# Patient Record
Sex: FEMALE | Race: WHITE | HISPANIC OR LATINO | Employment: OTHER | URBAN - METROPOLITAN AREA
[De-identification: names, ages, dates, MRNs, and addresses within clinical notes are randomized per-mention and may not be internally consistent; named-entity substitution may affect disease eponyms.]

---

## 2017-01-25 RX ORDER — DIAZEPAM 5 MG/1
TABLET ORAL
Qty: 30 TAB | Refills: 0 | Status: ON HOLD | OUTPATIENT
Start: 2017-01-25 | End: 2017-02-17

## 2017-01-26 RX ORDER — ESCITALOPRAM OXALATE 10 MG/1
TABLET ORAL
Qty: 90 TAB | Refills: 0 | Status: ON HOLD | OUTPATIENT
Start: 2017-01-26 | End: 2017-02-17

## 2017-02-16 ENCOUNTER — TELEPHONE (OUTPATIENT)
Dept: CARDIOLOGY | Facility: MEDICAL CENTER | Age: 72
End: 2017-02-16

## 2017-02-17 ENCOUNTER — HOSPITAL ENCOUNTER (INPATIENT)
Facility: MEDICAL CENTER | Age: 72
LOS: 8 days | DRG: 234 | End: 2017-02-27
Attending: INTERNAL MEDICINE | Admitting: INTERNAL MEDICINE
Payer: MEDICARE

## 2017-02-17 ENCOUNTER — TELEPHONE (OUTPATIENT)
Dept: CARDIOLOGY | Facility: MEDICAL CENTER | Age: 72
End: 2017-02-17

## 2017-02-17 ENCOUNTER — RESOLUTE PROFESSIONAL BILLING HOSPITAL PROF FEE (OUTPATIENT)
Dept: PULMONOLOGY | Facility: HOSPICE | Age: 72
End: 2017-02-17
Payer: MEDICARE

## 2017-02-17 DIAGNOSIS — I25.10 CORONARY ARTERY DISEASE INVOLVING NATIVE CORONARY ARTERY OF NATIVE HEART WITHOUT ANGINA PECTORIS: ICD-10-CM

## 2017-02-17 LAB
EKG IMPRESSION: NORMAL
GLUCOSE BLD-MCNC: 96 MG/DL (ref 65–99)
GLUCOSE BLD-MCNC: 97 MG/DL (ref 65–99)
INR PPP: 0.94 (ref 0.87–1.13)
PROTHROMBIN TIME: 12.9 SEC (ref 12–14.6)
TROPONIN I SERPL-MCNC: <0.01 NG/ML (ref 0–0.04)
TROPONIN I SERPL-MCNC: <0.01 NG/ML (ref 0–0.04)

## 2017-02-17 PROCEDURE — C1894 INTRO/SHEATH, NON-LASER: HCPCS

## 2017-02-17 PROCEDURE — B2151ZZ FLUOROSCOPY OF LEFT HEART USING LOW OSMOLAR CONTRAST: ICD-10-PCS | Performed by: INTERNAL MEDICINE

## 2017-02-17 PROCEDURE — B2111ZZ FLUOROSCOPY OF MULTIPLE CORONARY ARTERIES USING LOW OSMOLAR CONTRAST: ICD-10-PCS | Performed by: INTERNAL MEDICINE

## 2017-02-17 PROCEDURE — C1887 CATHETER, GUIDING: HCPCS

## 2017-02-17 PROCEDURE — 700101 HCHG RX REV CODE 250

## 2017-02-17 PROCEDURE — C1769 GUIDE WIRE: HCPCS

## 2017-02-17 PROCEDURE — A9270 NON-COVERED ITEM OR SERVICE: HCPCS | Performed by: NURSE PRACTITIONER

## 2017-02-17 PROCEDURE — 304952 HCHG R 2 PADS

## 2017-02-17 PROCEDURE — 700105 HCHG RX REV CODE 258: Performed by: INTERNAL MEDICINE

## 2017-02-17 PROCEDURE — 85610 PROTHROMBIN TIME: CPT

## 2017-02-17 PROCEDURE — 307093 HCHG TR BAND RADIAL

## 2017-02-17 PROCEDURE — 700111 HCHG RX REV CODE 636 W/ 250 OVERRIDE (IP)

## 2017-02-17 PROCEDURE — 84484 ASSAY OF TROPONIN QUANT: CPT | Mod: 91

## 2017-02-17 PROCEDURE — 700117 HCHG RX CONTRAST REV CODE 255: Performed by: INTERNAL MEDICINE

## 2017-02-17 PROCEDURE — 93005 ELECTROCARDIOGRAM TRACING: CPT | Performed by: INTERNAL MEDICINE

## 2017-02-17 PROCEDURE — 93458 L HRT ARTERY/VENTRICLE ANGIO: CPT

## 2017-02-17 PROCEDURE — 700105 HCHG RX REV CODE 258: Performed by: NURSE PRACTITIONER

## 2017-02-17 PROCEDURE — 700102 HCHG RX REV CODE 250 W/ 637 OVERRIDE(OP): Performed by: NURSE PRACTITIONER

## 2017-02-17 PROCEDURE — 360979 HCHG DIAGNOSTIC CATH

## 2017-02-17 PROCEDURE — 93010 ELECTROCARDIOGRAM REPORT: CPT | Performed by: INTERNAL MEDICINE

## 2017-02-17 PROCEDURE — 99152 MOD SED SAME PHYS/QHP 5/>YRS: CPT

## 2017-02-17 PROCEDURE — 36415 COLL VENOUS BLD VENIPUNCTURE: CPT

## 2017-02-17 PROCEDURE — 82962 GLUCOSE BLOOD TEST: CPT

## 2017-02-17 PROCEDURE — 4A023N7 MEASUREMENT OF CARDIAC SAMPLING AND PRESSURE, LEFT HEART, PERCUTANEOUS APPROACH: ICD-10-PCS | Performed by: INTERNAL MEDICINE

## 2017-02-17 PROCEDURE — G0378 HOSPITAL OBSERVATION PER HR: HCPCS

## 2017-02-17 RX ORDER — METOPROLOL TARTRATE 50 MG/1
50 TABLET, FILM COATED ORAL TWICE DAILY
Status: DISCONTINUED | OUTPATIENT
Start: 2017-02-18 | End: 2017-02-19

## 2017-02-17 RX ORDER — ZOLPIDEM TARTRATE 5 MG/1
5 TABLET ORAL
Status: DISCONTINUED | OUTPATIENT
Start: 2017-02-17 | End: 2017-02-20

## 2017-02-17 RX ORDER — LIDOCAINE HYDROCHLORIDE 20 MG/ML
INJECTION, SOLUTION INFILTRATION; PERINEURAL
Status: COMPLETED
Start: 2017-02-17 | End: 2017-02-17

## 2017-02-17 RX ORDER — METFORMIN HYDROCHLORIDE 500 MG/1
500 TABLET, EXTENDED RELEASE ORAL 2 TIMES DAILY WITH MEALS
COMMUNITY
End: 2021-10-13

## 2017-02-17 RX ORDER — ATORVASTATIN CALCIUM 20 MG/1
20 TABLET, FILM COATED ORAL
Status: DISCONTINUED | OUTPATIENT
Start: 2017-02-17 | End: 2017-02-27 | Stop reason: HOSPADM

## 2017-02-17 RX ORDER — DIAZEPAM 5 MG/1
5 TABLET ORAL 4 TIMES DAILY PRN
COMMUNITY
End: 2017-06-13 | Stop reason: SDUPTHER

## 2017-02-17 RX ORDER — AMOXICILLIN 250 MG
1 CAPSULE ORAL
Status: DISCONTINUED | OUTPATIENT
Start: 2017-02-17 | End: 2017-02-20

## 2017-02-17 RX ORDER — METOPROLOL TARTRATE 50 MG/1
100 TABLET, FILM COATED ORAL DAILY
Status: DISCONTINUED | OUTPATIENT
Start: 2017-02-17 | End: 2017-02-17

## 2017-02-17 RX ORDER — SIMVASTATIN 20 MG
20 TABLET ORAL EVERY EVENING
Status: DISCONTINUED | OUTPATIENT
Start: 2017-02-17 | End: 2017-02-17

## 2017-02-17 RX ORDER — ACETAMINOPHEN 325 MG/1
650 TABLET ORAL EVERY 6 HOURS PRN
Status: DISCONTINUED | OUTPATIENT
Start: 2017-02-17 | End: 2017-02-20

## 2017-02-17 RX ORDER — GUAIFENESIN/DEXTROMETHORPHAN 100-10MG/5
10 SYRUP ORAL EVERY 6 HOURS PRN
Status: DISCONTINUED | OUTPATIENT
Start: 2017-02-17 | End: 2017-02-20

## 2017-02-17 RX ORDER — DEXTROSE MONOHYDRATE 25 G/50ML
25 INJECTION, SOLUTION INTRAVENOUS
Status: DISCONTINUED | OUTPATIENT
Start: 2017-02-17 | End: 2017-02-20

## 2017-02-17 RX ORDER — ONDANSETRON 2 MG/ML
4 INJECTION INTRAMUSCULAR; INTRAVENOUS EVERY 4 HOURS PRN
Status: DISCONTINUED | OUTPATIENT
Start: 2017-02-17 | End: 2017-02-20

## 2017-02-17 RX ORDER — SODIUM CHLORIDE 9 MG/ML
1000 INJECTION, SOLUTION INTRAVENOUS CONTINUOUS
Status: DISCONTINUED | OUTPATIENT
Start: 2017-02-17 | End: 2017-02-17

## 2017-02-17 RX ORDER — ESCITALOPRAM OXALATE 10 MG/1
10 TABLET ORAL
Status: DISCONTINUED | OUTPATIENT
Start: 2017-02-17 | End: 2017-02-17

## 2017-02-17 RX ORDER — DOCUSATE SODIUM 100 MG/1
100 CAPSULE, LIQUID FILLED ORAL 2 TIMES DAILY
Status: DISCONTINUED | OUTPATIENT
Start: 2017-02-17 | End: 2017-02-20

## 2017-02-17 RX ORDER — ESCITALOPRAM OXALATE 10 MG/1
10 TABLET ORAL DAILY
Status: DISCONTINUED | OUTPATIENT
Start: 2017-02-17 | End: 2017-02-21

## 2017-02-17 RX ORDER — AMOXICILLIN 250 MG
1 CAPSULE ORAL NIGHTLY
Status: DISCONTINUED | OUTPATIENT
Start: 2017-02-17 | End: 2017-02-20

## 2017-02-17 RX ORDER — LOSARTAN POTASSIUM 25 MG/1
50 TABLET ORAL DAILY
Status: DISCONTINUED | OUTPATIENT
Start: 2017-02-17 | End: 2017-02-20

## 2017-02-17 RX ORDER — AMLODIPINE BESYLATE 10 MG/1
10 TABLET ORAL DAILY
Status: DISCONTINUED | OUTPATIENT
Start: 2017-02-17 | End: 2017-02-20

## 2017-02-17 RX ORDER — METOPROLOL TARTRATE 50 MG/1
50 TABLET, FILM COATED ORAL
Status: DISCONTINUED | OUTPATIENT
Start: 2017-02-17 | End: 2017-02-17

## 2017-02-17 RX ORDER — ONDANSETRON 4 MG/1
4 TABLET, ORALLY DISINTEGRATING ORAL EVERY 4 HOURS PRN
Status: DISCONTINUED | OUTPATIENT
Start: 2017-02-17 | End: 2017-02-20

## 2017-02-17 RX ORDER — VERAPAMIL HYDROCHLORIDE 2.5 MG/ML
INJECTION, SOLUTION INTRAVENOUS
Status: COMPLETED
Start: 2017-02-17 | End: 2017-02-17

## 2017-02-17 RX ORDER — ENEMA 19; 7 G/133ML; G/133ML
1 ENEMA RECTAL
Status: DISCONTINUED | OUTPATIENT
Start: 2017-02-17 | End: 2017-02-20

## 2017-02-17 RX ORDER — BISACODYL 10 MG
10 SUPPOSITORY, RECTAL RECTAL
Status: DISCONTINUED | OUTPATIENT
Start: 2017-02-17 | End: 2017-02-20

## 2017-02-17 RX ORDER — MAGNESIUM HYDROXIDE 400 MG/5ML
1 SUSPENSION, ORAL (FINAL DOSE FORM) ORAL 2 TIMES DAILY
Status: ON HOLD | COMMUNITY
End: 2017-02-25

## 2017-02-17 RX ORDER — MIDAZOLAM HYDROCHLORIDE 1 MG/ML
INJECTION INTRAMUSCULAR; INTRAVENOUS
Status: COMPLETED
Start: 2017-02-17 | End: 2017-02-17

## 2017-02-17 RX ORDER — LACTULOSE 20 G/30ML
30 SOLUTION ORAL
Status: DISCONTINUED | OUTPATIENT
Start: 2017-02-17 | End: 2017-02-20

## 2017-02-17 RX ORDER — LEVOTHYROXINE SODIUM 0.07 MG/1
75 TABLET ORAL
Status: DISCONTINUED | OUTPATIENT
Start: 2017-02-17 | End: 2017-02-27 | Stop reason: HOSPADM

## 2017-02-17 RX ORDER — SODIUM CHLORIDE 9 MG/ML
INJECTION, SOLUTION INTRAVENOUS CONTINUOUS
Status: DISPENSED | OUTPATIENT
Start: 2017-02-17 | End: 2017-02-17

## 2017-02-17 RX ORDER — HEPARIN SODIUM,PORCINE 1000/ML
VIAL (ML) INJECTION
Status: COMPLETED
Start: 2017-02-17 | End: 2017-02-17

## 2017-02-17 RX ORDER — DIAZEPAM 5 MG/1
5 TABLET ORAL EVERY 6 HOURS PRN
Status: DISCONTINUED | OUTPATIENT
Start: 2017-02-17 | End: 2017-02-20

## 2017-02-17 RX ADMIN — LIDOCAINE HYDROCHLORIDE: 20 INJECTION, SOLUTION INFILTRATION; PERINEURAL at 15:01

## 2017-02-17 RX ADMIN — ATORVASTATIN CALCIUM 20 MG: 20 TABLET, FILM COATED ORAL at 21:14

## 2017-02-17 RX ADMIN — STANDARDIZED SENNA CONCENTRATE AND DOCUSATE SODIUM 1 TABLET: 8.6; 5 TABLET, FILM COATED ORAL at 21:14

## 2017-02-17 RX ADMIN — AMLODIPINE BESYLATE 10 MG: 10 TABLET ORAL at 16:06

## 2017-02-17 RX ADMIN — LEVOTHYROXINE SODIUM 75 MCG: 75 TABLET ORAL at 16:05

## 2017-02-17 RX ADMIN — SODIUM CHLORIDE 1000 ML: 9 INJECTION, SOLUTION INTRAVENOUS at 13:15

## 2017-02-17 RX ADMIN — METOPROLOL TARTRATE 100 MG: 50 TABLET, FILM COATED ORAL at 16:05

## 2017-02-17 RX ADMIN — NITROGLYCERIN 10 ML: 20 INJECTION INTRAVENOUS at 15:01

## 2017-02-17 RX ADMIN — SODIUM CHLORIDE: 9 INJECTION, SOLUTION INTRAVENOUS at 16:10

## 2017-02-17 RX ADMIN — VERAPAMIL HYDROCHLORIDE 5 MG: 2.5 INJECTION, SOLUTION INTRAVENOUS at 15:02

## 2017-02-17 RX ADMIN — ASPIRIN 81 MG: 81 TABLET ORAL at 16:06

## 2017-02-17 RX ADMIN — FENTANYL CITRATE 50 MCG: 50 INJECTION, SOLUTION INTRAMUSCULAR; INTRAVENOUS at 15:04

## 2017-02-17 RX ADMIN — HEPARIN SODIUM: 1000 INJECTION, SOLUTION INTRAVENOUS; SUBCUTANEOUS at 15:01

## 2017-02-17 RX ADMIN — IOHEXOL 71 ML: 350 INJECTION, SOLUTION INTRAVENOUS at 15:11

## 2017-02-17 RX ADMIN — ESCITALOPRAM OXALATE 10 MG: 10 TABLET ORAL at 16:06

## 2017-02-17 RX ADMIN — LOSARTAN POTASSIUM 50 MG: 50 TABLET, FILM COATED ORAL at 16:06

## 2017-02-17 RX ADMIN — MIDAZOLAM 1 MG: 1 INJECTION INTRAMUSCULAR; INTRAVENOUS at 15:04

## 2017-02-17 RX ADMIN — HEPARIN SODIUM 2000 UNITS: 200 INJECTION, SOLUTION INTRAVENOUS at 15:01

## 2017-02-17 ASSESSMENT — LIFESTYLE VARIABLES
HAVE YOU EVER FELT YOU SHOULD CUT DOWN ON YOUR DRINKING: NO
TOTAL SCORE: 1
EVER_SMOKED: YES
TOTAL SCORE: 1
ALCOHOL_USE: YES
ALCOHOL_AMOUNT: 3
EVER_SMOKED: YES
EVER FELT BAD OR GUILTY ABOUT YOUR DRINKING: NO
AVERAGE NUMBER OF DAYS PER WEEK YOU HAVE A DRINK CONTAINING ALCOHOL: 3
ON A TYPICAL DAY WHEN YOU DRINK ALCOHOL HOW MANY DRINKS DO YOU HAVE: 4
CONSUMPTION TOTAL: POSITIVE
TOTAL SCORE: 1
DO YOU DRINK ALCOHOL: YES
HOW MANY TIMES IN THE PAST YEAR HAVE YOU HAD 5 OR MORE DRINKS IN A DAY: 0
EVER HAD A DRINK FIRST THING IN THE MORNING TO STEADY YOUR NERVES TO GET RID OF A HANGOVER: NO
HAVE PEOPLE ANNOYED YOU BY CRITICIZING YOUR DRINKING: YES

## 2017-02-17 ASSESSMENT — PAIN SCALES - GENERAL
PAINLEVEL_OUTOF10: 0
PAINLEVEL_OUTOF10: 0

## 2017-02-17 ASSESSMENT — COPD QUESTIONNAIRES
DURING THE PAST 4 WEEKS HOW MUCH DID YOU FEEL SHORT OF BREATH: NONE/LITTLE OF THE TIME
DO YOU EVER COUGH UP ANY MUCUS OR PHLEGM?: NO/ONLY WITH OCCASIONAL COLDS OR INFECTIONS
DURING THE PAST 4 WEEKS HOW MUCH DID YOU FEEL SHORT OF BREATH: SOME OF THE TIME
HAVE YOU SMOKED AT LEAST 100 CIGARETTES IN YOUR ENTIRE LIFE: YES
COPD SCREENING SCORE: 6

## 2017-02-17 ASSESSMENT — PATIENT HEALTH QUESTIONNAIRE - PHQ9
SUM OF ALL RESPONSES TO PHQ QUESTIONS 1-9: 0
SUM OF ALL RESPONSES TO PHQ9 QUESTIONS 1 AND 2: 0
2. FEELING DOWN, DEPRESSED, IRRITABLE, OR HOPELESS: NOT AT ALL
1. LITTLE INTEREST OR PLEASURE IN DOING THINGS: NOT AT ALL

## 2017-02-17 NOTE — IP AVS SNAPSHOT
" Home Care Instructions                                                                                                                  Name:Carole Kingsley  Medical Record Number:[id number]  CSN: [id number]    YOB: 1945   Age: 71 y.o.  Sex: female  HT:1.57 m (5' 1.81\") WT: 61 kg (134 lb 7.7 oz)          Admit Date: 2/17/2017     Discharge Date:   Today's Date: 2/27/2017  Attending Doctor:  Hernán Dowell M.D.                  Allergies:  Tape            Discharge Instructions       Discharge Instructions    Discharged to group home by medical transport with hospital escort. Discharged via wheelchair, hospital escort: Yes.  Special equipment needed: Not Applicable    Be sure to schedule a follow-up appointment with your primary care doctor or any specialists as instructed.     Discharge Plan:   Influenza Vaccine Indication: Patient Refuses    I understand that a diet low in cholesterol, fat, and sodium is recommended for good health. Unless I have been given specific instructions below for another diet, I accept this instruction as my diet prescription.   Other diet: cardiac    Special Instructions: heart surgery and acute coronary syndrome    Cardiac Surgery Discharge Instructions/[facility]    Activity:  1. NO driving for 4 weeks after surgery. You may ride as a passenger.  2. NO lifting of any item over 10 lbs (e.g. gallon of milk) for 6 weeks after surgery.  Do not raise both arms above head, only one at a time.  Do not push or pull with your arms.  3. Walk as much as possible! Walk a minimum of 4 times per day. Depending on your fatigue and comfort level, you may walk as much as you wish. There is no maximum.  4. Other physical activities (sex, housework, gardening, etc.) are OK after 4 weeks or after 8 weeks if you want to golf (start by putting, then advance to chipping, then to driving).  5. Continue using incentive spirometer for 2 weeks.  If you are going home on oxygen and " you were not on oxygen prior to surgery, keep using until you are oxygen free.  6. Weigh daily and write it down starting  the next morning after you arrive home. Call your doctor for a weight gain of 2-4 lbs in 1-2 days.    Incision Care:  1.  Home health nurse or patient may remove chest dressing 7 days post-surgery (on February 27, 2017) or sooner if the battery runs out or the device alarms. When the battery runs out, the vacuum will lose suction and the dressing will puff up.  Slowly roll down the dressing edges, until the entire dressing is removed. Then the entire dressing/incision vacuum may be thrown away.   2. Once the chest dressing is removed, keep the incision open to air. SHOWER EVERYDAY-no baths. Make sure to clean your incision(s) twice daily with plain, perfume and dye-free soap (if you shower in the morning, stand at the sink at bedtime and clean incision with soap and water). Then pat incision(s) dry with clean towel. Avoid creams or lotions on the incision(s).             3. If there is any increase in redness or swelling, or separation of the incision line, or thick drainage* from any of the incisions, call [facility] ([phone]). * Clear, thin drainage is not abnormal especially from the leg incision and/or chest tube sites.       4. Continue to wear your CELINE Stockings for 4 weeks on the leg(s) with the incision(s) or swelling. You may take off the stocking(s) when in bed or when the leg(s) are elevated.    General Instructions:  1. If you are on the blood thinner Coumadin (warfarin), you will need your coumadin levels checked periodically.  2. You have been referred to Cardiac Rehab which is highly recommended for you after heart surgery. You can start Cardiac Rehab 30 days after surgery.  If you do not have an appointment at the time of discharge call [phone] to schedule an appointment.  3. Your Primary Care Doctor typically handles Home Oxygen. The Home Oxygen service that  drops off your tanks should be checking your oxygen saturation levels. Oxygen may be stopped when you are > 90 % saturated on room air. Check with your Primary Care Doctor if you are unsure.    Prescription Refills/Questions:   Call your usual Pharmacy for ALL refills  1. Pain medication questions only: Call [facility] at [phone].  (Remember that refills may require additional physician approval and will need at least 24 hours’ notice. Please call for refills on Mondays through Fridays from 9 am to 4 pm).  2. For all other medications (except pain medication): Call your Cardiology Group or Primary Care Doctor (not [facility]) for refills or questions.    [facility] IS ALWAYS AVAILABLE TO ANSWER YOUR QUESTIONS. DO NOT HESITATE TO CALL!    Acute Coronary Syndrome (ACS) is a diagnosis that encompasses cardiac-related chest pain and heart attack. ACS occurs when the blood flow to the heart muscle is severely reduced or cut off completely due to a slow process called atherosclerosis.  Atherosclerosis is a disease in which the coronary arteries become narrow from a buildup of fat, cholesterol, and other substances that combine to form plaque. If the plaque breaks, a blood clot will form and block the blood flow to the heart muscle. This lack of blood flow can cause damage or death to the heart muscle which is called a heart attack or Myocardial Infarction (MI). There are two different types of MIs:  ST Elevation Myocardial Infarction or STEMI (the most severe type of heart attack) and Non-ST Elevation Myocardial Infarction or NSTEMI.    Treatment Plan:  · Cardiac Diet  - Low fat, low salt, low cholesterol   · Cardiac Rehab  - Your doctor has ordered you a referral to [facility].  Call [phone] to schedule an appointment.  · Attend my follow-up appointment with my Cardiologist.  · Take my medications as prescribed by my doctor  · Exercise daily  · Lower my bad cholesterol and raise  my good cholesterol and lower my blood pressure    Medications:  Certain medications are used to treat ACS.  Remember to always take medications as prescribed and never stop talking medications unless told by your doctor.    You have been prescribed the following medicatons:    Aspirin - Aspirin is used as a blood thinning medication and you will require this medication indefinitely.  Anti-platelet/blood thinner - Your Anti-platelet/Blood thinning medication is called clopidogrel, and is used in combination with aspirin to prevent clots from forming in your heart and/or around your stent.  Your doctor will determine how long you need to be on this medicine, but generally if you have a Drug Eluding stent, you will need to take this medication for at least one month, and you have a Bare Metal stent, you will need the medication for at least 3 months.  Some patients require this medication indefinitely.  Beta-Blocker - Beta-Blocker metoprolol is used to lower blood pressure and heart rate, and/or helps your heart heal after a heart attack.  Statin - Statin atorvastatin is used to lower cholesterol.    · Is patient discharged on Warfarin / Coumadin?   No     · Is patient Post Blood Transfusion?  No    Depression / Suicide Risk    As you are discharged from this [facility] facility, it is important to learn how to keep safe from harming yourself.    Recognize the warning signs:  · Abrupt changes in personality, positive or negative- including increase in energy   · Giving away possessions  · Change in eating patterns- significant weight changes-  positive or negative  · Change in sleeping patterns- unable to sleep or sleeping all the time   · Unwillingness or inability to communicate  · Depression  · Unusual sadness, discouragement and loneliness  · Talk of wanting to die  · Neglect of personal appearance   · Rebelliousness- reckless behavior  · Withdrawal from people/activities they love  · Confusion- inability to  concentrate     If you or a loved one observes any of these behaviors or has concerns about self-harm, here's what you can do:  · Talk about it- your feelings and reasons for harming yourself  · Remove any means that you might use to hurt yourself (examples: pills, rope, extension cords, firearm)  · Get professional help from the community (Mental Health, Substance Abuse, psychological counseling)  · Do not be alone:Call your Safe Contact- someone whom you trust who will be there for you.  · Call your local CRISIS HOTLINE 285-7115 or 257-555-2462  · Call your local Children's Mobile Crisis Response Team [place] (522) 381-8747 or www.BizSlate  · Call the toll free National Suicide Prevention Hotlines   · National Suicide Prevention Lifeline 852-947-RDEP (0493)  · National Hope Line Network 800-SUICIDE (124-3038)        Your appointments     Mar 07, 2017  2:00 PM   HOSPITAL FOLLOW UP with WARREN Silva   [facility]    [address]  [facility] [id number] [phone]            Apr 17, 2017 11:00 AM   Established Patient with Angel Ambrose M.D.   [facility] ([facility])    [address], [address]  [place] [id number] [phone]           You will be receiving a confirmation call a few days before your appointment from our automated call confirmation system.              Follow-up Information     1. Follow up with Hernán Dowell M.D. On 3/13/2017.    Specialty:  Cardiac Surgery    Why:  2:00 pm    Contact information    [id number]  [place] [address]  [phone]          2. Follow up with [facility] ([facility]) .    Specialties:  Skilled Nursing Facility, Hospice, Rehabilitation    Contact information    [address]  [facility] [address]  [phone]         Discharge Medication Instructions:    Below are the medications your physician expects you to take upon  discharge:    Review all your home medications and newly ordered medications with your doctor and/or pharmacist. Follow medication instructions as directed by your doctor and/or pharmacist.    Please keep your medication list with you and share with your physician.               Medication List      START taking these medications        Instructions    atorvastatin 20 MG Tabs   Last time this was given:  20 mg on 2/26/2017  8:00 PM   Commonly known as:  LIPITOR    Take 1 Tab by mouth every bedtime.   Dose:  20 mg       clopidogrel 75 MG Tabs   Last time this was given:  75 mg on 2/27/2017  8:30 AM   Commonly known as:  PLAVIX    Take 1 Tab by mouth every day.   Dose:  75 mg        MG Caps   Last time this was given:  100 mg on 2/27/2017  8:37 AM    Take 100 mg by mouth every morning.   Dose:  100 mg       furosemide 40 MG Tabs   Last time this was given:  40 mg on 2/27/2017  8:37 AM   Commonly known as:  LASIX    Take 1 Tab by mouth every day.   Dose:  40 mg       hydrocodone-acetaminophen 5-325 MG Tabs per tablet   Last time this was given:  2 Tabs on 2/27/2017  2:45 AM   Commonly known as:  NORCO    Take 1-2 Tabs by mouth every 6 hours as needed.   Dose:  1-2 Tab       losartan 25 MG Tabs   Last time this was given:  25 mg on 2/27/2017  8:30 AM   Commonly known as:  COZAAR    Take 1 Tab by mouth every day.   Dose:  25 mg       metoprolol 25 MG Tabs   Last time this was given:  25 mg on 2/27/2017  8:30 AM   Commonly known as:  LOPRESSOR    Take 1 Tab by mouth 2 Times a Day.   Dose:  25 mg       omeprazole 20 MG delayed-release capsule   Last time this was given:  20 mg on 2/27/2017  8:30 AM   Commonly known as:  PRILOSEC    Take 1 Cap by mouth every day.   Dose:  20 mg       potassium chloride SA 20 MEQ Tbcr   Last time this was given:  20 mEq on 2/27/2017  8:30 AM   Commonly known as:  Kdur    Take 1 Tab by mouth every day.   Dose:  20 mEq         CHANGE how you take these medications        Instructions     escitalopram 20 MG tablet   What changed:    - medication strength  - how much to take  - when to take this   Last time this was given:  20 mg on 2/27/2017  8:30 AM   Commonly known as:  LEXAPRO    Take 1 Tab by mouth every day.   Dose:  20 mg         CONTINUE taking these medications        Instructions    aspirin 81 MG EC tablet   Last time this was given:  81 mg on 2/27/2017  8:30 AM    Take 81 mg by mouth every day.   Dose:  81 mg       CALCIUM PO    Take 600 mg by mouth every day.   Dose:  600 mg       cholecalciferol 5000 UNIT Caps   Commonly known as:  VITAMIN D3    Take 5,000 Units by mouth every day.   Dose:  5000 Units       Cyanocobalamin 5000 MCG Caps    Take 5,000 mcg by mouth every day.   Dose:  5000 mcg       diazepam 5 MG Tabs   Last time this was given:  5 mg on 2/20/2017  4:39 AM   Commonly known as:  VALIUM    Take 5 mg by mouth 4 times a day as needed for Anxiety.   Dose:  5 mg       folic acid 400 MCG tablet   Commonly known as:  FOLVITE    Take 400 mcg by mouth every day.   Dose:  400 mcg       gemfibrozil 600 MG Tabs   Commonly known as:  LOPID    TAKE ONE TABLET BY MOUTH TWICE DAILY       levothyroxine 75 MCG Tabs   Last time this was given:  75 mcg on 2/27/2017  6:21 AM   Commonly known as:  SYNTHROID    TAKE ONE TABLET BY MOUTH ONCE DAILY       metformin  MG Tb24   Commonly known as:  GLUCOPHAGE XR    Take 500 mg by mouth 2 times a day.   Dose:  500 mg               Instructions           Diet / Nutrition:    Follow any diet instructions given to you by your doctor or the dietician, including how much salt (sodium) you are allowed each day.    If you are overweight, talk to your doctor about a weight reduction plan.    Activity:    Remain physically active following your doctor's instructions about exercise and activity.    Rest often.     Any time you become even a little tired or short of breath, SIT DOWN and rest.    Worsening Symptoms:    Report any of the following signs and  symptoms to the doctor's office immediately:    *Pain of jaw, arm, or neck  *Chest pain not relieved by medication                               *Dizziness or loss of consciousness  *Difficulty breathing even when at rest   *More tired than usual                                       *Bleeding drainage or swelling of surgical site  *Swelling of feet, ankles, legs or stomach                 *Fever (>100ºF)  *Pink or blood tinged sputum  *Weight gain (3lbs/day or 5lbs /week)           *Shock from internal defibrillator (if applicable)  *Palpitations or irregular heartbeats                *Cool and/or numb extremities    Stroke Awareness    Common Risk Factors for Stroke include:    Age  Atrial Fibrillation  Carotid Artery Stenosis  Diabetes Mellitus  Excessive alcohol consumption  High blood pressure  Overweight   Physical inactivity  Smoking    Warning signs and symptoms of a stroke include:    *Sudden numbness or weakness of the face, arm or leg (especially on one side of the body).  *Sudden confusion, trouble speaking or understanding.  *Sudden trouble seeing in one or both eyes.  *Sudden trouble walking, dizziness, loss of balance or coordination.Sudden severe headache with no known cause.    It is very important to get treatment quickly when a stroke occurs. If you experience any of the above warning signs, call 911 immediately.                   Disclaimer         Quit Smoking / Tobacco Use:    I understand the use of any tobacco products increases my chance of suffering from future heart disease or stroke and could cause other illnesses which may shorten my life. Quitting the use of tobacco products is the single most important thing I can do to improve my health. For further information on smoking / tobacco cessation call a Toll Free Quit Line at 1-640.260.8812 (*National [employer]) or 1-871.926.8829 (American Lung Association) or you can access the web based program at www.lungusa.org.    [place]  Tobacco Users Help Line:  (891) 792-6532       Toll Free: 3-074-717-3469  Quit Tobacco Program [facility] (996)113-3328    Crisis Hotline:    [place] Crisis Hotline:  0-093-ILOAAYB or 1-697.368.6115    [place] Crisis Hotline:    1-988.284.7130 or 388-580-2937    Discharge Survey:   Thank you for choosing [facility]. We hope we did everything we could to make your hospital stay a pleasant one. You may be receiving a phone survey and we would appreciate your time and participation in answering the questions. Your input is very valuable to us in our efforts to improve our service to our patients and their families.        My signature on this form indicates that:    1. I have reviewed and understand the above information.  2. My questions regarding this information have been answered to my satisfaction.  3. I have formulated a plan with my discharge nurse to obtain my prescribed medications for home.                  Disclaimer         __________________________________                     __________       ________                       Patient Signature                                                 Date                    Time

## 2017-02-17 NOTE — CONSULTS
"DATE OF CONSULTATION: 2/17/2017     REFERRING PHYSICIAN: Trav Corrales MD.     CONSULTING PHYSICIAN: Zane Carpio M.D.     REASON FOR CONSULTATION: coronary artery disease.     I have been asked by  to see the patient in surgical consultation   for evaluation of coronary artery bypass surgery.    HISTORY OF PRESENT ILLNESS: The patient is a 71 year old female who presented to an outside hospital a couple of days ago with chest.  She was released due to negative troponins.  She went to her cardiologist Dr. Johnson and described her pain.  Dr. Eugene then directly admitted her to [facility] for a heart cath.  She has been referred to me for surgical consultation.      PAST MEDICAL HISTORY:  has a past medical history of Granular cell tumor (2007); Hypertension; Hypothyroidism; Vitamin D deficiency; status post hysterectomy oophorectomy; Gout; DIABETES MELLITUS; Heart burn; Indigestion; Unspecified cataract; Psychiatric problem; Renal disorder (2013); Hiatus hernia syndrome; Pituitary adenoma ([facility]) (2008); Peripheral vascular disease with claudication (2014); Coronary artery disease (2014); and Hyperlipidemia.     PAST SURGICAL HISTORY:  has past surgical history that includes recovery (6/12/2014); other (2013); recovery (7/14/2014); hysterectomy, total abdominal; angioplasty (7/2014); and recovery (10/3/2014).     ALLERGIES:   Allergies   Allergen Reactions   • Tape Contact Dermatitis     \"paper is OK\"        CURRENT MEDICATIONS:   Home Medications     Reviewed by Haley Ennis (Pharmacy Tech) on 02/17/17 at 1357  Med List Status: Complete    Medication Last Dose Status    amlodipine (NORVASC) 10 MG Tab 2/16/2017 Active    aspirin 81 MG EC tablet 2/16/2017 Active    CALCIUM PO 2/16/2017 Active    cholecalciferol (VITAMIN D3) 5000 UNIT Cap 2/16/2017 Active    Cyanocobalamin 5000 MCG Cap 2/16/2017 Active    diazepam (VALIUM) 5 MG Tab 2/15/2017 Active    escitalopram (LEXAPRO) 10 MG Tab " 2/16/2017 Active    folic acid (FOLVITE) 400 MCG tablet 2/16/2017 Active    gemfibrozil (LOPID) 600 MG Tab 2/16/2017 Active    levothyroxine (SYNTHROID) 75 MCG Tab 2/16/2017 Active    losartan (COZAAR) 50 MG Tab 2/16/2017 Active    metformin ER (GLUCOPHAGE XR) 500 MG TABLET SR 24 HR 2/16/2017 Active    metoprolol (LOPRESSOR) 50 MG Tab 2/16/2017 Active    NITROSTAT 0.4 MG SL Tab 2/16/2017 Active    Omega 3-Lutein-Zeaxanthin (ADVANCED EYE HEALTH) 250-2.5-0.5 MG Cap 2/16/2017 Active    Potassium 99 MG TABS 2/16/2017 Active                FAMILY HISTORY:   Family History   Problem Relation Age of Onset   • Other Mother      Colon cancer   • Heart Attack Paternal Uncle      MI in 60        SOCIAL HISTORY:   Social History     Social History Main Topics   • Smoking status: Former Smoker -- 25 years     Types: Cigarettes     Quit date: 01/08/2014   • Smokeless tobacco: Never Used   • Alcohol Use: 1.0 oz/week     2 Standard drinks or equivalent per week      Comment: 2 a night but not for the past month/ Vodka   • Drug Use: No   • Sexual Activity: Not on file       REVIEW OF SYSTEMS: Comprehensive review of systems is negative with the   exception of the aforementioned HPI, PMH, and PSH elements in accordance with CMS guidelines.     PHYSICAL EXAMINATION:     General Appearance:  Alert and oriented x 3, well nourished, well developed, in no apparent distress.   HEENT:  Atraumatic, normocephalic, pupils equal round reactive to light, extra ocular movements are intact, oropharynx is clear without exudates.   Neck:  Supple without lymphadenopathy. No carotid bruits bilaterally.   Cardiovascular:  Regular rate and rhythm.  No murmurs, rubs, or gallops.   Pulmonary:  No adventitious lung sounds are heard.  Air entry is adequate bilaterally.  Abdomen:  Soft non-tender, non-distended, bowel sounds are present.   Extremities:   No clubbing, cyanosis, or generalized edema is noted.    Neurologic: Non focal exam.   Patient answers  questions appropriately    LABORATORY VALUES:           Recent Labs      02/17/17   1344   INR  0.94     Recent Labs      02/17/17   1344   INR  0.94        IMAGING:   Echocardiogram Comp W/O Cont    (Results Pending)       IMPRESSION AND PLAN:    A pleasant 71 year old female whom was on maximal medical therapy and continued to have chest pain.  I recommend CABG with grafts to the distal LAD, the diagonal 1 and 2, right posterior descending, possible right and posterior lateral.  My partner Dr. Dowell to do case on Monday.  Dr. Dowell to discuss risk on Sunday with patient.        ____________________________________   Zane Carpio M.D.

## 2017-02-17 NOTE — IP AVS SNAPSHOT
Voxeo Access Code: [email or web address]  Expires: 3/29/2017 11:07 AM    Voxeo  A secure, online tool to manage your health information     [employer]’s Voxeo® is a secure, online tool that connects you to your personalized health information from the privacy of your home -- day or night - making it very easy for you to manage your healthcare. Once the activation process is completed, you can even access your medical information using the Voxeo meredith, which is available for free in the Apple Meredith store or Google Play store.     Voxeo provides the following levels of access (as shown below):   My Chart Features   [facility] Primary Care Doctor [facility]  Specialists [facility]  Urgent  Care Non-[facility]  Primary Care  Doctor   Email your healthcare team securely and privately 24/7 X X X X   Manage appointments: schedule your next appointment; view details of past/upcoming appointments X      Request prescription refills. X      View recent personal medical records, including lab and immunizations X X X X   View health record, including health history, allergies, medications X X X X   Read reports about your outpatient visits, procedures, consult and ER notes X X X X   See your discharge summary, which is a recap of your hospital and/or ER visit that includes your diagnosis, lab results, and care plan. X X       How to register for Voxeo:  1. Go to  [email or web address].  2. Click on the Sign Up Now box, which takes you to the New Member Sign Up page. You will need to provide the following information:  a. Enter your Voxeo Access Code exactly as it appears at the top of this page. (You will not need to use this code after you’ve completed the sign-up process. If you do not sign up before the expiration date, you must request a new code.)   b. Enter your date of birth.   c. Enter your home email address.   d. Click Submit, and follow the next screen’s instructions.  3. Create a Voxeo ID. This will be your Voxeo  login ID and cannot be changed, so think of one that is secure and easy to remember.  4. Create a [facility] password. You can change your password at any time.  5. Enter your Password Reset Question and Answer. This can be used at a later time if you forget your password.   6. Enter your e-mail address. This allows you to receive e-mail notifications when new information is available in [facility].  7. Click Sign Up. You can now view your health information.    For assistance activating your [facility] account, call [phone]

## 2017-02-17 NOTE — TELEPHONE ENCOUNTER
Patient of Chavo having chest pain and seen at [facility]. She needs to be sen by someone tomororrow hopefully Dr Eugene.

## 2017-02-17 NOTE — IP AVS SNAPSHOT
" <p align=\"LEFT\"><IMG SRC=\"//[email or web address]\" alt=\"Image\" WIDTH=\"50%\" HEIGHT=\"200\" BORDER=\"\"></p>                   Name:Carole Kingsley  Medical Record Number:[id number]  CSN: [id number]    YOB: 1945   Age: 71 y.o.  Sex: female  HT:1.57 m (5' 1.81\") WT: 61 kg (134 lb 7.7 oz)          Admit Date: 2/17/2017     Discharge Date:   Today's Date: 2/27/2017  Attending Doctor:  Hernán Dowell M.D.                  Allergies:  Tape          Your appointments     Mar 07, 2017  2:00 PM   HOSPITAL FOLLOW UP with WARREN Silva   [facility]    [address]  [place] [id number] [phone]            Apr 17, 2017 11:00 AM   Established Patient with Angel Ambrose M.D.   [facility] ([facility])    [address]  [place] [id number] [phone]           You will be receiving a confirmation call a few days before your appointment from our automated call confirmation system.              Follow-up Information     1. Follow up with Hernán Dowell M.D. On 3/13/2017.    Specialty:  Cardiac Surgery    Why:  2:00 pm    Contact information    [id number]  [address]  [place] [address]  [phone]          2. Follow up with [facility] ([facility]) .    Specialties:  Skilled Nursing Facility, Hospice, Rehabilitation    Contact information    [address]  [place] [address]  [phone]         Medication List      Take these Medications        Instructions    aspirin 81 MG EC tablet    Take 81 mg by mouth every day.   Dose:  81 mg       atorvastatin 20 MG Tabs   Commonly known as:  LIPITOR    Take 1 Tab by mouth every bedtime.   Dose:  20 mg       CALCIUM PO    Take 600 mg by mouth every day.   Dose:  600 mg       cholecalciferol 5000 UNIT Caps   Commonly known as:  VITAMIN D3    Take 5,000 Units by mouth every day.   Dose:  5000 Units       clopidogrel 75 MG Tabs "   Commonly known as:  PLAVIX    Take 1 Tab by mouth every day.   Dose:  75 mg       Cyanocobalamin 5000 MCG Caps    Take 5,000 mcg by mouth every day.   Dose:  5000 mcg       diazepam 5 MG Tabs   Commonly known as:  VALIUM    Take 5 mg by mouth 4 times a day as needed for Anxiety.   Dose:  5 mg        MG Caps    Take 100 mg by mouth every morning.   Dose:  100 mg       escitalopram 20 MG tablet   What changed:    - medication strength  - how much to take  - when to take this   Commonly known as:  LEXAPRO    Take 1 Tab by mouth every day.   Dose:  20 mg       folic acid 400 MCG tablet   Commonly known as:  FOLVITE    Take 400 mcg by mouth every day.   Dose:  400 mcg       furosemide 40 MG Tabs   Commonly known as:  LASIX    Take 1 Tab by mouth every day.   Dose:  40 mg       gemfibrozil 600 MG Tabs   Commonly known as:  LOPID    TAKE ONE TABLET BY MOUTH TWICE DAILY       hydrocodone-acetaminophen 5-325 MG Tabs per tablet   Commonly known as:  NORCO    Take 1-2 Tabs by mouth every 6 hours as needed.   Dose:  1-2 Tab       levothyroxine 75 MCG Tabs   Commonly known as:  SYNTHROID    TAKE ONE TABLET BY MOUTH ONCE DAILY       losartan 25 MG Tabs   Commonly known as:  COZAAR    Take 1 Tab by mouth every day.   Dose:  25 mg       metformin  MG Tb24   Commonly known as:  GLUCOPHAGE XR    Take 500 mg by mouth 2 times a day.   Dose:  500 mg       metoprolol 25 MG Tabs   Commonly known as:  LOPRESSOR    Take 1 Tab by mouth 2 Times a Day.   Dose:  25 mg       omeprazole 20 MG delayed-release capsule   Commonly known as:  PRILOSEC    Take 1 Cap by mouth every day.   Dose:  20 mg       potassium chloride SA 20 MEQ Tbcr   Commonly known as:  Kdur    Take 1 Tab by mouth every day.   Dose:  20 mEq

## 2017-02-17 NOTE — H&P
Reason of Consult: CP    HPI:  71F with unrevascularized MVCAD presents as a direct admission after talking with her cardiologist Dr. Eugene. She notes yesterday the onset of stuttering substernal chest pressure radiating to left arm and axilla. Longest episode 15 minutes, spontaneously resolves but occurring at rest. Compliant with her medical therapy. Seen at an outside hospital last night and was discharged after normal labs and ECG. Continues to have stuttering intermittent pain, but none now.    Denies any other cardiovascular symptoms including shortness of breath, dyspnea on exertion, lightheadedness, syncope or presyncope, lower extremity edema, PND, orthopnea or palpitations.      Past Medical History   Diagnosis Date   • Granular cell tumor 2007     pituitary / status post transsphenoidal resection   • Hypertension    • Hypothyroidism      primary   • Vitamin D deficiency    • status post hysterectomy oophorectomy    • Gout    • DIABETES MELLITUS      oral meds   • Heart burn    • Indigestion    • Unspecified cataract      early stage   • Psychiatric problem      anxiety   • Renal disorder 2013     hospitalized for kidney infection   • Hiatus hernia syndrome    • Pituitary adenoma (CMS-HCC) 2008   • Peripheral vascular disease with claudication 2014     status post corrective surgery   • Coronary artery disease 2014   • Hyperlipidemia      especially hypertriglyceridemia       Past Surgical History   Procedure Laterality Date   • Recovery  6/12/2014     Performed by Ir-Recovery Surgery at SURGERY [facility] ORS   • Other  2013     eye (laser)   • Recovery  7/14/2014     Performed by Ir-Recovery Surgery at SURGERY SAME DAY [facility] ORS   • Hysterectomy, total abdominal     • Angioplasty  7/2014     legs   • Recovery  10/3/2014     Performed by Cath-Recovery Surgery at SURGERY SAME DAY [facility] ORS       No current facility-administered medications on file prior to encounter.     Current Outpatient  Prescriptions on File Prior to Encounter   Medication Sig Dispense Refill   • escitalopram (LEXAPRO) 10 MG Tab TAKE ONE TABLET BY MOUTH ONCE DAILY 90 Tab 0   • diazepam (VALIUM) 5 MG Tab TAKE ONE TABLET BY MOUTH 4 TIMES DAILY AFTER MEALS AND AT BEDTIME 30 Tab 0   • losartan (COZAAR) 50 MG Tab TAKE ONE TABLET BY MOUTH ONCE DAILY 90 Tab 2   • metformin (GLUCOPHAGE) 500 MG Tab TAKE ONE TABLET BY MOUTH TWICE DAILY WITH FOOD 60 Tab 0   • Multiple Vitamins-Minerals (PRESERVISION AREDS 2) Cap Take  by mouth.     • simvastatin (ZOCOR) 20 MG Tab Take 1 Tab by mouth every evening. 30 Tab 11   • levothyroxine (SYNTHROID) 75 MCG Tab TAKE ONE TABLET BY MOUTH ONCE DAILY 30 Tab 6   • NITROSTAT 0.4 MG SL Tab DISSOLVE ONE TABLET UNDER THE TONGUE EVERY 5 MINUTES AS NEEDED FOR CHEST PAIN.  DO NOT EXCEED A TOTAL OF 3 DOSES IN 15 MINUTES NOW 25 Tab 3   • metoprolol (LOPRESSOR) 50 MG Tab 50 mg 2 times a day. TAKE 2 TABLETS EVERY MORNING  Indications: High Blood Pressure     • amlodipine (NORVASC) 10 MG Tab Take 1 Tab by mouth every day. 90 Tab 4   • Coenzyme Q10 (CO Q 10 PO) Take  by mouth every day.     • metformin (GLUCOPHAGE) 500 MG Tab Take 1 Tab by mouth 2 times a day, with meals. 60 Tab 0   • losartan (COZAAR) 50 MG Tab Take 1 Tab by mouth every day. 30 Tab 0   • gemfibrozil (LOPID) 600 MG Tab TAKE ONE TABLET BY MOUTH TWICE DAILY 60 Tab 0   • escitalopram (LEXAPRO) 10 MG Tab Take 1 Tab by mouth every day. 30 Tab 0   • Cholecalciferol (VITAMIN D-3) 5000 UNITS TABS Take  by mouth every day.     • aspirin 81 MG EC tablet Take  by mouth every day.     • folic acid (FOLVITE) 400 MCG tablet Take 400 mcg by mouth every day.     • Potassium 99 MG TABS TAKE 2 TABLETS EVERY MORNING & 2 TABLETS EVERY EVENING     • Cyanocobalamin (VITAMIN B 12 PO) Take 1,000 mcg by mouth every day.     • CALCIUM PO Take 600 mg by mouth every day.         Current Facility-Administered Medications   Medication Dose Frequency Provider Last Rate Last Dose   •  amlodipine (NORVASC) tablet 10 mg  10 mg DAILY Sarah Servin A.P.N.       • aspirin EC (ECOTRIN) tablet 81 mg  81 mg DAILY Sarah Servin A.P.N.       • diazepam (VALIUM) tablet 5 mg  5 mg Q6HRS PRN Sarah Servin A.P.N.       • escitalopram (LEXAPRO) tablet 10 mg  10 mg QDAY Sarah Servin A.P.N.       • [START ON 2/18/2017] levothyroxine (SYNTHROID) tablet 75 mcg  75 mcg AM ES Sarah Servin A.P.N.       • [START ON 2/18/2017] losartan (COZAAR) tablet 50 mg  50 mg QDAY Sarah Servin A.P.N.       • metoprolol (LOPRESSOR) tablet 50 mg  50 mg TWICE DAILY Sarah Servin A.P.N.       • simvastatin (ZOCOR) tablet 20 mg  20 mg Q EVENING Sarah Servin A.P.N.       • docusate sodium (COLACE) capsule 100 mg  100 mg BID Sarah Servin, A.P.N.       • senna-docusate (PERICOLACE or SENOKOT S) 8.6-50 MG per tablet 1 Tab  1 Tab Nightly Sarah Servin A.P.N.       • senna-docusate (PERICOLACE or SENOKOT S) 8.6-50 MG per tablet 1 Tab  1 Tab Q24HRS PRN Sarah Servin A.P.N.       • lactulose 20 GM/30ML solution 30 mL  30 mL Q24HRS PRN Sarah Servin, A.P.N.       • bisacodyl (DULCOLAX) suppository 10 mg  10 mg Q24HRS PRN Sarah Servin A.P.N.       • fleet enema 133 mL  1 Each Once PRN Sarah Servin A.P.N.       • Respiratory Care per Protocol   Continuous RT Sarah Servin A.P.N.       • NS infusion 1,000 mL  1,000 mL Continuous Sarah Servin A.P.N.       • ondansetron (ZOFRAN) syringe/vial injection 4 mg  4 mg Q4HRS PRN Sarah Servin, A.P.N.       • ondansetron (ZOFRAN ODT) dispertab 4 mg  4 mg Q4HRS PRN Sarah Servin, A.P.N.       • zolpidem (AMBIEN) tablet 5 mg  5 mg HS PRN - MR X 1 Sarah Servin, A.P.N.       • guaifenesin DM (ROBITUSSIN DM) 100-10 MG/5ML syrup 10 mL  10 mL Q6HRS PRN Sarah Servin, A.P.N.       • insulin lispro (HUMALOG) injection 1-6 Units  1-6 Units 4X/DAY [facility] Sarah Servin, A.P.N.       • glucose 4 g chewable tablet 16 g  16 g Q15 MIN PRN Sarah Servin, A.P.N.        And   •  dextrose 50% (D50W) injection 25 mL  25 mL Q15 MIN PRN Sarah Servin AZulmaPZulmaN.       • acetaminophen (TYLENOL) tablet 650 mg  650 mg Q6HRS PRN Sarah Servin A.P.N.         Last reviewed on 2/17/2017 12:27 PM by Kellie Norris R.N.    Tape    Family History   Problem Relation Age of Onset   • Other Mother      Colon cancer   • Heart Attack Paternal Uncle      MI in 60       ROS: As HPI other reviewed and negative     Physical Exam   Blood pressure 175/92, pulse 52, temperature 36.6 °C (97.8 °F), resp. rate 18, weight 57.607 kg (127 lb), SpO2 95 %.    Constitutional: Appears well-developed.   HENT: Normocephalic and atraumatic. No scleral icterus.   Neck: No JVD present.   Cardiovascular: Normal rate. Exam reveals no gallop and no friction rub. No murmur heard.   Pulmonary/Chest: CTAB    Abdominal: S/NT/ND BS+   Musculoskeletal: Exhibits no edema. Pulses present.  Skin: Skin is warm and dry.   Neuro: Non-focal, CN 2-12 intact grossly    No intake or output data in the 24 hours ending 02/17/17 1323                              EKG (pnd):    Outside labs from last night: CBC, CMP unremarkable. Troponin negative.    Imaging reviewed    Impressions:  1. Unstable angina  2. MVCAD  3. HTN  4. PAD  5. HLP  6. DM2  7. Bi-cytopenia with history of reported iron deficiency anemia    Recommendations:  Cardiac catheterization and likely CTS consultation for CABG  Continue home medications except metformin  Statin, beta blocker, ACE-I and up-titrate as tolerated.  ASA  NS  PRN NTG    I have discussed the risks and benefits of cardiac catheterization as well as the procedure itself, rationale and appropriateness in detail with the patient today. Complications including but not limited to death, stroke, MI, urgent bypass surgery, contrast nephropathy, vascular complications, bleeding and infection were explained to the patient. The potential outcomes associated with the procedure (possible PCI, possible CABG, possible medical Rx  only) were also discussed at length. The patient agrees to proceed.

## 2017-02-17 NOTE — IP AVS SNAPSHOT
2/27/2017          Carole Kingsley  [address]  [place] [address]    Dear Carole:    [facility] wants to ensure your discharge home is safe and you or your loved ones have had all your questions answered regarding your care after you leave the hospital.    You may receive a telephone call within two days of your discharge.  This call is to make certain you understand your discharge instructions as well as ensure we provided you with the best care possible during your stay with us.     The call will only last approximately 3-5 minutes and will be done by a nurse.    Once again, we want to ensure your discharge home is safe and that you have a clear understanding of any next steps in your care.  If you have any questions or concerns, please do not hesitate to contact us, we are here for you.  Thank you for choosing [facility] for your healthcare needs.    Sincerely,    Jorge Luis Casanova    [facility]

## 2017-02-17 NOTE — CATH LAB
Immediate Post-Operative Note      PreOp Diagnosis:   1. Unstable angina  2. CAD  3. HTN    PostOp Diagnosis:   1. 3v CAD  2. Normal LVEF    Procedure(s) :  Coronary Angiography, Left Heart Catheterization, Left Ventriculography    Surgeon(s):  Trav Corrales M.D.    Type of Anesthesia: Moderate Sedation    Specimen: None  Complications: none apparent    Recs:  CABG        Trav Corrales M.D.  2/17/2017 3:09 PM

## 2017-02-17 NOTE — PROGRESS NOTES
"Med rec complete per patient and patient's family at bedside with list and Rx bottles ([facility] [place]).  Patient states she is not taking \"any statins\".  Patient states she took nitroglycerin three times yesterday.  Patient denies taking antibiotics within last month.  Allergies reviewed.  "

## 2017-02-17 NOTE — PROGRESS NOTES
MARGARITA CM received medical records from [facility], records include EKG tracings, labs, H&P, CXR.  Documents scanned to EPIC media for reviewing and archiving

## 2017-02-17 NOTE — TELEPHONE ENCOUNTER
Dr. Eugene has called patient.  Choosing to direct admit at [facility] with heart cath today.  Bed Control notified. Tamica will be on route from [place].

## 2017-02-18 LAB
ANION GAP SERPL CALC-SCNC: 10 MMOL/L (ref 0–11.9)
BUN SERPL-MCNC: 25 MG/DL (ref 8–22)
CALCIUM SERPL-MCNC: 9.1 MG/DL (ref 8.5–10.5)
CHLORIDE SERPL-SCNC: 107 MMOL/L (ref 96–112)
CO2 SERPL-SCNC: 21 MMOL/L (ref 20–33)
CREAT SERPL-MCNC: 1.05 MG/DL (ref 0.5–1.4)
EKG IMPRESSION: NORMAL
ERYTHROCYTE [DISTWIDTH] IN BLOOD BY AUTOMATED COUNT: 45.8 FL (ref 35.9–50)
GFR SERPL CREATININE-BSD FRML MDRD: 52 ML/MIN/1.73 M 2
GLUCOSE BLD-MCNC: 103 MG/DL (ref 65–99)
GLUCOSE BLD-MCNC: 112 MG/DL (ref 65–99)
GLUCOSE BLD-MCNC: 139 MG/DL (ref 65–99)
GLUCOSE BLD-MCNC: 95 MG/DL (ref 65–99)
GLUCOSE SERPL-MCNC: 92 MG/DL (ref 65–99)
HCT VFR BLD AUTO: 35.1 % (ref 37–47)
HGB BLD-MCNC: 11.3 G/DL (ref 12–16)
LV EJECT FRACT  99904: 70
LV EJECT FRACT MOD 2C 99903: 74.91
LV EJECT FRACT MOD 4C 99902: 64.52
LV EJECT FRACT MOD BP 99901: 69.95
MCH RBC QN AUTO: 30.1 PG (ref 27–33)
MCHC RBC AUTO-ENTMCNC: 32.2 G/DL (ref 33.6–35)
MCV RBC AUTO: 93.4 FL (ref 81.4–97.8)
PLATELET # BLD AUTO: 226 K/UL (ref 164–446)
PMV BLD AUTO: 11.2 FL (ref 9–12.9)
POTASSIUM SERPL-SCNC: 3.7 MMOL/L (ref 3.6–5.5)
RBC # BLD AUTO: 3.76 M/UL (ref 4.2–5.4)
SODIUM SERPL-SCNC: 138 MMOL/L (ref 135–145)
TROPONIN I SERPL-MCNC: <0.01 NG/ML (ref 0–0.04)
WBC # BLD AUTO: 7.1 K/UL (ref 4.8–10.8)

## 2017-02-18 PROCEDURE — 93970 EXTREMITY STUDY: CPT

## 2017-02-18 PROCEDURE — 36415 COLL VENOUS BLD VENIPUNCTURE: CPT

## 2017-02-18 PROCEDURE — A9270 NON-COVERED ITEM OR SERVICE: HCPCS | Performed by: NURSE PRACTITIONER

## 2017-02-18 PROCEDURE — 93970 EXTREMITY STUDY: CPT | Mod: 26 | Performed by: SURGERY

## 2017-02-18 PROCEDURE — 700102 HCHG RX REV CODE 250 W/ 637 OVERRIDE(OP): Performed by: NURSE PRACTITIONER

## 2017-02-18 PROCEDURE — 84484 ASSAY OF TROPONIN QUANT: CPT

## 2017-02-18 PROCEDURE — G0378 HOSPITAL OBSERVATION PER HR: HCPCS

## 2017-02-18 PROCEDURE — A9270 NON-COVERED ITEM OR SERVICE: HCPCS | Performed by: INTERNAL MEDICINE

## 2017-02-18 PROCEDURE — 93306 TTE W/DOPPLER COMPLETE: CPT | Mod: 26 | Performed by: INTERNAL MEDICINE

## 2017-02-18 PROCEDURE — 82962 GLUCOSE BLOOD TEST: CPT | Mod: 91

## 2017-02-18 PROCEDURE — 700102 HCHG RX REV CODE 250 W/ 637 OVERRIDE(OP): Performed by: INTERNAL MEDICINE

## 2017-02-18 PROCEDURE — 85027 COMPLETE CBC AUTOMATED: CPT

## 2017-02-18 PROCEDURE — 93880 EXTRACRANIAL BILAT STUDY: CPT | Mod: 26 | Performed by: SURGERY

## 2017-02-18 PROCEDURE — 700112 HCHG RX REV CODE 229: Performed by: NURSE PRACTITIONER

## 2017-02-18 PROCEDURE — 80048 BASIC METABOLIC PNL TOTAL CA: CPT

## 2017-02-18 PROCEDURE — 93306 TTE W/DOPPLER COMPLETE: CPT

## 2017-02-18 PROCEDURE — 93010 ELECTROCARDIOGRAM REPORT: CPT | Performed by: INTERNAL MEDICINE

## 2017-02-18 PROCEDURE — 93880 EXTRACRANIAL BILAT STUDY: CPT

## 2017-02-18 PROCEDURE — 93005 ELECTROCARDIOGRAM TRACING: CPT | Performed by: NURSE PRACTITIONER

## 2017-02-18 RX ORDER — ALPRAZOLAM 0.5 MG/1
0.5 TABLET ORAL NIGHTLY PRN
Status: DISCONTINUED | OUTPATIENT
Start: 2017-02-18 | End: 2017-02-20

## 2017-02-18 RX ADMIN — AMLODIPINE BESYLATE 10 MG: 10 TABLET ORAL at 08:27

## 2017-02-18 RX ADMIN — STANDARDIZED SENNA CONCENTRATE AND DOCUSATE SODIUM 1 TABLET: 8.6; 5 TABLET, FILM COATED ORAL at 16:41

## 2017-02-18 RX ADMIN — LEVOTHYROXINE SODIUM 75 MCG: 75 TABLET ORAL at 05:41

## 2017-02-18 RX ADMIN — DOCUSATE SODIUM 100 MG: 100 CAPSULE ORAL at 21:24

## 2017-02-18 RX ADMIN — LOSARTAN POTASSIUM 50 MG: 50 TABLET, FILM COATED ORAL at 08:26

## 2017-02-18 RX ADMIN — METOPROLOL TARTRATE 50 MG: 50 TABLET, FILM COATED ORAL at 09:38

## 2017-02-18 RX ADMIN — ASPIRIN 81 MG: 81 TABLET ORAL at 08:27

## 2017-02-18 RX ADMIN — DOCUSATE SODIUM 100 MG: 100 CAPSULE ORAL at 08:27

## 2017-02-18 RX ADMIN — STANDARDIZED SENNA CONCENTRATE AND DOCUSATE SODIUM 1 TABLET: 8.6; 5 TABLET, FILM COATED ORAL at 21:24

## 2017-02-18 RX ADMIN — ALPRAZOLAM 0.5 MG: 0.5 TABLET ORAL at 21:23

## 2017-02-18 RX ADMIN — ATORVASTATIN CALCIUM 20 MG: 20 TABLET, FILM COATED ORAL at 21:24

## 2017-02-18 RX ADMIN — ESCITALOPRAM OXALATE 10 MG: 10 TABLET ORAL at 08:28

## 2017-02-18 ASSESSMENT — PAIN SCALES - GENERAL
PAINLEVEL_OUTOF10: 0
PAINLEVEL_OUTOF10: 0

## 2017-02-18 NOTE — PROGRESS NOTES
Hemoband release completed at this time. Pt alert and oriented x4. O2 removed at 1700 and maintains POX >95%.  in to explain need for CABG procedure to pt and granddaughter and then spoke with the son on the phone. Pt aware that procedure will not be done until Monday.

## 2017-02-18 NOTE — PROGRESS NOTES
Patient's HR in low 50's high 40's. Order received from Cardiologist Dr. Valverde to discontinue metoprolol 100 mg and change other order of metoprolol 50 mg to BID with paramaters to hold if SBP <100 or HR <60. Orders to hold metoprolol for tonight since HR in 50's.

## 2017-02-18 NOTE — CARE PLAN
Problem: Safety  Goal: Will remain free from injury  Non slip socks on, bed in low position, 2 side rails up, call light within reach, educated on fall risk, verbalizes understanding, will continue to monitor.    Problem: Knowledge Deficit  Goal: Knowledge of disease process/condition, treatment plan, diagnostic tests, and medications will improve  Patient updated on plan of care for the day, patient verbalized understanding. Understands will have full discussion with cardiologist on Sunday for CABG on Monday. All questions and concerns addressed at this time.

## 2017-02-18 NOTE — PROGRESS NOTES
Interval History:  71F with MVCAD  2/18: Cath with MVCAD. Doing well.    Physical Exam   Blood pressure 140/67, pulse 48, temperature 36.1 °C (97 °F), resp. rate 18, weight 64.5 kg (142 lb 3.2 oz), SpO2 94 %.    Constitutional: Appears well-developed.   HENT: Normocephalic and atraumatic. No scleral icterus.   Neck: No JVD present.   Cardiovascular: Normal rate. Exam reveals no gallop and no friction rub. No murmur heard.   Pulmonary/Chest: CTAB   Abdominal: S/NT/ND BS+   Musculoskeletal: Exhibits no edema. Pulses present.  Skin: Skin is warm and dry.     ROS: As HPI other reviewed and negative       Intake/Output Summary (Last 24 hours) at 02/18/17 0947  Last data filed at 02/17/17 2000   Gross per 24 hour   Intake    150 ml   Output    200 ml   Net    -50 ml       Recent Labs      02/18/17   0204   WBC  7.1   RBC  3.76*   HEMOGLOBIN  11.3*   HEMATOCRIT  35.1*   MCV  93.4   MCH  30.1   MCHC  32.2*   RDW  45.8   PLATELETCT  226   MPV  11.2     Recent Labs      02/18/17   0204   SODIUM  138   POTASSIUM  3.7   CHLORIDE  107   CO2  21   GLUCOSE  92   BUN  25*   CREATININE  1.05   CALCIUM  9.1     Recent Labs      02/17/17   1344   INR  0.94         Recent Labs      02/17/17   1344  02/17/17   1929  02/18/17   0204   TROPONINI  <0.01  <0.01  <0.01           No current facility-administered medications on file prior to encounter.     Current Outpatient Prescriptions on File Prior to Encounter   Medication Sig Dispense Refill   • levothyroxine (SYNTHROID) 75 MCG Tab TAKE ONE TABLET BY MOUTH ONCE DAILY 30 Tab 6   • NITROSTAT 0.4 MG SL Tab DISSOLVE ONE TABLET UNDER THE TONGUE EVERY 5 MINUTES AS NEEDED FOR CHEST PAIN.  DO NOT EXCEED A TOTAL OF 3 DOSES IN 15 MINUTES NOW 25 Tab 3   • metoprolol (LOPRESSOR) 50 MG Tab Take  mg by mouth 2 times a day. 100 mg in am  50 mg in pm  Indications: High Blood Pressure     • amlodipine (NORVASC) 10 MG Tab Take 1 Tab by mouth every day. 90 Tab 4   • losartan (COZAAR) 50 MG Tab Take 1  Tab by mouth every day. 30 Tab 0   • gemfibrozil (LOPID) 600 MG Tab TAKE ONE TABLET BY MOUTH TWICE DAILY 60 Tab 0   • escitalopram (LEXAPRO) 10 MG Tab Take 1 Tab by mouth every day. 30 Tab 0   • aspirin 81 MG EC tablet Take 81 mg by mouth every day.     • folic acid (FOLVITE) 400 MCG tablet Take 400 mcg by mouth every day.     • Potassium 99 MG TABS Take 2 Tabs by mouth 2 Times a Day.     • CALCIUM PO Take 600 mg by mouth every day.         Current Facility-Administered Medications   Medication Dose Frequency Provider Last Rate Last Dose   • amlodipine (NORVASC) tablet 10 mg  10 mg DAILY Sarah Servin, A.P.N.   10 mg at 02/18/17 0827   • aspirin EC (ECOTRIN) tablet 81 mg  81 mg DAILY Sarah Servin, A.P.N.   81 mg at 02/18/17 0827   • diazepam (VALIUM) tablet 5 mg  5 mg Q6HRS PRN Sarah Servin A.P.N.       • levothyroxine (SYNTHROID) tablet 75 mcg  75 mcg AM ES Sarah Servin, A.P.N.   75 mcg at 02/18/17 0541   • losartan (COZAAR) tablet 50 mg  50 mg DAILY Sarah Servin, A.P.N.   50 mg at 02/18/17 0826   • docusate sodium (COLACE) capsule 100 mg  100 mg BID Sarah Servin, A.P.N.   100 mg at 02/18/17 0827   • senna-docusate (PERICOLACE or SENOKOT S) 8.6-50 MG per tablet 1 Tab  1 Tab Nightly Sarah Servin, A.P.N.   1 Tab at 02/17/17 2114   • senna-docusate (PERICOLACE or SENOKOT S) 8.6-50 MG per tablet 1 Tab  1 Tab Q24HRS PRN Sarah Servin A.P.N.       • lactulose 20 GM/30ML solution 30 mL  30 mL Q24HRS PRN Sarah Servin A.P.N.       • bisacodyl (DULCOLAX) suppository 10 mg  10 mg Q24HRS PRN Sarah Servin A.P.N.       • fleet enema 133 mL  1 Each Once PRN KENZIE Bradley.P.ALYSIA       • Respiratory Care per Protocol   Continuous RT KENZIE Bradley.P.NZulma       • ondansetron (ZOFRAN) syringe/vial injection 4 mg  4 mg Q4HRS PRN KENZIE Bradley.P.N.       • ondansetron (ZOFRAN ODT) dispertab 4 mg  4 mg Q4HRS PRN KENZIE Bradley.P.NZulma       • zolpidem (AMBIEN) tablet 5 mg  5 mg HS PRN - MR X 1   Malathi, A.P.N.       • guaifenesin DM (ROBITUSSIN DM) 100-10 MG/5ML syrup 10 mL  10 mL Q6HRS PRN linda Servin, A.P.N.       • insulin lispro (HUMALOG) injection 1-6 Units  1-6 Units 4X/DAY [facility] Malathi, A.P.N.   Stopped at 02/17/17 1700   • glucose 4 g chewable tablet 16 g  16 g Q15 MIN PRN linda Servin, A.P.N.        And   • dextrose 50% (D50W) injection 25 mL  25 mL Q15 MIN PRN  Malathi, A.P.N.       • acetaminophen (TYLENOL) tablet 650 mg  650 mg Q6HRS PRN  Malathi, A.P.N.       • escitalopram (LEXAPRO) tablet 10 mg  10 mg DAILY Vesta Malathi, A.P.N.   10 mg at 02/18/17 0828   • atorvastatin (LIPITOR) tablet 20 mg  20 mg QHS Vesta Malathi, A.P.N.   20 mg at 02/17/17 2114   • pneumococcal 13-Loan Conj Vacc (PREVNAR 13) syringe 0.5 mL  0.5 mL Once PRN Rody Eugene M.D.       • metoprolol (LOPRESSOR) tablet 50 mg  50 mg TWICE DAILY Boom Min MD,FACC   50 mg at 02/18/17 0938     Last reviewed on 2/17/2017  1:57 PM by Haley Ennis  Medications reviewed    Imaging reviewed    Impressions:  1. Unstable angina  2. MVCAD  3. HTN  4. PAD  5. HLP  6. DM2  7. Iron deficiency anemia    Recommendations:  CABG Monday  BB as tolerated  ASA  Statin  ARB

## 2017-02-18 NOTE — PROCEDURES
DATE OF SERVICE:  02/17/2017    REFERRING PHYSICIAN:  Rody Eugene MD    PROCEDURES PERFORMED:  Selective coronary angiography of the native vessels,   left heart catheterization, left ventriculogram, right radial approach.    PREOPERATIVE DIAGNOSES:  1.  Unstable angina.  2.  History of multivessel coronary artery disease.  3.  Hypertension.    POSTOPERATIVE DIAGNOSES:  1.  Severe multivessel coronary artery disease.  2.  Preserved left ventricular ejection fraction.    DESCRIPTION OF PROCEDURE:  The risks, benefits, and alternatives of cardiac   catheterization including, but not limited to death, stroke, MI, urgent bypass   surgery, renovascular complication, bleeding, and infection were discussed   with the patient who agreed to proceed both verbally and via written consent.    The patient was transferred to catheterization laboratory in a fasting state   from the inpatient setting.  The right wrist and right groin were prepped and   draped in usual sterile fashion with ChloraPrep solution.  The right radial   area was anesthetized with 1 mL of 1% Xylocaine and entered with single   through and through puncture and a 6-Danish slender glide sheath was placed.    Intra-arterial cocktail of heparin, verapamil, and nitroglycerin was   administered and a 5-Danish Velasquez catheter was advanced in the ascending aorta   and used to cross the aortic valve.  Contrast was administered with 8 mL per   second for 24 mL for left ventriculogram and a pullback gradient was recorded.    Same catheter was then used to engage the right coronary artery.  Contrast   administered and multiple images obtained in standard planes.  This is   insufficient to engage the ostium of the left main and therefore it was   exchanged over a guidewire for a 6-Danish JL 3.5 diagnostic catheter seated in   the left main.  Contrast was administered and multiple images obtained in   standard planes.  After review of the angiographic and hemodynamic  films, all   catheters and guidewires removed and TR band was applied to achieve patent   hemostasis.  The patient tolerated the procedure well, and left the   catheterization laboratory in stable condition.    FINDINGS:  I.  HEMODYNAMICS:  1.  Aortic pressure 114/63, mean 75, heart rate 53.  2.  Left ventricular pressure 113/8, LVEDP 15.  3.  No significant gradient on aortic valve pullback.    II.  LEFT VENTRICULOGRAM:  LVEF in the GOFF projection is calculated to be 68%   without wall motion abnormalities and no significant mitral regurgitation.    III.  SELECTIVE CORONARY ANGIOGRAPHY OF NATIVE VESSELS:  1.  Right coronary artery:  The right coronary artery is a large, dominant   vessel giving rise to a posterolateral and posterior descending system.  The   mid portion of the vessel is notable for mild-to-moderate nonobstructive   disease.  At the crux, the posterior descending and posterolateral branches,   which are large both have high grade 80% stenoses at their ostia.  These   appear to represent good targets for bypass.  2.  Left main:  This is a large long vessel, which is free from   angiographically apparent disease and gives rise to LAD and circumflex.  3.  Left circumflex:  This is a moderate sized vessel, which is notable for a   subtotal occlusion of a large marginal branch as well as a high grade stenosis   in the circumflex proper.  The vessel is highly tortuous throughout.  4.  Left anterior descending:  This is a very large vessel with a long segment   of moderate disease in its proximal portion, which is eccentric.  It gives   rise to several diagonal branches, which are moderate to large in size.  The   first diagonal has 50% ostial stenosis, second diagonal has a 70% ostial   stenosis and the LAD has a diffuse area of high grade stenosis in its mid   portion, terminating in a very large vessel at the mid portion of the LV,   which wraps around the apex.    CONCLUSIONS:  1.  Severe multivessel  coronary artery disease.  2.  Preserved left ventricular ejection fraction.    PLAN:  Coronary artery bypass grafting surgery.       ____________________________________     MD JOSE Cruz / BUNNY    DD:  02/17/2017 15:26:04  DT:  02/17/2017 17:15:17    D#:  [id number]  Job#:  [id number]

## 2017-02-18 NOTE — PROGRESS NOTES
Received report and assumed care of patient. Patient is alert and oriented x4. Patient is in no signs of distress and denies pain at this time. Patient is still on post-op vitals with 4 hours left. Patient has hemoband on right wrist with all air out but will keep in place for now, old drainage but no new bleeding, soft with 3+ pulses. Patient has understanding for CABG on Monday. Patient was updated on the plan of care for the day. Call light within reach, bed in low position, 2 side rails up. Educated on fall risk, verbalizes understanding. Will continue to monitor.

## 2017-02-18 NOTE — PROGRESS NOTES
Pt received from cardiac [facility] lab at this time. Responds easily to verbal stimuli but still groggy. Grandaughter at bedside. O2 on at 2 liters nasal cannula. Hemoband intact to rt wrist with 18ml air pulse +3

## 2017-02-19 ENCOUNTER — APPOINTMENT (OUTPATIENT)
Dept: RADIOLOGY | Facility: MEDICAL CENTER | Age: 72
DRG: 234 | End: 2017-02-19
Attending: NURSE PRACTITIONER
Payer: MEDICARE

## 2017-02-19 PROBLEM — R07.9 CHEST PAIN: Status: ACTIVE | Noted: 2017-02-19

## 2017-02-19 LAB
ABO GROUP BLD: NORMAL
ABO GROUP BLD: NORMAL
BARCODED ABORH UBTYP: 6200
BARCODED ABORH UBTYP: 6200
BARCODED PRD CODE UBPRD: NORMAL
BARCODED PRD CODE UBPRD: NORMAL
BARCODED UNIT NUM UBUNT: NORMAL
BARCODED UNIT NUM UBUNT: NORMAL
BLD GP AB SCN SERPL QL: NORMAL
COMPONENT R 8504R: NORMAL
COMPONENT R 8504R: NORMAL
GLUCOSE BLD-MCNC: 114 MG/DL (ref 65–99)
GLUCOSE BLD-MCNC: 143 MG/DL (ref 65–99)
GLUCOSE BLD-MCNC: 99 MG/DL (ref 65–99)
PRODUCT TYPE UPROD: NORMAL
PRODUCT TYPE UPROD: NORMAL
RH BLD: NORMAL
TSH SERPL DL<=0.005 MIU/L-ACNC: 2.68 UIU/ML (ref 0.3–3.7)
UNIT STATUS USTAT: NORMAL
UNIT STATUS USTAT: NORMAL

## 2017-02-19 PROCEDURE — 700102 HCHG RX REV CODE 250 W/ 637 OVERRIDE(OP): Performed by: INTERNAL MEDICINE

## 2017-02-19 PROCEDURE — 84443 ASSAY THYROID STIM HORMONE: CPT

## 2017-02-19 PROCEDURE — 700101 HCHG RX REV CODE 250: Performed by: NURSE PRACTITIONER

## 2017-02-19 PROCEDURE — 770022 HCHG ROOM/CARE - ICU (200)

## 2017-02-19 PROCEDURE — 86901 BLOOD TYPING SEROLOGIC RH(D): CPT

## 2017-02-19 PROCEDURE — 700102 HCHG RX REV CODE 250 W/ 637 OVERRIDE(OP): Performed by: NURSE PRACTITIONER

## 2017-02-19 PROCEDURE — 700112 HCHG RX REV CODE 229: Performed by: NURSE PRACTITIONER

## 2017-02-19 PROCEDURE — 82962 GLUCOSE BLOOD TEST: CPT

## 2017-02-19 PROCEDURE — 86850 RBC ANTIBODY SCREEN: CPT

## 2017-02-19 PROCEDURE — A9270 NON-COVERED ITEM OR SERVICE: HCPCS | Performed by: INTERNAL MEDICINE

## 2017-02-19 PROCEDURE — 71020 DX-CHEST-2 VIEWS: CPT

## 2017-02-19 PROCEDURE — A9270 NON-COVERED ITEM OR SERVICE: HCPCS | Performed by: NURSE PRACTITIONER

## 2017-02-19 PROCEDURE — 86900 BLOOD TYPING SEROLOGIC ABO: CPT

## 2017-02-19 RX ORDER — METOPROLOL TARTRATE 50 MG/1
50 TABLET, FILM COATED ORAL TWICE DAILY
Status: DISPENSED | OUTPATIENT
Start: 2017-02-19 | End: 2017-02-20

## 2017-02-19 RX ADMIN — LOSARTAN POTASSIUM 50 MG: 50 TABLET, FILM COATED ORAL at 08:07

## 2017-02-19 RX ADMIN — DOCUSATE SODIUM 100 MG: 100 CAPSULE ORAL at 08:07

## 2017-02-19 RX ADMIN — ATORVASTATIN CALCIUM 20 MG: 20 TABLET, FILM COATED ORAL at 21:06

## 2017-02-19 RX ADMIN — ESCITALOPRAM OXALATE 10 MG: 10 TABLET ORAL at 08:07

## 2017-02-19 RX ADMIN — METOPROLOL TARTRATE 50 MG: 50 TABLET, FILM COATED ORAL at 08:07

## 2017-02-19 RX ADMIN — STANDARDIZED SENNA CONCENTRATE AND DOCUSATE SODIUM 1 TABLET: 8.6; 5 TABLET, FILM COATED ORAL at 10:59

## 2017-02-19 RX ADMIN — LEVOTHYROXINE SODIUM 75 MCG: 75 TABLET ORAL at 06:14

## 2017-02-19 RX ADMIN — DIAZEPAM 5 MG: 5 TABLET ORAL at 14:17

## 2017-02-19 RX ADMIN — MUPIROCIN 1 APPLICATION: 20 OINTMENT TOPICAL at 21:07

## 2017-02-19 RX ADMIN — AMLODIPINE BESYLATE 10 MG: 10 TABLET ORAL at 08:07

## 2017-02-19 RX ADMIN — STANDARDIZED SENNA CONCENTRATE AND DOCUSATE SODIUM 1 TABLET: 8.6; 5 TABLET, FILM COATED ORAL at 21:06

## 2017-02-19 RX ADMIN — DIAZEPAM 5 MG: 5 TABLET ORAL at 21:06

## 2017-02-19 RX ADMIN — ASPIRIN 81 MG: 81 TABLET ORAL at 08:07

## 2017-02-19 RX ADMIN — DOCUSATE SODIUM 100 MG: 100 CAPSULE ORAL at 21:06

## 2017-02-19 ASSESSMENT — ENCOUNTER SYMPTOMS
FEVER: 0
SHORTNESS OF BREATH: 0
NAUSEA: 0
CHILLS: 0
VOMITING: 0
DIARRHEA: 0
DIZZINESS: 0
CONSTIPATION: 1
PALPITATIONS: 0
COUGH: 0
MYALGIAS: 0

## 2017-02-19 ASSESSMENT — PAIN SCALES - GENERAL
PAINLEVEL_OUTOF10: 0

## 2017-02-19 NOTE — PROGRESS NOTES
"Assumed pt care at 1900.  Pt in no distress.  Pt stated, \"I feel anxious.\"  Pt requesting xanax to be give with evening medications.  Pt denies any pain or discomfort.  Upper side rails are up.  Call light is within reach.  Will continue to monitor and follow plan of care.  "

## 2017-02-19 NOTE — CARE PLAN
Problem: Safety  Goal: Will remain free from falls  Intervention: Implement fall precautions  Pt has steady gait, ambulates with no assistance.       Problem: Bowel/Gastric:  Goal: Normal bowel function is maintained or improved  Intervention: Educate patient and significant other/support system about diet, fluid intake, medications and activity to promote bowel function  Pt states she has poor appetite, but is passing gas and has had a bowel movement.

## 2017-02-19 NOTE — PROGRESS NOTES
Patient and family seen at bedside by CTS.  Plan for CABG in AM 0800.  Transfer to [facility] for prep and education.  Orders placed, check TSH as well.  See full dictated note from Dr. Dowell for details.

## 2017-02-19 NOTE — PROGRESS NOTES
Cardiology Progress Note               Author: Sarah Valentinen Date & Time created: 2017  10:58 AM     Interval History:  71 year old with HX of hypertension, hypothyroid, DM who presented from [facility] with chest pain.  Angiogram on  showed multivessel CAD    :  142/85  HR 60    States little nervous about surgery tomorrow       CONCLUSIONS  Normal left ventricular size, wall thickness, and systolic function.  Left ventricular ejection fraction is visually estimated to be 70%.  Grade I diastolic dysfunction.  Aortic sclerosis without stenosis.  Right ventricular systolic pressure is estimated to be 30 mmHg.    CONCLUSIONS:  1.  Severe multivessel coronary artery disease.  2.  Preserved left ventricular ejection fraction.     PLAN:  Coronary artery bypass grafting surgery.        ____________________________________      Review of Systems   Constitutional: Negative for fever, chills and malaise/fatigue.   Respiratory: Negative for cough and shortness of breath.    Cardiovascular: Negative for chest pain and palpitations.   Gastrointestinal: Positive for constipation. Negative for nausea, vomiting and diarrhea.   Musculoskeletal: Negative for myalgias.   Skin: Negative for rash.   Neurological: Negative for dizziness.       Physical Exam   Constitutional: She is oriented to person, place, and time. She appears well-developed and well-nourished. No distress.   Eyes: Conjunctivae are normal.   Cardiovascular: Normal rate, regular rhythm and normal heart sounds.    Pulmonary/Chest: Effort normal and breath sounds normal.   Musculoskeletal: Normal range of motion. She exhibits no edema.   Neurological: She is alert and oriented to person, place, and time.   Skin: Skin is warm and dry.   Psychiatric: She has a normal mood and affect. Her behavior is normal. Judgment and thought content normal.   Nursing note and vitals reviewed.      Hemodynamics:  Temp (24hrs), Av.4 °C (97.5 °F), Min:36.2 °C (97.2 °F),  Max:36.6 °C (97.8 °F)  Temperature: 36.2 °C (97.2 °F)  Pulse  Av.6  Min: 48  Max: 90   Blood Pressure : (!) 179/82 mmHg     Respiratory:    Respiration: 19, Pulse Oximetry: 96 %        RUL Breath Sounds: Clear, RML Breath Sounds: Clear, RLL Breath Sounds: Clear, KATERYNA Breath Sounds: Clear, LLL Breath Sounds: Clear  Fluids:     Weight: 63.2 kg (139 lb 5.3 oz)  GI/Nutrition:  Orders Placed This Encounter   Procedures   • Diet Order     Standing Status: Standing      Number of Occurrences: 1      Standing Expiration Date:      Order Specific Question:  Diet:     Answer:  Cardiac [6]     Lab Results:  Recent Labs      17   0204   WBC  7.1   RBC  3.76*   HEMOGLOBIN  11.3*   HEMATOCRIT  35.1*   MCV  93.4   MCH  30.1   MCHC  32.2*   RDW  45.8   PLATELETCT  226   MPV  11.2     Recent Labs      17   0204   SODIUM  138   POTASSIUM  3.7   CHLORIDE  107   CO2  21   GLUCOSE  92   BUN  25*   CREATININE  1.05   CALCIUM  9.1     Recent Labs      17   1344   INR  0.94         Recent Labs      17   1344  17   1929  17   0204   TROPONINI  <0.01  <0.01  <0.01             Medical Decision Making, by Problem:  There are no hospital problems to display for this patient.      Plan:  CAD:  Plan for CABG on Monday  Having some increased SBP this am but last nights dose of lopressor was held    Ok to transfer to [facility] for pre-op CABG showers and education   Continue current cardiac medications      Pt c/o constipation, does have bowel protocol ordered    Case and plan with Dr. Corrales         Core Measures

## 2017-02-19 NOTE — PROGRESS NOTES
Pt seen by cardiac surgeon at this time and currently requesting medication for anxiety. Valium 5 mg given po.

## 2017-02-19 NOTE — PROGRESS NOTES
Pt resting comfortably in bed, appears to be sleeping.  In no distress.  Call light within reach.  Will continue to monitor and follow plan of care.

## 2017-02-20 ENCOUNTER — APPOINTMENT (OUTPATIENT)
Dept: RADIOLOGY | Facility: MEDICAL CENTER | Age: 72
DRG: 234 | End: 2017-02-20
Attending: THORACIC SURGERY (CARDIOTHORACIC VASCULAR SURGERY)
Payer: MEDICARE

## 2017-02-20 LAB
ACT BLD: 142 SEC (ref 74–137)
ACT BLD: 152 SEC (ref 74–137)
ACT BLD: 580 SEC (ref 74–137)
ACT BLD: 714 SEC (ref 74–137)
ACT BLD: 745 SEC (ref 74–137)
ACT BLD: 822 SEC (ref 74–137)
ACT BLD: 842 SEC (ref 74–137)
ALBUMIN SERPL BCP-MCNC: 4 G/DL (ref 3.2–4.9)
ALBUMIN/GLOB SERPL: 1.3 G/DL
ALP SERPL-CCNC: 68 U/L (ref 30–99)
ALT SERPL-CCNC: 15 U/L (ref 2–50)
ANION GAP SERPL CALC-SCNC: 11 MMOL/L (ref 0–11.9)
APPEARANCE UR: CLEAR
APTT PPP: 30.5 SEC (ref 24.7–36)
APTT PPP: 31.4 SEC (ref 24.7–36)
AST SERPL-CCNC: 16 U/L (ref 12–45)
BACTERIA #/AREA URNS HPF: ABNORMAL /HPF
BARCODED ABORH UBTYP: 5100
BARCODED PRD CODE UBPRD: NORMAL
BARCODED UNIT NUM UBUNT: NORMAL
BASE EXCESS BLDA CALC-SCNC: -1 MMOL/L (ref -4–3)
BASE EXCESS BLDA CALC-SCNC: -1 MMOL/L (ref -4–3)
BASE EXCESS BLDA CALC-SCNC: -10 MMOL/L (ref -4–3)
BASE EXCESS BLDA CALC-SCNC: -11 MMOL/L (ref -4–3)
BASE EXCESS BLDA CALC-SCNC: -2 MMOL/L (ref -4–3)
BASE EXCESS BLDA CALC-SCNC: -5 MMOL/L (ref -4–3)
BASE EXCESS BLDA CALC-SCNC: -6 MMOL/L (ref -4–3)
BASE EXCESS BLDA CALC-SCNC: -7 MMOL/L (ref -4–3)
BASE EXCESS BLDA CALC-SCNC: 0 MMOL/L (ref -4–3)
BASE EXCESS BLDA CALC-SCNC: 0 MMOL/L (ref -4–3)
BASE EXCESS BLDA CALC-SCNC: 3 MMOL/L (ref -4–3)
BASE EXCESS BLDA CALC-SCNC: 4 MMOL/L (ref -4–3)
BASE EXCESS BLDV CALC-SCNC: -1 MMOL/L (ref -4–3)
BASOPHILS # BLD AUTO: 0.6 % (ref 0–1.8)
BASOPHILS # BLD: 0.04 K/UL (ref 0–0.12)
BILIRUB SERPL-MCNC: 0.4 MG/DL (ref 0.1–1.5)
BILIRUB UR QL STRIP.AUTO: NEGATIVE
BODY TEMPERATURE: ABNORMAL DEGREES
BUN SERPL-MCNC: 19 MG/DL (ref 8–22)
CA-I BLD ISE-SCNC: 0.8 MMOL/L (ref 1.1–1.3)
CA-I BLD ISE-SCNC: 0.81 MMOL/L (ref 1.1–1.3)
CA-I BLD ISE-SCNC: 0.84 MMOL/L (ref 1.1–1.3)
CA-I BLD ISE-SCNC: 0.86 MMOL/L (ref 1.1–1.3)
CA-I BLD ISE-SCNC: 0.89 MMOL/L (ref 1.1–1.3)
CA-I BLD ISE-SCNC: 0.97 MMOL/L (ref 1.1–1.3)
CA-I BLD ISE-SCNC: 1.16 MMOL/L (ref 1.1–1.3)
CA-I BLD ISE-SCNC: 1.2 MMOL/L (ref 1.1–1.3)
CALCIUM SERPL-MCNC: 9.7 MG/DL (ref 8.5–10.5)
CHLORIDE SERPL-SCNC: 106 MMOL/L (ref 96–112)
CO2 BLDA-SCNC: 17 MMOL/L (ref 20–33)
CO2 BLDA-SCNC: 18 MMOL/L (ref 20–33)
CO2 BLDA-SCNC: 20 MMOL/L (ref 20–33)
CO2 BLDA-SCNC: 21 MMOL/L (ref 20–33)
CO2 BLDA-SCNC: 23 MMOL/L (ref 20–33)
CO2 BLDA-SCNC: 24 MMOL/L (ref 20–33)
CO2 BLDA-SCNC: 24 MMOL/L (ref 20–33)
CO2 BLDA-SCNC: 25 MMOL/L (ref 20–33)
CO2 BLDA-SCNC: 25 MMOL/L (ref 20–33)
CO2 BLDA-SCNC: 26 MMOL/L (ref 20–33)
CO2 BLDA-SCNC: 28 MMOL/L (ref 20–33)
CO2 BLDA-SCNC: 29 MMOL/L (ref 20–33)
CO2 BLDV-SCNC: 24 MMOL/L (ref 20–33)
CO2 SERPL-SCNC: 24 MMOL/L (ref 20–33)
COLOR UR: COLORLESS
COMPONENT P 8504P: NORMAL
CREAT SERPL-MCNC: 0.84 MG/DL (ref 0.5–1.4)
EKG IMPRESSION: NORMAL
EOSINOPHIL # BLD AUTO: 0.34 K/UL (ref 0–0.51)
EOSINOPHIL NFR BLD: 5.3 % (ref 0–6.9)
EPI CELLS #/AREA URNS HPF: ABNORMAL /HPF
ERYTHROCYTE [DISTWIDTH] IN BLOOD BY AUTOMATED COUNT: 44.1 FL (ref 35.9–50)
EST. AVERAGE GLUCOSE BLD GHB EST-MCNC: 148 MG/DL
GFR SERPL CREATININE-BSD FRML MDRD: >60 ML/MIN/1.73 M 2
GLOBULIN SER CALC-MCNC: 3 G/DL (ref 1.9–3.5)
GLUCOSE BLD-MCNC: 110 MG/DL (ref 65–99)
GLUCOSE BLD-MCNC: 126 MG/DL (ref 65–99)
GLUCOSE BLD-MCNC: 128 MG/DL (ref 65–99)
GLUCOSE BLD-MCNC: 129 MG/DL (ref 65–99)
GLUCOSE BLD-MCNC: 129 MG/DL (ref 65–99)
GLUCOSE BLD-MCNC: 131 MG/DL (ref 65–99)
GLUCOSE BLD-MCNC: 137 MG/DL (ref 65–99)
GLUCOSE BLD-MCNC: 137 MG/DL (ref 65–99)
GLUCOSE BLD-MCNC: 147 MG/DL (ref 65–99)
GLUCOSE BLD-MCNC: 151 MG/DL (ref 65–99)
GLUCOSE BLD-MCNC: 154 MG/DL (ref 65–99)
GLUCOSE BLD-MCNC: 156 MG/DL (ref 65–99)
GLUCOSE BLD-MCNC: 164 MG/DL (ref 65–99)
GLUCOSE BLD-MCNC: 172 MG/DL (ref 65–99)
GLUCOSE BLD-MCNC: 200 MG/DL (ref 65–99)
GLUCOSE BLD-MCNC: 93 MG/DL (ref 65–99)
GLUCOSE BLD-MCNC: 99 MG/DL (ref 65–99)
GLUCOSE SERPL-MCNC: 96 MG/DL (ref 65–99)
GLUCOSE UR STRIP.AUTO-MCNC: NEGATIVE MG/DL
HBA1C MFR BLD: 6.8 % (ref 0–5.6)
HCO3 BLDA-SCNC: 15.9 MMOL/L (ref 17–25)
HCO3 BLDA-SCNC: 16.7 MMOL/L (ref 17–25)
HCO3 BLDA-SCNC: 18.8 MMOL/L (ref 17–25)
HCO3 BLDA-SCNC: 20 MMOL/L (ref 17–25)
HCO3 BLDA-SCNC: 21.7 MMOL/L (ref 17–25)
HCO3 BLDA-SCNC: 23.1 MMOL/L (ref 17–25)
HCO3 BLDA-SCNC: 23.2 MMOL/L (ref 17–25)
HCO3 BLDA-SCNC: 23.6 MMOL/L (ref 17–25)
HCO3 BLDA-SCNC: 23.9 MMOL/L (ref 17–25)
HCO3 BLDA-SCNC: 24.3 MMOL/L (ref 17–25)
HCO3 BLDA-SCNC: 26.8 MMOL/L (ref 17–25)
HCO3 BLDA-SCNC: 27.7 MMOL/L (ref 17–25)
HCO3 BLDV-SCNC: 23.2 MMOL/L (ref 24–28)
HCT VFR BLD AUTO: 35.9 % (ref 37–47)
HCT VFR BLD CALC: 19 % (ref 37–47)
HCT VFR BLD CALC: 20 % (ref 37–47)
HCT VFR BLD CALC: 23 % (ref 37–47)
HCT VFR BLD CALC: 24 % (ref 37–47)
HCT VFR BLD CALC: 24 % (ref 37–47)
HCT VFR BLD CALC: 25 % (ref 37–47)
HCT VFR BLD CALC: 36 % (ref 37–47)
HCT VFR BLD CALC: 36 % (ref 37–47)
HGB BLD-MCNC: 12 G/DL (ref 12–16)
HGB BLD-MCNC: 12.2 G/DL (ref 12–16)
HGB BLD-MCNC: 12.2 G/DL (ref 12–16)
HGB BLD-MCNC: 6.5 G/DL (ref 12–16)
HGB BLD-MCNC: 6.8 G/DL (ref 12–16)
HGB BLD-MCNC: 7.8 G/DL (ref 12–16)
HGB BLD-MCNC: 8.2 G/DL (ref 12–16)
HGB BLD-MCNC: 8.2 G/DL (ref 12–16)
HGB BLD-MCNC: 8.5 G/DL (ref 12–16)
IMM GRANULOCYTES # BLD AUTO: 0.01 K/UL (ref 0–0.11)
IMM GRANULOCYTES NFR BLD AUTO: 0.2 % (ref 0–0.9)
INR PPP: 0.91 (ref 0.87–1.13)
INR PPP: 1.36 (ref 0.87–1.13)
KETONES UR STRIP.AUTO-MCNC: NEGATIVE MG/DL
LEUKOCYTE ESTERASE UR QL STRIP.AUTO: ABNORMAL
LYMPHOCYTES # BLD AUTO: 2.79 K/UL (ref 1–4.8)
LYMPHOCYTES NFR BLD: 43.2 % (ref 22–41)
MAGNESIUM SERPL-MCNC: 2.8 MG/DL (ref 1.5–2.5)
MCH RBC QN AUTO: 30.5 PG (ref 27–33)
MCHC RBC AUTO-ENTMCNC: 33.4 G/DL (ref 33.6–35)
MCV RBC AUTO: 91.1 FL (ref 81.4–97.8)
MICRO URNS: ABNORMAL
MONOCYTES # BLD AUTO: 0.6 K/UL (ref 0–0.85)
MONOCYTES NFR BLD AUTO: 9.3 % (ref 0–13.4)
NEUTROPHILS # BLD AUTO: 2.68 K/UL (ref 2–7.15)
NEUTROPHILS NFR BLD: 41.4 % (ref 44–72)
NITRITE UR QL STRIP.AUTO: NEGATIVE
NRBC # BLD AUTO: 0 K/UL
NRBC BLD AUTO-RTO: 0 /100 WBC
O2/TOTAL GAS SETTING VFR VENT: 100 %
O2/TOTAL GAS SETTING VFR VENT: 30 %
O2/TOTAL GAS SETTING VFR VENT: 40 %
O2/TOTAL GAS SETTING VFR VENT: 50 %
O2/TOTAL GAS SETTING VFR VENT: 60 %
O2/TOTAL GAS SETTING VFR VENT: 80 %
PCO2 BLDA: 31 MMHG (ref 26–37)
PCO2 BLDA: 34.1 MMHG (ref 26–37)
PCO2 BLDA: 35.7 MMHG (ref 26–37)
PCO2 BLDA: 36.2 MMHG (ref 26–37)
PCO2 BLDA: 36.4 MMHG (ref 26–37)
PCO2 BLDA: 38.1 MMHG (ref 26–37)
PCO2 BLDA: 38.3 MMHG (ref 26–37)
PCO2 BLDA: 38.6 MMHG (ref 26–37)
PCO2 BLDA: 38.7 MMHG (ref 26–37)
PCO2 BLDA: 39.3 MMHG (ref 26–37)
PCO2 BLDA: 46.1 MMHG (ref 26–37)
PCO2 BLDA: 49.2 MMHG (ref 26–37)
PCO2 BLDV: 35.9 MMHG (ref 41–51)
PCO2 TEMP ADJ BLDA: 35.6 MMHG (ref 26–37)
PCO2 TEMP ADJ BLDA: 36.7 MMHG (ref 26–37)
PCO2 TEMP ADJ BLDA: 37 MMHG (ref 26–37)
PCO2 TEMP ADJ BLDA: 39 MMHG (ref 26–37)
PCO2 TEMP ADJ BLDA: 42.6 MMHG (ref 26–37)
PCO2 TEMP ADJ BLDA: 45.3 MMHG (ref 26–37)
PH BLDA: 7.24 [PH] (ref 7.4–7.5)
PH BLDA: 7.25 [PH] (ref 7.4–7.5)
PH BLDA: 7.28 [PH] (ref 7.4–7.5)
PH BLDA: 7.3 [PH] (ref 7.4–7.5)
PH BLDA: 7.3 [PH] (ref 7.4–7.5)
PH BLDA: 7.31 [PH] (ref 7.4–7.5)
PH BLDA: 7.42 [PH] (ref 7.4–7.5)
PH BLDA: 7.43 [PH] (ref 7.4–7.5)
PH BLDA: 7.44 [PH] (ref 7.4–7.5)
PH BLDA: 7.46 [PH] (ref 7.4–7.5)
PH BLDA: 7.46 [PH] (ref 7.4–7.5)
PH BLDA: 7.48 [PH] (ref 7.4–7.5)
PH BLDV: 7.42 [PH] (ref 7.31–7.45)
PH TEMP ADJ BLDA: 7.25 [PH] (ref 7.4–7.5)
PH TEMP ADJ BLDA: 7.26 [PH] (ref 7.4–7.5)
PH TEMP ADJ BLDA: 7.31 [PH] (ref 7.4–7.5)
PH TEMP ADJ BLDA: 7.31 [PH] (ref 7.4–7.5)
PH TEMP ADJ BLDA: 7.32 [PH] (ref 7.4–7.5)
PH TEMP ADJ BLDA: 7.33 [PH] (ref 7.4–7.5)
PH UR STRIP.AUTO: 6.5 [PH]
PLATELET # BLD AUTO: 132 K/UL (ref 164–446)
PLATELET # BLD AUTO: 204 K/UL (ref 164–446)
PLATELET # BLD AUTO: 222 K/UL (ref 164–446)
PMV BLD AUTO: 11 FL (ref 9–12.9)
PO2 BLDA: 109 MMHG (ref 64–87)
PO2 BLDA: 349 MMHG (ref 64–87)
PO2 BLDA: 357 MMHG (ref 64–87)
PO2 BLDA: 377 MMHG (ref 64–87)
PO2 BLDA: 381 MMHG (ref 64–87)
PO2 BLDA: 68 MMHG (ref 64–87)
PO2 BLDA: 81 MMHG (ref 64–87)
PO2 BLDA: 84 MMHG (ref 64–87)
PO2 BLDA: 90 MMHG (ref 64–87)
PO2 BLDA: 96 MMHG (ref 64–87)
PO2 BLDA: 99 MMHG (ref 64–87)
PO2 BLDA: >500 MMHG (ref 64–87)
PO2 BLDV: 48 MMHG (ref 25–40)
PO2 TEMP ADJ BLDA: 67 MMHG (ref 64–87)
PO2 TEMP ADJ BLDA: 77 MMHG (ref 64–87)
PO2 TEMP ADJ BLDA: 79 MMHG (ref 64–87)
PO2 TEMP ADJ BLDA: 86 MMHG (ref 64–87)
PO2 TEMP ADJ BLDA: 88 MMHG (ref 64–87)
PO2 TEMP ADJ BLDA: 89 MMHG (ref 64–87)
POTASSIUM BLD-SCNC: 3.4 MMOL/L (ref 3.6–5.5)
POTASSIUM BLD-SCNC: 3.5 MMOL/L (ref 3.6–5.5)
POTASSIUM BLD-SCNC: 4.2 MMOL/L (ref 3.6–5.5)
POTASSIUM BLD-SCNC: 5.2 MMOL/L (ref 3.6–5.5)
POTASSIUM BLD-SCNC: 5.8 MMOL/L (ref 3.6–5.5)
POTASSIUM BLD-SCNC: 6.4 MMOL/L (ref 3.6–5.5)
POTASSIUM BLD-SCNC: 6.6 MMOL/L (ref 3.6–5.5)
POTASSIUM BLD-SCNC: 7.5 MMOL/L (ref 3.6–5.5)
POTASSIUM SERPL-SCNC: 3 MMOL/L (ref 3.6–5.5)
POTASSIUM SERPL-SCNC: 4 MMOL/L (ref 3.6–5.5)
PRODUCT TYPE UPROD: NORMAL
PROT SERPL-MCNC: 7 G/DL (ref 6–8.2)
PROT UR QL STRIP: NEGATIVE MG/DL
PROTHROMBIN TIME: 12.5 SEC (ref 12–14.6)
PROTHROMBIN TIME: 17.2 SEC (ref 12–14.6)
RBC # BLD AUTO: 3.94 M/UL (ref 4.2–5.4)
RBC # URNS HPF: ABNORMAL /HPF
RBC UR QL AUTO: NEGATIVE
SAO2 % BLDA: 100 % (ref 93–99)
SAO2 % BLDA: 92 % (ref 93–99)
SAO2 % BLDA: 94 % (ref 93–99)
SAO2 % BLDA: 95 % (ref 93–99)
SAO2 % BLDA: 95 % (ref 93–99)
SAO2 % BLDA: 97 % (ref 93–99)
SAO2 % BLDA: 97 % (ref 93–99)
SAO2 % BLDA: 99 % (ref 93–99)
SAO2 % BLDV: 85 %
SODIUM BLD-SCNC: 136 MMOL/L (ref 135–145)
SODIUM BLD-SCNC: 137 MMOL/L (ref 135–145)
SODIUM BLD-SCNC: 138 MMOL/L (ref 135–145)
SODIUM BLD-SCNC: 139 MMOL/L (ref 135–145)
SODIUM BLD-SCNC: 143 MMOL/L (ref 135–145)
SODIUM BLD-SCNC: 146 MMOL/L (ref 135–145)
SODIUM SERPL-SCNC: 141 MMOL/L (ref 135–145)
SP GR UR STRIP.AUTO: 1.01
SPECIMEN DRAWN FROM PATIENT: ABNORMAL
UNIT STATUS USTAT: NORMAL
WBC # BLD AUTO: 6.5 K/UL (ref 4.8–10.8)
WBC #/AREA URNS HPF: ABNORMAL /HPF

## 2017-02-20 PROCEDURE — 700111 HCHG RX REV CODE 636 W/ 250 OVERRIDE (IP): Performed by: THORACIC SURGERY (CARDIOTHORACIC VASCULAR SURGERY)

## 2017-02-20 PROCEDURE — 502240 HCHG MISC OR SUPPLY RC 0272: Performed by: THORACIC SURGERY (CARDIOTHORACIC VASCULAR SURGERY)

## 2017-02-20 PROCEDURE — 770022 HCHG ROOM/CARE - ICU (200)

## 2017-02-20 PROCEDURE — 500074 HCHG BLADE, BEAVER (OHS): Performed by: THORACIC SURGERY (CARDIOTHORACIC VASCULAR SURGERY)

## 2017-02-20 PROCEDURE — 502654 HCHG INSERT, OFF-PUMP: Performed by: THORACIC SURGERY (CARDIOTHORACIC VASCULAR SURGERY)

## 2017-02-20 PROCEDURE — 81001 URINALYSIS AUTO W/SCOPE: CPT

## 2017-02-20 PROCEDURE — A6402 STERILE GAUZE <= 16 SQ IN: HCPCS | Performed by: THORACIC SURGERY (CARDIOTHORACIC VASCULAR SURGERY)

## 2017-02-20 PROCEDURE — 700101 HCHG RX REV CODE 250: Performed by: NURSE PRACTITIONER

## 2017-02-20 PROCEDURE — 85610 PROTHROMBIN TIME: CPT | Mod: 91

## 2017-02-20 PROCEDURE — P9034 PLATELETS, PHERESIS: HCPCS

## 2017-02-20 PROCEDURE — 700105 HCHG RX REV CODE 258: Performed by: THORACIC SURGERY (CARDIOTHORACIC VASCULAR SURGERY)

## 2017-02-20 PROCEDURE — 501673 HCHG TUBING, PRESSURE 6' W/MALE LL: Performed by: THORACIC SURGERY (CARDIOTHORACIC VASCULAR SURGERY)

## 2017-02-20 PROCEDURE — 02100Z9 BYPASS CORONARY ARTERY, ONE ARTERY FROM LEFT INTERNAL MAMMARY, OPEN APPROACH: ICD-10-PCS | Performed by: THORACIC SURGERY (CARDIOTHORACIC VASCULAR SURGERY)

## 2017-02-20 PROCEDURE — 160024 HCHG STAT PERFUSION STAT: Performed by: THORACIC SURGERY (CARDIOTHORACIC VASCULAR SURGERY)

## 2017-02-20 PROCEDURE — 500896 HCHG PACK, VASCULAR (FOR ROOM 10): Performed by: THORACIC SURGERY (CARDIOTHORACIC VASCULAR SURGERY)

## 2017-02-20 PROCEDURE — 84132 ASSAY OF SERUM POTASSIUM: CPT | Mod: 91

## 2017-02-20 PROCEDURE — 500385 HCHG DRAIN, PLEUROVAC ADUL: Performed by: THORACIC SURGERY (CARDIOTHORACIC VASCULAR SURGERY)

## 2017-02-20 PROCEDURE — C1729 CATH, DRAINAGE: HCPCS | Performed by: THORACIC SURGERY (CARDIOTHORACIC VASCULAR SURGERY)

## 2017-02-20 PROCEDURE — 501445 HCHG STAPLER, SKIN DISP: Performed by: THORACIC SURGERY (CARDIOTHORACIC VASCULAR SURGERY)

## 2017-02-20 PROCEDURE — 82803 BLOOD GASES ANY COMBINATION: CPT | Mod: 91

## 2017-02-20 PROCEDURE — 500700 HCHG HEMOCLIP, SMALL (RED): Performed by: THORACIC SURGERY (CARDIOTHORACIC VASCULAR SURGERY)

## 2017-02-20 PROCEDURE — C1898 LEAD, PMKR, OTHER THAN TRANS: HCPCS | Performed by: THORACIC SURGERY (CARDIOTHORACIC VASCULAR SURGERY)

## 2017-02-20 PROCEDURE — 500053 HCHG BANDAGE, ELASTIC 4: Performed by: THORACIC SURGERY (CARDIOTHORACIC VASCULAR SURGERY)

## 2017-02-20 PROCEDURE — 500043 HCHG BAG-A-JET: Performed by: THORACIC SURGERY (CARDIOTHORACIC VASCULAR SURGERY)

## 2017-02-20 PROCEDURE — 500598 HCHG GLUE, BIOGLUE 5CC PRE-FILL: Performed by: THORACIC SURGERY (CARDIOTHORACIC VASCULAR SURGERY)

## 2017-02-20 PROCEDURE — 501519 HCHG SUTURE, E PACK: Performed by: THORACIC SURGERY (CARDIOTHORACIC VASCULAR SURGERY)

## 2017-02-20 PROCEDURE — 93005 ELECTROCARDIOGRAM TRACING: CPT | Performed by: NURSE PRACTITIONER

## 2017-02-20 PROCEDURE — 160009 HCHG ANES TIME/MIN: Performed by: THORACIC SURGERY (CARDIOTHORACIC VASCULAR SURGERY)

## 2017-02-20 PROCEDURE — 06BP0ZZ EXCISION OF RIGHT SAPHENOUS VEIN, OPEN APPROACH: ICD-10-PCS | Performed by: THORACIC SURGERY (CARDIOTHORACIC VASCULAR SURGERY)

## 2017-02-20 PROCEDURE — 85014 HEMATOCRIT: CPT | Mod: 91

## 2017-02-20 PROCEDURE — 500734 HCHG INSERT, STEALTH: Performed by: THORACIC SURGERY (CARDIOTHORACIC VASCULAR SURGERY)

## 2017-02-20 PROCEDURE — 500815 HCHG LOCATORS, AC VEIN GRAFT: Performed by: THORACIC SURGERY (CARDIOTHORACIC VASCULAR SURGERY)

## 2017-02-20 PROCEDURE — 700111 HCHG RX REV CODE 636 W/ 250 OVERRIDE (IP)

## 2017-02-20 PROCEDURE — 93325 DOPPLER ECHO COLOR FLOW MAPG: CPT

## 2017-02-20 PROCEDURE — 160042 HCHG SURGERY MINUTES - EA ADDL 1 MIN LEVEL 5: Performed by: THORACIC SURGERY (CARDIOTHORACIC VASCULAR SURGERY)

## 2017-02-20 PROCEDURE — 85025 COMPLETE CBC W/AUTO DIFF WBC: CPT

## 2017-02-20 PROCEDURE — 82330 ASSAY OF CALCIUM: CPT

## 2017-02-20 PROCEDURE — A9270 NON-COVERED ITEM OR SERVICE: HCPCS | Performed by: NURSE PRACTITIONER

## 2017-02-20 PROCEDURE — P9047 ALBUMIN (HUMAN), 25%, 50ML: HCPCS

## 2017-02-20 PROCEDURE — 503000 HCHG SUTURE, OHS: Performed by: THORACIC SURGERY (CARDIOTHORACIC VASCULAR SURGERY)

## 2017-02-20 PROCEDURE — P9016 RBC LEUKOCYTES REDUCED: HCPCS

## 2017-02-20 PROCEDURE — 84295 ASSAY OF SERUM SODIUM: CPT

## 2017-02-20 PROCEDURE — 30243R1 TRANSFUSION OF NONAUTOLOGOUS PLATELETS INTO CENTRAL VEIN, PERCUTANEOUS APPROACH: ICD-10-PCS | Performed by: THORACIC SURGERY (CARDIOTHORACIC VASCULAR SURGERY)

## 2017-02-20 PROCEDURE — 94760 N-INVAS EAR/PLS OXIMETRY 1: CPT

## 2017-02-20 PROCEDURE — 700102 HCHG RX REV CODE 250 W/ 637 OVERRIDE(OP): Performed by: NURSE PRACTITIONER

## 2017-02-20 PROCEDURE — 5A1221Z PERFORMANCE OF CARDIAC OUTPUT, CONTINUOUS: ICD-10-PCS | Performed by: THORACIC SURGERY (CARDIOTHORACIC VASCULAR SURGERY)

## 2017-02-20 PROCEDURE — 83036 HEMOGLOBIN GLYCOSYLATED A1C: CPT

## 2017-02-20 PROCEDURE — 700101 HCHG RX REV CODE 250

## 2017-02-20 PROCEDURE — 500890 HCHG PACK, OPEN HEART: Performed by: THORACIC SURGERY (CARDIOTHORACIC VASCULAR SURGERY)

## 2017-02-20 PROCEDURE — 30243N1 TRANSFUSION OF NONAUTOLOGOUS RED BLOOD CELLS INTO CENTRAL VEIN, PERCUTANEOUS APPROACH: ICD-10-PCS | Performed by: THORACIC SURGERY (CARDIOTHORACIC VASCULAR SURGERY)

## 2017-02-20 PROCEDURE — 700102 HCHG RX REV CODE 250 W/ 637 OVERRIDE(OP): Performed by: ANESTHESIOLOGY

## 2017-02-20 PROCEDURE — 110382 HCHG SHELL REV 271: Performed by: THORACIC SURGERY (CARDIOTHORACIC VASCULAR SURGERY)

## 2017-02-20 PROCEDURE — 82947 ASSAY GLUCOSE BLOOD QUANT: CPT | Mod: 91

## 2017-02-20 PROCEDURE — 160048 HCHG OR STATISTICAL LEVEL 1-5: Performed by: THORACIC SURGERY (CARDIOTHORACIC VASCULAR SURGERY)

## 2017-02-20 PROCEDURE — 700105 HCHG RX REV CODE 258

## 2017-02-20 PROCEDURE — 500359 HCHG DILATORS, PARSONNET (OHS): Performed by: THORACIC SURGERY (CARDIOTHORACIC VASCULAR SURGERY)

## 2017-02-20 PROCEDURE — 36430 TRANSFUSION BLD/BLD COMPNT: CPT

## 2017-02-20 PROCEDURE — 502627 HCHG HEMOSTAT, SURGICEL 4X4: Performed by: THORACIC SURGERY (CARDIOTHORACIC VASCULAR SURGERY)

## 2017-02-20 PROCEDURE — 700105 HCHG RX REV CODE 258: Performed by: NURSE PRACTITIONER

## 2017-02-20 PROCEDURE — 85730 THROMBOPLASTIN TIME PARTIAL: CPT | Mod: 91

## 2017-02-20 PROCEDURE — 700105 HCHG RX REV CODE 258: Performed by: ANESTHESIOLOGY

## 2017-02-20 PROCEDURE — 99291 CRITICAL CARE FIRST HOUR: CPT | Performed by: INTERNAL MEDICINE

## 2017-02-20 PROCEDURE — 5A1223Z PERFORMANCE OF CARDIAC PACING, CONTINUOUS: ICD-10-PCS | Performed by: THORACIC SURGERY (CARDIOTHORACIC VASCULAR SURGERY)

## 2017-02-20 PROCEDURE — 94002 VENT MGMT INPAT INIT DAY: CPT

## 2017-02-20 PROCEDURE — 700111 HCHG RX REV CODE 636 W/ 250 OVERRIDE (IP): Performed by: INTERNAL MEDICINE

## 2017-02-20 PROCEDURE — A4606 OXYGEN PROBE USED W OXIMETER: HCPCS | Performed by: THORACIC SURGERY (CARDIOTHORACIC VASCULAR SURGERY)

## 2017-02-20 PROCEDURE — 501745 HCHG WIRE, SURGICAL STEEL: Performed by: THORACIC SURGERY (CARDIOTHORACIC VASCULAR SURGERY)

## 2017-02-20 PROCEDURE — 700111 HCHG RX REV CODE 636 W/ 250 OVERRIDE (IP): Performed by: ANESTHESIOLOGY

## 2017-02-20 PROCEDURE — 82962 GLUCOSE BLOOD TEST: CPT | Mod: 91

## 2017-02-20 PROCEDURE — 85347 COAGULATION TIME ACTIVATED: CPT | Mod: 91

## 2017-02-20 PROCEDURE — 500767 HCHG KIT, ENDOSCOPIC VEIN HARVEST: Performed by: THORACIC SURGERY (CARDIOTHORACIC VASCULAR SURGERY)

## 2017-02-20 PROCEDURE — 71010 DX-CHEST-PORTABLE (1 VIEW): CPT

## 2017-02-20 PROCEDURE — B24BZZ4 ULTRASONOGRAPHY OF HEART WITH AORTA, TRANSESOPHAGEAL: ICD-10-PCS | Performed by: THORACIC SURGERY (CARDIOTHORACIC VASCULAR SURGERY)

## 2017-02-20 PROCEDURE — 700101 HCHG RX REV CODE 250: Performed by: ANESTHESIOLOGY

## 2017-02-20 PROCEDURE — 700111 HCHG RX REV CODE 636 W/ 250 OVERRIDE (IP): Performed by: NURSE PRACTITIONER

## 2017-02-20 PROCEDURE — 503001 HCHG PERFUSION: Performed by: THORACIC SURGERY (CARDIOTHORACIC VASCULAR SURGERY)

## 2017-02-20 PROCEDURE — 160031 HCHG SURGERY MINUTES - 1ST 30 MINS LEVEL 5: Performed by: THORACIC SURGERY (CARDIOTHORACIC VASCULAR SURGERY)

## 2017-02-20 PROCEDURE — 80053 COMPREHEN METABOLIC PANEL: CPT

## 2017-02-20 PROCEDURE — 500294 HCHG CLAMP, BULLDOG 1/2 FORCE BLUE: Performed by: THORACIC SURGERY (CARDIOTHORACIC VASCULAR SURGERY)

## 2017-02-20 PROCEDURE — 0212093 BYPASS CORONARY ARTERY, THREE ARTERIES FROM CORONARY ARTERY WITH AUTOLOGOUS VENOUS TISSUE, OPEN APPROACH: ICD-10-PCS | Performed by: THORACIC SURGERY (CARDIOTHORACIC VASCULAR SURGERY)

## 2017-02-20 PROCEDURE — 93312 ECHO TRANSESOPHAGEAL: CPT

## 2017-02-20 PROCEDURE — 501183 HCHG PUNCH, AORTIC #4: Performed by: THORACIC SURGERY (CARDIOTHORACIC VASCULAR SURGERY)

## 2017-02-20 PROCEDURE — 500824 HCHG MISTER BLOWER, CTS: Performed by: THORACIC SURGERY (CARDIOTHORACIC VASCULAR SURGERY)

## 2017-02-20 PROCEDURE — 93010 ELECTROCARDIOGRAM REPORT: CPT | Performed by: INTERNAL MEDICINE

## 2017-02-20 PROCEDURE — 85049 AUTOMATED PLATELET COUNT: CPT | Mod: 91

## 2017-02-20 PROCEDURE — P9045 ALBUMIN (HUMAN), 5%, 250 ML: HCPCS

## 2017-02-20 PROCEDURE — 86923 COMPATIBILITY TEST ELECTRIC: CPT | Mod: 91

## 2017-02-20 PROCEDURE — 110371 HCHG SHELL REV 272: Performed by: THORACIC SURGERY (CARDIOTHORACIC VASCULAR SURGERY)

## 2017-02-20 PROCEDURE — 500112 HCHG BONE WAX: Performed by: THORACIC SURGERY (CARDIOTHORACIC VASCULAR SURGERY)

## 2017-02-20 PROCEDURE — 83735 ASSAY OF MAGNESIUM: CPT

## 2017-02-20 PROCEDURE — 37799 UNLISTED PX VASCULAR SURGERY: CPT

## 2017-02-20 PROCEDURE — 502655: Performed by: THORACIC SURGERY (CARDIOTHORACIC VASCULAR SURGERY)

## 2017-02-20 DEVICE — GLUE BIOGLUE 5CC PRE-FILL (5EA/PK - 4 TIPS PER SYRINGE): Type: IMPLANTABLE DEVICE | Status: FUNCTIONAL

## 2017-02-20 DEVICE — LOCATORS AC VEIN GRAFT (OHS) - 10/BX  SCANLAN #250-1001-83: Type: IMPLANTABLE DEVICE | Status: FUNCTIONAL

## 2017-02-20 RX ORDER — POTASSIUM CHLORIDE 7.45 MG/ML
10 INJECTION INTRAVENOUS ONCE
Status: COMPLETED | OUTPATIENT
Start: 2017-02-20 | End: 2017-02-20

## 2017-02-20 RX ORDER — LACTULOSE 20 G/30ML
30 SOLUTION ORAL
Status: DISCONTINUED | OUTPATIENT
Start: 2017-02-20 | End: 2017-02-27 | Stop reason: HOSPADM

## 2017-02-20 RX ORDER — POTASSIUM CHLORIDE 14.9 MG/ML
20 INJECTION INTRAVENOUS ONCE
Status: COMPLETED | OUTPATIENT
Start: 2017-02-20 | End: 2017-02-20

## 2017-02-20 RX ORDER — ONDANSETRON 2 MG/ML
4 INJECTION INTRAMUSCULAR; INTRAVENOUS EVERY 6 HOURS PRN
Status: DISCONTINUED | OUTPATIENT
Start: 2017-02-20 | End: 2017-02-27 | Stop reason: HOSPADM

## 2017-02-20 RX ORDER — AMOXICILLIN 250 MG
1 CAPSULE ORAL NIGHTLY
Status: DISCONTINUED | OUTPATIENT
Start: 2017-02-20 | End: 2017-02-27 | Stop reason: HOSPADM

## 2017-02-20 RX ORDER — HYDROCODONE BITARTRATE AND ACETAMINOPHEN 5; 325 MG/1; MG/1
1-2 TABLET ORAL EVERY 6 HOURS PRN
Status: DISCONTINUED | OUTPATIENT
Start: 2017-02-20 | End: 2017-02-27 | Stop reason: HOSPADM

## 2017-02-20 RX ORDER — AMOXICILLIN 250 MG
1 CAPSULE ORAL
Status: DISCONTINUED | OUTPATIENT
Start: 2017-02-20 | End: 2017-02-27 | Stop reason: HOSPADM

## 2017-02-20 RX ORDER — DEXTROSE MONOHYDRATE 25 G/50ML
25 INJECTION, SOLUTION INTRAVENOUS PRN
Status: DISPENSED | OUTPATIENT
Start: 2017-02-20 | End: 2017-02-21

## 2017-02-20 RX ORDER — BISACODYL 10 MG
10 SUPPOSITORY, RECTAL RECTAL
Status: DISCONTINUED | OUTPATIENT
Start: 2017-02-20 | End: 2017-02-27 | Stop reason: HOSPADM

## 2017-02-20 RX ORDER — NITROGLYCERIN 20 MG/100ML
0-100 INJECTION INTRAVENOUS CONTINUOUS
Status: DISCONTINUED | OUTPATIENT
Start: 2017-02-20 | End: 2017-02-21 | Stop reason: ALTCHOICE

## 2017-02-20 RX ORDER — OXYCODONE HYDROCHLORIDE 5 MG/1
2.5 TABLET ORAL
Status: DISCONTINUED | OUTPATIENT
Start: 2017-02-20 | End: 2017-02-27 | Stop reason: HOSPADM

## 2017-02-20 RX ORDER — SODIUM CHLORIDE, SODIUM GLUCONATE, SODIUM ACETATE, POTASSIUM CHLORIDE AND MAGNESIUM CHLORIDE 526; 502; 368; 37; 30 MG/100ML; MG/100ML; MG/100ML; MG/100ML; MG/100ML
INJECTION, SOLUTION INTRAVENOUS PRN
Status: DISCONTINUED | OUTPATIENT
Start: 2017-02-20 | End: 2017-02-21 | Stop reason: ALTCHOICE

## 2017-02-20 RX ORDER — ACETAMINOPHEN 325 MG/1
650 TABLET ORAL EVERY 4 HOURS PRN
Status: DISCONTINUED | OUTPATIENT
Start: 2017-02-20 | End: 2017-02-27 | Stop reason: HOSPADM

## 2017-02-20 RX ORDER — HYDRALAZINE HYDROCHLORIDE 20 MG/ML
10-20 INJECTION INTRAMUSCULAR; INTRAVENOUS EVERY 4 HOURS PRN
Status: DISCONTINUED | OUTPATIENT
Start: 2017-02-20 | End: 2017-02-20

## 2017-02-20 RX ORDER — ALUMINA, MAGNESIA, AND SIMETHICONE 2400; 2400; 240 MG/30ML; MG/30ML; MG/30ML
30 SUSPENSION ORAL EVERY 4 HOURS PRN
Status: DISCONTINUED | OUTPATIENT
Start: 2017-02-20 | End: 2017-02-27 | Stop reason: HOSPADM

## 2017-02-20 RX ORDER — CLOPIDOGREL BISULFATE 75 MG/1
75 TABLET ORAL DAILY
Status: DISCONTINUED | OUTPATIENT
Start: 2017-02-21 | End: 2017-02-27 | Stop reason: HOSPADM

## 2017-02-20 RX ORDER — MIDAZOLAM HYDROCHLORIDE 1 MG/ML
.5-2 INJECTION INTRAMUSCULAR; INTRAVENOUS
Status: DISCONTINUED | OUTPATIENT
Start: 2017-02-20 | End: 2017-02-21 | Stop reason: ALTCHOICE

## 2017-02-20 RX ORDER — ACETAMINOPHEN 650 MG/1
650 SUPPOSITORY RECTAL EVERY 4 HOURS PRN
Status: DISCONTINUED | OUTPATIENT
Start: 2017-02-20 | End: 2017-02-27 | Stop reason: HOSPADM

## 2017-02-20 RX ORDER — SODIUM CHLORIDE 9 MG/ML
INJECTION, SOLUTION INTRAVENOUS CONTINUOUS
Status: DISCONTINUED | OUTPATIENT
Start: 2017-02-20 | End: 2017-02-27 | Stop reason: HOSPADM

## 2017-02-20 RX ORDER — TRAMADOL HYDROCHLORIDE 50 MG/1
50 TABLET ORAL EVERY 4 HOURS PRN
Status: DISCONTINUED | OUTPATIENT
Start: 2017-02-20 | End: 2017-02-27 | Stop reason: HOSPADM

## 2017-02-20 RX ORDER — NITROGLYCERIN 20 MG/100ML
5 INJECTION INTRAVENOUS
Status: DISCONTINUED | OUTPATIENT
Start: 2017-02-20 | End: 2017-02-20

## 2017-02-20 RX ORDER — PROMETHAZINE HYDROCHLORIDE 25 MG/1
25 SUPPOSITORY RECTAL EVERY 6 HOURS PRN
Status: DISCONTINUED | OUTPATIENT
Start: 2017-02-20 | End: 2017-02-27 | Stop reason: HOSPADM

## 2017-02-20 RX ORDER — DIPHENHYDRAMINE HCL 25 MG
25 TABLET ORAL
Status: DISCONTINUED | OUTPATIENT
Start: 2017-02-20 | End: 2017-02-27 | Stop reason: HOSPADM

## 2017-02-20 RX ORDER — PAPAVERINE HYDROCHLORIDE 30 MG/ML
INJECTION INTRAMUSCULAR; INTRAVENOUS
Status: DISCONTINUED | OUTPATIENT
Start: 2017-02-20 | End: 2017-02-20 | Stop reason: HOSPADM

## 2017-02-20 RX ORDER — ENEMA 19; 7 G/133ML; G/133ML
1 ENEMA RECTAL
Status: DISCONTINUED | OUTPATIENT
Start: 2017-02-20 | End: 2017-02-27 | Stop reason: HOSPADM

## 2017-02-20 RX ORDER — DOCUSATE SODIUM 100 MG/1
100 CAPSULE, LIQUID FILLED ORAL EVERY MORNING
Status: DISCONTINUED | OUTPATIENT
Start: 2017-02-20 | End: 2017-02-27 | Stop reason: HOSPADM

## 2017-02-20 RX ORDER — CEFAZOLIN SODIUM 2 G/100ML
2 INJECTION, SOLUTION INTRAVENOUS EVERY 8 HOURS
Status: COMPLETED | OUTPATIENT
Start: 2017-02-20 | End: 2017-02-21

## 2017-02-20 RX ORDER — NITROGLYCERIN 20 MG/100ML
INJECTION INTRAVENOUS
Status: ACTIVE
Start: 2017-02-20 | End: 2017-02-21

## 2017-02-20 RX ORDER — MORPHINE SULFATE 4 MG/ML
4 INJECTION, SOLUTION INTRAMUSCULAR; INTRAVENOUS
Status: DISCONTINUED | OUTPATIENT
Start: 2017-02-20 | End: 2017-02-21 | Stop reason: ALTCHOICE

## 2017-02-20 RX ORDER — OXYCODONE HYDROCHLORIDE 5 MG/1
5 TABLET ORAL
Status: DISCONTINUED | OUTPATIENT
Start: 2017-02-20 | End: 2017-02-27 | Stop reason: HOSPADM

## 2017-02-20 RX ORDER — SODIUM CHLORIDE 9 MG/ML
INJECTION, SOLUTION INTRAVENOUS
Status: DISCONTINUED
Start: 2017-02-20 | End: 2017-02-20

## 2017-02-20 RX ORDER — SODIUM CHLORIDE, SODIUM LACTATE, POTASSIUM CHLORIDE, CALCIUM CHLORIDE 600; 310; 30; 20 MG/100ML; MG/100ML; MG/100ML; MG/100ML
INJECTION, SOLUTION INTRAVENOUS
Status: DISCONTINUED
Start: 2017-02-20 | End: 2017-02-20

## 2017-02-20 RX ADMIN — SODIUM CHLORIDE, SODIUM GLUCONATE, SODIUM ACETATE, POTASSIUM CHLORIDE AND MAGNESIUM CHLORIDE 1000 ML: 526; 502; 368; 37; 30 INJECTION, SOLUTION INTRAVENOUS at 18:05

## 2017-02-20 RX ADMIN — MUPIROCIN 1 APPLICATION: 20 OINTMENT TOPICAL at 21:05

## 2017-02-20 RX ADMIN — DIAZEPAM 5 MG: 5 TABLET ORAL at 04:39

## 2017-02-20 RX ADMIN — METOPROLOL TARTRATE 12.5 MG: 25 TABLET, FILM COATED ORAL at 05:37

## 2017-02-20 RX ADMIN — SODIUM CHLORIDE: 9 INJECTION, SOLUTION INTRAVENOUS at 14:10

## 2017-02-20 RX ADMIN — HYDRALAZINE HYDROCHLORIDE 20 MG: 20 INJECTION INTRAMUSCULAR; INTRAVENOUS at 04:00

## 2017-02-20 RX ADMIN — OXYCODONE HYDROCHLORIDE 5 MG: 5 TABLET ORAL at 21:12

## 2017-02-20 RX ADMIN — SODIUM CHLORIDE, SODIUM GLUCONATE, SODIUM ACETATE, POTASSIUM CHLORIDE AND MAGNESIUM CHLORIDE 1000 ML: 526; 502; 368; 37; 30 INJECTION, SOLUTION INTRAVENOUS at 14:20

## 2017-02-20 RX ADMIN — HYDROCODONE BITARTRATE AND ACETAMINOPHEN 1 TABLET: 5; 325 TABLET ORAL at 19:41

## 2017-02-20 RX ADMIN — POTASSIUM CHLORIDE 10 MEQ: 7.46 INJECTION, SOLUTION INTRAVENOUS at 18:07

## 2017-02-20 RX ADMIN — SODIUM BICARBONATE 50 MEQ: 84 INJECTION, SOLUTION INTRAVENOUS at 15:55

## 2017-02-20 RX ADMIN — STANDARDIZED SENNA CONCENTRATE AND DOCUSATE SODIUM 1 TABLET: 8.6; 5 TABLET, FILM COATED ORAL at 21:03

## 2017-02-20 RX ADMIN — VANCOMYCIN HYDROCHLORIDE 900 MG: 10 INJECTION, POWDER, LYOPHILIZED, FOR SOLUTION INTRAVENOUS at 14:52

## 2017-02-20 RX ADMIN — ATORVASTATIN CALCIUM 20 MG: 20 TABLET, FILM COATED ORAL at 21:03

## 2017-02-20 RX ADMIN — POTASSIUM CHLORIDE 20 MEQ: 14.9 INJECTION, SOLUTION INTRAVENOUS at 14:30

## 2017-02-20 RX ADMIN — MUPIROCIN 1 APPLICATION: 20 OINTMENT TOPICAL at 14:41

## 2017-02-20 RX ADMIN — MORPHINE SULFATE 4 MG: 4 INJECTION INTRAVENOUS at 16:37

## 2017-02-20 RX ADMIN — SODIUM BICARBONATE 50 MEQ: 84 INJECTION, SOLUTION INTRAVENOUS at 17:17

## 2017-02-20 RX ADMIN — CEFAZOLIN SODIUM 2 G: 2 INJECTION, SOLUTION INTRAVENOUS at 20:59

## 2017-02-20 RX ADMIN — POTASSIUM CHLORIDE 20 MEQ: 200 INJECTION, SOLUTION INTRAVENOUS at 18:46

## 2017-02-20 ASSESSMENT — PAIN SCALES - GENERAL
PAINLEVEL_OUTOF10: 0
PAINLEVEL_OUTOF10: 7
PAINLEVEL_OUTOF10: 0
PAINLEVEL_OUTOF10: 7
PAINLEVEL_OUTOF10: 3
PAINLEVEL_OUTOF10: 6

## 2017-02-20 ASSESSMENT — ENCOUNTER SYMPTOMS
SHORTNESS OF BREATH: 0
FEVER: 0

## 2017-02-20 ASSESSMENT — LIFESTYLE VARIABLES: DOES PATIENT WANT TO STOP DRINKING: NO

## 2017-02-20 NOTE — PROGRESS NOTES
Report received from tele RN, transported patient to [facility], patient tolerated transport, patient family at bedside.

## 2017-02-20 NOTE — CARE PLAN
Problem: Pre Op  Goal: Optimal preparation for CABG/Heart Valve surgery  Intervention: Pre Op education to patient/significant other. Provide patient [facility] Patient Guideline for Cardiac Surgery (See Pt. Ed.)  Education provided to patient and family. Starr RN present to assist with answering questions about immediate post-op period. Packet provided, all questions answered at this time.  Intervention: Consents obtained for Surgery, Anesthesia and Transfusion/Bloodless Program Participant  Surgical and blood transfusion consents signed and placed on chart. Anesthesia consent to be signed in OR.  Intervention: Pre op checklist completed. Admit profile completed. Advanced directive verified.  Completed and verified.  Intervention: Pre op labs per MD order: CBC, CMP, INR, PTT, COD if not done in past 72 hours. HbA1C if diabetic.  Labs completed.  Intervention: Pre op diagnostics per MD order (EKG, CXR, Bilateral carotid doppler study and vein mapping)  All diagnostics done per MD order.  Intervention: Baseline assessment documented to include IS volume, weight, bilateral BP and peripheral pulses.  IS = 2000 ml, weight 58.2 kg via stand up scale, bilateral blood pressures charted and peripheral pulses marked and charted accordingly.  Intervention: NPO at midnight except cardiac medications. (No ASA, coumadin or Plavix)  Done.  Intervention: Shower with chlorhexidine x 2  Completed.  Intervention: Prep with clippers  Completed.  Intervention: Remove dentures, valuables and jewelry  Patient does not wear dentures, no valuables or jewelry noted.  Intervention: Determine if patient is taking Beta Blockers  Patient prescribed beta blockers. 2000 dose held due to bradycardia.  Intervention: Cardiac/BP meds and Mupirocin ointment given prior to surgery. Verify orders for hold or administer of anticoags.  Medications administered as ordered.  Intervention: If diabetic, administer insulin night before surgery  FSBG within  parameters.  Intervention: If diabetic, FSBS in am and follow sliding scale if indicated  Completed.  Intervention: Pre op antibiotics sent to surgery with patient per MD order (surgery to administer)  Will consult with anesthesiology in pre-op, no current order for antibiotics.  Intervention: Medical record sent to surgery with current H&P  Done.  Intervention: Transport to surgery with cardiac monitor and oxygen  To be completed when called to OR.

## 2017-02-20 NOTE — OR SURGEON
Immediate Post-Operative Note      PreOp Diagnosis: CAD    PostOp Diagnosis: CA    Procedure(s):  MULTIPLE CORONARY ARTERY BYPASS ENDO VEIN HARVEST X4, PREVENA DRESSING - Wound Class: Clean with Drain  PARUL  - Wound Class: None  LIMA to LAD, RSVG to OM, RSVG to Diag, RSVG to RCA, bilat thigh EVH    Surgeon(s):  Hernán Dowell M.D.    Anesthesiologist/Type of Anesthesia:  Anesthesiologist: Abdelrahman Madden M.D.  Anesthesia Technician: Sly Sarkar; Arnav Richardson/General    Surgical Staff:  Assistant: WARREN Quigley  Circulator: Lima Sharpe R.N.; Jennifer Akhtar R.N.  Perfusionist: Micha Anderson Circulator: Deanne Oates R.N.  Scrub Person: VAIBHAV Marina IV; Luann Lucero    Specimen: none    Estimated Blood Loss: minimal, cell saver    Findings: CAD    Complications: none        2/20/2017 1:59 PM Aleksandra Murphy

## 2017-02-20 NOTE — PROGRESS NOTES
Report given to [facility] nurses who came to transport pt to room 620 in preparation for surgery tomorrow.

## 2017-02-20 NOTE — CONSULTS
DATE OF SERVICE:  02/19/2017    CHIEF COMPLAINT:  Sharp chest pain.    HISTORY OF PRESENT ILLNESS:  Patient was seen by Dr. Carpio, had angiography 3   years ago.  Dr. Carpio told that she was nonoperative at that time.  She now   comes in with chest pain, sharp, radiating in left chest, not relieved from   nitroglycerin and at times was vice-like.  She was seen in [facility]   and sent home, then brought back in to [facility] under Dr. Eugene.  Heart   catheterization shows that she has long 50% tubular lesion, LAD several 80%   smaller diagonal lesions, a high-grade PDA lesion, and a ghost vessel OM2 that   is very small EF 65%.    She stopped smoking in 2014, nondrinker.  She has had negative troponins,   history of angina, history of hypertension, dyslipidemia, and   hypercholesterolemia.  She has had claudication in bilateral legs, equal on   both sides.  Dr. Valenzuela has done interventional therapy on both legs.    According to the patient, no stents has been in place.  Carotids are reported   as normal.    I have discussed the case with Dr. Corrales and he agrees with our plan for   coronary bypass surgery.    ALLERGIES:  None.    MEDICATIONS:  Per medication list.    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    FAMILY HISTORY:  Noncontributory.    REVIEW OF SYSTEMS:  A 14-point review of systems negative except for that   mentioned above.    PHYSICAL EXAMINATION:   GENERAL:  No acute distress, alert, appearance appropriate, oriented x3.    Memory x3.  VITAL SIGNS:  Heart rate 85, blood pressure 135/85, and respirations 12.  HEENT:  PERRLA.  EOM normal.  NEUROLOGICAL:  Cranial nerves II-XII intact.  Four extremities motor function   equal, reflex normal.  Motor equal.  CARDIAC:  S1, S2.  No S3.  No murmur.  PULMONARY:  Clear.  ABDOMEN:  Somewhat overweight.  No masses.  GENITOURINARY:  Deferred.  RECTAL:  Deferred.  GYN:  Deferred.  VASCULAR:  Femoral, popliteal, dorsalis pedis and  posterior tibial pulses 1+   trace.  EXTREMITIES:  Saphenous vein good for harvesting bilaterally, marked and   echoed.  SKIN:  Clear.  PSYCHIATRIC:  Affect appropriate.  ENDOCRINE:  No thyromegaly.    IMPRESSION:  1.  Possible angina, atypical.  2.  Pleuritic chest pain.  3.  Coronary artery disease.  4.  Ex-smoker, 2014.  5.  Hypertension.  6.  Dyslipidemia.  7.  Hypercholesterolemia.  8.  Claudication.  9.  Dr. Valenzuela, interventional therapy in bilateral legs.    This patient has significant coronary artery disease.  Reviewed the coronary   angiography with cardiology, all in agreement for bypass surgery.    I explained the risks of surgery to the patient.  There is risk of mortality   of 1%, infection, bleeding, heart attack, stroke, blood transfusion risk,   liver, lung, kidney failure, diaphragmatic paralysis, paresis, ulnar   neuropathy, chest wall paresthesia, leg paresthesia, reoperation, bleeding,   reoperation for infection, tracheostomy, ventilation, AIDS, hepatitis, and   missed counts of surgical items.    The STS risk calculator was discussed.  She is New York Heart   Classification II and [place] Classification is II.    I explained the risks of surgery.  In addition to risk of mortality, there is   risk of all above with increased risk of wound healing problems, poor   circulation, and healing of vein harvest sites.    The patient had some chronic fatigue.  I do not believe this is referable to   her heart.  I have also told the family and patient that her chest pain may   not be related to her heart, it could be pleuritic pain, which case this may   not change after surgery.    The patient and family has agreed giving 30-day support to this patient in   ICU.  I have asked them stay with her critical care team, our ICU team and get   her through 30 days, regardless of serious events unless we have irreversible   catastrophic event happen.  They understand and accept above, understand and   accept  the risks and wish proceed with surgery.  They will support the patient   and the critical care team for 30 days postoperatively.    I spent 2 hours in reviewing the films, case reviewed, cardiology   consultation, family consultations.  Planned surgery on Monday.       ____________________________________     MD ARSLAN GARCIA / BUNNY    DD:  02/19/2017 14:04:44  DT:  02/19/2017 19:38:45    D#:  [id number]  Job#:  [id number]

## 2017-02-21 ENCOUNTER — APPOINTMENT (OUTPATIENT)
Dept: RADIOLOGY | Facility: MEDICAL CENTER | Age: 72
DRG: 234 | End: 2017-02-21
Attending: NURSE PRACTITIONER
Payer: MEDICARE

## 2017-02-21 ENCOUNTER — PATIENT OUTREACH (OUTPATIENT)
Dept: HEALTH INFORMATION MANAGEMENT | Facility: OTHER | Age: 72
End: 2017-02-21

## 2017-02-21 LAB
ANION GAP SERPL CALC-SCNC: 6 MMOL/L (ref 0–11.9)
BUN SERPL-MCNC: 10 MG/DL (ref 8–22)
CALCIUM SERPL-MCNC: 7.4 MG/DL (ref 8.5–10.5)
CHLORIDE SERPL-SCNC: 106 MMOL/L (ref 96–112)
CO2 SERPL-SCNC: 26 MMOL/L (ref 20–33)
CREAT SERPL-MCNC: 0.79 MG/DL (ref 0.5–1.4)
EKG IMPRESSION: NORMAL
ERYTHROCYTE [DISTWIDTH] IN BLOOD BY AUTOMATED COUNT: 52.2 FL (ref 35.9–50)
GFR SERPL CREATININE-BSD FRML MDRD: >60 ML/MIN/1.73 M 2
GLUCOSE BLD-MCNC: 104 MG/DL (ref 65–99)
GLUCOSE BLD-MCNC: 109 MG/DL (ref 65–99)
GLUCOSE BLD-MCNC: 121 MG/DL (ref 65–99)
GLUCOSE BLD-MCNC: 123 MG/DL (ref 65–99)
GLUCOSE BLD-MCNC: 126 MG/DL (ref 65–99)
GLUCOSE BLD-MCNC: 129 MG/DL (ref 65–99)
GLUCOSE BLD-MCNC: 145 MG/DL (ref 65–99)
GLUCOSE BLD-MCNC: 145 MG/DL (ref 65–99)
GLUCOSE BLD-MCNC: 152 MG/DL (ref 65–99)
GLUCOSE BLD-MCNC: 43 MG/DL (ref 65–99)
GLUCOSE BLD-MCNC: 58 MG/DL (ref 65–99)
GLUCOSE BLD-MCNC: 77 MG/DL (ref 65–99)
GLUCOSE BLD-MCNC: 80 MG/DL (ref 65–99)
GLUCOSE BLD-MCNC: 82 MG/DL (ref 65–99)
GLUCOSE BLD-MCNC: 89 MG/DL (ref 65–99)
GLUCOSE BLD-MCNC: 91 MG/DL (ref 65–99)
GLUCOSE BLD-MCNC: 99 MG/DL (ref 65–99)
GLUCOSE SERPL-MCNC: 152 MG/DL (ref 65–99)
HCT VFR BLD AUTO: 30.4 % (ref 37–47)
HGB BLD-MCNC: 10.3 G/DL (ref 12–16)
LV EJECT FRACT  99904: 65
MCH RBC QN AUTO: 30.5 PG (ref 27–33)
MCHC RBC AUTO-ENTMCNC: 33.9 G/DL (ref 33.6–35)
MCV RBC AUTO: 89.9 FL (ref 81.4–97.8)
PLATELET # BLD AUTO: 155 K/UL (ref 164–446)
PMV BLD AUTO: 10.5 FL (ref 9–12.9)
POTASSIUM SERPL-SCNC: 3.6 MMOL/L (ref 3.6–5.5)
POTASSIUM SERPL-SCNC: 3.8 MMOL/L (ref 3.6–5.5)
POTASSIUM SERPL-SCNC: 4.2 MMOL/L (ref 3.6–5.5)
RBC # BLD AUTO: 3.38 M/UL (ref 4.2–5.4)
SODIUM SERPL-SCNC: 138 MMOL/L (ref 135–145)
WBC # BLD AUTO: 14.6 K/UL (ref 4.8–10.8)

## 2017-02-21 PROCEDURE — 700111 HCHG RX REV CODE 636 W/ 250 OVERRIDE (IP): Performed by: NURSE PRACTITIONER

## 2017-02-21 PROCEDURE — 700102 HCHG RX REV CODE 250 W/ 637 OVERRIDE(OP)

## 2017-02-21 PROCEDURE — 700102 HCHG RX REV CODE 250 W/ 637 OVERRIDE(OP): Performed by: INTERNAL MEDICINE

## 2017-02-21 PROCEDURE — 302043 TAPE, HYPAFIX: Performed by: THORACIC SURGERY (CARDIOTHORACIC VASCULAR SURGERY)

## 2017-02-21 PROCEDURE — 700102 HCHG RX REV CODE 250 W/ 637 OVERRIDE(OP): Performed by: NURSE PRACTITIONER

## 2017-02-21 PROCEDURE — 82962 GLUCOSE BLOOD TEST: CPT

## 2017-02-21 PROCEDURE — A9270 NON-COVERED ITEM OR SERVICE: HCPCS | Performed by: NURSE PRACTITIONER

## 2017-02-21 PROCEDURE — 93005 ELECTROCARDIOGRAM TRACING: CPT | Performed by: NURSE PRACTITIONER

## 2017-02-21 PROCEDURE — 700105 HCHG RX REV CODE 258

## 2017-02-21 PROCEDURE — 99291 CRITICAL CARE FIRST HOUR: CPT | Performed by: INTERNAL MEDICINE

## 2017-02-21 PROCEDURE — 94760 N-INVAS EAR/PLS OXIMETRY 1: CPT

## 2017-02-21 PROCEDURE — 700112 HCHG RX REV CODE 229: Performed by: NURSE PRACTITIONER

## 2017-02-21 PROCEDURE — 93010 ELECTROCARDIOGRAM REPORT: CPT | Performed by: INTERNAL MEDICINE

## 2017-02-21 PROCEDURE — 80048 BASIC METABOLIC PNL TOTAL CA: CPT

## 2017-02-21 PROCEDURE — 84132 ASSAY OF SERUM POTASSIUM: CPT

## 2017-02-21 PROCEDURE — A9270 NON-COVERED ITEM OR SERVICE: HCPCS | Performed by: INTERNAL MEDICINE

## 2017-02-21 PROCEDURE — 700101 HCHG RX REV CODE 250

## 2017-02-21 PROCEDURE — 71010 DX-CHEST-PORTABLE (1 VIEW): CPT

## 2017-02-21 PROCEDURE — 770020 HCHG ROOM/CARE - TELE (206)

## 2017-02-21 PROCEDURE — 700105 HCHG RX REV CODE 258: Performed by: NURSE PRACTITIONER

## 2017-02-21 PROCEDURE — 700101 HCHG RX REV CODE 250: Performed by: THORACIC SURGERY (CARDIOTHORACIC VASCULAR SURGERY)

## 2017-02-21 PROCEDURE — 85027 COMPLETE CBC AUTOMATED: CPT

## 2017-02-21 RX ORDER — ESCITALOPRAM OXALATE 10 MG/1
20 TABLET ORAL DAILY
Status: DISCONTINUED | OUTPATIENT
Start: 2017-02-22 | End: 2017-02-27 | Stop reason: HOSPADM

## 2017-02-21 RX ORDER — POTASSIUM CHLORIDE 7.45 MG/ML
10 INJECTION INTRAVENOUS ONCE
Status: COMPLETED | OUTPATIENT
Start: 2017-02-21 | End: 2017-02-21

## 2017-02-21 RX ORDER — OMEPRAZOLE 20 MG/1
20 CAPSULE, DELAYED RELEASE ORAL DAILY
Status: DISCONTINUED | OUTPATIENT
Start: 2017-02-21 | End: 2017-02-27 | Stop reason: HOSPADM

## 2017-02-21 RX ADMIN — LEVOTHYROXINE SODIUM 75 MCG: 75 TABLET ORAL at 05:59

## 2017-02-21 RX ADMIN — POTASSIUM CHLORIDE 10 MEQ: 7.46 INJECTION, SOLUTION INTRAVENOUS at 01:11

## 2017-02-21 RX ADMIN — ATORVASTATIN CALCIUM 20 MG: 20 TABLET, FILM COATED ORAL at 20:58

## 2017-02-21 RX ADMIN — METOPROLOL TARTRATE 12.5 MG: 25 TABLET, FILM COATED ORAL at 09:11

## 2017-02-21 RX ADMIN — OXYCODONE HYDROCHLORIDE 5 MG: 5 TABLET ORAL at 09:12

## 2017-02-21 RX ADMIN — OMEPRAZOLE 20 MG: 20 CAPSULE, DELAYED RELEASE ORAL at 11:38

## 2017-02-21 RX ADMIN — INSULIN HUMAN 9 UNITS: 100 INJECTION, SUSPENSION SUBCUTANEOUS at 11:28

## 2017-02-21 RX ADMIN — HYDROCODONE BITARTRATE AND ACETAMINOPHEN 1 TABLET: 5; 325 TABLET ORAL at 18:16

## 2017-02-21 RX ADMIN — ASPIRIN 81 MG: 81 TABLET ORAL at 09:26

## 2017-02-21 RX ADMIN — OXYCODONE HYDROCHLORIDE 5 MG: 5 TABLET ORAL at 03:35

## 2017-02-21 RX ADMIN — OXYCODONE HYDROCHLORIDE 5 MG: 5 TABLET ORAL at 14:51

## 2017-02-21 RX ADMIN — DEXTROSE MONOHYDRATE 25 ML: 500 INJECTION PARENTERAL at 00:09

## 2017-02-21 RX ADMIN — CLOPIDOGREL 75 MG: 75 TABLET, FILM COATED ORAL at 09:11

## 2017-02-21 RX ADMIN — MUPIROCIN 1 APPLICATION: 20 OINTMENT TOPICAL at 09:17

## 2017-02-21 RX ADMIN — MAGNESIUM SULFATE IN DEXTROSE 1 G: 10 INJECTION, SOLUTION INTRAVENOUS at 12:50

## 2017-02-21 RX ADMIN — OXYCODONE HYDROCHLORIDE 5 MG: 5 TABLET ORAL at 20:58

## 2017-02-21 RX ADMIN — POTASSIUM CHLORIDE 10 MEQ: 7.46 INJECTION, SOLUTION INTRAVENOUS at 06:05

## 2017-02-21 RX ADMIN — HYDROCODONE BITARTRATE AND ACETAMINOPHEN 1 TABLET: 5; 325 TABLET ORAL at 11:33

## 2017-02-21 RX ADMIN — CEFAZOLIN SODIUM 2 G: 2 INJECTION, SOLUTION INTRAVENOUS at 04:01

## 2017-02-21 RX ADMIN — HYDROCODONE BITARTRATE AND ACETAMINOPHEN 1 TABLET: 5; 325 TABLET ORAL at 05:59

## 2017-02-21 RX ADMIN — MUPIROCIN 1 APPLICATION: 20 OINTMENT TOPICAL at 21:00

## 2017-02-21 RX ADMIN — SODIUM CHLORIDE 2.75 UNITS/HR: 9 INJECTION, SOLUTION INTRAVENOUS at 07:09

## 2017-02-21 RX ADMIN — ESCITALOPRAM OXALATE 10 MG: 10 TABLET ORAL at 09:11

## 2017-02-21 RX ADMIN — METOPROLOL TARTRATE 12.5 MG: 25 TABLET, FILM COATED ORAL at 20:58

## 2017-02-21 RX ADMIN — VANCOMYCIN HYDROCHLORIDE 900 MG: 10 INJECTION, POWDER, LYOPHILIZED, FOR SOLUTION INTRAVENOUS at 02:25

## 2017-02-21 RX ADMIN — STANDARDIZED SENNA CONCENTRATE AND DOCUSATE SODIUM 1 TABLET: 8.6; 5 TABLET, FILM COATED ORAL at 20:58

## 2017-02-21 RX ADMIN — OXYCODONE HYDROCHLORIDE 5 MG: 5 TABLET ORAL at 00:10

## 2017-02-21 RX ADMIN — DEXTROSE MONOHYDRATE 25 ML: 500 INJECTION PARENTERAL at 10:10

## 2017-02-21 RX ADMIN — DOCUSATE SODIUM 100 MG: 100 CAPSULE ORAL at 09:11

## 2017-02-21 ASSESSMENT — PAIN SCALES - GENERAL
PAINLEVEL_OUTOF10: 9
PAINLEVEL_OUTOF10: 8
PAINLEVEL_OUTOF10: 9
PAINLEVEL_OUTOF10: 6
PAINLEVEL_OUTOF10: 8
PAINLEVEL_OUTOF10: 6
PAINLEVEL_OUTOF10: 6
PAINLEVEL_OUTOF10: 5
PAINLEVEL_OUTOF10: 5
PAINLEVEL_OUTOF10: 6
PAINLEVEL_OUTOF10: 8

## 2017-02-21 ASSESSMENT — ENCOUNTER SYMPTOMS
EYES NEGATIVE: 1
CONSTITUTIONAL NEGATIVE: 1
CARDIOVASCULAR NEGATIVE: 1
BRUISES/BLEEDS EASILY: 0
GASTROINTESTINAL NEGATIVE: 1
PSYCHIATRIC NEGATIVE: 1
FEVER: 0
MUSCULOSKELETAL NEGATIVE: 1
NEUROLOGICAL NEGATIVE: 1
SHORTNESS OF BREATH: 0
RESPIRATORY NEGATIVE: 1

## 2017-02-21 NOTE — PROGRESS NOTES
Patient transported to T620 with OR team. Bedside report received from MD Madden. Monitors placed and patient assessed. All drips and CXR verified. Family brought to bedside and updated on plan of care.

## 2017-02-21 NOTE — PROGRESS NOTES
Cardiology Progress Note               Author: Antonio Lee Date & Time created: 2017  7:39 AM     Interval History:  S/P CABG X 4 yesterday.  Stable overnight.  Off all pressor agents.  Daughter is concerned about possibility of alcohol withdrawal and depression treatment.   Review of Systems   Constitutional: Negative for fever.   Respiratory: Negative for shortness of breath.    Endo/Heme/Allergies: Does not bruise/bleed easily.       Physical Exam   Constitutional: No distress.   HENT:   Head: Normocephalic and atraumatic.   Eyes: No scleral icterus.   Neck: No JVD present.   Cardiovascular: Normal rate.    No murmur heard.  Pulmonary/Chest: Effort normal. No respiratory distress. She has no wheezes.   Musculoskeletal: She exhibits no edema.   Neurological: She is alert.   Skin: Skin is warm and dry.       Hemodynamics:  Temp (24hrs), Av.2 °C (97.1 °F), Min:35.1 °C (95.2 °F), Max:36.8 °C (98.2 °F)  Temperature: 36.7 °C (98.1 °F), Monitored Temp: 37 °C (98.6 °F)  Pulse  Av.4  Min: 48  Max: 94Heart Rate (Monitored): 71  Arterial BP: 121/60 mmHg, NIBP: 114/66 mmHg  CVP (mm Hg): (!) 15 MM HG  Respiratory:  Jacobo Vent Mode: ASV, PEEP/CPAP: 8, FiO2: 30, P Peak (PIP): 16, P MEAN: 10 Respiration: 14, Pulse Oximetry: 95 %, O2 Daily Delivery Respiratory : Silicone Nasal Cannula  Chest Tube Group Right;Left;Mediastinal Straight 36-Tube Status / Drainage: Patent;Serosanguinous;Small, Chest Tube Group Right;Left;Mediastinal Straight 36-Device: Air Leak Present;Dry Closed Drainage System;Suction 20 cm Water  Work Of Breathing / Effort: Shallow  RUL Breath Sounds: Clear, RML Breath Sounds: Diminished, RLL Breath Sounds: Diminished, KATERYNA Breath Sounds: Clear, LLL Breath Sounds: Diminished  Fluids:     Weight: 69.2 kg (152 lb 8.9 oz)  GI/Nutrition:  Orders Placed This Encounter   Procedures   • DIET ORDER     Standing Status: Standing      Number of Occurrences: 1      Standing Expiration Date:      Order  Specific Question:  Diet:     Answer:  Consistent Carbohydrate [4]      Comments:  no carbs     Order Specific Question:  Diet:     Answer:  Cardiac [6]     Lab Results:  Recent Labs      02/20/17   0140  02/20/17   1030  02/20/17   1330  02/21/17   0500   WBC  6.5   --    --   14.6*   RBC  3.94*   --    --   3.38*   HEMOGLOBIN  12.0   --    --   10.3*   HEMATOCRIT  35.9*   --    --   30.4*   MCV  91.1   --    --   89.9   MCH  30.5   --    --   30.5   MCHC  33.4*   --    --   33.9   RDW  44.1   --    --   52.2*   PLATELETCT  222  132*  204  155*   MPV  11.0   --    --   10.5     Recent Labs      02/20/17   0140  02/20/17   1706  02/21/17   0005  02/21/17   0500   SODIUM  141   --    --   138   POTASSIUM  4.0  3.0*  3.8  4.2   CHLORIDE  106   --    --   106   CO2  24   --    --   26   GLUCOSE  96   --    --   152*   BUN  19   --    --   10   CREATININE  0.84   --    --   0.79   CALCIUM  9.7   --    --   7.4*     Recent Labs      02/20/17   0140  02/20/17   1330   APTT  31.4  30.5   INR  0.91  1.36*                     Medical Decision Making, by Problem:  Active Hospital Problems    Diagnosis   • Chest pain [R07.9]       Plan:  Hemodynamically stable post CABG.  Hx of labile hypertension preop.  Monitor BP.  Will refer to Psychiatry for consideration of antidepressant Rx and treatment of substance abuse.    Core Measures

## 2017-02-21 NOTE — PROGRESS NOTES
Cardiovascular Surgery Progress Note    Name: Carole Kingsley  MRN: [id number]  : 1945  Admit Date: 2017 11:25 AM  Procedure:  Procedure(s) and Anesthesia Type:     * MULTIPLE CORONARY ARTERY BYPASS ENDO VEIN HARVEST X4, PREVENA DRESSING - General     * PARUL  - General  1 Day Post-Op    Vitals:  Patient Vitals for the past 8 hrs:   Temp Monitored Temp SpO2 O2 Delivery O2 (LPM) Pulse Heart Rate (Monitored) Resp NIBP Weight   17 0652 - - 95 % - 1 71 71 14 - -   17 0600 - 37 °C (98.6 °F) 94 % - - 71 71 14 114/66 mmHg -   17 0559 - 37 °C (98.6 °F) 93 % - - 73 73 (!) 8 - -   17 0500 - 36.8 °C (98.2 °F) 94 % - - 70 69 15 - -   17 0430 - 36.7 °C (98.1 °F) 96 % - - 68 68 17 - -   17 0415 - 36.7 °C (98.1 °F) 95 % - - 65 65 (!) 25 - -   17 0400 36.7 °C (98.1 °F) 36.7 °C (98.1 °F) 97 % Silicone Nasal Cannula 1 67 67 18 113/73 mmHg 69.2 kg (152 lb 8.9 oz)   17 0345 - 36.7 °C (98.1 °F) 97 % - - 68 68 (!) 21 125/69 mmHg -   17 0335 - 36.7 °C (98.1 °F) 96 % - - 69 69 (!) 11 - -   17 0330 - 36.7 °C (98.1 °F) 96 % - - 65 65 (!) 10 111/65 mmHg -   17 0315 - 36.7 °C (98.1 °F) 95 % - - 66 66 (!) 10 113/70 mmHg -   17 0300 - 36.6 °C (97.9 °F) 96 % - - 65 65 17 111/70 mmHg -   17 0245 - 36.5 °C (97.7 °F) 96 % Silicone Nasal Cannula 1 65 66 15 104/63 mmHg -   17 0230 - 36.5 °C (97.7 °F) 91 % None (Room Air) 0 70 71 15 112/62 mmHg -   17 0215 - 36.4 °C (97.5 °F) 96 % - - 67 67 14 105/68 mmHg -   17 0200 - 36.4 °C (97.5 °F) 94 % - - 67 67 12 101/59 mmHg -   17 0145 - 36.4 °C (97.5 °F) 94 % - - 67 67 (!) 6 105/63 mmHg -   17 0130 - 36.4 °C (97.5 °F) 94 % - - 67 67 (!) 10 (!) 98/71 mmHg -   17 0115 - 36.3 °C (97.3 °F) 94 % - - 66 65 (!) 11 108/61 mmHg -   17 0100 - 36.3 °C (97.3 °F) 93 % - - 65 65 12 (!) 91/54 mmHg -   17 0045 - 36.3 °C (97.3 °F) 95 % - - 68 68 (!) 27 112/67 mmHg -   17 0030 -  36.2 °C (97.2 °F) 93 % - - 65 65 17 107/63 mmHg -   17 0015 36.2 °C (97.2 °F) 36.2 °C (97.2 °F) 94 % - - 69 69 18 (!) 99/58 mmHg -     Temp (24hrs), Av.1 °C (97 °F), Min:35.1 °C (95.2 °F), Max:36.8 °C (98.2 °F)      Respiratory:  Jacobo Vent Mode: ASV, PEEP/CPAP: 8, FiO2: 30, P Peak (PIP): 16, P MEAN: 10 Respiration: 14, Pulse Oximetry: 95 %, O2 Daily Delivery Respiratory : Silicone Nasal Cannula  Chest Tube Group Right;Left;Mediastinal Straight 36-Tube Status / Drainage: Patent;Serosanguinous;Small, Chest Tube Group Right;Left;Mediastinal Straight 36-Device: Air Leak Present;Dry Closed Drainage System;Suction 20 cm Water  Chest Tube Drains:     Mediastinal Chest Tube 1: 60 ml    Fluids:    Intake/Output Summary (Last 24 hours) at 17 0813  Last data filed at 17 0600   Gross per 24 hour   Intake 6019.96 ml   Output   5015 ml   Net 1004.96 ml     Admit weight: Weight: 57.607 kg (127 lb)  Current weight: Weight: 69.2 kg (152 lb 8.9 oz) (17 0400)    Labs:  Recent Labs      17   0140  17   1030  17   1330  17   0500   WBC  6.5   --    --   14.6*   RBC  3.94*   --    --   3.38*   HEMOGLOBIN  12.0   --    --   10.3*   HEMATOCRIT  35.9*   --    --   30.4*   MCV  91.1   --    --   89.9   MCH  30.5   --    --   30.5   MCHC  33.4*   --    --   33.9   RDW  44.1   --    --   52.2*   PLATELETCT  222  132*  204  155*   MPV  11.0   --    --   10.5     Recent Labs      17   0140   NEUTSPOLYS  41.40*   LYMPHOCYTES  43.20*   MONOCYTES  9.30   EOSINOPHILS  5.30   BASOPHILS  0.60     Recent Labs      17   0140  17   1706  17   0005  17   0500   SODIUM  141   --    --   138   POTASSIUM  4.0  3.0*  3.8  4.2   CHLORIDE  106   --    --   106   CO2  24   --    --   26   GLUCOSE  96   --    --   152*   BUN  19   --    --   10   CREATININE  0.84   --    --   0.79   CALCIUM  9.7   --    --   7.4*     Recent Labs      17   0140  17   1330   APTT  31.4   30.5   INR  0.91  1.36*       Medications:  • insulin lispro  3 Units     • insulin NPH  9 Units     • insulin lispro  6 Units     • insulin lispro  0-15 Units     • docusate sodium  100 mg      And   • senna-docusate  1 Tab     • enoxaparin  40 mg     • mupirocin  1 Application     • metoprolol  12.5 mg      Followed by   • [START ON 2/22/2017] metoprolol  25 mg     • clopidogrel  75 mg     • desmopressin (DDAVP) IV  0.3 mcg/kg     • magnesium sulfate  1 g Stopped (02/20/17 1442)   • levothyroxine  75 mcg     • escitalopram  10 mg     • atorvastatin  20 mg         Exam:   Review of Systems   Constitutional: Negative.    HENT: Negative.    Eyes: Negative.    Respiratory: Negative.    Cardiovascular: Negative.    Gastrointestinal: Negative.    Genitourinary: Negative.    Musculoskeletal: Negative.    Skin: Negative.    Neurological: Negative.    Endo/Heme/Allergies: Negative.    Psychiatric/Behavioral: Negative.        Physical Exam   Constitutional: She is oriented to person, place, and time. She appears well-developed.   HENT:   Head: Normocephalic.   Eyes: Pupils are equal, round, and reactive to light.   Neck: Normal range of motion. No JVD present.   Cardiovascular: Normal rate, regular rhythm and normal heart sounds.    Pulmonary/Chest: Breath sounds normal.   Dim bases   Abdominal: Soft. Bowel sounds are normal. She exhibits no distension. There is no guarding.   Genitourinary:   barrera   Musculoskeletal: Normal range of motion.   Neurological: She is alert and oriented to person, place, and time.   Skin: Skin is warm and dry.   Prevena to chest, EVH sites CDI   Psychiatric: She has a normal mood and affect. Her behavior is normal. Judgment and thought content normal.       Quality Measures:   EKG reviewed, Medications reviewed, Labs reviewed and Radiology images reviewed  Barrera catheter: One or Two Days Post Surgery (Day of Surgery being Day 0)  Central line in place: Need for access and Concentrated IV  drugs    DVT Prophylaxis: Contraindicated - High bleeding risk  DVT prophylaxis - mechanical: SCDs  Ulcer prophylaxis: Yes    Assessed for rehab: Patient was assess for and/or received rehabilitation services during this hospitalization      Assessment/Plan:  POD 1 Extubated, nuero grossly intact, NSR, HDS off pressors.  Min output CT, small airleak noted.  Keep CTs, DC holly.  Psych consult for depression/ETOH abuse.  AMB/IS      Active Hospital Problems    Diagnosis   • Chest pain [R07.9]

## 2017-02-21 NOTE — PROGRESS NOTES
Report received from Coco. Zoe verified. Dr. Lee, Kathie, BIN,a nd Dr. Edwards at bedside. Discussed POC.

## 2017-02-21 NOTE — PROGRESS NOTES
Received report from ANYA Rushing. Assumed care of patient at bedside. Dr. Lee at bedside. Discussed blood pressures. Orders received to titrate off pressors as per normal cardiac surgery protocol. Ok for SBP to be in the 90s-low 100s (discussed Cardiac Surgery wanting SBP in the 120s-130s). Orders received to tritrate off Epi first. Orders read back to MD and implemented. Per report received from Kristan, Dr. Larios is ok with SBP being as high as 160s, discussed this with Dr. Lee. Dr. Lee also looked over patient's EGK. Plan of care discussed with patient and family at bedside. All questions and concerns addressed.

## 2017-02-21 NOTE — CARE PLAN
Problem: Post Op Day 1 CABG/Heart Valve Replacement  Goal: Optimal care of the post op CABG/heart valve replacement Post Op Day 1  Intervention: EKG and CXR completed  EKG complete. Xray techs will be by shortly to obtain daily chest x ray.   Intervention: Daily Weights  Daily weight: 69.7kg bed scale  Intervention: Up in chair for all meals  Up to chair for breakfast  Intervention: IS q 1 hour while awake and record best IS volume  Morning IS: 1000  Intervention: Graduated elastic stockings  At bedside. Bilateral [facility] sites are currently wrapped.   Intervention: Transfer to tele status, begin VS q 4 hours  Tele status.

## 2017-02-21 NOTE — PROGRESS NOTES
Cardiology Progress Note               Author: Antonio Lee Date & Time created: 2017  7:01 PM     Interval History:  CABG X 4 earlier today.  Extubated and mildly sedated.    Review of Systems   Constitutional: Negative for fever.   Respiratory: Negative for shortness of breath.    Cardiovascular: Positive for chest pain.       Physical Exam   Constitutional: No distress.   HENT:   Head: Normocephalic and atraumatic.   Eyes: No scleral icterus.   Neck: No JVD present.   Cardiovascular: Normal rate.    No murmur heard.  Pulmonary/Chest: Effort normal. No respiratory distress. She has no wheezes.   Musculoskeletal: She exhibits no edema.   Neurological: She is alert.   Skin: Skin is warm and dry.       Hemodynamics:  Temp (24hrs), Av.2 °C (97.2 °F), Min:35.1 °C (95.2 °F), Max:36.8 °C (98.2 °F)  Temperature: 36.8 °C (98.2 °F), Monitored Temp: 36.8 °C (98.2 °F)  Pulse  Av.9  Min: 48  Max: 94Heart Rate (Monitored): 91  Arterial BP: 119/46 mmHg, NIBP: 115/63 mmHg  CVP (mm Hg): (!) -2 MM HG  Respiratory:  Jacobo Vent Mode: ASV, PEEP/CPAP: 8, FiO2: 30, P Peak (PIP): 16, P MEAN: 10 Respiration: 16, Pulse Oximetry: 96 %, O2 Daily Delivery Respiratory : Silicone Nasal Cannula  Chest Tube Group Right;Left;Mediastinal Straight 36-Tube Status / Drainage: Patent;Draining;Sutured in Place, Chest Tube Group Right;Left;Mediastinal Straight 36-Device: Air Leak Present;Suction 20 cm Water  Work Of Breathing / Effort: Vented  RUL Breath Sounds: Clear, RML Breath Sounds: Clear, RLL Breath Sounds: Clear, KATERYNA Breath Sounds: Clear, LLL Breath Sounds: Clear  Fluids:  Date 17 0700 - 17 0659   Shift 7516-9871 6582-7130 1468-3481 24 Hour Total   I  N  T  A  K  E   I.V. 1118.4 1425  2543.4    Blood 900   900    Shift Total 2018.4 1425  3443.4   O  U  T  P  U  T   Urine 2110 905  3015    Drains 30 180  210    Shift Total 2146 1089 1915   Weight (kg) 58.2 58.2 58.2 58.2       Weight: 58.2 kg (128 lb 4.9  oz)  GI/Nutrition:  Orders Placed This Encounter   Procedures   • Diet Order: Strict NPO after midnight     Standing Status: Standing      Number of Occurrences: 1      Standing Expiration Date:      Order Specific Question:  Restrict to:     Answer:  Strict [1]      Comments:  After midnight     Lab Results:  Recent Labs      02/18/17   0204  02/20/17   0140  02/20/17   1030  02/20/17   1330   WBC  7.1  6.5   --    --    RBC  3.76*  3.94*   --    --    HEMOGLOBIN  11.3*  12.0   --    --    HEMATOCRIT  35.1*  35.9*   --    --    MCV  93.4  91.1   --    --    MCH  30.1  30.5   --    --    MCHC  32.2*  33.4*   --    --    RDW  45.8  44.1   --    --    PLATELETCT  226  222  132*  204   MPV  11.2  11.0   --    --      Recent Labs      02/18/17   0204  02/20/17   0140 02/20/17   1706   SODIUM  138  141   --    POTASSIUM  3.7  4.0  3.0*   CHLORIDE  107  106   --    CO2  21  24   --    GLUCOSE  92  96   --    BUN  25*  19   --    CREATININE  1.05  0.84   --    CALCIUM  9.1  9.7   --      Recent Labs      02/20/17   0140  02/20/17   1330   APTT  31.4  30.5   INR  0.91  1.36*         Recent Labs      02/17/17   1929  02/18/17   0204   TROPONINI  <0.01  <0.01             Medical Decision Making, by Problem:  Active Hospital Problems    Diagnosis   • Chest pain [R07.9]       Plan:  In [id number].  Discussed hemodynamics with nursing.  Recommend taper off epi tonight.  No rhythm problems    Core Measures

## 2017-02-21 NOTE — CONSULTS
DATE OF SERVICE:  02/20/2017    REFERRING PHYSICIAN:  Dr. Hernán Dowell.    REASON FOR CONSULTATION:  Ventilator management post coronary artery bypass   grafting.    HISTORY OF PRESENT ILLNESS:  The patient is a 71-year-old female who presented   to an outside hospital several days prior to admission on the 17th of February with intermittent chest pain.  Currently, she was released from that   hospital due to negative troponin values; however, she saw her cardiologist in   [place], [place] and described the pain.  Dr. Eugene directly admitted her to   [facility] on the 17th of February for cardiac   catheterization.  After her cardiac catheterization, she was found to have   multi vessel coronary artery disease and was referred to cardiovascular   surgery for revascularization.  At the time, the patient is currently   intubated on the ventilator; however, awake, unable to nod yes/no.    PAST MEDICAL HISTORY:  1.  Vitamin D deficiency status post hysterectomy and bilateral oophorectomy.  2.  Type 2 diabetes.  3.  Hypertension.  4.  Hypothyroidism.  5.  Gastroesophageal reflux disease.  6.  Hyperlipidemia.  7.  Coronary artery disease.  8.  Peripheral vascular disease with claudication.  9.  Pituitary adenoma.    PAST SURGICAL HISTORY:  Includes total abdominal hysterectomy with bilateral   oophorectomy as well as angioplasty in July 2014.    SOCIAL HISTORY:  The patient is a former smoker having smoked approximately a   pack of cigarettes for the 25 years, however, quit in January of 2014.  She   drinks about 2 standard alcoholic beverages a week.  No history of marijuana   use or illegal drug use.  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    FAMILY HISTORY:  Significant for coronary artery disease.    HOME MEDICATIONS:  Include amlodipine 10 mg daily, aspirin 81 mg daily,   vitamin D3 5000 units daily, cyanocobalamin 5000 mcg daily, valium 5 mg daily,    Lexapro 10 mg daily, folic acid 400 mcg daily, Gemfibrozil 600 mg daily,   Synthroid 75 mcg daily, losartan 50 mg daily, metformin 500 mg twice daily,   Lopressor 50 mg daily, Nitrostat as needed.    ALLERGIES:  PAPER TAPE.    IMPATIENT MEDICATIONS:  Have been thoroughly reviewed.    PHYSICAL EXAMINATION:  VITAL SIGNS:  Blood pressures have ranged from 90s-100s/50s-60s, heart rate in   the 50s, respiratory rate of 11-15, T-max of 98.0, oxygenation of 97-98% on   40% FiO2.  GENERAL:  She is sedated with Precedex; however, opens her eyes to verbal   stimuli and squeezes hands appropriately.  HEENT:  She is PERRLA, EOMI, conjunctivae are pink.  Sclerae anicteric.    Oropharynx has ET tube in place without any mucosal breakdown.  NECK:  Supple.  No adenopathy or thyromegaly appreciated.  CARDIOVASCULAR:  Regular rate and rhythm without murmur, rub or gallop.  CHEST:  Her chest wall has a wound VAC in place with good drainage and   mediastinal chest tube with serous bloody drainage.  LUNGS:  Clear to auscultation bilaterally.  ABDOMEN:  Soft, nontender, nondistended.  No masses or fluid appreciated.  EXTREMITIES:  A 2+ dorsal pedal pulses.  Warm and dry.  Good cap refill.  She   has trace pedal edema, otherwise no cyanosis or clubbing.  NEUROLOGIC:  She is sedated with Precedex, however, will open her eyes to   verbal stimuli and follow simple commands such as squeezing hands and eye   tracking with her eyes.  No obvious focal deficits appreciated.    LABORATORY DATA AND IMAGING:  White count is 6.5, hemoglobin of 12.0,   hematocrit of 35.9, platelets of 222.  Sodium of 141, potassium of 4.0,   chloride of 106, serum bicarb of 24, glucose of 96, BUN of 19, creatinine of   0.84, calcium of 9.7.  AST of 16, ALT of 15, alkaline phosphonate of 68, total   bilirubin of 0.4, albumin of 4.0. INR of 1.36.  Magnesium of 2.8.  Chest   x-ray shows an EG tube approximately 2-3 cm above the gildardo with some mild   cardiomegaly and  faint opacity in the left base; otherwise, clear with no   pneumothorax or pleural effusion appreciated.  There is a right internal   jugular catheter right over the CVS as well as mediastinal chest tube is in   place.    IMPRESSION:  1.  Ventilator dependant respiratory failure status post 4-vessel coronary   artery bypass grafting.  2.  A 4-vessel coronary artery bypass grafting   3.  Hypotension likely related to vasoplegia.  4.  Hyperglycemia.  5.  History of systemic arterial hypertension.  6.  History of hypothyroidism.  7.  History of type 2 diabetes mellitus.  8.  History of tobacco dependence in remission.  9.  Acute metabolic acidosis.  10.  History of dyslipidemia.    PLAN:  The patient continues to be on full ventilatory support at this time;   however, she is on the respiratory protocol for weaning.  The patient is doing   quite well with her weaning protocol and she has sedation reduced and is   following simple commands.  I do not feel that despite her 25 years of   smoking, that she will no trouble extubating her later today.  We will   continue with all respiratory protocols once she is extubated with having her   start mobilizing as well as using incentive spirometry.  In the meantime, the   patient continues on vasopressors for likely vesoplegia associated with her   surgery and acidemia.  This will continue to be weaned expectedly as well as   monitoring urinary output and mediastinal drainage.  The patient has also been   started on insulin drip and will continue this protocol until it can be   adequately weaned and transitioned to sliding scale when she is started on an   oral diet.  In the meantime, the patient will remain on ulcer prophylaxis as   well as SCDs, and once the mediastinal drains have been removed, she will be started on   anticoagulation.    Thank you for allowing the pulmonary critical care team to participate in this   patient's care.  We will continue to follow along.    This  case was discussed at length with the anesthesiologist, Dr. Madden as   well as Dr. Hernán Dowell and the ICU nursing staff and respiratory therapy.    This patient is critically ill at this time.  Critical care time is   approximately 45 minutes in direct critical care as well as extensive data   review.  There was no overlap with other physicians and this excludes any   procedures.       ____________________________________     MD JOSUÉ WILDER / BUNNY    DD:  02/20/2017 17:18:55  DT:  02/20/2017 18:28:10    D#:  [id number]  Job#:  [id number]

## 2017-02-21 NOTE — PROGRESS NOTES
Discussed plan of care with MD Dowell. Small air leak present with CT. Magnesium held for Mg 2.8. Per MD, higher BP ok with max , also assess for detox from alcohol.

## 2017-02-21 NOTE — CARE PLAN
Problem: Day of surgery post CABG/Heart valve replacement  Goal: Stabilization in immediate post op period  Intervention: VS q 15 min x 4 hours, then q 1 hour. Include temperature immediately upon arrival. Check CO/CI q 2-4 hours and PRN  done  Intervention: If radial artery used, elevate arm, no BP checks or needle sticks from affected arm, monitor ulnar pulse and capillary refill  done  Intervention: First post op hour labs and diagnostics per MD order  done  Intervention: Serum K q 6 hours x 24 hours. ABG and CBC prn.  done  Intervention: For FSBS greater than 130, start Post Cardiac Surgery Insulin Drip Protocol  done  Intervention: FSBS frequency as per Cardiac Surgery Insulin Drip Protocol  done  Intervention: For patients on Beta Blockers: verify dose given prior to surgery or within 6 hours after arrival to the unit  done  Intervention: Chest tube to 20 cm suction, record CT drainage with VS  done  Intervention: For CT drainage > 300 cc in first post op hour and/or 150 cc in subsequent hours: platelets, coag screen, fibrinogen, H&H per order  N/A  Intervention: Titrate and wean off vasoactive drips per patient’s condition and per MD order while maintaining SBP  mmHg per MD order  Per MD Linkus keep pressure higher with Max   Intervention: VAP protocol in place  Done  Intervention: Wean from vent per protocol (see protocol), extubation goal with 2-6 hours post op.  Done, 1815  Intervention: IS q 1 hour while awake post extubation  Done  Intervention: Bedrest until extubated and groin lines out  Done  Intervention: Out of bed, dangle 4 hours post extubation  NOC  Intervention: Up in chair 4 hours, day of extubation  N/A  Intervention: Maintain all original surgical dressings for 24 hours  done  Intervention: Clear liquids post extubation, advance as tolerated  Done  Intervention: Discontinue [place] gita and arterial line 12-18 hours post op if hemodynamically stable and off vasoactive  drips  N/A

## 2017-02-21 NOTE — DIETARY
Nutrition Services: Consult cardiac rehab diet education    Pt is a 71 y.o. Female with Dx: CABG x4    PMH: Former smoker, HTN, CAD, DM, PVD  BMI= 28.07  Labs: HA1c 6.8% (2/20); no current lipid panel    S/p CABG x4 (2/20). Pt on post-op clear liquid diet. No nutrition problems PTA per admit screen. Attempted to see pt for diet education, but SW and RN were in room @ that time. Left healthy diet handouts outside of room.    RD will F/u to discuss diet recommendations.

## 2017-02-21 NOTE — PROGRESS NOTES
RN and RT agree patient mets all parameters to extubate. Patient tolerated well. No signs of distress. Oxygen saturation 97% on 3L NC.

## 2017-02-21 NOTE — PROGRESS NOTES
Pulmonary Critical Care Progress Note        Date of Service:  2/21/17  Chief Complaint: Intermittent and worsening chest pain with (+) cardiac cath findings    History of Present Illness: 71 y.o. female admitted for multivessel coronary artery disease and CABG     ROS:  Respiratory: negative, Cardiac: positive chest pain, negative orthopnea and negative leg swelling, GI: negative.  All other systems negative.    Interval Events:  24 hour interval history reviewed    - Extubated last night per protocol   - IS this morning at [id number]   - All pressors are off   - Tolerated a clear liquid diet    PFSH:  No change.    Respiratory:     Pulse Oximetry: 96 %  Chest Tube Drains:     Mediastinal Chest Tube 1: 10 ml    Exam: dimished at the bases, no wheezing  ImagingCXR  I have personally reviewed the chest x-ray my impression is   interval removal of ETT, clear lung fields   Recent Labs      02/20/17   1634  02/20/17   1713  02/20/17   1806   ISTATAPH  7.298*  7.250*  7.314*   ISTATAPCO2  38.3*  36.2  39.3*   ISTATAPO2  81  90*  68   ISTATATCO2  20  17*  21   [id number]  95  95  92*   ISTATARTHCO3  18.8  15.9*  20.0   ISTATARTBE  -7*  -11*  -6*   ISTATTEMP  36.2 C  36.6 C  36.8 C   ISTATFIO2  40  40  30   ISTATSPEC  Arterial  Arterial  Arterial   ISTATAPHTC  7.309*  7.255*  7.317*   [id number]  77  88*  67       HemoDynamics:  Pulse: 68, Heart Rate (Monitored): 68  Arterial BP: 121/60 mmHg, NIBP: 113/73 mmHg  CVP (mm Hg): (!) 12 MM HG    Exam: regular rate and rhythm  Imaging: Available data reviewed        Neuro:  GCS Total Unique Coma Score: 15       Exam: no focal deficits noted mental status intact Motor and sensory exam grossly intact follows commands, raises two fingers  Imaging: None - Reviewed    Fluids:  Intake/Output       02/19/17 0700 - 02/20/17 0659 02/20/17 0700 - 02/21/17 0659 02/21/17 0700 - 02/22/17 0659      0617-4711 2374-2413 Total 7313-6954 5235-1612 Total 6706-5211 0004-4803 Total       Intake     P.O.  150  130 280  --  1550 1550  --  -- --    P.O. 150 130 280 -- 1550 1550 -- -- --    I.V.  --  -- --  2543.4  616.2 3159.5  --  -- --    Precedex Volume -- -- -- 15.2 6.9 22.1 -- -- --    Nitroglycerin Volume -- -- -- 0 -- 0 -- -- --    Phenylephrine Volume -- -- -- 33 25.1 58 -- -- --    Insulin Volume -- -- -- 76.9 133.8 210.7 -- -- --    Epinephrine Volume -- -- -- 28.3 10.4 38.6 -- -- --    IV Volume (NS TKO) -- -- -- -- 40 40 -- -- --    IV Volume (NS/ Playsma-lyte) -- -- -- 2090 -- 2090 -- -- --    IV Piggyback Volume (IV Piggyback) -- -- -- 300 400 700 -- -- --    Blood  --  -- --  900  -- 900  --  -- --    PRBC Total Volume (Non-Barcoded) -- -- -- 700 -- 700 -- -- --    Platelets Total Volume (Non-Barcoded) -- -- -- 200 -- 200 -- -- --    Total Intake 150 .4 2166.2 5609.5 -- -- --       Output    Urine  --  50 50  3015  1410 4425  --  -- --    Number of Times Voided 1 x 4 x 5 x -- -- -- -- -- --    Indwelling Cathether -- -- -- 3015 1410 4425 -- -- --    Void (ml) -- 50 50 -- -- -- -- -- --    Drains  --  -- --  210  180 390  --  -- --    Mediastinal Chest Tube 1 -- -- -- 210 180 390 -- -- --    Stool  --  -- --  --  -- --  --  -- --    Number of Times Stooled -- -- -- -- 0 x 0 x -- -- --    Total Output -- 50 50 3225 1590 4815 -- -- --       Net I/O     150 80 230 218.4 576.2 794.5 -- -- --        Weight: 69.2 kg (152 lb 8.9 oz)  Recent Labs      02/20/17   0140  02/20/17   1330  02/20/17   1706  02/21/17   0005  02/21/17   0500   SODIUM  141   --    --    --   138   POTASSIUM  4.0   --   3.0*  3.8  4.2   CHLORIDE  106   --    --    --   106   CO2  24   --    --    --   26   BUN  19   --    --    --   10   CREATININE  0.84   --    --    --   0.79   MAGNESIUM   --   2.8*   --    --    --    CALCIUM  9.7   --    --    --   7.4*       GI/Nutrition:  Exam: abdomen is soft and non-tender, normal active bowel sounds  Imaging: Available data reviewed  taking PO  Liver Function  Recent Labs       17   0140  17   0500   ALTSGPT  15   --    ASTSGOT  16   --    ALKPHOSPHAT  68   --    TBILIRUBIN  0.4   --    GLUCOSE  96  152*       Heme:  Recent Labs      17   0140  17   1030  17   1330  17   0500   RBC  3.94*   --    --   3.38*   HEMOGLOBIN  12.0   --    --   10.3*   HEMATOCRIT  35.9*   --    --   30.4*   PLATELETCT  222  132*  204  155*   PROTHROMBTM  12.5   --   17.2*   --    APTT  31.4   --   30.5   --    INR  0.91   --   1.36*   --        Infectious Disease:  Temp  Av.2 °C (97.1 °F)  Min: 35.1 °C (95.2 °F)  Max: 36.8 °C (98.2 °F)  Monitored Temp  Av.2 °C (97.2 °F)  Min: 35 °C (95 °F)  Max: 36.8 °C (98.2 °F)  Micro: reviewed  Recent Labs      17   0140  17   0500   WBC  6.5  14.6*   NEUTSPOLYS  41.40*   --    LYMPHOCYTES  43.20*   --    MONOCYTES  9.30   --    EOSINOPHILS  5.30   --    BASOPHILS  0.60   --    ASTSGOT  16   --    ALTSGPT  15   --    ALKPHOSPHAT  68   --    TBILIRUBIN  0.4   --      Current Facility-Administered Medications   Medication Dose Frequency Provider Last Rate Last Dose   • Respiratory Care per Protocol   Continuous RT Aleksandra Murphy, A.P.N.       • NS infusion   Continuous Aleksandra Murphy, A.P.N. 10 mL/hr at 17 1900     • K+ Scale: Goal of 4.5  1 Each Q6HRS Aleksandra Murphy, A.P.N.   1 Each at 17 0101   • Pharmacy Consult Request ...Pain Management Review 1 Each  1 Each PRN Aleksandra Murphy, A.P.N.       • docusate sodium (COLACE) capsule 100 mg  100 mg QAM Aleksandra Murphy, A.P.N.   Stopped at 17 1315    And   • senna-docusate (PERICOLACE or SENOKOT S) 8.6-50 MG per tablet 1 Tab  1 Tab Nightly Aleksandra Murphy, A.P.N.   1 Tab at 17    And   • senna-docusate (PERICOLACE or SENOKOT S) 8.6-50 MG per tablet 1 Tab  1 Tab Q24HRS PRN Aleksandra Murphy, A.P.N.        And   • lactulose 20 GM/30ML solution 30 mL  30 mL Q24HRS PRN Aleksandra Murphy, A.P.N.        And   • bisacodyl (DULCOLAX) suppository 10 mg  10  mg Q24HRS PRN Aleksandra Murphy, A.P.N.        And   • fleet enema 133 mL  1 Each Once PRN Aleksandra Murphy, A.P.N.       • enoxaparin (LOVENOX) inj 40 mg  40 mg DAILY Aleksandra Murphy, A.P.N.       • mupirocin (BACTROBAN) 2 % ointment 1 Application  1 Application BID Aleksandra Murphy, A.P.N.   1 Application at 02/20/17 2105   • metoprolol (LOPRESSOR) tablet 12.5 mg  12.5 mg BID Aleksandra Murphy, A.P.N.        Followed by   • [START ON 2/22/2017] metoprolol (LOPRESSOR) tablet 25 mg  25 mg BID Aleksandra Murphy, A.P.N.       • clopidogrel (PLAVIX) tablet 75 mg  75 mg DAILY Aleksandra Murphy, A.P.N.       • oxycodone immediate-release (ROXICODONE) tablet 2.5 mg  2.5 mg Q3HRS PRN Aleksandra Murphy, A.P.N.       • oxycodone immediate-release (ROXICODONE) tablet 5 mg  5 mg Q3HRS PRN Aleksandra Murphy, A.P.N.   5 mg at 02/21/17 0335   • tramadol (ULTRAM) 50 MG tablet 50 mg  50 mg Q4HRS PRN Aleksandra Murphy, A.P.N.       • ondansetron (ZOFRAN) syringe/vial injection 4 mg  4 mg Q6HRS PRN Aleksandra Murphy, A.P.N.        Or   • prochlorperazine (COMPAZINE) injection 10 mg  10 mg Q6HRS PRN Aleksandra Murphy, A.P.N.        Or   • promethazine (PHENERGAN) suppository 25 mg  25 mg Q6HRS PRN Aleksandra Murphy, A.P.N.       • acetaminophen (TYLENOL) tablet 650 mg  650 mg Q4HRS PRN Aleksandra Murphy, A.P.N.        Or   • acetaminophen (TYLENOL) suppository 650 mg  650 mg Q4HRS PRN Aleksandra Murphy, A.P.N.       • mag hydrox-al hydrox-simeth (MAALOX PLUS ES or MYLANTA DS) suspension 30 mL  30 mL Q4HRS PRN Aleksandra Murphy, A.P.N.       • diphenhydrAMINE (BENADRYL) tablet/capsule 25 mg  25 mg HS PRN - MR X 1 Aleksandra Murphy, A.P.N.       • hydrocodone-acetaminophen (NORCO) 5-325 MG per tablet 1-2 Tab  1-2 Tab Q6HRS PRN Aleksandra Murphy, A.P.N.   1 Tab at 02/20/17 1941   • desmopressin (DDAVP) 17.46 mcg in NS 50 mL ivpb  0.3 mcg/kg Once Hernán Dowell M.D.   17.46 mcg at 02/20/17 1315   • insulin regular human (HUMULIN/NOVOLIN R) 62.5 Units in  mL  infusion per protocol  1-6 Units/hr Continuous Hernán Dowell M.D. 11 mL/hr at 02/21/17 0503 2.75 Units/hr at 02/21/17 0503    And   • insulin regular (HUMULIN R) injection 0-10 Units  0-10 Units PRN Hernán Dowell M.D.   1 Units at 02/20/17 1909    And   • dextrose 50% (D50W) injection 25 mL  25 mL PRN Hernán Dowell M.D.   25 mL at 02/21/17 0009   • insulin lispro (HUMALOG) injection 0-20 Units  0-20 Units PRN Hernán Dowell M.D.       • magnesium sulfate ivpb premix 1 g  1 g QDAY Aleksandra Murphy, A.P.N.   Stopped at 02/20/17 1442   • levothyroxine (SYNTHROID) tablet 75 mcg  75 mcg AM ES Sarah Servin A.P.N.   Stopped at 02/20/17 0700   • escitalopram (LEXAPRO) tablet 10 mg  10 mg DAILY Sarah Servin A.P.N.   Stopped at 02/20/17 0900   • atorvastatin (LIPITOR) tablet 20 mg  20 mg QHS Sarah Servin A.P.N.   20 mg at 02/20/17 2103   • pneumococcal 13-Loan Conj Vacc (PREVNAR 13) syringe 0.5 mL  0.5 mL Once PRN Rody Eugene M.D.   Stopped at 02/19/17 2247     Last reviewed on 2/17/2017  1:57 PM by eKllie Soler [facility]    Quality  Measures:  Radiology images reviewed, Labs reviewed and Medications reviewed  Abdullahi catheter: Critically Ill - Requiring Accurate Measurement of Urinary Output  Central line in place: Need for access and Vasopressors    DVT Prophylaxis: Contraindicated - High bleeding risk  DVT prophylaxis - mechanical: SCDs  Ulcer prophylaxis: Yes    Assessed for rehab: Patient was assess for and/or received rehabilitation services during this hospitalization      Problems/Plan:    Ventilator dependant respiratory failure status post 4-vessel coronary artery bypass grafting.   - Weaned per protocol and extubated 2/20   - cont RT/O2 protocols   - Start IS and may proceed to PEP   - Mobilize to chair today  CAD s/p 4-vessel coronary artery bypass grafting     - uncomplicated   - weaned vasopressors   - CT and pacer wires per CT surgery and cardiology  Hypotension likely related to vasoplegia.   -  improved   - vasopressors weaned  Acute metabolic acidosis.   - improved  Hyperglycemia.   - ISS and NPH--> will adjust as necessary  History of systemic arterial hypertension.   - will start home regimen soon  History of hypothyroidism.  History of type 2 diabetes mellitus.  History of tobacco dependence in remission.  History of dyslipidemia.  Prophylaxis   - PPI, mobilize, advance diet as tolerated    Discussed patient condition and risk of morbidity and/or mortality with Family, RN, RT, Pharmacy, Dietary, , Charge nurse / hot rounds, Patient and cardiology and CVS.    The patient remains critically ill.  Critical care time = 35 minutes in directly providing and coordinating critical care and extensive data review.  No time overlap and excludes procedures.

## 2017-02-22 LAB
ANION GAP SERPL CALC-SCNC: 5 MMOL/L (ref 0–11.9)
BUN SERPL-MCNC: 13 MG/DL (ref 8–22)
CALCIUM SERPL-MCNC: 7.7 MG/DL (ref 8.5–10.5)
CHLORIDE SERPL-SCNC: 101 MMOL/L (ref 96–112)
CO2 SERPL-SCNC: 24 MMOL/L (ref 20–33)
CREAT SERPL-MCNC: 0.71 MG/DL (ref 0.5–1.4)
ERYTHROCYTE [DISTWIDTH] IN BLOOD BY AUTOMATED COUNT: 50.8 FL (ref 35.9–50)
GFR SERPL CREATININE-BSD FRML MDRD: >60 ML/MIN/1.73 M 2
GLUCOSE BLD-MCNC: 111 MG/DL (ref 65–99)
GLUCOSE BLD-MCNC: 114 MG/DL (ref 65–99)
GLUCOSE BLD-MCNC: 119 MG/DL (ref 65–99)
GLUCOSE BLD-MCNC: 119 MG/DL (ref 65–99)
GLUCOSE BLD-MCNC: 140 MG/DL (ref 65–99)
GLUCOSE BLD-MCNC: 89 MG/DL (ref 65–99)
GLUCOSE BLD-MCNC: 98 MG/DL (ref 65–99)
GLUCOSE SERPL-MCNC: 97 MG/DL (ref 65–99)
HCT VFR BLD AUTO: 26.7 % (ref 37–47)
HGB BLD-MCNC: 9 G/DL (ref 12–16)
MCH RBC QN AUTO: 30.2 PG (ref 27–33)
MCHC RBC AUTO-ENTMCNC: 33.7 G/DL (ref 33.6–35)
MCV RBC AUTO: 89.6 FL (ref 81.4–97.8)
PLATELET # BLD AUTO: 136 K/UL (ref 164–446)
PMV BLD AUTO: 11.1 FL (ref 9–12.9)
POTASSIUM SERPL-SCNC: 3.5 MMOL/L (ref 3.6–5.5)
RBC # BLD AUTO: 2.98 M/UL (ref 4.2–5.4)
SODIUM SERPL-SCNC: 130 MMOL/L (ref 135–145)
WBC # BLD AUTO: 14.2 K/UL (ref 4.8–10.8)

## 2017-02-22 PROCEDURE — G8978 MOBILITY CURRENT STATUS: HCPCS | Mod: CI

## 2017-02-22 PROCEDURE — A9270 NON-COVERED ITEM OR SERVICE: HCPCS | Performed by: NURSE PRACTITIONER

## 2017-02-22 PROCEDURE — 770020 HCHG ROOM/CARE - TELE (206)

## 2017-02-22 PROCEDURE — 700112 HCHG RX REV CODE 229: Performed by: NURSE PRACTITIONER

## 2017-02-22 PROCEDURE — 82962 GLUCOSE BLOOD TEST: CPT | Mod: 91

## 2017-02-22 PROCEDURE — 700111 HCHG RX REV CODE 636 W/ 250 OVERRIDE (IP): Performed by: NURSE PRACTITIONER

## 2017-02-22 PROCEDURE — 80048 BASIC METABOLIC PNL TOTAL CA: CPT

## 2017-02-22 PROCEDURE — 99291 CRITICAL CARE FIRST HOUR: CPT | Performed by: INTERNAL MEDICINE

## 2017-02-22 PROCEDURE — G8979 MOBILITY GOAL STATUS: HCPCS | Mod: CI

## 2017-02-22 PROCEDURE — 85027 COMPLETE CBC AUTOMATED: CPT

## 2017-02-22 PROCEDURE — 700102 HCHG RX REV CODE 250 W/ 637 OVERRIDE(OP): Performed by: INTERNAL MEDICINE

## 2017-02-22 PROCEDURE — 97162 PT EVAL MOD COMPLEX 30 MIN: CPT

## 2017-02-22 PROCEDURE — A9270 NON-COVERED ITEM OR SERVICE: HCPCS | Performed by: PSYCHIATRY & NEUROLOGY

## 2017-02-22 PROCEDURE — 700102 HCHG RX REV CODE 250 W/ 637 OVERRIDE(OP): Performed by: NURSE PRACTITIONER

## 2017-02-22 PROCEDURE — A9270 NON-COVERED ITEM OR SERVICE: HCPCS | Performed by: INTERNAL MEDICINE

## 2017-02-22 PROCEDURE — 700102 HCHG RX REV CODE 250 W/ 637 OVERRIDE(OP): Performed by: PSYCHIATRY & NEUROLOGY

## 2017-02-22 RX ORDER — POTASSIUM CHLORIDE 20 MEQ/1
40 TABLET, EXTENDED RELEASE ORAL ONCE
Status: COMPLETED | OUTPATIENT
Start: 2017-02-22 | End: 2017-02-22

## 2017-02-22 RX ORDER — POTASSIUM CHLORIDE 20 MEQ/1
20 TABLET, EXTENDED RELEASE ORAL DAILY
Status: DISCONTINUED | OUTPATIENT
Start: 2017-02-22 | End: 2017-02-27 | Stop reason: HOSPADM

## 2017-02-22 RX ORDER — POTASSIUM CHLORIDE 20 MEQ/1
40 TABLET, EXTENDED RELEASE ORAL ONCE
Status: DISCONTINUED | OUTPATIENT
Start: 2017-02-22 | End: 2017-02-22

## 2017-02-22 RX ORDER — FUROSEMIDE 10 MG/ML
40 INJECTION INTRAMUSCULAR; INTRAVENOUS DAILY
Status: DISCONTINUED | OUTPATIENT
Start: 2017-02-22 | End: 2017-02-25

## 2017-02-22 RX ADMIN — POTASSIUM CHLORIDE 40 MEQ: 1500 TABLET, EXTENDED RELEASE ORAL at 10:37

## 2017-02-22 RX ADMIN — DOCUSATE SODIUM 100 MG: 100 CAPSULE ORAL at 08:50

## 2017-02-22 RX ADMIN — OXYCODONE HYDROCHLORIDE 5 MG: 5 TABLET ORAL at 20:38

## 2017-02-22 RX ADMIN — OXYCODONE HYDROCHLORIDE 5 MG: 5 TABLET ORAL at 17:27

## 2017-02-22 RX ADMIN — OXYCODONE HYDROCHLORIDE 5 MG: 5 TABLET ORAL at 00:14

## 2017-02-22 RX ADMIN — METOPROLOL TARTRATE 25 MG: 25 TABLET, FILM COATED ORAL at 08:50

## 2017-02-22 RX ADMIN — MAGNESIUM SULFATE IN DEXTROSE 1 G: 10 INJECTION, SOLUTION INTRAVENOUS at 13:59

## 2017-02-22 RX ADMIN — DIPHENHYDRAMINE HCL 25 MG: 25 TABLET ORAL at 01:54

## 2017-02-22 RX ADMIN — ASPIRIN 81 MG: 81 TABLET ORAL at 08:50

## 2017-02-22 RX ADMIN — POTASSIUM CHLORIDE 20 MEQ: 1500 TABLET, EXTENDED RELEASE ORAL at 10:37

## 2017-02-22 RX ADMIN — LEVOTHYROXINE SODIUM 75 MCG: 75 TABLET ORAL at 05:15

## 2017-02-22 RX ADMIN — OXYCODONE HYDROCHLORIDE 5 MG: 5 TABLET ORAL at 04:01

## 2017-02-22 RX ADMIN — OMEPRAZOLE 20 MG: 20 CAPSULE, DELAYED RELEASE ORAL at 08:49

## 2017-02-22 RX ADMIN — CLOPIDOGREL 75 MG: 75 TABLET, FILM COATED ORAL at 08:50

## 2017-02-22 RX ADMIN — ESCITALOPRAM OXALATE 20 MG: 10 TABLET ORAL at 08:50

## 2017-02-22 RX ADMIN — OXYCODONE HYDROCHLORIDE 5 MG: 5 TABLET ORAL at 08:51

## 2017-02-22 RX ADMIN — STANDARDIZED SENNA CONCENTRATE AND DOCUSATE SODIUM 1 TABLET: 8.6; 5 TABLET, FILM COATED ORAL at 20:39

## 2017-02-22 RX ADMIN — ATORVASTATIN CALCIUM 20 MG: 20 TABLET, FILM COATED ORAL at 20:38

## 2017-02-22 RX ADMIN — FUROSEMIDE 40 MG: 10 INJECTION, SOLUTION INTRAVENOUS at 10:37

## 2017-02-22 RX ADMIN — MUPIROCIN 1 APPLICATION: 20 OINTMENT TOPICAL at 09:05

## 2017-02-22 RX ADMIN — HYDROCODONE BITARTRATE AND ACETAMINOPHEN 2 TABLET: 5; 325 TABLET ORAL at 10:37

## 2017-02-22 RX ADMIN — OXYCODONE HYDROCHLORIDE 5 MG: 5 TABLET ORAL at 23:54

## 2017-02-22 RX ADMIN — MUPIROCIN 1 APPLICATION: 20 OINTMENT TOPICAL at 20:38

## 2017-02-22 RX ADMIN — METOPROLOL TARTRATE 25 MG: 25 TABLET, FILM COATED ORAL at 22:30

## 2017-02-22 ASSESSMENT — PAIN SCALES - GENERAL
PAINLEVEL_OUTOF10: 4
PAINLEVEL_OUTOF10: 4
PAINLEVEL_OUTOF10: 7
PAINLEVEL_OUTOF10: 8
PAINLEVEL_OUTOF10: 4
PAINLEVEL_OUTOF10: 4
PAINLEVEL_OUTOF10: 5
PAINLEVEL_OUTOF10: 5
PAINLEVEL_OUTOF10: 7
PAINLEVEL_OUTOF10: 7
PAINLEVEL_OUTOF10: 8

## 2017-02-22 ASSESSMENT — GAIT ASSESSMENTS
DISTANCE (FEET): 250
ASSISTIVE DEVICE: FRONT WHEEL WALKER
DEVIATION: BRADYKINETIC
GAIT LEVEL OF ASSIST: STAND BY ASSIST

## 2017-02-22 ASSESSMENT — ENCOUNTER SYMPTOMS
SHORTNESS OF BREATH: 0
CONSTITUTIONAL NEGATIVE: 1
FEVER: 0
MUSCULOSKELETAL NEGATIVE: 1
BRUISES/BLEEDS EASILY: 0
GASTROINTESTINAL NEGATIVE: 1
EYES NEGATIVE: 1
RESPIRATORY NEGATIVE: 1
PSYCHIATRIC NEGATIVE: 1
NEUROLOGICAL NEGATIVE: 1
CARDIOVASCULAR NEGATIVE: 1

## 2017-02-22 NOTE — CARE PLAN
Problem: Post Op Day 1 CABG/Heart Valve Replacement  Goal: Optimal care of the post op CABG/heart valve replacement Post Op Day 1  Outcome: PROGRESSING AS EXPECTED  Intervention: EKG and CXR completed  Completed  Intervention: All valve patients: PT/INR daily  NA  Intervention: Antibiotics are discontinued within 24 hours of anesthesia end time unless indication documented for continuation beyond 24 hours  Completed  Intervention: Daily Weights  Done by night shift  Intervention: Up in chair for all meals  Completed  Intervention: Ambulate in am if stable. First ambulation is 25 feet. Repeat x 3 as tolerated. Ambulate again before bed.  Pt walked x1 with length of approximately 85 ft.   Intervention: Discontinue barrera catheter unless documented reason for continuation  Barrera discontinued  Intervention: Remove original surgical dressing after 24 hrs, leave open to air unless otherwise specified by physician  Completed  Intervention: Cardiac rehab phase 1 ordered and smoking cessation education and program referral as appropriate  Completed  Intervention: Consider chest tube removal by MD  Chest tube still in place  Intervention: IS q 1 hour while awake and record best IS volume  Best IS 1600.  Majority of shift pt able to maintain 1250  Intervention: Graduated elastic stockings  CELINE hose on for day   Intervention: Saline lock IV  Completed  Intervention: Transfer to tele status, begin VS q 4 hours  Tele status  Intervention: After 24th hour post-anesthesia end time, transition patient to Cardiac Surgery SQ Insulin Protocol  Done by night shift  Intervention: If patient is CABG or on home beta-blocker, start Beta-Blocker on POD 1 or POD 2 or contraindication documented  On beta blocker

## 2017-02-22 NOTE — PROGRESS NOTES
Cardiology Progress Note               Author: Antonio Lee Date & Time created: 2017  8:49 AM     Interval History:  Ambulating in halls.  Chest tube still i place.  No atrial fib.  Appreciate input from Phychiatric service re: depression and alcohol.    Review of Systems   Constitutional: Negative for fever.   Respiratory: Negative for shortness of breath.    Endo/Heme/Allergies: Does not bruise/bleed easily.       Physical Exam   Constitutional: No distress.   HENT:   Head: Normocephalic and atraumatic.   Eyes: No scleral icterus.   Neck: No JVD present.   Cardiovascular: Normal rate.    No murmur heard.  Pulmonary/Chest: Effort normal. No respiratory distress. She has no wheezes.   Musculoskeletal: She exhibits no edema.   Neurological: She is alert.   Skin: Skin is warm and dry.       Hemodynamics:  Temp (24hrs), Av.6 °C (97.8 °F), Min:36.3 °C (97.3 °F), Max:37.1 °C (98.8 °F)  Temperature: 36.4 °C (97.5 °F)  Pulse  Av.5  Min: 48  Max: 94Heart Rate (Monitored): 71  Blood Pressure : 106/73 mmHg, NIBP: 137/72 mmHg    Respiratory:    Respiration: 14, Pulse Oximetry: 94 %, O2 Daily Delivery Respiratory : Room Air with O2 Available  Chest Tube Group Right;Left;Mediastinal Straight 36-Tube Status / Drainage: Patent;Serosanguinous;Small, Chest Tube Group Right;Left;Mediastinal Straight 36-Device: Air Leak Present;Dry Closed Drainage System;Suction 20 cm Water  Work Of Breathing / Effort: Mild  RUL Breath Sounds: Clear, RML Breath Sounds: Clear, RLL Breath Sounds: Diminished, KATERYNA Breath Sounds: Clear, LLL Breath Sounds: Diminished  Fluids:     Weight: 67.3 kg (148 lb 5.9 oz)  GI/Nutrition:  Orders Placed This Encounter   Procedures   • DIET ORDER     Standing Status: Standing      Number of Occurrences: 1      Standing Expiration Date:      Order Specific Question:  Diet:     Answer:  Consistent Carbohydrate [4]     Lab Results:  Recent Labs      17   0140   17   1330  17   0500   02/22/17   0358   WBC  6.5   --    --   14.6*  14.2*   RBC  3.94*   --    --   3.38*  2.98*   HEMOGLOBIN  12.0   --    --   10.3*  9.0*   HEMATOCRIT  35.9*   --    --   30.4*  26.7*   MCV  91.1   --    --   89.9  89.6   MCH  30.5   --    --   30.5  30.2   MCHC  33.4*   --    --   33.9  33.7   RDW  44.1   --    --   52.2*  50.8*   PLATELETCT  222   < >  204  155*  136*   MPV  11.0   --    --   10.5  11.1    < > = values in this interval not displayed.     Recent Labs      02/20/17   0140   02/21/17   0500  02/21/17   1129  02/22/17   0358   SODIUM  141   --   138   --   130*   POTASSIUM  4.0   < >  4.2  3.6  3.5*   CHLORIDE  106   --   106   --   101   CO2  24   --   26   --   24   GLUCOSE  96   --   152*   --   97   BUN  19   --   10   --   13   CREATININE  0.84   --   0.79   --   0.71   CALCIUM  9.7   --   7.4*   --   7.7*    < > = values in this interval not displayed.     Recent Labs      02/20/17   0140  02/20/17   1330   APTT  31.4  30.5   INR  0.91  1.36*                     Medical Decision Making, by Problem:  Active Hospital Problems    Diagnosis   • Chest pain [R07.9]       Plan:  Day three post CABG.  Doing well.No arrhtythmia.  Discussed cardiac rehab with daughter.  Will sign off at this point as hemonynamicaly stable.    Core Measures

## 2017-02-22 NOTE — CARE PLAN
Problem: Nutritional:  Goal: Patient to verbalize or demonstrate understanding of diet  Outcome: MET Date Met:  02/22/17  Healthy diet recommendations discussed and handouts provided.

## 2017-02-22 NOTE — PROGRESS NOTES
Report received from ANYA Moreland. Assumed care of patient. Updated patient on plan of care. Fall precautions in place, call light within reach.

## 2017-02-22 NOTE — CARE PLAN
Problem: Post Op Day 1 CABG/Heart Valve Replacement  Goal: Optimal care of the post op CABG/heart valve replacement Post Op Day 1  Intervention: Daily Weights  Pt weighed in a.m.   Intervention: Up in chair for all meals  Pt in chair for breakfast   Intervention: Ambulate in am if stable. First ambulation is 25 feet. Repeat x 3 as tolerated. Ambulate again before bed.  Ambulated in hallway to end of nurses station and back via wheelchair push- tolerated well   Intervention: Discontinue barrera catheter unless documented reason for continuation  Barrera d/c'd  Intervention: Remove original surgical dressing after 24 hrs, leave open to air unless otherwise specified by physician  Midline prevena in place   Intervention: Consider chest tube removal by MD  Chest tubes still in per MD order   Intervention: IS q 1 hour while awake and record best IS volume  IS done Q1h while awake- best value this morning 700   Intervention: Graduated elastic stockings  TedHose on pt   Intervention: Saline lock IV  IV saline locked   Intervention: Transfer to tele status, begin VS q 4 hours  Pt transitioned to tele status

## 2017-02-22 NOTE — PROGRESS NOTES
Cardiovascular Surgery Progress Note    Name: Carole Kingsley  MRN: [id number]  : 1945  Admit Date: 2017 11:25 AM  Procedure:  Procedure(s) and Anesthesia Type:     * MULTIPLE CORONARY ARTERY BYPASS ENDO VEIN HARVEST X4, PREVENA DRESSING - General     * PARUL  - General  2 Day Post-Op    Vitals:  Patient Vitals for the past 8 hrs:   Temp SpO2 O2 Delivery O2 (LPM) Pulse Heart Rate (Monitored) Resp NIBP Weight   17 0800 - 91 % None (Room Air) 0 - 72 - - -   17 0758 - 94 % - 0 71 71 14 - -   17 0600 - 92 % None (Room Air) - 69 67 17 137/72 mmHg -   17 0500 - 94 % - - 67 68 (!) 26 117/67 mmHg -   17 0400 36.4 °C (97.5 °F) 94 % None (Room Air) - - 61 17 117/67 mmHg 67.3 kg (148 lb 5.9 oz)   17 0300 - - - - - 60 14 - -     Temp (24hrs), Av.4 °C (97.6 °F), Min:36.3 °C (97.3 °F), Max:36.8 °C (98.2 °F)      Respiratory:    Respiration: 14, Pulse Oximetry: 91 %, O2 Daily Delivery Respiratory : Room Air with O2 Available  Chest Tube Group Right;Left;Mediastinal Straight 36-Tube Status / Drainage: Patent;Serosanguinous;Small, Chest Tube Group Right;Left;Mediastinal Straight 36-Device: Air Leak Present;Dry Closed Drainage System;Suction 20 cm Water  Chest Tube Drains:     Mediastinal Chest Tube 1: 0 ml    Fluids:    Intake/Output Summary (Last 24 hours) at 17 1013  Last data filed at 17 0600   Gross per 24 hour   Intake 1395.53 ml   Output    700 ml   Net 695.53 ml     Admit weight: Weight: 57.607 kg (127 lb)  Current weight: Weight: 67.3 kg (148 lb 5.9 oz) (17 0400)    Labs:  Recent Labs      17   0140   17   1330  17   0500  17   0358   WBC  6.5   --    --   14.6*  14.2*   RBC  3.94*   --    --   3.38*  2.98*   HEMOGLOBIN  12.0   --    --   10.3*  9.0*   HEMATOCRIT  35.9*   --    --   30.4*  26.7*   MCV  91.1   --    --   89.9  89.6   MCH  30.5   --    --   30.5  30.2   MCHC  33.4*   --    --   33.9  33.7   RDW  44.1   --    --    52.2*  50.8*   PLATELETCT  222   < >  204  155*  136*   MPV  11.0   --    --   10.5  11.1    < > = values in this interval not displayed.     Recent Labs      02/20/17   0140   NEUTSPOLYS  41.40*   LYMPHOCYTES  43.20*   MONOCYTES  9.30   EOSINOPHILS  5.30   BASOPHILS  0.60     Recent Labs      02/20/17   0140   02/21/17   0500  02/21/17   1129  02/22/17   0358   SODIUM  141   --   138   --   130*   POTASSIUM  4.0   < >  4.2  3.6  3.5*   CHLORIDE  106   --   106   --   101   CO2  24   --   26   --   24   GLUCOSE  96   --   152*   --   97   BUN  19   --   10   --   13   CREATININE  0.84   --   0.79   --   0.71   CALCIUM  9.7   --   7.4*   --   7.7*    < > = values in this interval not displayed.     Recent Labs      02/20/17   0140  02/20/17   1330   APTT  31.4  30.5   INR  0.91  1.36*       Medications:  • potassium chloride (KCl)  40 mEq     • potassium chloride SA  40 mEq     • potassium chloride SA  20 mEq     • furosemide  40 mg     • insulin NPH  9 Units     • insulin lispro  6 Units     • insulin lispro  0-15 Units     • aspirin EC  81 mg     • omeprazole  20 mg     • escitalopram  20 mg     • docusate sodium  100 mg      And   • senna-docusate  1 Tab     • enoxaparin  40 mg     • mupirocin  1 Application     • metoprolol  25 mg     • clopidogrel  75 mg     • magnesium sulfate  1 g Stopped (02/21/17 1350)   • levothyroxine  75 mcg     • atorvastatin  20 mg         Exam:   Review of Systems   Constitutional: Negative.    HENT: Negative.    Eyes: Negative.    Respiratory: Negative.    Cardiovascular: Negative.    Gastrointestinal: Negative.    Genitourinary: Negative.    Musculoskeletal: Negative.    Skin: Negative.    Neurological: Negative.    Endo/Heme/Allergies: Negative.    Psychiatric/Behavioral: Negative.        Physical Exam   Constitutional: She is oriented to person, place, and time. She appears well-developed.   HENT:   Head: Normocephalic.   Eyes: Pupils are equal, round, and reactive to light.    Neck: Normal range of motion. No JVD present.   Cardiovascular: Normal rate, regular rhythm and normal heart sounds.    Pulmonary/Chest: Breath sounds normal.   Dim bases   Abdominal: Soft. Bowel sounds are normal. She exhibits no distension. There is no guarding.   Genitourinary:   barrera   Musculoskeletal: Normal range of motion.   Neurological: She is alert and oriented to person, place, and time.   Skin: Skin is warm and dry.   Prevena to chest, EVH sites CDI   Psychiatric: She has a normal mood and affect. Her behavior is normal. Judgment and thought content normal.       Quality Measures:   EKG reviewed, Medications reviewed, Labs reviewed and Radiology images reviewed  Barrera catheter: One or Two Days Post Surgery (Day of Surgery being Day 0)  Central line in place: Need for access and Concentrated IV drugs    DVT Prophylaxis: Contraindicated - High bleeding risk  DVT prophylaxis - mechanical: SCDs  Ulcer prophylaxis: Yes    Assessed for rehab: Patient was assess for and/or received rehabilitation services during this hospitalization      Assessment/Plan:  POD 1 Extubated, nuero grossly intact, NSR, HDS off pressors.  Min output CT, small airleak noted.  Keep CTs, DC barrera.  Psych consult for depression/ETOH abuse.  AMB/IS  POD 2 HDS, SR, CT no air leak- d/c, replace k+, gently diurese, appreciate pysch input, amb, enc IS    Patient seen, examined and plan reviewed with midlevel provider. I agree with the plan.      Active Hospital Problems    Diagnosis   • Chest pain [R07.9]

## 2017-02-22 NOTE — PSYCHIATRY
"PSYCHIATRIC FOLLOW UP:    Reason for Admission: CP resulting in CABG    Legal hold status: na      In physical discomfort, family around. Pt has not had side effects.           Psychiatric Examination: observed phenomenon:  Vitals:Blood pressure 106/73, pulse 70, temperature 36.7 °C (98 °F), resp. rate 18, height 1.57 m (5' 1.81\"), weight 67.3 kg (148 lb 5.9 oz), SpO2 94 %.  Musculoskeletal(abnormal movements, gait, etc):none noted.    Appearance: clean, well groomed, glasses, holding a pillow to her chest and sometimes stops talking secondary to pain.good eye contact. Nice nails.  Thoughts: linear, no psychosis  Speech:wnl  Mood: depressed, mild  Affect: appropriate but frequently also looks euthymic  SI/HI:   denies  Attention/Alertness:intact  Memory: intact  Orientation: x 4  Fund of Knowledge:  good        Assessment:(acuity level)  Depressive Disorder Unspec.    Adjustment Disorder with anxiety, R/o anxiety Disorder Unspec: this last month. Possibly related to CP, etc.  Alcohol Use Disorder    Plan:no changes to meds.   legal hold:na     Will follow.            "

## 2017-02-22 NOTE — PROGRESS NOTES
Pulmonary Critical Care Progress Note        Date of Service:  2/22/17  Chief Complaint: Intermittent and worsening chest pain with (+) cardiac cath findings    History of Present Illness: 71 y.o. female admitted for multivessel coronary artery disease and CABG     ROS:  Respiratory: negative, Cardiac: positive chest pain, negative orthopnea and negative leg swelling, GI: negative.  All other systems negative.    Interval Events:  24 hour interval history reviewed    - No overnight events   - Abdullahi catheter removed   - Ambulated this morning   - IS at 1100cc    PFSH:  No change.    Respiratory:     Pulse Oximetry: 94 %  Chest Tube Drains:     Mediastinal Chest Tube 1: 0 ml    Exam: dimished at the bases, no wheezing  ImagingCXR  I have personally reviewed the chest x-ray my impression is   interval removal of ETT, clear lung fields   Recent Labs      02/20/17   1634  02/20/17   1713  02/20/17   1806   ISTATAPH  7.298*  7.250*  7.314*   ISTATAPCO2  38.3*  36.2  39.3*   ISTATAPO2  81  90*  68   ISTATATCO2  20  17*  21   [id number]  95  95  92*   ISTATARTHCO3  18.8  15.9*  20.0   ISTATARTBE  -7*  -11*  -6*   ISTATTEMP  36.2 C  36.6 C  36.8 C   ISTATFIO2  40  40  30   ISTATSPEC  Arterial  Arterial  Arterial   ISTATAPHTC  7.309*  7.255*  7.317*   [id number]  77  88*  67       HemoDynamics:  Pulse: 67, Heart Rate (Monitored): 68  Blood Pressure : 106/73 mmHg, NIBP: 117/67 mmHg  CVP (mm Hg): (!) 15 MM HG    Exam: regular rate and rhythm  Imaging: Available data reviewed        Neuro:  GCS Total Unique Coma Score: 15       Exam: no focal deficits noted mental status intact Motor and sensory exam grossly intact follows commands, raises two fingers  Imaging: None - Reviewed    Fluids:  Intake/Output       02/20/17 0700 - 02/21/17 0659 02/21/17 0700 - 02/22/17 0659 02/22/17 0700 - 02/23/17 0659      2497-9761 4724-3121 Total 1886-8927 0061-1762 Total 5666-2579 8345-3675 Total       Intake    P.O.  --  1149 1750  5055  100  1700  --  -- --    P.O. -- 1750 1750 3460 339 4295 -- -- --    I.V.  2543.4  826.6 3370  59.5  -- 59.5  --  -- --    Precedex Volume 15.2 6.9 22.1 -- -- -- -- -- --    Nitroglycerin Volume 0 -- 0 -- -- -- -- -- --    Phenylephrine Volume 33 25.1 58 -- -- -- -- -- --    Insulin Volume 76.9 144.3 221.2 59.5 -- 59.5 -- -- --    Epinephrine Volume 28.3 10.4 38.6 -- -- -- -- -- --    IV Volume (NS TKO) -- 40 40 -- -- -- -- -- --    IV Volume (NS/ Playsma-lyte) 2090 -- 2090 -- -- -- -- -- --    IV Piggyback Volume (IV Piggyback) 300 600 900 -- -- -- -- -- --    Blood  900  -- 900  --  -- --  --  -- --    PRBC Total Volume (Non-Barcoded) 700 -- 700 -- -- -- -- -- --    Platelets Total Volume (Non-Barcoded) 200 -- 200 -- -- -- -- -- --    Enteral  --  -- --  --  30 30  --  -- --    Free Water / Tube Flush -- -- -- -- 30 30 -- -- --    Total Intake 3443.4 2576.6 6020 1659.5 130 1789.5 -- -- --       Output    Urine  3015  1530 4545  320  200 520  --  -- --    Indwelling Cathether 3015 1530 4545 320 -- 320 -- -- --    Void (ml) -- -- -- 0 200 200 -- -- --    Drains  210  260 470  110  -- 110  --  -- --    Mediastinal Chest Tube 1 210 260 470 110 -- 110 -- -- --    Stool  --  -- --  --  -- --  --  -- --    Number of Times Stooled -- 0 x 0 x -- -- -- -- -- --    Total Output 3225 1790 5015 430 200 630 -- -- --       Net I/O     218.4 786.6 1005 1229.5 -70 1159.5 -- -- --        Weight: 67.3 kg (148 lb 5.9 oz)  Recent Labs      02/20/17   0140  02/20/17   1330   02/21/17   0500  02/21/17   1129  02/22/17   0358   SODIUM  141   --    --   138   --   130*   POTASSIUM  4.0   --    < >  4.2  3.6  3.5*   CHLORIDE  106   --    --   106   --   101   CO2  24   --    --   26   --   24   BUN  19   --    --   10   --   13   CREATININE  0.84   --    --   0.79   --   0.71   MAGNESIUM   --   2.8*   --    --    --    --    CALCIUM  9.7   --    --   7.4*   --   7.7*    < > = values in this interval not displayed.       GI/Nutrition:  Exam:  abdomen is soft and non-tender, normal active bowel sounds  Imaging: Available data reviewed  taking PO  Liver Function  Recent Labs      17   0140  17   0500  17   0358   ALTSGPT  15   --    --    ASTSGOT  16   --    --    ALKPHOSPHAT  68   --    --    TBILIRUBIN  0.4   --    --    GLUCOSE  96  152*  97       Heme:  Recent Labs      17   0140   17   1330  17   0500  17   0358   RBC  3.94*   --    --   3.38*  2.98*   HEMOGLOBIN  12.0   --    --   10.3*  9.0*   HEMATOCRIT  35.9*   --    --   30.4*  26.7*   PLATELETCT  222   < >  204  155*  136*   PROTHROMBTM  12.5   --   17.2*   --    --    APTT  31.4   --   30.5   --    --    INR  0.91   --   1.36*   --    --     < > = values in this interval not displayed.       Infectious Disease:  Temp  Av.7 °C (98 °F)  Min: 36.3 °C (97.3 °F)  Max: 37.2 °C (99 °F)  Monitored Temp  Av °C (98.6 °F)  Min: 37 °C (98.6 °F)  Max: 37 °C (98.6 °F)  Micro: reviewed  Recent Labs      17   0140  17   0500  17   0358   WBC  6.5  14.6*  14.2*   NEUTSPOLYS  41.40*   --    --    LYMPHOCYTES  43.20*   --    --    MONOCYTES  9.30   --    --    EOSINOPHILS  5.30   --    --    BASOPHILS  0.60   --    --    ASTSGOT  16   --    --    ALTSGPT  15   --    --    ALKPHOSPHAT  68   --    --    TBILIRUBIN  0.4   --    --      Current Facility-Administered Medications   Medication Dose Frequency Provider Last Rate Last Dose   • insulin NPH (HUMULIN,NOVOLIN) injection 9 Units  9 Units Q8HRS Aleksandra Murphy, A.P.N.   Stopped at 17 2200   • insulin lispro (HUMALOG) injection 6 Units  6 Units TID AC Aleksandra Murphy, A.P.N.   Stopped at 17 1700   • insulin lispro (HUMALOG) injection 0-15 Units  0-15 Units ACHS & 0200 KENZIE Quigley.P.N.   Stopped at 17 1700   • aspirin EC (ECOTRIN) tablet 81 mg  81 mg DAILY KENZIE Quigley.P.N.   81 mg at 17 0926   • omeprazole (PRILOSEC) capsule 20 mg  20 mg DAILY Jennifer ESPINOZA  NELSY Edwards.   20 mg at 02/21/17 1138   • escitalopram (LEXAPRO) tablet 20 mg  20 mg DAILY Zully Ballard M.D.       • Respiratory Care per Protocol   Continuous RT Aleksandra Murphy, A.P.N.       • NS infusion   Continuous Aleksandra Murphy, A.P.N. 10 mL/hr at 02/20/17 1900     • Pharmacy Consult Request ...Pain Management Review 1 Each  1 Each PRN Aleksandra Murphy, A.P.N.       • docusate sodium (COLACE) capsule 100 mg  100 mg QAM Aleksandra Murphy, A.P.N.   100 mg at 02/21/17 0911    And   • senna-docusate (PERICOLACE or SENOKOT S) 8.6-50 MG per tablet 1 Tab  1 Tab Nightly Aleksandra Murphy, A.P.N.   1 Tab at 02/21/17 2058    And   • senna-docusate (PERICOLACE or SENOKOT S) 8.6-50 MG per tablet 1 Tab  1 Tab Q24HRS PRN Aleksandra Murphy, A.P.N.        And   • lactulose 20 GM/30ML solution 30 mL  30 mL Q24HRS PRN Aleksandra Murphy, A.P.N.        And   • bisacodyl (DULCOLAX) suppository 10 mg  10 mg Q24HRS PRN Aleksandra Murphy, A.P.N.        And   • fleet enema 133 mL  1 Each Once PRN Aleksandra Murphy, A.P.N.       • enoxaparin (LOVENOX) inj 40 mg  40 mg DAILY Aleksandra Murphy, A.P.N.   Stopped at 02/21/17 2100   • mupirocin (BACTROBAN) 2 % ointment 1 Application  1 Application BID Aleksandra Murphy, A.P.N.   1 Application at 02/21/17 2100   • metoprolol (LOPRESSOR) tablet 25 mg  25 mg BID Aleksandra Murphy, A.P.N.       • clopidogrel (PLAVIX) tablet 75 mg  75 mg DAILY Aleksandra Murphy, A.P.N.   75 mg at 02/21/17 0911   • oxycodone immediate-release (ROXICODONE) tablet 2.5 mg  2.5 mg Q3HRS PRN Aleksandra L Jeffrey, A.P.N.       • oxycodone immediate-release (ROXICODONE) tablet 5 mg  5 mg Q3HRS PRN Aleksandra L Jeffrey, A.P.N.   5 mg at 02/22/17 0401   • tramadol (ULTRAM) 50 MG tablet 50 mg  50 mg Q4HRS PRN Aleksandra L Jeffrey, A.P.N.       • ondansetron (ZOFRAN) syringe/vial injection 4 mg  4 mg Q6HRS PRN Aleksandra L Jeffrey, A.P.N.        Or   • prochlorperazine (COMPAZINE) injection 10 mg  10 mg Q6HRS PRN Aleksandra L Jeffrey, A.P.N.        Or   •  promethazine (PHENERGAN) suppository 25 mg  25 mg Q6HRS PRN Aleksandra Leest, A.P.N.       • acetaminophen (TYLENOL) tablet 650 mg  650 mg Q4HRS PRN Aleksandra L Jeffrey, A.P.N.        Or   • acetaminophen (TYLENOL) suppository 650 mg  650 mg Q4HRS PRN Aleksandra L Jeffrey, A.P.N.       • mag hydrox-al hydrox-simeth (MAALOX PLUS ES or MYLANTA DS) suspension 30 mL  30 mL Q4HRS PRN Aleksandra L Jeffrey, A.P.N.       • diphenhydrAMINE (BENADRYL) tablet/capsule 25 mg  25 mg HS PRN - MR X 1 Aleksandra Murphy, A.P.N.   25 mg at 02/22/17 0154   • hydrocodone-acetaminophen (NORCO) 5-325 MG per tablet 1-2 Tab  1-2 Tab Q6HRS PRN Aleksandra Murphy, A.P.N.   1 Tab at 02/21/17 1816   • magnesium sulfate ivpb premix 1 g  1 g QDAY Aleksandra Murphy, A.P.N.   Stopped at 02/21/17 1350   • levothyroxine (SYNTHROID) tablet 75 mcg  75 mcg AM ES Sarah Servin, A.P.N.   75 mcg at 02/22/17 0515   • atorvastatin (LIPITOR) tablet 20 mg  20 mg QHS Sarah Servin, A.P.N.   20 mg at 02/21/17 2058   • pneumococcal 13-Loan Conj Vacc (PREVNAR 13) syringe 0.5 mL  0.5 mL Once PRN Rody Eugene M.D.   Stopped at 02/19/17 2247     Last reviewed on 2/17/2017  1:57 PM by Kellie Soler NIRMALA    Quality  Measures:  Radiology images reviewed, Labs reviewed and Medications reviewed  Abdullahi catheter: No Abdullahi  Central line in place: Need for access and Vasopressors    DVT Prophylaxis: Contraindicated - High bleeding risk  DVT prophylaxis - mechanical: SCDs  Ulcer prophylaxis: Yes    Assessed for rehab: Patient was assess for and/or received rehabilitation services during this hospitalization      Problems/Plan:    Ventilator dependant respiratory failure status post 4-vessel coronary artery bypass grafting.   - Weaned per protocol and extubated 2/20   - cont RT/O2 protocols   - Start IS and may proceed to PEP   - Mobilizing in hallways   - forced diuresis as needed  CAD s/p 4-vessel coronary artery bypass grafting     - uncomplicated   - weaned vasopressors   - CT and pacer wires  per CT surgery and cardiology  Hypotension likely related to vasoplegia.   - improved   - vasopressors weaned  Acute metabolic acidosis.   - improved  Hypokalemia   - replete as needed  Hyperglycemia.   - ISS and NPH--> will adjust as necessary  History of systemic arterial hypertension.   - will start home regimen soon  History of hypothyroidism.  History of type 2 diabetes mellitus.  History of tobacco dependence in remission.  History of dyslipidemia.  Prophylaxis   - PPI, mobilize, advance diet as tolerated    Discussed patient condition and risk of morbidity and/or mortality with Family, RN, RT, Pharmacy, Dietary, , Charge nurse / hot rounds, Patient and cardiology and CV[facility].    The patient remains critically ill.  Critical care time = 32 minutes in directly providing and coordinating critical care and extensive data review.  No time overlap and excludes procedures.

## 2017-02-22 NOTE — DISCHARGE PLANNING
Care Transition Team Assessment  CABG x4 2/20.  Up to chair.  Awake.  Has chest tube. Extubated.  Mobilizing.  Son will move in with patient and provided 24/7 supervision.  Anticipate Home PO day 5 with [facility].   [facility] vs [facility].     Information Source  Orientation : Oriented x 4  Information Given By: Relative  Informant's Name:  (Katharina DTR [phone]  )  Who is responsible for making decisions for patient? : Patient    Readmission Evaluation  Is this a readmission?: No    Elopement Risk  Legal Hold: No  Ambulatory or Self Mobile in Wheelchair: Yes  Disoriented: No  Psychiatric Symptoms: None  History of Wandering: No  Elopement this Admit: No  Vocalizing Wanting to Leave: No  Displays Behaviors, Body Language Wanting to Leave: No-Not at Risk for Elopement  Elopement Risk: Not at Risk for Elopement    Interdisciplinary Discharge Planning  Does Admitting Nurse Feel This Could be a Complex Discharge?: No  Primary Care Physician: Cristin Morgan   Lives with - Patient's Self Care Capacity: Alone and Able to Care For Self  Patient or legal guardian wants to designate a caregiver (see row info): No  Support Systems: Children  Housing / Facility: [address] [facility]  Do You Take your Prescribed Medications Regularly: Yes  Able to Return to Previous ADL's: Yes  Mobility Issues: No  Prior Services: None  Patient Expects to be Discharged to:: home  Assistance Needed: No  Durable Medical Equipment: Not Applicable    Discharge Preparedness  What is your plan after discharge?: Home health care  What are your discharge supports?: Child (24/7 supervision. )  Prior Functional Level: Independent with Activities of Daily Living  Difficulity with ADLs: None  Difficulity with IADLs: None    Functional Assesment  Prior Functional Level: Independent with Activities of Daily Living    Finances  Financial Barriers to Discharge: No  Prescription Coverage: Yes    Vision / Hearing Impairment  Vision Impairment : Yes  Right Eye Vision:  Impaired, Wears Glasses  Left Eye Vision: Impaired, Wears Glasses  Hearing Impairment : No    Values / Beliefs / Concerns  Values / Beliefs Concerns : No    Advance Directive  Advance Directive?: None    Domestic Abuse  Have you ever been the victim of abuse or violence?: No  Physical Abuse or Sexual Abuse: No  Verbal Abuse or Emotional Abuse: No  Possible Abuse Reported to:: Not Applicable    Psychological Assessment  History of Substance Abuse: None    Discharge Risks or Barriers  Discharge risks or barriers?: No    Anticipated Discharge Information  Anticipated discharge disposition: [facility]  Discharge Address:  ([address] [place] [address] [address] [place])  Discharge Contact Phone Number:  ([phone])

## 2017-02-23 LAB
ANION GAP SERPL CALC-SCNC: 5 MMOL/L (ref 0–11.9)
BUN SERPL-MCNC: 15 MG/DL (ref 8–22)
CALCIUM SERPL-MCNC: 7.7 MG/DL (ref 8.5–10.5)
CHLORIDE SERPL-SCNC: 99 MMOL/L (ref 96–112)
CO2 SERPL-SCNC: 21 MMOL/L (ref 20–33)
CREAT SERPL-MCNC: 0.66 MG/DL (ref 0.5–1.4)
ERYTHROCYTE [DISTWIDTH] IN BLOOD BY AUTOMATED COUNT: 48.3 FL (ref 35.9–50)
GFR SERPL CREATININE-BSD FRML MDRD: >60 ML/MIN/1.73 M 2
GLUCOSE BLD-MCNC: 70 MG/DL (ref 65–99)
GLUCOSE BLD-MCNC: 89 MG/DL (ref 65–99)
GLUCOSE BLD-MCNC: 92 MG/DL (ref 65–99)
GLUCOSE BLD-MCNC: 92 MG/DL (ref 65–99)
GLUCOSE BLD-MCNC: 95 MG/DL (ref 65–99)
GLUCOSE BLD-MCNC: 96 MG/DL (ref 65–99)
GLUCOSE SERPL-MCNC: 90 MG/DL (ref 65–99)
HCT VFR BLD AUTO: 25.9 % (ref 37–47)
HGB BLD-MCNC: 8.6 G/DL (ref 12–16)
MCH RBC QN AUTO: 30.1 PG (ref 27–33)
MCHC RBC AUTO-ENTMCNC: 33.2 G/DL (ref 33.6–35)
MCV RBC AUTO: 90.6 FL (ref 81.4–97.8)
PLATELET # BLD AUTO: 123 K/UL (ref 164–446)
PMV BLD AUTO: 11.2 FL (ref 9–12.9)
POTASSIUM SERPL-SCNC: 3.8 MMOL/L (ref 3.6–5.5)
RBC # BLD AUTO: 2.86 M/UL (ref 4.2–5.4)
SODIUM SERPL-SCNC: 125 MMOL/L (ref 135–145)
WBC # BLD AUTO: 11.1 K/UL (ref 4.8–10.8)

## 2017-02-23 PROCEDURE — 700102 HCHG RX REV CODE 250 W/ 637 OVERRIDE(OP): Performed by: INTERNAL MEDICINE

## 2017-02-23 PROCEDURE — A9270 NON-COVERED ITEM OR SERVICE: HCPCS | Performed by: NURSE PRACTITIONER

## 2017-02-23 PROCEDURE — 99291 CRITICAL CARE FIRST HOUR: CPT | Performed by: INTERNAL MEDICINE

## 2017-02-23 PROCEDURE — 700102 HCHG RX REV CODE 250 W/ 637 OVERRIDE(OP): Performed by: NURSE PRACTITIONER

## 2017-02-23 PROCEDURE — G8987 SELF CARE CURRENT STATUS: HCPCS | Mod: CJ

## 2017-02-23 PROCEDURE — A9270 NON-COVERED ITEM OR SERVICE: HCPCS | Performed by: INTERNAL MEDICINE

## 2017-02-23 PROCEDURE — 80048 BASIC METABOLIC PNL TOTAL CA: CPT

## 2017-02-23 PROCEDURE — 700112 HCHG RX REV CODE 229: Performed by: NURSE PRACTITIONER

## 2017-02-23 PROCEDURE — G8988 SELF CARE GOAL STATUS: HCPCS | Mod: CI

## 2017-02-23 PROCEDURE — 700111 HCHG RX REV CODE 636 W/ 250 OVERRIDE (IP): Performed by: NURSE PRACTITIONER

## 2017-02-23 PROCEDURE — A9270 NON-COVERED ITEM OR SERVICE: HCPCS | Performed by: PSYCHIATRY & NEUROLOGY

## 2017-02-23 PROCEDURE — 770020 HCHG ROOM/CARE - TELE (206)

## 2017-02-23 PROCEDURE — 97165 OT EVAL LOW COMPLEX 30 MIN: CPT

## 2017-02-23 PROCEDURE — 700102 HCHG RX REV CODE 250 W/ 637 OVERRIDE(OP): Performed by: PSYCHIATRY & NEUROLOGY

## 2017-02-23 PROCEDURE — 85027 COMPLETE CBC AUTOMATED: CPT

## 2017-02-23 PROCEDURE — 82962 GLUCOSE BLOOD TEST: CPT

## 2017-02-23 RX ADMIN — CLOPIDOGREL 75 MG: 75 TABLET, FILM COATED ORAL at 08:17

## 2017-02-23 RX ADMIN — MUPIROCIN 1 APPLICATION: 20 OINTMENT TOPICAL at 08:22

## 2017-02-23 RX ADMIN — OMEPRAZOLE 20 MG: 20 CAPSULE, DELAYED RELEASE ORAL at 08:18

## 2017-02-23 RX ADMIN — OXYCODONE HYDROCHLORIDE 5 MG: 5 TABLET ORAL at 20:47

## 2017-02-23 RX ADMIN — METOPROLOL TARTRATE 25 MG: 25 TABLET, FILM COATED ORAL at 08:20

## 2017-02-23 RX ADMIN — ESCITALOPRAM OXALATE 20 MG: 10 TABLET ORAL at 08:19

## 2017-02-23 RX ADMIN — OXYCODONE HYDROCHLORIDE 5 MG: 5 TABLET ORAL at 16:24

## 2017-02-23 RX ADMIN — OXYCODONE HYDROCHLORIDE 5 MG: 5 TABLET ORAL at 12:15

## 2017-02-23 RX ADMIN — FUROSEMIDE 40 MG: 10 INJECTION, SOLUTION INTRAVENOUS at 08:18

## 2017-02-23 RX ADMIN — OXYCODONE HYDROCHLORIDE 5 MG: 5 TABLET ORAL at 04:20

## 2017-02-23 RX ADMIN — POTASSIUM CHLORIDE 20 MEQ: 1500 TABLET, EXTENDED RELEASE ORAL at 08:18

## 2017-02-23 RX ADMIN — LEVOTHYROXINE SODIUM 75 MCG: 75 TABLET ORAL at 06:16

## 2017-02-23 RX ADMIN — STANDARDIZED SENNA CONCENTRATE AND DOCUSATE SODIUM 1 TABLET: 8.6; 5 TABLET, FILM COATED ORAL at 20:47

## 2017-02-23 RX ADMIN — ENOXAPARIN SODIUM 40 MG: 40 INJECTION SUBCUTANEOUS at 08:17

## 2017-02-23 RX ADMIN — METOPROLOL TARTRATE 25 MG: 25 TABLET, FILM COATED ORAL at 20:47

## 2017-02-23 RX ADMIN — DOCUSATE SODIUM 100 MG: 100 CAPSULE ORAL at 08:17

## 2017-02-23 RX ADMIN — MUPIROCIN 1 APPLICATION: 20 OINTMENT TOPICAL at 20:50

## 2017-02-23 RX ADMIN — ATORVASTATIN CALCIUM 20 MG: 20 TABLET, FILM COATED ORAL at 20:47

## 2017-02-23 RX ADMIN — ASPIRIN 81 MG: 81 TABLET ORAL at 08:17

## 2017-02-23 RX ADMIN — OXYCODONE HYDROCHLORIDE 5 MG: 5 TABLET ORAL at 08:18

## 2017-02-23 ASSESSMENT — PAIN SCALES - GENERAL
PAINLEVEL_OUTOF10: 7
PAINLEVEL_OUTOF10: 5
PAINLEVEL_OUTOF10: 4
PAINLEVEL_OUTOF10: 7
PAINLEVEL_OUTOF10: ASSUMED PAIN PRESENT
PAINLEVEL_OUTOF10: 0
PAINLEVEL_OUTOF10: 0
PAINLEVEL_OUTOF10: 4
PAINLEVEL_OUTOF10: ASSUMED PAIN PRESENT
PAINLEVEL_OUTOF10: 7
PAINLEVEL_OUTOF10: ASSUMED PAIN PRESENT
PAINLEVEL_OUTOF10: 5
PAINLEVEL_OUTOF10: 2
PAINLEVEL_OUTOF10: 6
PAINLEVEL_OUTOF10: 7

## 2017-02-23 ASSESSMENT — ENCOUNTER SYMPTOMS
PSYCHIATRIC NEGATIVE: 1
EYES NEGATIVE: 1
RESPIRATORY NEGATIVE: 1
CARDIOVASCULAR NEGATIVE: 1
MUSCULOSKELETAL NEGATIVE: 1
NEUROLOGICAL NEGATIVE: 1
CONSTITUTIONAL NEGATIVE: 1
GASTROINTESTINAL NEGATIVE: 1

## 2017-02-23 ASSESSMENT — ACTIVITIES OF DAILY LIVING (ADL): TOILETING: INDEPENDENT

## 2017-02-23 NOTE — FLOWSHEET NOTE
This note also relates to the following rows which could not be included:  Respiration - Cannot attach notes to unvalidated device data  Heart Rate (Monitored) - Cannot attach notes to unvalidated device data         02/23/17 0719   Events/Summary/Plan   Events/Summary/Plan IS   Interdisciplinary Plan of Care-Goals (Indications)   Hyperinflation Protocol Indications Pre or Post-op Abdominal, Thoracic or Orthopedic Surgery   Interdisciplinary Plan of Care-Outcomes    Hyperinflation Protocol Goals/Outcome Greater Than 60% of Predicted I.S. Volume x 24 hrs   Education   Education Yes - Pt. / Family has been Instructed in use of Respiratory Equipment   Incentive Spirometry Group   Incentive Spirometry Instruction Yes   Breathing Exercises Yes   Incentive Spirometer Volume 1100 mL   Incentive Spirometer Date Last Changed 02/20/17   Incentive Spirometer Next Change Date (Q 30 Days) 03/20/17   Respiratory WDL   Respiratory (WDL) X   Chest Exam   Work Of Breathing / Effort Mild   Breath Sounds   Pre/Post Intervention Pre Intervention Assessment   RUL Breath Sounds Clear   RML Breath Sounds Clear   RLL Breath Sounds Diminished   KATERYNA Breath Sounds Clear   LLL Breath Sounds Diminished   Secretions   Sputum Color Unable to Evaluate   Oximetry   Continuous Oximetry Yes   Oxygen   Pulse Oximetry 95 %   O2 (LPM) 0   O2 (FiO2) 21   O2 Daily Delivery Respiratory  Room Air with O2 Available

## 2017-02-23 NOTE — PROGRESS NOTES
Pulmonary Critical Care Progress Note        Date of Service:  2/23/17  Chief Complaint: Intermittent and worsening chest pain with (+) cardiac cath findings    History of Present Illness: 71 y.o. female admitted for multivessel coronary artery disease and CABG     ROS:  Respiratory: negative, Cardiac: positive chest pain, negative orthopnea and negative leg swelling, GI: negative.  All other systems negative.    Interval Events:  24 hour interval history reviewed    - No overnight events   - Last BM on 2/19   - Tolerating a diet   - IS at 1100cc   - Pacer wires and CT removed yesterday    PFSH:  No change.    Respiratory:     Pulse Oximetry: 94 %    Exam: dimished at the bases, no wheezing  ImagingCXR  I have personally reviewed the chest x-ray my impression is   interval removal of ETT, clear lung fields   Recent Labs      02/20/17   1634  02/20/17   1713  02/20/17   1806   ISTATAPH  7.298*  7.250*  7.314*   ISTATAPCO2  38.3*  36.2  39.3*   ISTATAPO2  81  90*  68   ISTATATCO2  20  17*  21   TACBSQS0PLN  95  95  92*   ISTATARTHCO3  18.8  15.9*  20.0   ISTATARTBE  -7*  -11*  -6*   ISTATTEMP  36.2 C  36.6 C  36.8 C   ISTATFIO2  40  40  30   ISTATSPEC  Arterial  Arterial  Arterial   ISTATAPHTC  7.309*  7.255*  7.317*   [id number]  77  88*  67       HemoDynamics:  Pulse: 61, Heart Rate (Monitored): 61  Arterial BP: 117/61 mmHg, NIBP: 127/74 mmHg      Exam: regular rate and rhythm  Imaging: Available data reviewed        Neuro:  GCS Total [place]ier Coma Score: 15       Exam: no focal deficits noted mental status intact Motor and sensory exam grossly intact follows commands, raises two fingers  Imaging: None - Reviewed    Fluids:  Intake/Output       02/21/17 0700 - 02/22/17 0659 02/22/17 0700 - 02/23/17 0659 02/23/17 0700 - 02/24/17 0659      0801-8859 5631-3251 Total 3051-3111 9092-9250 Total 6159-7941 7834-9726 Total       Intake    P.O.  1600  250 1850  --  -- --  --  -- --    P.OZulma 3563 012 2089 -- -- -- -- -- --     I.V.  59.5  -- 59.5  100  -- 100  --  -- --    Insulin Volume 59.5 -- 59.5 -- -- -- -- -- --    IV Piggyback Volume (IV Piggyback) -- -- -- 100 -- 100 -- -- --    Enteral  --  30 30  --  -- --  --  -- --    Free Water / Tube Flush -- 30 30 -- -- -- -- -- --    Total Intake 1659.5 280 1939.5 100 -- 100 -- -- --       Output    Urine  320  500 820  950  250 1200  --  -- --    Number of Times Voided -- -- -- 1 x 1 x 2 x -- -- --    Indwelling Cathether 320 -- 320 -- -- -- -- -- --    Void (ml) 0 500 500  -- -- --    Drains  110  -- 110  60  -- 60  --  -- --    Mediastinal Chest Tube 1 110 -- 110 60 -- 60 -- -- --    Total Output 430  250 1260 -- -- --       Net I/O     1229.5 -220 1009.5 -910 -250 -1160 -- -- --           Recent Labs      17   1330   17   0500  17   1129  17   03517   0415   SODIUM   --    --   138   --   130*  125*   POTASSIUM   --    < >  4.2  3.6  3.5*  3.8   CHLORIDE   --    --   106   --   101  99   CO2   --    --   26   --   24  21   BUN   --    --   10   --   13  15   CREATININE   --    --   0.79   --   0.71  0.66   MAGNESIUM  2.8*   --    --    --    --    --    CALCIUM   --    --   7.4*   --   7.7*  7.7*    < > = values in this interval not displayed.       GI/Nutrition:  Exam: abdomen is soft and non-tender, normal active bowel sounds  Imaging: Available data reviewed  taking PO  Liver Function  Recent Labs      17   0500  17   0358  17   0415   GLUCOSE  152*  97  90       Heme:  Recent Labs      17   1330  17   0500  17   0358  17   0415   RBC   --   3.38*  2.98*  2.86*   HEMOGLOBIN   --   10.3*  9.0*  8.6*   HEMATOCRIT   --   30.4*  26.7*  25.9*   PLATELETCT  204  155*  136*  123*   PROTHROMBTM  17.2*   --    --    --    APTT  30.5   --    --    --    INR  1.36*   --    --    --        Infectious Disease:  Temp  Av.3 °C (97.3 °F)  Min: 35.8 °C (96.5 °F)  Max: 36.7 °C (98 °F)  Micro:  reviewed  Recent Labs      02/21/17   0500  02/22/17   0358  02/23/17   0415   WBC  14.6*  14.2*  11.1*     Current Facility-Administered Medications   Medication Dose Frequency Provider Last Rate Last Dose   • potassium chloride SA (Kdur) tablet 20 mEq  20 mEq DAILY Coco Corcoran, A.P.N.   20 mEq at 02/22/17 1037   • furosemide (LASIX) injection 40 mg  40 mg DAILY Coco Corcoran A.P.N.   40 mg at 02/22/17 1037   • insulin NPH (HUMULIN,NOVOLIN) injection 9 Units  9 Units Q8HRS Aleksandra Murphy, A.P.N.   Stopped at 02/21/17 2200   • insulin lispro (HUMALOG) injection 6 Units  6 Units TID AC Aleksandra Murphy, A.P.N.   Stopped at 02/22/17 1100   • insulin lispro (HUMALOG) injection 0-15 Units  0-15 Units ACHS & 0200 Aleksandra Murphy, A.P.N.   Stopped at 02/21/17 1700   • aspirin EC (ECOTRIN) tablet 81 mg  81 mg DAILY Aleksandra Murphy, A.P.N.   81 mg at 02/22/17 0850   • omeprazole (PRILOSEC) capsule 20 mg  20 mg DAILY Jennifer Edwards M.D.   20 mg at 02/22/17 0849   • escitalopram (LEXAPRO) tablet 20 mg  20 mg DAILY Zully Ballard M.D.   20 mg at 02/22/17 0850   • Respiratory Care per Protocol   Continuous RT Aleksandra Murphy, A.P.N.       • NS infusion   Continuous Aleksandra Murphy, A.P.N. 10 mL/hr at 02/20/17 1900     • Pharmacy Consult Request ...Pain Management Review 1 Each  1 Each PRN Aleksandra Murphy, A.P.N.       • docusate sodium (COLACE) capsule 100 mg  100 mg QAM Aleksandra Murphy, A.P.N.   100 mg at 02/22/17 0850    And   • senna-docusate (PERICOLACE or SENOKOT S) 8.6-50 MG per tablet 1 Tab  1 Tab Nightly Aleksandra Murphy, A.P.N.   1 Tab at 02/22/17 2039    And   • senna-docusate (PERICOLACE or SENOKOT S) 8.6-50 MG per tablet 1 Tab  1 Tab Q24HRS PRN Aleksandra L Jeffrey, A.P.N.        And   • lactulose 20 GM/30ML solution 30 mL  30 mL Q24HRS PRN Aleksandra Leest, A.P.N.        And   • bisacodyl (DULCOLAX) suppository 10 mg  10 mg Q24HRS PRN Aleksandra Leest, A.P.N.        And   • fleet enema 133 mL  1 Each Once PRN Aleksandra  RAE Murphy, A.P.N.       • enoxaparin (LOVENOX) inj 40 mg  40 mg DAILY Aleksandra Murphy, A.P.N.   Stopped at 02/21/17 2100   • mupirocin (BACTROBAN) 2 % ointment 1 Application  1 Application BID Aleksandra Murphy, A.P.N.   1 Application at 02/22/17 2038   • metoprolol (LOPRESSOR) tablet 25 mg  25 mg BID Aleksandra Murphy, A.P.N.   25 mg at 02/22/17 2230   • clopidogrel (PLAVIX) tablet 75 mg  75 mg DAILY Aleksandra Murphy, A.P.N.   75 mg at 02/22/17 0850   • oxycodone immediate-release (ROXICODONE) tablet 2.5 mg  2.5 mg Q3HRS PRN Aleksandra Murphy, A.P.N.       • oxycodone immediate-release (ROXICODONE) tablet 5 mg  5 mg Q3HRS PRN Aleksandra Murphy, A.P.N.   5 mg at 02/23/17 0420   • tramadol (ULTRAM) 50 MG tablet 50 mg  50 mg Q4HRS PRN Aleksandra Murphy, A.P.N.       • ondansetron (ZOFRAN) syringe/vial injection 4 mg  4 mg Q6HRS PRN Aleksandra Murphy, A.P.N.        Or   • prochlorperazine (COMPAZINE) injection 10 mg  10 mg Q6HRS PRN Aleksandra Murphy, A.P.N.        Or   • promethazine (PHENERGAN) suppository 25 mg  25 mg Q6HRS PRN Aleksandra Murphy, A.P.N.       • acetaminophen (TYLENOL) tablet 650 mg  650 mg Q4HRS PRN Aleksandra Murphy, A.P.N.        Or   • acetaminophen (TYLENOL) suppository 650 mg  650 mg Q4HRS PRN Aleksandra Murphy, A.P.N.       • mag hydrox-al hydrox-simeth (MAALOX PLUS ES or MYLANTA DS) suspension 30 mL  30 mL Q4HRS PRN Aleksandra Murphy, A.P.N.       • diphenhydrAMINE (BENADRYL) tablet/capsule 25 mg  25 mg HS PRN - MR X 1 Aleksandra Murphy, A.P.N.   25 mg at 02/22/17 0154   • hydrocodone-acetaminophen (NORCO) 5-325 MG per tablet 1-2 Tab  1-2 Tab Q6HRS PRN Aleksandra Murphy, A.P.N.   2 Tab at 02/22/17 1037   • magnesium sulfate ivpb premix 1 g  1 g QDAY Aleksandra Murphy, A.P.N.   Stopped at 02/22/17 1459   • levothyroxine (SYNTHROID) tablet 75 mcg  75 mcg AM ES Sarah Servin, A.P.N.   75 mcg at 02/22/17 0515   • atorvastatin (LIPITOR) tablet 20 mg  20 mg QHS Sarah Servin, A.P.N.   20 mg at 02/22/17 2038   • pneumococcal  13-Loan Conj Vacc (PREVNAR 13) syringe 0.5 mL  0.5 mL Once PRN Rody Eugene M.D.   Stopped at 02/19/17 2247     Last reviewed on 2/17/2017  1:57 PM by Haley Ennis    Quality  Measures:  Radiology images reviewed, Labs reviewed and Medications reviewed  Abdullahi catheter: No Abdullahi  Central line in place: Need for access and Vasopressors    DVT Prophylaxis: Contraindicated - High bleeding risk  DVT prophylaxis - mechanical: SCDs  Ulcer prophylaxis: Yes    Assessed for rehab: Patient was assess for and/or received rehabilitation services during this hospitalization    Problems/Plan:    Ventilator dependant respiratory failure status post 4-vessel coronary artery bypass grafting.   - Weaned per protocol and extubated 2/20   - cont RT/O2 protocols   - Start IS and may proceed to PEP   - Mobilizing in hallways   - forced diuresis as needed  CAD s/p 4-vessel coronary artery bypass grafting     - uncomplicated   - weaned vasopressors   - CT and pacer wires removed 2/22  Hypotension likely related to vasoplegia.   - improved   - vasopressors weaned  Acute metabolic acidosis.   - improved  Hypokalemia   - replete as needed  Hyperglycemia.   - ISS and NPH--> will adjust as necessary  History of systemic arterial hypertension.   - will start home regimen soon  History of hypothyroidism.  History of type 2 diabetes mellitus.  History of tobacco dependence in remission.  History of dyslipidemia.  Prophylaxis   - PPI, mobilize, advance diet as tolerated    Discussed patient condition and risk of morbidity and/or mortality with Family, RN, RT, Pharmacy, Dietary, , Charge nurse / hot rounds, Patient and cardiology and CVS.    The patient remains critically ill.  Critical care time = 31 minutes in directly providing and coordinating critical care and extensive data review.  No time overlap and excludes procedures.

## 2017-02-23 NOTE — FACE TO FACE
Face to Face Supporting Documentation - Home Health    The encounter with this patient was in whole or in part the primary reason for home health admission.    Date of encounter:   Patient:                    MRN:                       YOB: 2016  Carole Kingsley  [id number]  1945     Home health to see patient for:  Skilled Nursing care for assessment, interventions & education    Skilled need for:  Surgical Aftercare s/p cabg    Skilled nursing interventions to include:  Wound Care    Homebound status evidenced by:  Needs the assistance of another person in order to leave the home. Leaving home requires a considerable and taxing effort. There is a normal inability to leave the home.    Community Physician to provide follow up care: ABIMAEL Todd     Optional Interventions? No      I certify the face to face encounter for this home health care referral meets the CMS requirements and the encounter/clinical assessment with the patient was, in whole, or in part, for the medical condition(s) listed above, which is the primary reason for home health care. Based on my clinical findings: the service(s) are medically necessary, support the need for home health care, and the homebound criteria are met.  I certify that this patient has had a face to face encounter by myself.  DANNY Quigley. - NPI: [id number]

## 2017-02-23 NOTE — THERAPY
"Occupational Therapy Evaluation completed.   Functional Status:  Up in chair CGA sit>stand after 2 attempts, walking in room no AD, no overt c/o pain or fatigue, SBA standing at sink for grooming, CGA w/toilet txf returned to chair in room, reinforced cardiac and sternal prec reports family support upon d/c  Plan of Care: Will benefit from Occupational Therapy 3 times per week  Discharge Recommendations:  Equipment: Will Continue to Assess for Equipment Needs. Post-acute therapy recommended dependent on progress over next 24-48 hours     See \"Rehab Therapy-Acute\" Patient Summary Report for complete documentation.    "

## 2017-02-23 NOTE — THERAPY
"Physical Therapy Evaluation completed.   Bed Mobility:  Supine to Sit: Moderate Assist  Transfers: Sit to Stand: Contact Guard Assist  Gait: Level Of Assist: Stand by Assist with Front-Wheel Walker       Plan of Care: Will benefit from Physical Therapy 2 times per week  Discharge Recommendations: Equipment: Front-Wheel Walker. Post-acute therapy recommended before discharged home.    See \"Rehab Therapy-Acute\" Patient Summary Report for complete documentation.     Pt educated on sternal prec this date and on walking program for 3-6 times a day for 6 weeks. Pt educated on proper techniques and demonstrated them for sit to stand and bed mobility. Pt required assistance for bed mobility to follow prec. Pt reports that her son will be staying with her for 1 month following hopsitalization to assist. Pt demonstrated steady gait with FWW and ability to complete stairs following prec. Pt will benefit from skilled acute PT this date to work on deficits.   "

## 2017-02-23 NOTE — DISCHARGE PLANNING
PO day 3.  Doing well. Up to chair.  Alert and in good spirits.   Last BM 2/19.  Room Air.      No ABX.  Follow for Rehab acceptance.

## 2017-02-23 NOTE — CARE PLAN
Problem: Post Op Day 3 CABG/Heart Valve replacement  Goal: Optimal care of the post op CABG/Heart Valve replacement post op day 3  Intervention: Daily Weights  Done  Intervention: Shower daily and clean incisions twice daily with soap and water  Done  Intervention: Up in chair for all meals  In chair for breakfast  Intervention: Ambulate, increasing the distance each time x 3 and before bed  Ambulated half the unit once this morning on room air.  Intervention: IS q 1 hour while awake and record best IS volume  Best IS 1000 mL  Intervention: Consider removal of barrera, chest tube and pacer wire if not already done  D/C. Pacer wires capped

## 2017-02-23 NOTE — DISCHARGE PLANNING
"PMR referral from Jeffrey STEWARD :  Cardiac Debility s/p cardiac surgery limited medical and therapy need anticipate Outpatient support. May benefit from OT evaluation if unable to discharge home with outpatient support. Current documentation does not support IRF need two disciplines of therapy  for CMS criteria for IRF. No physiatry consult ordered per protocol     Plan:  Hemodynamically stable post CABG.  Hx of labile hypertension preop.  Monitor BP.  Will refer to Psychiatry for consideration of antidepressant Rx and treatment of substance abuse.    Core Measures                      Perla Sanches PT Student Attested  Therapy 2/22/2017  4:42 PM      Attestation signed by Stephy Fletcher P.T. at 2/22/2017 4:47 PM   Discussed and agreed with student POC. VAIBHAV PT      Expand All Collapse All    Physical Therapy Evaluation completed.   Bed Mobility:  Supine to Sit: Moderate Assist  Transfers: Sit to Stand: Contact Guard Assist  Gait: Level Of Assist: Stand by Assist with Front-Wheel Walker       Plan of Care: Will benefit from Physical Therapy 2 times per week  Discharge Recommendations: Equipment: Front-Wheel Walker. Post-acute therapy recommended before discharged home.    See \"Rehab Therapy-Acute\" Patient Summary Report for complete documentation.     Pt educated on sternal prec this date and on walking program for 3-6 times a day for 6 weeks. Pt educated on proper techniques and demonstrated them for sit to stand and bed mobility. Pt required assistance for bed mobility to follow prec. Pt reports that her son will be staying with her for 1 month following hopsitalization to assist. Pt demonstrated steady gait with FWW and ability to complete stairs following prec. Pt will benefit from skilled acute PT this date to work on deficits.            Cosigned by: Stephy Fletcher PMARVIN at 2/22/2017  4:47 PM                 "

## 2017-02-23 NOTE — PROGRESS NOTES
Patient walked around 3/4 of unit without wheelchair on room air with stand by assist of RN just prior to lunch

## 2017-02-23 NOTE — CARE PLAN
Problem: Hyperinflation:  Goal: Prevent or improve atelectasis  Intervention: Instruct incentive spirometry usage  PEP BID    Pt reaching 1100 on IS

## 2017-02-24 LAB
ANION GAP SERPL CALC-SCNC: 8 MMOL/L (ref 0–11.9)
BUN SERPL-MCNC: 13 MG/DL (ref 8–22)
CALCIUM SERPL-MCNC: 8.5 MG/DL (ref 8.5–10.5)
CHLORIDE SERPL-SCNC: 105 MMOL/L (ref 96–112)
CO2 SERPL-SCNC: 23 MMOL/L (ref 20–33)
CREAT SERPL-MCNC: 0.74 MG/DL (ref 0.5–1.4)
ERYTHROCYTE [DISTWIDTH] IN BLOOD BY AUTOMATED COUNT: 46 FL (ref 35.9–50)
GFR SERPL CREATININE-BSD FRML MDRD: >60 ML/MIN/1.73 M 2
GLUCOSE BLD-MCNC: 78 MG/DL (ref 65–99)
GLUCOSE BLD-MCNC: 86 MG/DL (ref 65–99)
GLUCOSE SERPL-MCNC: 83 MG/DL (ref 65–99)
HCT VFR BLD AUTO: 26.1 % (ref 37–47)
HGB BLD-MCNC: 8.8 G/DL (ref 12–16)
MCH RBC QN AUTO: 30.3 PG (ref 27–33)
MCHC RBC AUTO-ENTMCNC: 33.7 G/DL (ref 33.6–35)
MCV RBC AUTO: 90 FL (ref 81.4–97.8)
PLATELET # BLD AUTO: 152 K/UL (ref 164–446)
PMV BLD AUTO: 11.3 FL (ref 9–12.9)
POTASSIUM SERPL-SCNC: 3.9 MMOL/L (ref 3.6–5.5)
RBC # BLD AUTO: 2.9 M/UL (ref 4.2–5.4)
SODIUM SERPL-SCNC: 136 MMOL/L (ref 135–145)
WBC # BLD AUTO: 7.9 K/UL (ref 4.8–10.8)

## 2017-02-24 PROCEDURE — A9270 NON-COVERED ITEM OR SERVICE: HCPCS | Performed by: NURSE PRACTITIONER

## 2017-02-24 PROCEDURE — A9270 NON-COVERED ITEM OR SERVICE: HCPCS | Performed by: INTERNAL MEDICINE

## 2017-02-24 PROCEDURE — 700102 HCHG RX REV CODE 250 W/ 637 OVERRIDE(OP): Performed by: PSYCHIATRY & NEUROLOGY

## 2017-02-24 PROCEDURE — 700112 HCHG RX REV CODE 229: Performed by: NURSE PRACTITIONER

## 2017-02-24 PROCEDURE — 700102 HCHG RX REV CODE 250 W/ 637 OVERRIDE(OP): Performed by: NURSE PRACTITIONER

## 2017-02-24 PROCEDURE — 700111 HCHG RX REV CODE 636 W/ 250 OVERRIDE (IP): Performed by: NURSE PRACTITIONER

## 2017-02-24 PROCEDURE — 700102 HCHG RX REV CODE 250 W/ 637 OVERRIDE(OP): Performed by: INTERNAL MEDICINE

## 2017-02-24 PROCEDURE — 82962 GLUCOSE BLOOD TEST: CPT | Mod: 91

## 2017-02-24 PROCEDURE — 99291 CRITICAL CARE FIRST HOUR: CPT | Performed by: INTERNAL MEDICINE

## 2017-02-24 PROCEDURE — A9270 NON-COVERED ITEM OR SERVICE: HCPCS | Performed by: PSYCHIATRY & NEUROLOGY

## 2017-02-24 PROCEDURE — 770020 HCHG ROOM/CARE - TELE (206)

## 2017-02-24 PROCEDURE — 80048 BASIC METABOLIC PNL TOTAL CA: CPT

## 2017-02-24 PROCEDURE — 85027 COMPLETE CBC AUTOMATED: CPT

## 2017-02-24 RX ADMIN — LEVOTHYROXINE SODIUM 75 MCG: 75 TABLET ORAL at 06:00

## 2017-02-24 RX ADMIN — OXYCODONE HYDROCHLORIDE 5 MG: 5 TABLET ORAL at 11:27

## 2017-02-24 RX ADMIN — LACTULOSE 30 ML: 10 SOLUTION ORAL at 08:23

## 2017-02-24 RX ADMIN — METOPROLOL TARTRATE 25 MG: 25 TABLET, FILM COATED ORAL at 08:23

## 2017-02-24 RX ADMIN — POTASSIUM CHLORIDE 20 MEQ: 1500 TABLET, EXTENDED RELEASE ORAL at 08:23

## 2017-02-24 RX ADMIN — OMEPRAZOLE 20 MG: 20 CAPSULE, DELAYED RELEASE ORAL at 08:23

## 2017-02-24 RX ADMIN — TRAMADOL HYDROCHLORIDE 50 MG: 50 TABLET, COATED ORAL at 08:23

## 2017-02-24 RX ADMIN — ATORVASTATIN CALCIUM 20 MG: 20 TABLET, FILM COATED ORAL at 19:35

## 2017-02-24 RX ADMIN — ESCITALOPRAM OXALATE 20 MG: 10 TABLET ORAL at 08:23

## 2017-02-24 RX ADMIN — OXYCODONE HYDROCHLORIDE 2.5 MG: 5 TABLET ORAL at 06:00

## 2017-02-24 RX ADMIN — ENOXAPARIN SODIUM 40 MG: 40 INJECTION SUBCUTANEOUS at 08:22

## 2017-02-24 RX ADMIN — STANDARDIZED SENNA CONCENTRATE AND DOCUSATE SODIUM 1 TABLET: 8.6; 5 TABLET, FILM COATED ORAL at 19:35

## 2017-02-24 RX ADMIN — MUPIROCIN 1 APPLICATION: 20 OINTMENT TOPICAL at 08:23

## 2017-02-24 RX ADMIN — ASPIRIN 81 MG: 81 TABLET ORAL at 08:23

## 2017-02-24 RX ADMIN — CLOPIDOGREL 75 MG: 75 TABLET, FILM COATED ORAL at 08:23

## 2017-02-24 RX ADMIN — HYDROCODONE BITARTRATE AND ACETAMINOPHEN 1 TABLET: 5; 325 TABLET ORAL at 16:48

## 2017-02-24 RX ADMIN — OXYCODONE HYDROCHLORIDE 2.5 MG: 5 TABLET ORAL at 01:45

## 2017-02-24 RX ADMIN — DOCUSATE SODIUM 100 MG: 100 CAPSULE ORAL at 08:22

## 2017-02-24 RX ADMIN — FUROSEMIDE 40 MG: 10 INJECTION, SOLUTION INTRAVENOUS at 08:22

## 2017-02-24 ASSESSMENT — PAIN SCALES - GENERAL
PAINLEVEL_OUTOF10: ASSUMED PAIN PRESENT
PAINLEVEL_OUTOF10: ASSUMED PAIN PRESENT
PAINLEVEL_OUTOF10: 6
PAINLEVEL_OUTOF10: 6
PAINLEVEL_OUTOF10: ASSUMED PAIN PRESENT
PAINLEVEL_OUTOF10: 4
PAINLEVEL_OUTOF10: 6
PAINLEVEL_OUTOF10: 7
PAINLEVEL_OUTOF10: ASSUMED PAIN PRESENT
PAINLEVEL_OUTOF10: 5
PAINLEVEL_OUTOF10: 4
PAINLEVEL_OUTOF10: 4
PAINLEVEL_OUTOF10: 2
PAINLEVEL_OUTOF10: 3
PAINLEVEL_OUTOF10: 6

## 2017-02-24 ASSESSMENT — ENCOUNTER SYMPTOMS
PSYCHIATRIC NEGATIVE: 1
NEUROLOGICAL NEGATIVE: 1
GASTROINTESTINAL NEGATIVE: 1
CARDIOVASCULAR NEGATIVE: 1
EYES NEGATIVE: 1
MUSCULOSKELETAL NEGATIVE: 1
RESPIRATORY NEGATIVE: 1
CONSTITUTIONAL NEGATIVE: 1

## 2017-02-24 NOTE — FACE TO FACE
Face to Face Supporting Documentation - Home Health    The encounter with this patient was in whole or in part the primary reason for home health admission.    Date of encounter:   Patient:                    MRN:                       YOB: 2016  Carole Kingsley  [id number]  1945     Home health to see patient for:  Skilled Nursing care for assessment, interventions & education    Skilled need for:  Surgical Aftercare s/p cabg    Skilled nursing interventions to include:  Wound Care    Homebound status evidenced by:  Needs the assistance of another person in order to leave the home. Leaving home requires a considerable and taxing effort. There is a normal inability to leave the home.    Community Physician to provide follow up care: ABIMAEL Todd     Optional Interventions? No      I certify the face to face encounter for this home health care referral meets the CMS requirements and the encounter/clinical assessment with the patient was, in whole, or in part, for the medical condition(s) listed above, which is the primary reason for home health care. Based on my clinical findings: the service(s) are medically necessary, support the need for home health care, and the homebound criteria are met.  I certify that this patient has had a face to face encounter by myself.  DANNY Quigley. - NPI: [id number]

## 2017-02-24 NOTE — PROGRESS NOTES
Pulmonary Critical Care Progress Note        Date of Service:  2/24/17  Chief Complaint: Intermittent and worsening chest pain with (+) cardiac cath findings    History of Present Illness: 71 y.o. female admitted for multivessel coronary artery disease and CABG     ROS:  Respiratory: negative, Cardiac: positive chest pain, negative orthopnea and negative leg swelling, GI: negative.  All other systems negative.    Interval Events:  24 hour interval history reviewed    - POD #4   - Insulin drip discontinued   - Hemodynamically stable   - Last BM on 2/19   - Mobilizing in hallways    PFSH:  No change.    Respiratory:     Pulse Oximetry: 93 %    Exam: dimished at the bases, no wheezing  ImagingCXR  I have personally reviewed the chest x-ray my impression is   interval removal of ETT, clear lung fields         Invalid input(s): [id number]    HemoDynamics:  Pulse: 63, Heart Rate (Monitored): 67  Blood Pressure : 134/71 mmHg, NIBP: 136/75 mmHg      Exam: regular rate and rhythm  Imaging: Available data reviewed        Neuro:  GCS Total [place] Coma Score: 15       Exam: no focal deficits noted mental status intact Motor and sensory exam grossly intact follows commands, raises two fingers  Imaging: None - Reviewed    Fluids:  Intake/Output       02/22/17 0700 - 02/23/17 0659 02/23/17 0700 - 02/24/17 0659 02/24/17 0700 - 02/25/17 0659      2569-8904 6659-4203 Total 0009-0345 1307-7678 Total 0536-3043 9019-1020 Total       Intake    P.O.  --  -- --  480  240 720  --  -- --    P.O. -- -- -- 480 240 720 -- -- --    I.V.  100  -- 100  --  -- --  --  -- --    IV Piggyback Volume (IV Piggyback) 100 -- 100 -- -- -- -- -- --    Total Intake 100 -- 100 480 240 720 -- -- --       Output    Urine  950  250 1200  1050  2000 3050  --  -- --    Number of Times Voided 1 x 1 x 2 x 2 x 5 x 7 x -- -- --    Void (ml)  1050 2000 3050 -- -- --    Drains  60  -- 60  --  -- --  --  -- --    Mediastinal Chest Tube 1 60 -- 60 -- --  -- -- -- --    Stool  --  -- --  --  -- --  --  -- --    Number of Times Stooled -- -- -- -- 0 x 0 x -- -- --    Total Output 1701 632 7651 1050 2000 3050 -- -- --       Net I/O     -910 -250 -1160 -570 -1760 -2330 -- -- --        Weight: 64.2 kg (141 lb 8.6 oz)  Recent Labs      17   043   SODIUM  130*  125*  136   POTASSIUM  3.5*  3.8  3.9   CHLORIDE  101  99  105   CO2  24  21  23   BUN  13  15  13   CREATININE  0.71  0.66  0.74   CALCIUM  7.7*  7.7*  8.5       GI/Nutrition:  Exam: abdomen is soft and non-tender, normal active bowel sounds  Imaging: Available data reviewed  taking PO  Liver Function  Recent Labs      175  17   043   GLUCOSE  97  90  83       Heme:  Recent Labs      17   0430   RBC  2.98*  2.86*  2.90*   HEMOGLOBIN  9.0*  8.6*  8.8*   HEMATOCRIT  26.7*  25.9*  26.1*   PLATELETCT  136*  123*  152*       Infectious Disease:  Temp  Av.7 °C (98 °F)  Min: 36.2 °C (97.2 °F)  Max: 36.8 °C (98.2 °F)  Micro: reviewed  Recent Labs      17   0430   WBC  14.2*  11.1*  7.9     Current Facility-Administered Medications   Medication Dose Frequency Provider Last Rate Last Dose   • potassium chloride SA (Kdur) tablet 20 mEq  20 mEq DAILY Coco Corcoran, A.P.N.   20 mEq at 17   • furosemide (LASIX) injection 40 mg  40 mg DAILY Coco Corcoran, A.P.N.   40 mg at 17   • insulin NPH (HUMULIN,NOVOLIN) injection 9 Units  9 Units Q8HRS Aleksandra Murphy, A.P.N.   Stopped at 02/21/17 2200   • insulin lispro (HUMALOG) injection 6 Units  6 Units TID AC Aleksandra Murphy, A.P.N.   Stopped at 17 1700   • insulin lispro (HUMALOG) injection 0-15 Units  0-15 Units ACHS & 0200 Aleksandra Murphy, A.P.N.   Stopped at 17 1700   • aspirin EC (ECOTRIN) tablet 81 mg  81 mg DAILY Aleksandra Murphy, A.P.N.   81 mg at 17 0817   • omeprazole  (PRILOSEC) capsule 20 mg  20 mg DAILY Jennifer Edwards M.D.   20 mg at 02/23/17 0818   • escitalopram (LEXAPRO) tablet 20 mg  20 mg DAILY Zully Ballard M.D.   20 mg at 02/23/17 0819   • Respiratory Care per Protocol   Continuous RT Aleksandra Murphy, A.P.N.       • NS infusion   Continuous Aleksandra Murphy, A.P.N.   Stopped at 02/23/17 1457   • docusate sodium (COLACE) capsule 100 mg  100 mg QAM Aleksandra Murphy, A.P.N.   100 mg at 02/23/17 0817    And   • senna-docusate (PERICOLACE or SENOKOT S) 8.6-50 MG per tablet 1 Tab  1 Tab Nightly Aleksandra Murphy, A.P.N.   1 Tab at 02/23/17 2047    And   • senna-docusate (PERICOLACE or SENOKOT S) 8.6-50 MG per tablet 1 Tab  1 Tab Q24HRS PRN Aleksandra Murphy, A.P.N.        And   • lactulose 20 GM/30ML solution 30 mL  30 mL Q24HRS PRN Aleksandra Murphy, A.P.N.        And   • bisacodyl (DULCOLAX) suppository 10 mg  10 mg Q24HRS PRN Aleksandra Murphy, A.P.N.        And   • fleet enema 133 mL  1 Each Once PRN Aleksandra Murphy, A.P.N.       • enoxaparin (LOVENOX) inj 40 mg  40 mg DAILY Aleksandra Murphy, A.P.N.   40 mg at 02/23/17 0817   • mupirocin (BACTROBAN) 2 % ointment 1 Application  1 Application BID Aleksandra Murphy, A.P.N.   1 Application at 02/23/17 2050   • metoprolol (LOPRESSOR) tablet 25 mg  25 mg BID Aleksandra Murphy, A.P.N.   25 mg at 02/23/17 2047   • clopidogrel (PLAVIX) tablet 75 mg  75 mg DAILY Aleksandra Murphy, A.P.N.   75 mg at 02/23/17 0817   • oxycodone immediate-release (ROXICODONE) tablet 2.5 mg  2.5 mg Q3HRS PRN Aleksandra L Jeffrey, A.P.N.   2.5 mg at 02/24/17 0145   • oxycodone immediate-release (ROXICODONE) tablet 5 mg  5 mg Q3HRS PRN Aleksandra L Jeffrey, A.P.N.   5 mg at 02/23/17 2047   • tramadol (ULTRAM) 50 MG tablet 50 mg  50 mg Q4HRS PRN Aleksandra L Jeffrey, A.P.N.       • ondansetron (ZOFRAN) syringe/vial injection 4 mg  4 mg Q6HRS PRN Aleksandra L Jeffrey, A.P.N.        Or   • prochlorperazine (COMPAZINE) injection 10 mg  10 mg Q6HRS PRN Aleksandra L Jeffrey, A.P.N.        Or   •  promethazine (PHENERGAN) suppository 25 mg  25 mg Q6HRS PRN Aleksandra L Jeffrey, A.P.N.       • acetaminophen (TYLENOL) tablet 650 mg  650 mg Q4HRS PRN Aleksandra L Jeffrey, A.P.N.        Or   • acetaminophen (TYLENOL) suppository 650 mg  650 mg Q4HRS PRN Aleksandra L Jeffrey, A.P.N.       • mag hydrox-al hydrox-simeth (MAALOX PLUS ES or MYLANTA DS) suspension 30 mL  30 mL Q4HRS PRN Aleksandra L Jeffrey, A.P.N.       • diphenhydrAMINE (BENADRYL) tablet/capsule 25 mg  25 mg HS PRN - MR X 1 Aleksandra L Jeffrey, A.P.N.   25 mg at 02/22/17 0154   • hydrocodone-acetaminophen (NORCO) 5-325 MG per tablet 1-2 Tab  1-2 Tab Q6HRS PRN Aleksandra L Jeffrey, A.P.N.   2 Tab at 02/22/17 1037   • levothyroxine (SYNTHROID) tablet 75 mcg  75 mcg AM ES Sarah Servin, A.P.N.   75 mcg at 02/23/17 0616   • atorvastatin (LIPITOR) tablet 20 mg  20 mg QHS Sarah Servin, A.P.N.   20 mg at 02/23/17 2047   • pneumococcal 13-Loan Conj Vacc (PREVNAR 13) syringe 0.5 mL  0.5 mL Once PRN Rody Eugene M.D.   Stopped at 02/19/17 2247     Last reviewed on 2/17/2017  1:57 PM by Kellie Soler [facility]    Quality  Measures:  Radiology images reviewed, Labs reviewed and Medications reviewed  Abdullahi catheter: No Abdullahi  Central line in place: Need for access and Vasopressors    DVT Prophylaxis: Contraindicated - High bleeding risk  DVT prophylaxis - mechanical: SCDs  Ulcer prophylaxis: Yes    Assessed for rehab: Patient was assess for and/or received rehabilitation services during this hospitalization    Problems/Plan:    Ventilator dependant respiratory failure status post 4-vessel coronary artery bypass grafting.   - Weaned per protocol and extubated 2/20   - cont RT/O2 protocols   - Start IS and may proceed to PEP   - Mobilizing in hallways   - forced diuresis as needed  CAD s/p 4-vessel coronary artery bypass grafting     - uncomplicated   - weaned vasopressors   - CT and pacer wires removed 2/22  Hypotension likely related to vasoplegia.   - improved   - vasopressors off  Acute  metabolic acidosis.   - improved  Hypokalemia   - replete as needed  Hyperglycemia.   - ISS and NPH--> will adjust as necessary  History of systemic arterial hypertension.   - will start home regimen soon  History of hypothyroidism.  History of type 2 diabetes mellitus.  History of tobacco dependence in remission.  History of dyslipidemia.  Prophylaxis   - PPI, mobilize, advance diet as tolerated  Disposition   - transfer to rehab in the next 1-2 days    Discussed patient condition and risk of morbidity and/or mortality with Family, RN, RT, Pharmacy, Dietary, , Charge nurse / hot rounds, Patient and cardiology and CVS.    The patient remains critically ill.  Critical care time = 31 minutes in directly providing and coordinating critical care and extensive data review.  No time overlap and excludes procedures.

## 2017-02-24 NOTE — PROGRESS NOTES
Cardiovascular Surgery Progress Note    Name: Carole Kingsley  MRN: [id number]  : 1945  Admit Date: 2017 11:25 AM  Procedure:  Procedure(s) and Anesthesia Type:     * MULTIPLE CORONARY ARTERY BYPASS ENDO VEIN HARVEST X4, PREVENA DRESSING - General     * PARUL  - General  4 Day Post-Op    Vitals:  Patient Vitals for the past 8 hrs:   Temp SpO2 O2 Delivery Heart Rate (Monitored) Resp NIBP Weight   17 0600 - - - 86 20 - -   17 0500 - - - 67 15 - -   17 0400 36.8 °C (98.2 °F) 93 % None (Room Air) 64 16 136/75 mmHg 64.2 kg (141 lb 8.6 oz)   17 0300 - - - 63 15 - -   17 0200 - - - 62 15 - -   17 0100 - - - 63 14 - -   17 0000 36.7 °C (98.1 °F) 92 % None (Room Air) (!) 59 16 121/81 mmHg -     Temp (24hrs), Av.7 °C (98 °F), Min:36.2 °C (97.2 °F), Max:36.8 °C (98.2 °F)      Respiratory:    Respiration: 20, Pulse Oximetry: 93 %     Chest Tube Drains:          Fluids:    Intake/Output Summary (Last 24 hours) at 17 0727  Last data filed at 17 0600   Gross per 24 hour   Intake    720 ml   Output   3050 ml   Net  -2330 ml     Admit weight: Weight: 57.607 kg (127 lb)  Current weight: Weight: 64.2 kg (141 lb 8.6 oz) (17 0400)    Labs:  Recent Labs      17   0358  17   0415  17   0430   WBC  14.2*  11.1*  7.9   RBC  2.98*  2.86*  2.90*   HEMOGLOBIN  9.0*  8.6*  8.8*   HEMATOCRIT  26.7*  25.9*  26.1*   MCV  89.6  90.6  90.0   MCH  30.2  30.1  30.3   MCHC  33.7  33.2*  33.7   RDW  50.8*  48.3  46.0   PLATELETCT  136*  123*  152*   MPV  11.1  11.2  11.3         Recent Labs      17   0358  17   0415  17   0430   SODIUM  130*  125*  136   POTASSIUM  3.5*  3.8  3.9   CHLORIDE  101  99  105   CO2  24  21  23   GLUCOSE  97  90  83   BUN  13  15  13   CREATININE  0.71  0.66  0.74   CALCIUM  7.7*  7.7*  8.5           Medications:  • potassium chloride SA  20 mEq     • furosemide  40 mg     • insulin NPH  9 Units     • insulin  lispro  6 Units     • insulin lispro  0-15 Units     • aspirin EC  81 mg     • omeprazole  20 mg     • escitalopram  20 mg     • docusate sodium  100 mg      And   • senna-docusate  1 Tab     • enoxaparin  40 mg     • mupirocin  1 Application     • metoprolol  25 mg     • clopidogrel  75 mg     • levothyroxine  75 mcg     • atorvastatin  20 mg         Exam:   Review of Systems   Constitutional: Negative.    HENT: Negative.    Eyes: Negative.    Respiratory: Negative.    Cardiovascular: Negative.    Gastrointestinal: Negative.    Genitourinary: Negative.    Musculoskeletal: Negative.    Skin: Negative.    Neurological: Negative.    Endo/Heme/Allergies: Negative.    Psychiatric/Behavioral: Negative.        Physical Exam   Constitutional: She is oriented to person, place, and time. She appears well-developed.   HENT:   Head: Normocephalic.   Eyes: Pupils are equal, round, and reactive to light.   Neck: Normal range of motion. No JVD present.   Cardiovascular: Normal rate, regular rhythm and normal heart sounds.    Pulmonary/Chest: Breath sounds normal.   Dim bases   Abdominal: Soft. Bowel sounds are normal. She exhibits no distension. There is no guarding.   Musculoskeletal: Normal range of motion.   Neurological: She is alert and oriented to person, place, and time.   Skin: Skin is warm and dry.   Prevena to chest, EVH sites CDI   Psychiatric: She has a normal mood and affect. Her behavior is normal. Judgment and thought content normal.       Quality Measures:   EKG reviewed, Medications reviewed, Labs reviewed and Radiology images reviewed  Abdullahi catheter: One or Two Days Post Surgery (Day of Surgery being Day 0)  Central line in place: Need for access and Concentrated IV drugs    DVT Prophylaxis: Contraindicated - High bleeding risk  DVT prophylaxis - mechanical: SCDs  Ulcer prophylaxis: Yes    Assessed for rehab: Patient was assess for and/or received rehabilitation services during this  hospitalization      Assessment/Plan:  POD 1 Extubated, nuero grossly intact, NSR, HDS off pressors.  Min output CT, small airleak noted.  Keep CTs, DC barrera.  Psych consult for depression/ETOH abuse.  AMB/IS  POD 2 HDS, SR, CT no air leak- d/c, replace k+, gently diurese, appreciate pysch input, amb, enc IS  POD 3 HDS, NSR, on RA, doing well. Home vs Rehab 1-2 days, PT to see again today.  DC temp wires.    POD 4 HDS, NSR, on RA, no BM yet--push bowel protocol.  OK from CTS for home after has BM, patient would like to review with family home vs Rehab.  Will follow.  CPM.        Active Hospital Problems    Diagnosis   • Chest pain [R07.9]

## 2017-02-24 NOTE — CARE PLAN
Problem: Post Op Day 3 CABG/Heart Valve replacement  Goal: Optimal care of the post op CABG/Heart Valve replacement post op day 3  Intervention: Daily Weights  Done noc  Intervention: Shower daily and clean incisions twice daily with soap and water  Done with one incision cleaning in AM  Intervention: Up in chair for all meals  done  Intervention: Ambulate, increasing the distance each time x 3 and before bed  done  Intervention: IS q 1 hour while awake and record best IS volume  done  Intervention: Consider removal of barrera, chest tube and pacer wire if not already done  done  Intervention: Case management and  for discharge needs  continuing  Intervention: Discharge planning (See discharge barriers/planning problem on care plan)  continuing

## 2017-02-24 NOTE — PROGRESS NOTES
Bedside report received. Assumed Patient care. Patient A&Ox4.  Complaints of pain at this time, medication intervention with initial medication pass. Initial assessment completed. Lines verified. Patient's plan of care discussed with Patient; Patient's questions answered at this time.    Bed is in lowest position, siderails raised and call light is within reach.     Patient completed walk before bedtime.   Patient is Tele status.

## 2017-02-24 NOTE — CARE PLAN
Problem: Communication  Goal: The ability to communicate needs accurately and effectively will improve  Intervention: Reorient patient to environment as needed  Patient reminded to use call light for any needs during shift. Patient is compliant and utilizes call light for any mobility needs.       Problem: Post Op Day 3 CABG/Heart Valve replacement  Goal: Optimal care of the post op CABG/Heart Valve replacement post op day 3  Intervention: Daily Weights  Completed during day. Will complete at 0400 for next daily weight.       Intervention: Shower daily and clean incisions twice daily with soap and water  Completed during day shift.   Intervention: Up in chair for all meals  Completed.   Intervention: Ambulate, increasing the distance each time x 3 and before bed  Completed.   Intervention: IS q 1 hour while awake and record best IS volume  Completed.   Intervention: Consider removal of barrera, chest tube and pacer wire if not already done  Barrera removed, chest tube removed, wires insulated per protocol.

## 2017-02-24 NOTE — CARE PLAN
Problem: Post Op Day 4 CABG/Heart Valve Replacement  Goal: Optimal care of the Post Op CABG/Heart Valve replacement Post Op Day 4  Intervention: Daily Weights  done  Intervention: Shower daily and clean incisions twice daily with soap and water  Refused shower, prevena in place  Intervention: Up in chair for all meals  done  Intervention: Ambulate, increasing the distance each time x 3 and before bed  done  Intervention: IS q 1 hour while awake and record best IS volume  Done 1200  Intervention: Consider removal of barrera, chest tube and pacer wire if not already done  done  Intervention: Discharge Education  Continuing- d/c to rehab planned

## 2017-02-24 NOTE — PROGRESS NOTES
Cardiovascular Surgery Progress Note    Name: Carole Kingsley  MRN: [id number]  : 1945  Admit Date: 2017 11:25 AM  Procedure:  Procedure(s) and Anesthesia Type:     * MULTIPLE CORONARY ARTERY BYPASS ENDO VEIN HARVEST X4, PREVENA DRESSING - General     * PARUL  - General  3 Day Post-Op    Vitals:  Patient Vitals for the past 8 hrs:   Temp SpO2 O2 Delivery Pulse Heart Rate (Monitored) Resp NIBP   17 1400 36.8 °C (98.2 °F) - None (Room Air) - 62 12 140/81 mmHg   17 1300 - - - 63 69 18 140/81 mmHg   17 1200 36.8 °C (98.2 °F) 96 % None (Room Air) - 64 (!) 23 140/81 mmHg   17 1100 - - - - 62 (!) 10 -   17 1000 - - - - 68 14 -   17 0900 36.2 °C (97.2 °F) 93 % - 69 70 13 134/71 mmHg     Temp (24hrs), Av.3 °C (97.4 °F), Min:35.8 °C (96.5 °F), Max:36.8 °C (98.2 °F)      Respiratory:    Respiration: 12, Pulse Oximetry: 96 %, O2 Daily Delivery Respiratory : Room Air with O2 Available     Chest Tube Drains:          Fluids:    Intake/Output Summary (Last 24 hours) at 17 1643  Last data filed at 17 1000   Gross per 24 hour   Intake    240 ml   Output   1850 ml   Net  -1610 ml     Admit weight: Weight: 57.607 kg (127 lb)  Current weight: Weight: 66.9 kg (147 lb 7.8 oz) (17 0400)    Labs:  Recent Labs      17   0500  17   0358  17   0415   WBC  14.6*  14.2*  11.1*   RBC  3.38*  2.98*  2.86*   HEMOGLOBIN  10.3*  9.0*  8.6*   HEMATOCRIT  30.4*  26.7*  25.9*   MCV  89.9  89.6  90.6   MCH  30.5  30.2  30.1   MCHC  33.9  33.7  33.2*   RDW  52.2*  50.8*  48.3   PLATELETCT  155*  136*  123*   MPV  10.5  11.1  11.2         Recent Labs      17   0500  17   1129  17   0358  17   0415   SODIUM  138   --   130*  125*   POTASSIUM  4.2  3.6  3.5*  3.8   CHLORIDE  106   --   101  99   CO2  26   --   24  21   GLUCOSE  152*   --   97  90   BUN  10   --   13  15   CREATININE  0.79   --   0.71  0.66   CALCIUM  7.4*   --   7.7*  7.7*            Medications:  • potassium chloride SA  20 mEq     • furosemide  40 mg     • insulin NPH  9 Units     • insulin lispro  6 Units     • insulin lispro  0-15 Units     • aspirin EC  81 mg     • omeprazole  20 mg     • escitalopram  20 mg     • docusate sodium  100 mg      And   • senna-docusate  1 Tab     • enoxaparin  40 mg     • mupirocin  1 Application     • metoprolol  25 mg     • clopidogrel  75 mg     • levothyroxine  75 mcg     • atorvastatin  20 mg         Exam:   Review of Systems   Constitutional: Negative.    HENT: Negative.    Eyes: Negative.    Respiratory: Negative.    Cardiovascular: Negative.    Gastrointestinal: Negative.    Genitourinary: Negative.    Musculoskeletal: Negative.    Skin: Negative.    Neurological: Negative.    Endo/Heme/Allergies: Negative.    Psychiatric/Behavioral: Negative.        Physical Exam   Constitutional: She is oriented to person, place, and time. She appears well-developed.   HENT:   Head: Normocephalic.   Eyes: Pupils are equal, round, and reactive to light.   Neck: Normal range of motion. No JVD present.   Cardiovascular: Normal rate, regular rhythm and normal heart sounds.    Pulmonary/Chest: Breath sounds normal.   Dim bases   Abdominal: Soft. Bowel sounds are normal. She exhibits no distension. There is no guarding.   Musculoskeletal: Normal range of motion.   Neurological: She is alert and oriented to person, place, and time.   Skin: Skin is warm and dry.   Prevena to chest, EVH sites CDI   Psychiatric: She has a normal mood and affect. Her behavior is normal. Judgment and thought content normal.       Quality Measures:   EKG reviewed, Medications reviewed, Labs reviewed and Radiology images reviewed  Abdullahi catheter: One or Two Days Post Surgery (Day of Surgery being Day 0)  Central line in place: Need for access and Concentrated IV drugs    DVT Prophylaxis: Contraindicated - High bleeding risk  DVT prophylaxis - mechanical: SCDs  Ulcer prophylaxis:  Yes    Assessed for rehab: Patient was assess for and/or received rehabilitation services during this hospitalization      Assessment/Plan:  POD 1 Extubated, nuero grossly intact, NSR, HDS off pressors.  Min output CT, small airleak noted.  Keep CTs, JAYDA barrera.  Psych consult for depression/ETOH abuse.  AMB/IS  POD 2 HDS, SR, CT no air leak- d/c, replace k+, gently diurese, appreciate pysch input, amb, enc IS  POD 3 HDS, NSR, on RA, doing well. Home vs Rehab 1-2 days, PT to see again today.  DC temp wires.      Patient seen, examined and plan reviewed with midlevel provider. I agree with the plan.      Active Hospital Problems    Diagnosis   • Chest pain [R07.9]

## 2017-02-25 LAB
ANION GAP SERPL CALC-SCNC: 7 MMOL/L (ref 0–11.9)
BUN SERPL-MCNC: 14 MG/DL (ref 8–22)
CALCIUM SERPL-MCNC: 8.8 MG/DL (ref 8.5–10.5)
CHLORIDE SERPL-SCNC: 107 MMOL/L (ref 96–112)
CO2 SERPL-SCNC: 25 MMOL/L (ref 20–33)
CREAT SERPL-MCNC: 0.81 MG/DL (ref 0.5–1.4)
ERYTHROCYTE [DISTWIDTH] IN BLOOD BY AUTOMATED COUNT: 48 FL (ref 35.9–50)
GFR SERPL CREATININE-BSD FRML MDRD: >60 ML/MIN/1.73 M 2
GLUCOSE SERPL-MCNC: 105 MG/DL (ref 65–99)
HCT VFR BLD AUTO: 25.7 % (ref 37–47)
HGB BLD-MCNC: 8.3 G/DL (ref 12–16)
MCH RBC QN AUTO: 29.7 PG (ref 27–33)
MCHC RBC AUTO-ENTMCNC: 32.3 G/DL (ref 33.6–35)
MCV RBC AUTO: 92.1 FL (ref 81.4–97.8)
PLATELET # BLD AUTO: 186 K/UL (ref 164–446)
PMV BLD AUTO: 11.3 FL (ref 9–12.9)
POTASSIUM SERPL-SCNC: 3.7 MMOL/L (ref 3.6–5.5)
RBC # BLD AUTO: 2.79 M/UL (ref 4.2–5.4)
SODIUM SERPL-SCNC: 139 MMOL/L (ref 135–145)
WBC # BLD AUTO: 7 K/UL (ref 4.8–10.8)

## 2017-02-25 PROCEDURE — A9270 NON-COVERED ITEM OR SERVICE: HCPCS | Performed by: NURSE PRACTITIONER

## 2017-02-25 PROCEDURE — 700111 HCHG RX REV CODE 636 W/ 250 OVERRIDE (IP): Performed by: NURSE PRACTITIONER

## 2017-02-25 PROCEDURE — A9270 NON-COVERED ITEM OR SERVICE: HCPCS | Performed by: INTERNAL MEDICINE

## 2017-02-25 PROCEDURE — 700102 HCHG RX REV CODE 250 W/ 637 OVERRIDE(OP): Performed by: INTERNAL MEDICINE

## 2017-02-25 PROCEDURE — 700102 HCHG RX REV CODE 250 W/ 637 OVERRIDE(OP): Performed by: NURSE PRACTITIONER

## 2017-02-25 PROCEDURE — 700102 HCHG RX REV CODE 250 W/ 637 OVERRIDE(OP): Performed by: PSYCHIATRY & NEUROLOGY

## 2017-02-25 PROCEDURE — A9270 NON-COVERED ITEM OR SERVICE: HCPCS | Performed by: PSYCHIATRY & NEUROLOGY

## 2017-02-25 PROCEDURE — 770020 HCHG ROOM/CARE - TELE (206)

## 2017-02-25 PROCEDURE — 80048 BASIC METABOLIC PNL TOTAL CA: CPT

## 2017-02-25 PROCEDURE — 700112 HCHG RX REV CODE 229: Performed by: NURSE PRACTITIONER

## 2017-02-25 PROCEDURE — 85027 COMPLETE CBC AUTOMATED: CPT

## 2017-02-25 PROCEDURE — 99291 CRITICAL CARE FIRST HOUR: CPT | Performed by: INTERNAL MEDICINE

## 2017-02-25 RX ORDER — ATORVASTATIN CALCIUM 20 MG/1
20 TABLET, FILM COATED ORAL
Qty: 30 TAB | Status: SHIPPED | DISCHARGE
Start: 2017-02-25 | End: 2017-03-07 | Stop reason: SDUPTHER

## 2017-02-25 RX ORDER — HYDROCODONE BITARTRATE AND ACETAMINOPHEN 5; 325 MG/1; MG/1
1-2 TABLET ORAL EVERY 6 HOURS PRN
Qty: 20 TAB | Refills: 0 | Status: SHIPPED | DISCHARGE
Start: 2017-02-25 | End: 2017-03-12

## 2017-02-25 RX ORDER — PSEUDOEPHEDRINE HCL 30 MG
100 TABLET ORAL EVERY MORNING
Qty: 60 CAP | Status: SHIPPED | DISCHARGE
Start: 2017-02-25 | End: 2017-06-21

## 2017-02-25 RX ORDER — FUROSEMIDE 20 MG/1
40 TABLET ORAL
Status: DISCONTINUED | OUTPATIENT
Start: 2017-02-26 | End: 2017-02-27 | Stop reason: HOSPADM

## 2017-02-25 RX ORDER — OMEPRAZOLE 20 MG/1
20 CAPSULE, DELAYED RELEASE ORAL DAILY
Qty: 30 CAP | Status: ON HOLD | DISCHARGE
Start: 2017-02-25 | End: 2017-03-21

## 2017-02-25 RX ORDER — POTASSIUM CHLORIDE 20 MEQ/1
20 TABLET, EXTENDED RELEASE ORAL DAILY
Qty: 60 TAB | Refills: 11 | Status: ON HOLD | DISCHARGE
Start: 2017-02-25 | End: 2017-03-21

## 2017-02-25 RX ORDER — ESCITALOPRAM OXALATE 20 MG/1
20 TABLET ORAL DAILY
Qty: 30 TAB | Status: SHIPPED | DISCHARGE
Start: 2017-02-25

## 2017-02-25 RX ORDER — CLOPIDOGREL BISULFATE 75 MG/1
75 TABLET ORAL DAILY
Qty: 30 TAB | Status: SHIPPED | DISCHARGE
Start: 2017-02-25 | End: 2017-03-07 | Stop reason: SDUPTHER

## 2017-02-25 RX ORDER — FUROSEMIDE 40 MG/1
40 TABLET ORAL DAILY
Qty: 60 TAB | Status: ON HOLD | DISCHARGE
Start: 2017-02-26 | End: 2017-03-21

## 2017-02-25 RX ADMIN — METOPROLOL TARTRATE 25 MG: 25 TABLET, FILM COATED ORAL at 07:48

## 2017-02-25 RX ADMIN — HYDROCODONE BITARTRATE AND ACETAMINOPHEN 1 TABLET: 5; 325 TABLET ORAL at 14:46

## 2017-02-25 RX ADMIN — POTASSIUM CHLORIDE 20 MEQ: 1500 TABLET, EXTENDED RELEASE ORAL at 07:49

## 2017-02-25 RX ADMIN — HYDROCODONE BITARTRATE AND ACETAMINOPHEN 1 TABLET: 5; 325 TABLET ORAL at 01:27

## 2017-02-25 RX ADMIN — HYDROCODONE BITARTRATE AND ACETAMINOPHEN 2 TABLET: 5; 325 TABLET ORAL at 07:48

## 2017-02-25 RX ADMIN — STANDARDIZED SENNA CONCENTRATE AND DOCUSATE SODIUM 1 TABLET: 8.6; 5 TABLET, FILM COATED ORAL at 20:53

## 2017-02-25 RX ADMIN — ASPIRIN 81 MG: 81 TABLET ORAL at 07:48

## 2017-02-25 RX ADMIN — METOPROLOL TARTRATE 25 MG: 25 TABLET, FILM COATED ORAL at 20:52

## 2017-02-25 RX ADMIN — OXYCODONE HYDROCHLORIDE 2.5 MG: 5 TABLET ORAL at 20:53

## 2017-02-25 RX ADMIN — LEVOTHYROXINE SODIUM 75 MCG: 75 TABLET ORAL at 06:07

## 2017-02-25 RX ADMIN — ESCITALOPRAM OXALATE 20 MG: 10 TABLET ORAL at 07:49

## 2017-02-25 RX ADMIN — ENOXAPARIN SODIUM 40 MG: 40 INJECTION SUBCUTANEOUS at 07:49

## 2017-02-25 RX ADMIN — ATORVASTATIN CALCIUM 20 MG: 20 TABLET, FILM COATED ORAL at 20:52

## 2017-02-25 RX ADMIN — HYDROCODONE BITARTRATE AND ACETAMINOPHEN 1 TABLET: 5; 325 TABLET ORAL at 20:53

## 2017-02-25 RX ADMIN — FUROSEMIDE 40 MG: 10 INJECTION, SOLUTION INTRAVENOUS at 07:49

## 2017-02-25 RX ADMIN — CLOPIDOGREL 75 MG: 75 TABLET, FILM COATED ORAL at 07:48

## 2017-02-25 RX ADMIN — DOCUSATE SODIUM 100 MG: 100 CAPSULE ORAL at 07:49

## 2017-02-25 RX ADMIN — OMEPRAZOLE 20 MG: 20 CAPSULE, DELAYED RELEASE ORAL at 07:49

## 2017-02-25 ASSESSMENT — ENCOUNTER SYMPTOMS
RESPIRATORY NEGATIVE: 1
NEUROLOGICAL NEGATIVE: 1
EYES NEGATIVE: 1
CONSTITUTIONAL NEGATIVE: 1
MUSCULOSKELETAL NEGATIVE: 1
PSYCHIATRIC NEGATIVE: 1
GASTROINTESTINAL NEGATIVE: 1
CARDIOVASCULAR NEGATIVE: 1

## 2017-02-25 ASSESSMENT — PAIN SCALES - GENERAL
PAINLEVEL_OUTOF10: ASSUMED PAIN PRESENT
PAINLEVEL_OUTOF10: ASSUMED PAIN PRESENT
PAINLEVEL_OUTOF10: 4
PAINLEVEL_OUTOF10: 8
PAINLEVEL_OUTOF10: 2
PAINLEVEL_OUTOF10: 6
PAINLEVEL_OUTOF10: 0
PAINLEVEL_OUTOF10: ASSUMED PAIN PRESENT
PAINLEVEL_OUTOF10: ASSUMED PAIN PRESENT
PAINLEVEL_OUTOF10: 0
PAINLEVEL_OUTOF10: 3
PAINLEVEL_OUTOF10: 0
PAINLEVEL_OUTOF10: 0
PAINLEVEL_OUTOF10: 7

## 2017-02-25 NOTE — CARE PLAN
Problem: Post Op Day 4 CABG/Heart Valve Replacement  Goal: Optimal care of the Post Op CABG/Heart Valve replacement Post Op Day 4  Intervention: Daily Weights  Completed  Intervention: Shower daily and clean incisions twice daily with soap and water  Pt took shower, incisions clean, w/ daughter Katharina.   Intervention: Up in chair for all meals  Pt up to chair since this am.   Intervention: Ambulate, increasing the distance each time x 3 and before bed  Ambulated x 3, steady gait, minimal assist  Intervention: IS q 1 hour while awake and record best IS volume  IS 1200  Intervention: Consider removal of barrera, chest tube and pacer wire if not already done  Completed  Intervention: Discharge Education  Will provide once d/c order completed

## 2017-02-25 NOTE — PROGRESS NOTES
Cardiovascular Surgery Progress Note    Name: Carole Kingsley  MRN: [id number]  : 1945  Admit Date: 2017 11:25 AM  Procedure:  Procedure(s) and Anesthesia Type:     * MULTIPLE CORONARY ARTERY BYPASS ENDO VEIN HARVEST X4, PREVENA DRESSING - General     * PARUL  - General  5 Day Post-Op    Vitals:  Patient Vitals for the past 8 hrs:   Temp SpO2 O2 Delivery Heart Rate (Monitored) Resp NIBP Weight   17 0900 - - - 70 12 - -   17 0800 36.2 °C (97.1 °F) 99 % None (Room Air) 76 (!) 76 149/85 mmHg -   17 0748 - - - 76 17 149/85 mmHg -   17 0700 - - - 67 12 - -   17 0500 - - - 68 14 - -   17 0400 36.3 °C (97.3 °F) 93 % None (Room Air) 67 15 129/66 mmHg 62 kg (136 lb 11 oz)     Temp (24hrs), Av.4 °C (97.5 °F), Min:36.2 °C (97.1 °F), Max:36.8 °C (98.2 °F)      Respiratory:    Respiration: 12, Pulse Oximetry: 99 %     Chest Tube Drains:          Fluids:    Intake/Output Summary (Last 24 hours) at 17 1155  Last data filed at 17 0800   Gross per 24 hour   Intake    720 ml   Output   1900 ml   Net  -1180 ml     Admit weight: Weight: 57.607 kg (127 lb)  Current weight: Weight: 62 kg (136 lb 11 oz) (17 0400)    Labs:  Recent Labs      17   0415  17   0430  17   0415   WBC  11.1*  7.9  7.0   RBC  2.86*  2.90*  2.79*   HEMOGLOBIN  8.6*  8.8*  8.3*   HEMATOCRIT  25.9*  26.1*  25.7*   MCV  90.6  90.0  92.1   MCH  30.1  30.3  29.7   MCHC  33.2*  33.7  32.3*   RDW  48.3  46.0  48.0   PLATELETCT  123*  152*  186   MPV  11.2  11.3  11.3         Recent Labs      17   0415  17   0430  17   041   SODIUM  125*  136  139   POTASSIUM  3.8  3.9  3.7   CHLORIDE  99  105  107   CO2  21  23  25   GLUCOSE  90  83  105*   BUN  15  13  14   CREATININE  0.66  0.74  0.81   CALCIUM  7.7*  8.5  8.8           Medications:  • [START ON 2017] furosemide  40 mg     • potassium chloride SA  20 mEq     • aspirin EC  81 mg     • omeprazole  20 mg      • escitalopram  20 mg     • docusate sodium  100 mg      And   • senna-docusate  1 Tab     • enoxaparin  40 mg     • metoprolol  25 mg     • clopidogrel  75 mg     • levothyroxine  75 mcg     • atorvastatin  20 mg         Exam:   Review of Systems   Constitutional: Negative.    HENT: Negative.    Eyes: Negative.    Respiratory: Negative.    Cardiovascular: Negative.    Gastrointestinal: Negative.    Genitourinary: Negative.    Musculoskeletal: Negative.    Skin: Negative.    Neurological: Negative.    Endo/Heme/Allergies: Negative.    Psychiatric/Behavioral: Negative.        Physical Exam   Constitutional: She is oriented to person, place, and time. She appears well-developed.   HENT:   Head: Normocephalic.   Eyes: Pupils are equal, round, and reactive to light.   Neck: Normal range of motion. No JVD present.   Cardiovascular: Normal rate, regular rhythm and normal heart sounds.    Pulmonary/Chest: Breath sounds normal.   Dim bases   Abdominal: Soft. Bowel sounds are normal. She exhibits no distension. There is no guarding.   Musculoskeletal: Normal range of motion.   Neurological: She is alert and oriented to person, place, and time.   Skin: Skin is warm and dry.   Prevena to chest, EVH sites CDI   Psychiatric: She has a normal mood and affect. Her behavior is normal. Judgment and thought content normal.       Quality Measures:   EKG reviewed, Medications reviewed, Labs reviewed and Radiology images reviewed  Barrera catheter: One or Two Days Post Surgery (Day of Surgery being Day 0)  Central line in place: Need for access and Concentrated IV drugs    DVT Prophylaxis: Contraindicated - High bleeding risk  DVT prophylaxis - mechanical: SCDs  Ulcer prophylaxis: Yes    Assessed for rehab: Patient was assess for and/or received rehabilitation services during this hospitalization      Assessment/Plan:  POD 1 Extubated, nuero grossly intact, NSR, HDS off pressors.  Min output CT, small airleak noted.  Keep CTs, DC barrera.   Psych consult for depression/ETOH abuse.  AMB/IS  POD 2 HDS, SR, CT no air leak- d/c, replace k+, gently diurese, appreciate pysch input, amb, enc IS  POD 3 HDS, NSR, on RA, doing well. Home vs Rehab 1-2 days, PT to see again today.  DC temp wires.    POD 4 HDS, NSR, on RA, no BM yet--push bowel protocol.  OK from CTS for home after has BM, patient would like to review with family home vs Rehab.  Will follow.  CPM.    POD 5 HDS< NSR, RA, ok to Rehab today.  See full dictated note for details.      Active Hospital Problems    Diagnosis   • Chest pain [R07.9]

## 2017-02-25 NOTE — PROGRESS NOTES
Pulmonary Critical Care Progress Note        Date of Service:  2/25/17  Chief Complaint: Intermittent and worsening chest pain with (+) cardiac cath findings    History of Present Illness: 71 y.o. female admitted for multivessel coronary artery disease and CABG     ROS:  Respiratory: negative, Cardiac: positive chest pain, negative orthopnea and negative leg swelling, GI: negative.  All other systems negative.    Interval Events:  24 hour interval history reviewed    - POD #5   - Pain is well controlled   - Hemodynamically stable   - Last BM was yesterday   - Ambulating in the hallways    PFSH:  No change.    Respiratory:     Pulse Oximetry: 94 %    Exam: dimished at the bases, no wheezing  ImagingCXR  I have personally reviewed the chest x-ray my impression is   interval removal of ETT, clear lung fields         Invalid input(s): [id number]    HemoDynamics:  Heart Rate (Monitored): 67  NIBP: 129/66 mmHg      Exam: regular rate and rhythm  Imaging: Available data reviewed        Neuro:  GCS Total Dixie Coma Score: 15       Exam: no focal deficits noted mental status intact Motor and sensory exam grossly intact follows commands, raises two fingers  Imaging: None - Reviewed    Fluids:  Intake/Output       02/23/17 0700 - 02/24/17 0659 02/24/17 0700 - 02/25/17 0659 02/25/17 0700 - 02/26/17 0659      [id number] 2377-2658 Total 5513-5278 4611-7620 Total 3188-7601 8744-7956 Total       Intake    P.O.  480  240 720  750  120 870  --  -- --    P.O. 480 240 720 750 120 870 -- -- --    Total Intake 480 240 720 750 120 870 -- -- --       Output    Urine  1050  2000 3050  1500  600 2100  --  -- --    Number of Times Voided 2 x 5 x 7 x 3 x 2 x 5 x -- -- --    Void (ml) 1050 2000 3050 1971 965 5131 -- -- --    Stool  --  -- --  --  -- --  --  -- --    Number of Times Stooled -- 0 x 0 x 1 x 0 x 1 x -- -- --    Total Output 1050 2000 3050 4601 895 3033 -- -- --       Net I/O     -570 -1760 -2330 -750 -480 -1230 -- -- --            Recent Labs      17   SODIUM  125*  136  139   POTASSIUM  3.8  3.9  3.7   CHLORIDE  99  105  107   CO2     BUN  15  13  14   CREATININE  0.66  0.74  0.81   CALCIUM  7.7*  8.5  8.8       GI/Nutrition:  Exam: abdomen is soft and non-tender, normal active bowel sounds  Imaging: Available data reviewed  taking PO  Liver Function  Recent Labs      17   GLUCOSE  90  83  105*       Heme:  Recent Labs      17   RBC  2.86*  2.90*  2.79*   HEMOGLOBIN  8.6*  8.8*  8.3*   HEMATOCRIT  25.9*  26.1*  25.7*   PLATELETCT  123*  152*  186       Infectious Disease:  Temp  Av.5 °C (97.7 °F)  Min: 36.3 °C (97.3 °F)  Max: 36.8 °C (98.3 °F)  Micro: reviewed  Recent Labs      17   WBC  11.1*  7.9  7.0     Current Facility-Administered Medications   Medication Dose Frequency Provider Last Rate Last Dose   • potassium chloride SA (Kdur) tablet 20 mEq  20 mEq DAILY Coco Corcoran, A.P.N.   20 mEq at 17   • furosemide (LASIX) injection 40 mg  40 mg DAILY Coco Corcoran A.P.N.   40 mg at 17   • aspirin EC (ECOTRIN) tablet 81 mg  81 mg DAILY Aleksandra Murphy, A.P.N.   81 mg at 17   • omeprazole (PRILOSEC) capsule 20 mg  20 mg DAILY Jennifer Edwards M.D.   20 mg at 17   • escitalopram (LEXAPRO) tablet 20 mg  20 mg DAILY Zully Ballard M.D.   20 mg at 17   • Respiratory Care per Protocol   Continuous RT Aleksandra Murphy, A.P.N.       • NS infusion   Continuous Aleksandra Murphy A.P.N.   Stopped at 17 1457   • docusate sodium (COLACE) capsule 100 mg  100 mg QAM Aleksandra Murphy, A.P.N.   100 mg at 17 0822    And   • senna-docusate (PERICOLACE or SENOKOT S) 8.6-50 MG per tablet 1 Tab  1 Tab Nightly Aleksandra Murphy, A.P.N.   1 Tab at 17 1935    And   • senna-docusate  (PERICOLACE or SENOKOT S) 8.6-50 MG per tablet 1 Tab  1 Tab Q24HRS PRN Aleksandra Murphy, A.P.N.        And   • lactulose 20 GM/30ML solution 30 mL  30 mL Q24HRS PRN Aleksandra Leest, A.P.N.   30 mL at 02/24/17 0823    And   • bisacodyl (DULCOLAX) suppository 10 mg  10 mg Q24HRS PRN Aleksandra Leest, A.P.N.        And   • fleet enema 133 mL  1 Each Once PRN Aleksandra Murphy, A.P.N.       • enoxaparin (LOVENOX) inj 40 mg  40 mg DAILY Aleksandra Murphy, A.P.N.   40 mg at 02/24/17 0822   • mupirocin (BACTROBAN) 2 % ointment 1 Application  1 Application BID Aleksandra Murphy, A.P.N.   Stopped at 02/24/17 2100   • metoprolol (LOPRESSOR) tablet 25 mg  25 mg BID Aleksandra Murphy, A.P.N.   Stopped at 02/24/17 1935   • clopidogrel (PLAVIX) tablet 75 mg  75 mg DAILY Aleksandra Murphy, A.P.N.   75 mg at 02/24/17 0823   • oxycodone immediate-release (ROXICODONE) tablet 2.5 mg  2.5 mg Q3HRS PRN Aleksandra Murphy, A.P.N.   2.5 mg at 02/24/17 0600   • oxycodone immediate-release (ROXICODONE) tablet 5 mg  5 mg Q3HRS PRN Aleksandra Murphy, A.P.N.   5 mg at 02/24/17 1127   • tramadol (ULTRAM) 50 MG tablet 50 mg  50 mg Q4HRS PRN Aleksandra Leest, A.P.N.   50 mg at 02/24/17 0823   • ondansetron (ZOFRAN) syringe/vial injection 4 mg  4 mg Q6HRS PRN Aleksandra Murphy, A.P.N.        Or   • prochlorperazine (COMPAZINE) injection 10 mg  10 mg Q6HRS PRN Aleksandra Murphy, A.P.N.        Or   • promethazine (PHENERGAN) suppository 25 mg  25 mg Q6HRS PRN Aleksandra Murphy, A.P.N.       • acetaminophen (TYLENOL) tablet 650 mg  650 mg Q4HRS PRN Aleksandra Murphy, A.P.N.        Or   • acetaminophen (TYLENOL) suppository 650 mg  650 mg Q4HRS PRN Aleksandra Murphy, A.P.N.       • mag hydrox-al hydrox-simeth (MAALOX PLUS ES or MYLANTA DS) suspension 30 mL  30 mL Q4HRS PRN Aleksandra Murphy, A.P.N.       • diphenhydrAMINE (BENADRYL) tablet/capsule 25 mg  25 mg HS PRN - MR X 1 Aleksandra Murphy, A.P.N.   25 mg at 02/22/17 0154   • hydrocodone-acetaminophen (NORCO) 5-325 MG per tablet  1-2 Tab  1-2 Tab Q6HRS PRN Aleksandra Murphy, A.P.N.   1 Tab at 02/25/17 0127   • levothyroxine (SYNTHROID) tablet 75 mcg  75 mcg AM ES Sarah Servin, A.P.N.   75 mcg at 02/24/17 0600   • atorvastatin (LIPITOR) tablet 20 mg  20 mg QHS Sarah Servin, A.P.N.   20 mg at 02/24/17 1935   • pneumococcal 13-Loan Conj Vacc (PREVNAR 13) syringe 0.5 mL  0.5 mL Once PRN Rody Eugene M.D.   Stopped at 02/19/17 2247     Last reviewed on 2/17/2017  1:57 PM by Kellie Soler T    Quality  Measures:  Radiology images reviewed, Labs reviewed and Medications reviewed  Abdullahi catheter: No Abdullahi  Central line in place: Need for access and Vasopressors    DVT Prophylaxis: Contraindicated - High bleeding risk  DVT prophylaxis - mechanical: SCDs  Ulcer prophylaxis: Yes    Assessed for rehab: Patient was assess for and/or received rehabilitation services during this hospitalization    Problems/Plan:    Ventilator dependant respiratory failure status post 4-vessel coronary artery bypass grafting.   - Weaned per protocol and extubated 2/20   - cont RT/O2 protocols   - Start IS and may proceed to PEP   - Mobilizing in hallways   - forced diuresis as needed  CAD s/p 4-vessel coronary artery bypass grafting     - uncomplicated   - CT and pacer wires removed 2/22  Hypotension likely related to vasoplegia.   - improved   - vasopressors off  Acute metabolic acidosis.   - improved  Hypokalemia   - replete as needed  Hyperglycemia.   - ISS and NPH--> will adjust as necessary  History of systemic arterial hypertension.   - will start home regimen soon  History of hypothyroidism.  History of type 2 diabetes mellitus.  History of tobacco dependence in remission.  History of dyslipidemia.  Prophylaxis   - PPI, mobilize, advance diet as tolerated  Disposition   - transfer to rehab in the next 1-2 days    Discussed patient condition and risk of morbidity and/or mortality with Family, RN, RT, Pharmacy, Dietary, , Charge nurse / hot rounds,  Patient and cardiology and [facility].    The patient remains critically ill.  Critical care time = 31 minutes in directly providing and coordinating critical care and extensive data review.  No time overlap and excludes procedures.

## 2017-02-25 NOTE — DISCHARGE SUMMARY
CHIEF COMPLAINT ON ADMISSION  No chief complaint on file.      CODE STATUS  Full Code    HPI & HOSPITAL COURSE  The patient is a 71 year old female who presented to an outside hospital a couple of days ago with chest pains.  She was released due to negative troponins.  She went to her cardiologist Dr. Johnson and described her pain.  Dr. Eugene then directly admitted her to [facility] for a heart cath.  She was then referred to Dr. Carpio and Dr. Dowell for surgical consultation.      PROCEDURES PERFORMED:  Coronary bypass grafting x4, left FRANCISCO to the distal LAD,   reverse saphenous vein graft to the distal diagonal, reverse saphenous vein    graft to the distal circumflex, reverse saphenous vein graft to the distal    PDA, endoscopic vein harvest, transesophageal echocardiography, temporary    pacemaker.     HOSPITAL COURSE:  POD 1 Extubated, nuero grossly intact, NSR, HDS off pressors.  Min output CT, small airleak noted.  Keep CTs, DC barrera.  Psych consult for depression/ETOH abuse.  AMB/IS  POD 2 HDS, SR, CT no air leak- d/c, replace k+, gently diurese, appreciate pysch input, amb, enc IS  POD 3 HDS, NSR, on RA, doing well. Home vs Rehab 1-2 days, PT to see again today. DC temp wires.     POD 4 HDS, NSR, on RA, no BM yet--push bowel protocol.  OK from CTS for home after has BM, patient would like to review with family home vs Rehab.  Will follow.  CPM.    POD 5 HDS< NSR, RA, ok to Rehab today.  See full dictated note for details.    Therefore, she is discharged in good and stable condition for further post-acute management.     SPECIFIC OUTPATIENT FOLLOW-UP  Mar 07, 2017  2:00 PM     HOSPITAL FOLLOW UP with DANNY Silva.     [facility] (--)      [address]  [facility] [address]     [phone]                    Apr 17, 2017 11:00 AM     Established Patient with Angel Ambrose M.D.     [facility] ([facility])       [address], [address]  [facility] [address] [phone]                   You will be receiving a confirmation call a few days before your appointment from our automated call confirmation system.                        Follow-up Information        1. Follow up with Hernán Dowell M.D. On 3/13/2017.       Specialty: Cardiac Surgery       Why: 2:00 pm       Contact information       [address]   [address]   [facility] [address]   [phone]          DISCHARGE PROBLEM LIST  Active Problems:    Chest pain POA: Unknown  Resolved Problems:    * No resolved hospital problems. *      FOLLOW UP  Future Appointments  Date Time Provider Department Center   3/7/2017 2:00 PM WARREN Silva [facility] None   4/17/2017 11:00 AM PAUL Rosen M.D.  [address] [facility] [address]  [facility] [address]  [phone]    On 3/13/2017  2:00 pm      MEDICATIONS ON DISCHARGE   Carole Kingsley   Home Medication Instructions JAYASHREE:[id number]    Printed on:02/25/17 1203   Medication Information                      aspirin 81 MG EC tablet  Take 81 mg by mouth every day.             atorvastatin (LIPITOR) 20 MG Tab  Take 1 Tab by mouth every bedtime.             CALCIUM PO  Take 600 mg by mouth every day.             cholecalciferol (VITAMIN D3) 5000 UNIT Cap  Take 5,000 Units by mouth every day.             clopidogrel (PLAVIX) 75 MG Tab  Take 1 Tab by mouth every day.             Cyanocobalamin 5000 MCG Cap  Take 5,000 mcg by mouth every day.             diazepam (VALIUM) 5 MG Tab  Take 5 mg by mouth 4 times a day as needed for Anxiety.             docusate sodium 100 MG Cap  Take 100 mg by mouth every morning.             escitalopram (LEXAPRO) 20 MG tablet  Take 1 Tab by mouth every day.             folic acid (FOLVITE) 400 MCG tablet  Take 400 mcg by mouth every day.             furosemide (LASIX) 40 MG Tab  Take 1 Tab by mouth every day.             gemfibrozil (LOPID) 600 MG Tab  TAKE  ONE TABLET BY MOUTH TWICE DAILY             hydrocodone-acetaminophen (NORCO) 5-325 MG Tab per tablet  Take 1-2 Tabs by mouth every 6 hours as needed.             levothyroxine (SYNTHROID) 75 MCG Tab  TAKE ONE TABLET BY MOUTH ONCE DAILY             losartan (COZAAR) 50 MG Tab  Take 1 Tab by mouth every day.             metformin ER (GLUCOPHAGE XR) 500 MG TABLET SR 24 HR  Take 500 mg by mouth 2 times a day.             metoprolol (LOPRESSOR) 25 MG Tab  Take 1 Tab by mouth 2 Times a Day.             omeprazole (PRILOSEC) 20 MG delayed-release capsule  Take 1 Cap by mouth every day.             potassium chloride SA (KDUR) 20 MEQ Tab CR  Take 1 Tab by mouth every day.                 DIET  Orders Placed This Encounter   Procedures   • DIET ORDER     Standing Status: Standing      Number of Occurrences: 1      Standing Expiration Date:      Order Specific Question:  Diet:     Answer:  Consistent Carbohydrate [4]       ACTIVITY  No Driving x 1 month  No heaving lifting/pushing/pulling x 3 months  Wound care:  Prevena to remove on POD 7-8 or when batter dies.  Remove dressing, wash incisions soap/water and pat dry  Report s/s infection    Class 1 - no symptoms of any kind, and for whom ordinary physical activity does not cause fatigue, palpitations, dyspnea, or anginal pain.    LINES, DRAINS, AND WOUNDS  This is an automated list. Peripheral IVs will be removed prior to discharge.  Central Line Group Right;Internal Jugular Triple Lumen 7 (Active)   Line Secured Sutured in Place 2/25/2017  8:00 AM   Patency and Function Check Performed at Beginning of Shift 2/25/2017  8:00 AM   Line Necessity Assessed Lack of Peripheral Access 2/25/2017  8:00 AM   Consider Removal of Femoral Line Not Applicable 2/25/2017  8:00 AM   Closed Tubing Set Up Yes 2/25/2017  8:00 AM   Hand Washing / Gloves Prior to Every Access Yes 2/25/2017  8:00 AM   Next Daily Chlorhexidine Bath Due (Regional ONLY) 02/25/17 2/25/2017  8:00 AM   Port Access   Scrub the Hub Prior to Access;Blood Return  2/25/2017  8:00 AM   Site Condition / Description Assessed;Patent;Clean;Dry;Intact 2/25/2017  8:00 AM   Signs and Symptoms of Infection None Apparent at this Time 2/25/2017  8:00 AM   Dressing Type / Description Antimicrobial Patch (BioPatch);Clean;Dry;Intact;Small ;Old / Dried Drainage;Tegaderm Advanced 2/25/2017  8:00 AM   Dressing Status Observed 2/25/2017  8:00 AM   Next Dressing Change  02/25/17 2/25/2017  8:00 AM   Date Primary Tubing Changed 02/25/17 2/25/2017  8:00 AM   Date Secondary Tubing Changed 02/25/17 2/25/2017  8:00 AM   NEXT Primary Tubing Change  02/28/17 2/25/2017  8:00 AM   NEXT Secondary Tubing Change  02/28/17 2/25/2017  8:00 AM   Date IV Connector(s) Changed 02/21/17 2/25/2017  8:00 AM   NEXT IV Connector(s) Change Date 02/25/17 2/25/2017  8:00 AM   Waveform Not Applicable 2/25/2017  8:00 AM   Line Calibrated Not Applicable 2/25/2017  8:00 AM       Surgical Incision  Incision Right Leg Lower (Active)   Wound Bed Pink 2/25/2017  8:00 AM   Drainage  Scant 2/25/2017  8:00 AM   Periwound Skin Pink;Edematous;Normal 2/25/2017  8:00 AM   Daily - Wound Closure Approximated 2/25/2017  8:00 AM   Dressing Options Open to Air 2/25/2017  8:00 AM   Dressing Status / Change Clean;Dry;Intact;Observed 2/25/2017  8:00 AM   Daily - Dressing Change Observed 2/25/2017  8:00 AM   Dressing Change Frequency As Needed 2/25/2017  8:00 AM   Next Dressing Change  02/21/17 2/20/2017  8:00 PM       Surgical Incision  Incision Left Leg Upper (Active)   Wound Bed Pink 2/25/2017  8:00 AM   Drainage  Serous;Minimal 2/25/2017  8:00 AM   Periwound Skin Normal;Pink 2/25/2017  8:00 AM   Daily - Wound Closure Adhesive Bandage 2/25/2017  8:00 AM   Dressing Options Dry Gauze 2/25/2017  8:00 AM   Dressing Status / Change Changed;Removed 2/25/2017  8:00 AM   Daily - Dressing Change Observed 2/25/2017  8:00 AM   Dressing Change Frequency As Needed 2/25/2017  8:00 AM   Next Dressing Change   02/21/17 2/20/2017  8:00 PM       Surgical Incision  Incision Chest (Active)   Wound Bed Other (comment) 2/20/2017  1:15 PM   Drainage  None 2/25/2017  8:00 AM   Periwound Skin Pink;Normal;Intact 2/25/2017  8:00 AM   Daily - Wound Closure Not Assessed 2/25/2017  8:00 AM   Dressing Options Wound Vac 2/25/2017  8:00 AM   Dressing Status / Change Clean;Dry;Intact;Observed 2/25/2017  8:00 AM   Daily - Dressing Change Observed 2/25/2017  8:00 AM   Dressing Change Frequency Weekly 2/25/2017  8:00 AM   Next Dressing Change  02/27/17 2/25/2017  8:00 AM                  MENTAL STATUS ON TRANSFER  Level of Consciousness: Alert  Orientation : Oriented x 4  Speech: Speech Clear    CONSULTATIONS  Cardiology  Pulmonology  Psychiatry  Cardiovascular Surgery    LABORATORY  Lab Results   Component Value Date/Time    SODIUM 139 02/25/2017 04:15 AM    POTASSIUM 3.7 02/25/2017 04:15 AM    CHLORIDE 107 02/25/2017 04:15 AM    CO2 25 02/25/2017 04:15 AM    GLUCOSE 105* 02/25/2017 04:15 AM    BUN 14 02/25/2017 04:15 AM    CREATININE 0.81 02/25/2017 04:15 AM    GLOM FILT RATE, EST >59 02/01/2010 12:46 PM        Lab Results   Component Value Date/Time    WBC 7.0 02/25/2017 04:15 AM    HEMOGLOBIN 8.3* 02/25/2017 04:15 AM    HEMATOCRIT 25.7* 02/25/2017 04:15 AM    PLATELET COUNT 186 02/25/2017 04:15 AM        Total time of the discharge process exceeds 45 minutes.

## 2017-02-25 NOTE — CARE PLAN
Problem: Post Op Day 4 CABG/Heart Valve Replacement  Goal: Optimal care of the Post Op CABG/Heart Valve replacement Post Op Day 4  Intervention: Daily Weights  Completed.  Intervention: Shower daily and clean incisions twice daily with soap and water  Patient refused shower during day shift and during night shift. Patient educated but continues to state that her skin gets too dry. States that she will shower again tomorrow.   Intervention: Up in chair for all meals  Completed.   Intervention: Ambulate, increasing the distance each time x 3 and before bed  Completed.   Intervention: IS q 1 hour while awake and record best IS volume  Completed, IS best 1200 before bedtime.   Intervention: Consider removal of barrera, chest tube and pacer wire if not already done  Barrera, chest rube and pacer wires removed.   Intervention: Discharge Education  To be completed during day shift tomorrow prior to discharge.       Problem: Pain  Goal: Alleviation of Pain or a reduction in pain to the patient’s comfort goal  Intervention: Pain Management--Medications  Patient educated on pain rating scale and RN discussed pain expectations and goals. Patient's pain managed appropriately during shift.

## 2017-02-25 NOTE — PROGRESS NOTES
Called [facility] and made aware pt has transfer orders, states pt is not on the list for potential transfers for today. Made charge Sarai aware.

## 2017-02-25 NOTE — PROGRESS NOTES
Bedside report received. Assumed Patient care. Patient A&Ox4. Mild complaints of pain at this time, pain is being managed appropriatley. Initial assessment completed. Lines verified. Patient's plan of care discussed with Patient; Patient's questions answered at this time.      Bed is in lowest position, siderails raised and call light is within reach.

## 2017-02-25 NOTE — PROGRESS NOTES
Received report from ANYA Looney. Pt resting in chair, comfortably. HR 60s. Pt assisted to the bathroom, pt has steady gait. Aleksandra STEWARD at bedside, to put in transfer orders to rehab.

## 2017-02-26 LAB
ANION GAP SERPL CALC-SCNC: 10 MMOL/L (ref 0–11.9)
BUN SERPL-MCNC: 14 MG/DL (ref 8–22)
CALCIUM SERPL-MCNC: 8.9 MG/DL (ref 8.5–10.5)
CHLORIDE SERPL-SCNC: 109 MMOL/L (ref 96–112)
CO2 SERPL-SCNC: 22 MMOL/L (ref 20–33)
CREAT SERPL-MCNC: 0.82 MG/DL (ref 0.5–1.4)
ERYTHROCYTE [DISTWIDTH] IN BLOOD BY AUTOMATED COUNT: 46.1 FL (ref 35.9–50)
GFR SERPL CREATININE-BSD FRML MDRD: >60 ML/MIN/1.73 M 2
GLUCOSE SERPL-MCNC: 93 MG/DL (ref 65–99)
HCT VFR BLD AUTO: 24.9 % (ref 37–47)
HGB BLD-MCNC: 8.3 G/DL (ref 12–16)
MCH RBC QN AUTO: 30.2 PG (ref 27–33)
MCHC RBC AUTO-ENTMCNC: 33.3 G/DL (ref 33.6–35)
MCV RBC AUTO: 90.5 FL (ref 81.4–97.8)
PLATELET # BLD AUTO: 210 K/UL (ref 164–446)
PMV BLD AUTO: 10.3 FL (ref 9–12.9)
POTASSIUM SERPL-SCNC: 3.9 MMOL/L (ref 3.6–5.5)
RBC # BLD AUTO: 2.75 M/UL (ref 4.2–5.4)
SODIUM SERPL-SCNC: 141 MMOL/L (ref 135–145)
WBC # BLD AUTO: 6.8 K/UL (ref 4.8–10.8)

## 2017-02-26 PROCEDURE — A9270 NON-COVERED ITEM OR SERVICE: HCPCS | Performed by: NURSE PRACTITIONER

## 2017-02-26 PROCEDURE — 770020 HCHG ROOM/CARE - TELE (206)

## 2017-02-26 PROCEDURE — 700112 HCHG RX REV CODE 229: Performed by: NURSE PRACTITIONER

## 2017-02-26 PROCEDURE — 85027 COMPLETE CBC AUTOMATED: CPT

## 2017-02-26 PROCEDURE — 700102 HCHG RX REV CODE 250 W/ 637 OVERRIDE(OP): Performed by: PSYCHIATRY & NEUROLOGY

## 2017-02-26 PROCEDURE — 700102 HCHG RX REV CODE 250 W/ 637 OVERRIDE(OP): Performed by: NURSE PRACTITIONER

## 2017-02-26 PROCEDURE — 99291 CRITICAL CARE FIRST HOUR: CPT | Performed by: INTERNAL MEDICINE

## 2017-02-26 PROCEDURE — 700111 HCHG RX REV CODE 636 W/ 250 OVERRIDE (IP): Performed by: NURSE PRACTITIONER

## 2017-02-26 PROCEDURE — 700102 HCHG RX REV CODE 250 W/ 637 OVERRIDE(OP): Performed by: INTERNAL MEDICINE

## 2017-02-26 PROCEDURE — A9270 NON-COVERED ITEM OR SERVICE: HCPCS | Performed by: PSYCHIATRY & NEUROLOGY

## 2017-02-26 PROCEDURE — 80048 BASIC METABOLIC PNL TOTAL CA: CPT

## 2017-02-26 PROCEDURE — A9270 NON-COVERED ITEM OR SERVICE: HCPCS | Performed by: INTERNAL MEDICINE

## 2017-02-26 RX ORDER — LOSARTAN POTASSIUM 25 MG/1
25 TABLET ORAL
Status: DISCONTINUED | OUTPATIENT
Start: 2017-02-26 | End: 2017-02-27 | Stop reason: HOSPADM

## 2017-02-26 RX ORDER — LOSARTAN POTASSIUM 25 MG/1
25 TABLET ORAL DAILY
Qty: 30 TAB | Status: SHIPPED | DISCHARGE
Start: 2017-02-26 | End: 2017-03-12

## 2017-02-26 RX ADMIN — POTASSIUM CHLORIDE 20 MEQ: 1500 TABLET, EXTENDED RELEASE ORAL at 07:59

## 2017-02-26 RX ADMIN — HYDROCODONE BITARTRATE AND ACETAMINOPHEN 2 TABLET: 5; 325 TABLET ORAL at 07:57

## 2017-02-26 RX ADMIN — OXYCODONE HYDROCHLORIDE 5 MG: 5 TABLET ORAL at 11:58

## 2017-02-26 RX ADMIN — ESCITALOPRAM OXALATE 20 MG: 10 TABLET ORAL at 07:58

## 2017-02-26 RX ADMIN — ASPIRIN 81 MG: 81 TABLET ORAL at 07:56

## 2017-02-26 RX ADMIN — OMEPRAZOLE 20 MG: 20 CAPSULE, DELAYED RELEASE ORAL at 07:56

## 2017-02-26 RX ADMIN — CLOPIDOGREL 75 MG: 75 TABLET, FILM COATED ORAL at 07:57

## 2017-02-26 RX ADMIN — LOSARTAN POTASSIUM 25 MG: 25 TABLET, FILM COATED ORAL at 09:00

## 2017-02-26 RX ADMIN — METOPROLOL TARTRATE 25 MG: 25 TABLET, FILM COATED ORAL at 20:00

## 2017-02-26 RX ADMIN — DOCUSATE SODIUM 100 MG: 100 CAPSULE ORAL at 07:57

## 2017-02-26 RX ADMIN — HYDROCODONE BITARTRATE AND ACETAMINOPHEN 2 TABLET: 5; 325 TABLET ORAL at 20:00

## 2017-02-26 RX ADMIN — ATORVASTATIN CALCIUM 20 MG: 20 TABLET, FILM COATED ORAL at 20:00

## 2017-02-26 RX ADMIN — DIPHENHYDRAMINE HCL 25 MG: 25 TABLET ORAL at 01:02

## 2017-02-26 RX ADMIN — ENOXAPARIN SODIUM 40 MG: 40 INJECTION SUBCUTANEOUS at 07:59

## 2017-02-26 RX ADMIN — METOPROLOL TARTRATE 25 MG: 25 TABLET, FILM COATED ORAL at 07:57

## 2017-02-26 RX ADMIN — FUROSEMIDE 40 MG: 20 TABLET ORAL at 07:57

## 2017-02-26 RX ADMIN — LEVOTHYROXINE SODIUM 75 MCG: 75 TABLET ORAL at 06:04

## 2017-02-26 RX ADMIN — OXYCODONE HYDROCHLORIDE 5 MG: 5 TABLET ORAL at 16:08

## 2017-02-26 RX ADMIN — OXYCODONE HYDROCHLORIDE 5 MG: 5 TABLET ORAL at 04:39

## 2017-02-26 RX ADMIN — OXYCODONE HYDROCHLORIDE 5 MG: 5 TABLET ORAL at 01:01

## 2017-02-26 RX ADMIN — STANDARDIZED SENNA CONCENTRATE AND DOCUSATE SODIUM 1 TABLET: 8.6; 5 TABLET, FILM COATED ORAL at 20:00

## 2017-02-26 ASSESSMENT — PAIN SCALES - GENERAL
PAINLEVEL_OUTOF10: 6
PAINLEVEL_OUTOF10: 8
PAINLEVEL_OUTOF10: 0
PAINLEVEL_OUTOF10: 7
PAINLEVEL_OUTOF10: 5
PAINLEVEL_OUTOF10: 9
PAINLEVEL_OUTOF10: ASSUMED PAIN PRESENT
PAINLEVEL_OUTOF10: 2
PAINLEVEL_OUTOF10: ASSUMED PAIN PRESENT
PAINLEVEL_OUTOF10: 2
PAINLEVEL_OUTOF10: 6
PAINLEVEL_OUTOF10: ASSUMED PAIN PRESENT

## 2017-02-26 ASSESSMENT — ENCOUNTER SYMPTOMS
RESPIRATORY NEGATIVE: 1
NEUROLOGICAL NEGATIVE: 1
GASTROINTESTINAL NEGATIVE: 1
MUSCULOSKELETAL NEGATIVE: 1
EYES NEGATIVE: 1
PSYCHIATRIC NEGATIVE: 1
CONSTITUTIONAL NEGATIVE: 1
CARDIOVASCULAR NEGATIVE: 1

## 2017-02-26 NOTE — CARE PLAN
Problem: Knowledge Deficit  Goal: Knowledge of disease process/condition, treatment plan, diagnostic tests, and medications will improve  Intervention: Assess knowledge level of disease process/condition, treatment plan, diagnostic tests, and medications  Pt understood treatment and interventions, all questions answered  Intervention: Explain information regarding disease process/condition, treatment plan, diagnostic tests, and medications and document in education  Pt understood importance of pain management to help with respiratory function. Getting pain meds on ATC.       Problem: Post Op Day 4 CABG/Heart Valve Replacement  Goal: Optimal care of the Post Op CABG/Heart Valve replacement Post Op Day 4  Intervention: Daily Weights  Completed  Intervention: Shower daily and clean incisions twice daily with soap and water  Will complete today after rounds. Pt independent  Intervention: Up in chair for all meals  Up to chair for breakfast and will be for all meals  Intervention: Ambulate, increasing the distance each time x 3 and before bed  Ambulated whole unit x 2   Intervention: IS q 1 hour while awake and record best IS volume  IS 1200  Intervention: Consider removal of barrera, chest tube and pacer wire if not already done  Completed  Intervention: Discharge Education  Will complete once bed available in rehab

## 2017-02-26 NOTE — DISCHARGE PLANNING
Medical Social Work    Discharge Plan: VERONICA explained that by was denied by rehab facility. Home Health choice form initialed and signed consenting for referral to be sent to [facility]. VERONICA faxed choice form to [facility]. Pt still interested in going to SNF. VERONICA recommended that pt speak to attending physician about the possibility of SNF.     PCP: SHANA Panchal; Amanda Gonzalez MD  Address: [address] [place] [address]  Phone Number: [phone]

## 2017-02-26 NOTE — PROGRESS NOTES
Report received from Chantel JOYNER. Patient is aware of transition of care to new RN at 0200 and patient is familiar with new RN.       Assumed Patient care. Patient A&Ox4. Patient is sleeping at this time. Initial assessment completed. Lines  verified.   Bed is in lowest position, siderails raised and call light is within reach.

## 2017-02-26 NOTE — DISCHARGE PLANNING
Medical Social Work    Discharge Plan: SW met with pt and pt's family at bedside. SNF choice form initialed and signed consenting for referral to [facility] (1st), [facility] (2nd), [facility], [facility], [facility] and [facility]. SW faxed choice form to [facility]. SW will follow for accepting facility and allow pt and family to make final choice accordingly.

## 2017-02-26 NOTE — PROGRESS NOTES
Cardiovascular Surgery Progress Note    Name: Carole Kingsley  MRN: [id number]  : 1945  Admit Date: 2017 11:25 AM  Procedure:  Procedure(s) and Anesthesia Type:     * MULTIPLE CORONARY ARTERY BYPASS ENDO VEIN HARVEST X4, PREVENA DRESSING - General     * PARUL  - General  5 Day Post-Op    Vitals:  Patient Vitals for the past 8 hrs:   Temp SpO2 O2 Delivery Pulse Heart Rate (Monitored) Resp NIBP Weight   17 1000 - - - - 67 16 - -   17 0900 - - - - 66 14 - -   17 0800 36.3 °C (97.4 °F) 93 % None (Room Air) 75 76 (!) 24 158/86 mmHg -   17 0700 - - - - 68 (!) 10 - -   17 0600 - - - - 67 14 - -   17 0500 - - - - 62 15 - -   17 0400 36.8 °C (98.2 °F) 93 % None (Room Air) - 64 15 149/77 mmHg 61.4 kg (135 lb 5.8 oz)     Temp (24hrs), Av.6 °C (97.8 °F), Min:35.8 °C (96.5 °F), Max:37 °C (98.6 °F)      Respiratory:    Respiration: 16, Pulse Oximetry: 93 %     Chest Tube Drains:          Fluids:    Intake/Output Summary (Last 24 hours) at 17 1135  Last data filed at 17 1000   Gross per 24 hour   Intake    680 ml   Output   2400 ml   Net  -1720 ml     Admit weight: Weight: 57.607 kg (127 lb)  Current weight: Weight: 61.4 kg (135 lb 5.8 oz) (17 0400)    Labs:  Recent Labs      17   0430  17   0415  17   0430   WBC  7.9  7.0  6.8   RBC  2.90*  2.79*  2.75*   HEMOGLOBIN  8.8*  8.3*  8.3*   HEMATOCRIT  26.1*  25.7*  24.9*   MCV  90.0  92.1  90.5   MCH  30.3  29.7  30.2   MCHC  33.7  32.3*  33.3*   RDW  46.0  48.0  46.1   PLATELETCT  152*  186  210   MPV  11.3  11.3  10.3         Recent Labs      17   0430  17   0415  17   0430   SODIUM  136  139  141   POTASSIUM  3.9  3.7  3.9   CHLORIDE  105  107  109   CO2  23  25  22   GLUCOSE  83  105*  93   BUN  13  14  14   CREATININE  0.74  0.81  0.82   CALCIUM  8.5  8.8  8.9           Medications:  • losartan  25 mg     • furosemide  40 mg     • potassium chloride SA  20 mEq      • aspirin EC  81 mg     • omeprazole  20 mg     • escitalopram  20 mg     • docusate sodium  100 mg      And   • senna-docusate  1 Tab     • enoxaparin  40 mg     • metoprolol  25 mg     • clopidogrel  75 mg     • levothyroxine  75 mcg     • atorvastatin  20 mg         Exam:   Review of Systems   Constitutional: Negative.    HENT: Negative.    Eyes: Negative.    Respiratory: Negative.    Cardiovascular: Negative.    Gastrointestinal: Negative.    Genitourinary: Negative.    Musculoskeletal: Negative.    Skin: Negative.    Neurological: Negative.    Endo/Heme/Allergies: Negative.    Psychiatric/Behavioral: Negative.        Physical Exam   Constitutional: She is oriented to person, place, and time. She appears well-developed.   HENT:   Head: Normocephalic.   Eyes: Pupils are equal, round, and reactive to light.   Neck: Normal range of motion. No JVD present.   Cardiovascular: Normal rate, regular rhythm and normal heart sounds.    Pulmonary/Chest: Breath sounds normal.   Dim bases   Abdominal: Soft. Bowel sounds are normal. She exhibits no distension. There is no guarding.   Musculoskeletal: Normal range of motion.   Neurological: She is alert and oriented to person, place, and time.   Skin: Skin is warm and dry.   Prevena to chest, EVH sites CDI   Psychiatric: She has a normal mood and affect. Her behavior is normal. Judgment and thought content normal.       Quality Measures:   EKG reviewed, Medications reviewed, Labs reviewed and Radiology images reviewed    Central line in place: Need for access    DVT Prophylaxis: Contraindicated - High bleeding risk  DVT prophylaxis - mechanical: SCDs  Ulcer prophylaxis: Yes    Assessed for rehab: Patient was assess for and/or received rehabilitation services during this hospitalization      Assessment/Plan:  POD 1 Extubated, nuero grossly intact, NSR, HDS off pressors.  Min output CT, small airleak noted.  Keep CTs, DC holly.  Psych consult for depression/ETOH abuse.   AMB/IS  POD 2 HDS, SR, CT no air leak- d/c, replace k+, gently diurese, appreciate pysch input, amb, enc IS  POD 3 HDS, NSR, on RA, doing well. Home vs Rehab 1-2 days, PT to see again today.  DC temp wires.    POD 4 HDS, NSR, on RA, no BM yet--push bowel protocol.  OK from CTS for home after has BM, patient would like to review with family home vs Rehab.  Will follow.  CPM.    POD 5 HDS< NSR, RA, ok to Rehab today.  See full dictated note for details.  POD 6 HDS, NSR,  Add back in losartan for BP.  Rehab pending.      Active Hospital Problems    Diagnosis   • Chest pain [R07.9]

## 2017-02-26 NOTE — PROGRESS NOTES
Pulmonary Critical Care Progress Note        Date of Service:  2/26/17  Chief Complaint: Intermittent and worsening chest pain with (+) cardiac cath findings    History of Present Illness: 71 y.o. female admitted for multivessel coronary artery disease and CABG     ROS:  Respiratory: negative, Cardiac: positive chest pain, negative orthopnea and negative leg swelling, GI: negative.  All other systems negative.    Interval Events:  24 hour interval history reviewed    - POD #6   - Ambulating in hallways without O2   - No issues overnight    PFSH:  No change.    Respiratory:     Pulse Oximetry: 93 %    Exam: dimished at the bases, no wheezing  ImagingCXR  I have personally reviewed the chest x-ray my impression is   interval removal of ETT, clear lung fields         Invalid input(s): [id number]    HemoDynamics:  Pulse: 84, Heart Rate (Monitored): 63  NIBP: 142/83 mmHg      Exam: regular rate and rhythm  Imaging: Available data reviewed        Neuro:  GCS Total Unique Coma Score: 15       Exam: no focal deficits noted mental status intact Motor and sensory exam grossly intact follows commands, raises two fingers  Imaging: None - Reviewed    Fluids:  Intake/Output       02/24/17 0700 - 02/25/17 0659 02/25/17 0700 - 02/26/17 0659 02/26/17 0700 - 02/27/17 0659      8048-8145 9658-4359 Total 0168-2795 8973-3729 Total 4577-5867 1013-3752 Total       Intake    P.O.  750  120 870  500  120 620  --  -- --    P.O. 750 120 870 500 120 620 -- -- --    Total Intake 750 120 870 500 120 620 -- -- --       Output    Urine  1500  1000 2500  1800  200 2000  --  -- --    Number of Times Voided 3 x 3 x 6 x 9 x -- 9 x -- -- --    Void (ml) 1500 1000 2500 0092 956 7885 -- -- --    Stool  --  -- --  --  -- --  --  -- --    Number of Times Stooled 1 x 0 x 1 x 0 x 0 x 0 x -- -- --    Total Output 1500 1000 2500 7147 569 3647 -- -- --       Net I/O     -750 -880 -1630 -1300 -80 -1380 -- -- --           Recent Labs      02/24/17   0433   17   SODIUM  136  139  141   POTASSIUM  3.9  3.7  3.9   CHLORIDE  105  107  109   CO2  23  25  22   BUN  13  14  14   CREATININE  0.74  0.81  0.82   CALCIUM  8.5  8.8  8.9       GI/Nutrition:  Exam: abdomen is soft and non-tender, normal active bowel sounds  Imaging: Available data reviewed  taking PO  Liver Function  Recent Labs      17   GLUCOSE  83  105*  93       Heme:  Recent Labs      17   RBC  2.90*  2.79*  2.75*   HEMOGLOBIN  8.8*  8.3*  8.3*   HEMATOCRIT  26.1*  25.7*  24.9*   PLATELETCT  152*  186  210       Infectious Disease:  Temp  Av.5 °C (97.7 °F)  Min: 35.8 °C (96.5 °F)  Max: 37 °C (98.6 °F)  Micro: reviewed  Recent Labs      17   WBC  7.9  7.0  6.8     Current Facility-Administered Medications   Medication Dose Frequency Provider Last Rate Last Dose   • furosemide (LASIX) tablet 40 mg  40 mg Q DAY Jennifer Edwards M.D.       • potassium chloride SA (Kdur) tablet 20 mEq  20 mEq DAILY Coco Corcoran A.P.N.   20 mEq at 17 0749   • aspirin EC (ECOTRIN) tablet 81 mg  81 mg DAILY Aleksandra Murphy, A.P.N.   81 mg at 17 0748   • omeprazole (PRILOSEC) capsule 20 mg  20 mg DAILY Jennifer Edwards M.D.   20 mg at 17 0749   • escitalopram (LEXAPRO) tablet 20 mg  20 mg DAILY Zully Ballard M.D.   20 mg at 17 0749   • Respiratory Care per Protocol   Continuous RT Aleksandra Murphy, A.P.N.       • NS infusion   Continuous Aleksandra Murphy, A.P.N.   Stopped at 17 1457   • docusate sodium (COLACE) capsule 100 mg  100 mg QAM Aleksandra Murphy, A.P.N.   100 mg at 17 0749    And   • senna-docusate (PERICOLACE or SENOKOT S) 8.6-50 MG per tablet 1 Tab  1 Tab Nightly Aleksandra Murphy, A.P.N.   1 Tab at 17    And   • senna-docusate (PERICOLACE or SENOKOT S) 8.6-50 MG per tablet 1 Tab  1 Tab Q24HRS MATY MCBRIDE  Jeffrey, A.P.N.        And   • lactulose 20 GM/30ML solution 30 mL  30 mL Q24HRS PRN Aleksandra Murphy, A.P.N.   30 mL at 02/24/17 0823    And   • bisacodyl (DULCOLAX) suppository 10 mg  10 mg Q24HRS PRN Aleksandra Leest, A.P.N.        And   • fleet enema 133 mL  1 Each Once PRN Aleksandra Murphy, A.P.N.       • enoxaparin (LOVENOX) inj 40 mg  40 mg DAILY Aleksandra Murphy, A.P.N.   40 mg at 02/25/17 0749   • metoprolol (LOPRESSOR) tablet 25 mg  25 mg BID Aleksandra Murphy, A.P.N.   25 mg at 02/25/17 2052   • clopidogrel (PLAVIX) tablet 75 mg  75 mg DAILY Aleksandra Murphy, A.P.N.   75 mg at 02/25/17 0748   • oxycodone immediate-release (ROXICODONE) tablet 2.5 mg  2.5 mg Q3HRS PRN Aleksandra Murphy, A.P.N.   2.5 mg at 02/25/17 2053   • oxycodone immediate-release (ROXICODONE) tablet 5 mg  5 mg Q3HRS PRN Aleksandra Leest, A.P.N.   5 mg at 02/26/17 0439   • tramadol (ULTRAM) 50 MG tablet 50 mg  50 mg Q4HRS PRN Aleksandra Murphy, A.P.N.   50 mg at 02/24/17 0823   • ondansetron (ZOFRAN) syringe/vial injection 4 mg  4 mg Q6HRS PRN Aleksandra Murphy, A.P.N.        Or   • prochlorperazine (COMPAZINE) injection 10 mg  10 mg Q6HRS PRN Aleksandra Leest, A.P.N.        Or   • promethazine (PHENERGAN) suppository 25 mg  25 mg Q6HRS PRN Aleksandra Leest, A.P.N.       • acetaminophen (TYLENOL) tablet 650 mg  650 mg Q4HRS PRN Aleksandra Leest, A.P.N.        Or   • acetaminophen (TYLENOL) suppository 650 mg  650 mg Q4HRS PRN Aleksandra Murphy, A.P.N.       • mag hydrox-al hydrox-simeth (MAALOX PLUS ES or MYLANTA DS) suspension 30 mL  30 mL Q4HRS PRN Aleksandra Murphy, A.P.N.       • diphenhydrAMINE (BENADRYL) tablet/capsule 25 mg  25 mg HS PRN - MR X 1 Aleksandra Murphy, A.P.N.   25 mg at 02/26/17 0102   • hydrocodone-acetaminophen (NORCO) 5-325 MG per tablet 1-2 Tab  1-2 Tab Q6HRS PRN Aleksandra Murphy, A.P.N.   1 Tab at 02/25/17 2053   • levothyroxine (SYNTHROID) tablet 75 mcg  75 mcg AM ES Sarah Servin, A.P.N.   75 mcg at 02/25/17 0607   • atorvastatin  (LIPITOR) tablet 20 mg  20 mg QHS Sarah Servin A.P.N.   20 mg at 02/25/17 2052   • pneumococcal 13-Loan Conj Vacc (PREVNAR 13) syringe 0.5 mL  0.5 mL Once PRN Rody Eugene M.D.   Stopped at 02/19/17 2247     Last reviewed on 2/17/2017  1:57 PM by Kellie Soler, T    Quality  Measures:  Radiology images reviewed, Labs reviewed and Medications reviewed  Abdullahi catheter: No Abdullahi  Central line in place: Need for access and Vasopressors    DVT Prophylaxis: Contraindicated - High bleeding risk  DVT prophylaxis - mechanical: SCDs  Ulcer prophylaxis: Yes    Assessed for rehab: Patient was assess for and/or received rehabilitation services during this hospitalization    Problems/Plan:    Ventilator dependant respiratory failure status post 4-vessel coronary artery bypass grafting.   - Weaned per protocol and extubated 2/20   - cont RT/O2 protocols   - Start IS and may proceed to PEP   - Mobilizing in hallways   - forced diuresis as needed  CAD s/p 4-vessel coronary artery bypass grafting     - uncomplicated   - CT and pacer wires removed 2/22  Hypotension likely related to vasoplegia.   - improved   - vasopressors off  Acute metabolic acidosis.   - improved  Hypokalemia   - replete as needed  Hyperglycemia.   - ISS and NPH--> will adjust as necessary  History of systemic arterial hypertension.   - will start home regimen soon  History of hypothyroidism.  History of type 2 diabetes mellitus.  History of tobacco dependence in remission.  History of dyslipidemia.  Prophylaxis   - mobilize, lovenox, advance diet as tolerated  Disposition   - transfer to SNF in the next 1-2 days    Discussed patient condition and risk of morbidity and/or mortality with Family, RN, RT, Pharmacy, Dietary, , Charge nurse / hot rounds, Patient and cardiology and CVS.    The patient remains critically ill.  Critical care time = 31 minutes in directly providing and coordinating critical care and extensive data review.  No time  overlap and excludes procedures.

## 2017-02-26 NOTE — CARE PLAN
Problem: Communication  Goal: The ability to communicate needs accurately and effectively will improve  Intervention: Golva patient and significant other/support system to call light to alert staff of needs  Patient is aware of transfer in RN care. Patient understands the importance of using call light for any assistance and mobility needs.       Problem: Pain  Goal: Alleviation of Pain or a reduction in pain to the patient’s comfort goal  Intervention: Pain Management--Medications  Patient has reported mild to moderate pain levels during shift, pain is being managed appropriatley and patient is sleeping at this time.

## 2017-02-26 NOTE — DISCHARGE PLANNING
Received choice form from VERONICA Shafer at 1355. Referral sent to [facility] at 2916.

## 2017-02-26 NOTE — DISCHARGE PLANNING
Medical Social Work    VERONICA received page from attending nurse asking that case management check on the status of rehab referral. VERONICA spoke with Jackson from [facility] who reported that the pt was declined by their facility. A order for home health was placed by nurse practitioner.    Plan: VERONICA will obtain Home Health choice.

## 2017-02-27 VITALS
OXYGEN SATURATION: 77 % | TEMPERATURE: 97.6 F | SYSTOLIC BLOOD PRESSURE: 134 MMHG | HEART RATE: 70 BPM | HEIGHT: 62 IN | RESPIRATION RATE: 12 BRPM | BODY MASS INDEX: 24.75 KG/M2 | DIASTOLIC BLOOD PRESSURE: 71 MMHG | WEIGHT: 134.48 LBS

## 2017-02-27 LAB
ANION GAP SERPL CALC-SCNC: 10 MMOL/L (ref 0–11.9)
BUN SERPL-MCNC: 14 MG/DL (ref 8–22)
CALCIUM SERPL-MCNC: 9.3 MG/DL (ref 8.5–10.5)
CHLORIDE SERPL-SCNC: 106 MMOL/L (ref 96–112)
CO2 SERPL-SCNC: 24 MMOL/L (ref 20–33)
CREAT SERPL-MCNC: 0.94 MG/DL (ref 0.5–1.4)
ERYTHROCYTE [DISTWIDTH] IN BLOOD BY AUTOMATED COUNT: 48.5 FL (ref 35.9–50)
GFR SERPL CREATININE-BSD FRML MDRD: 59 ML/MIN/1.73 M 2
GLUCOSE SERPL-MCNC: 101 MG/DL (ref 65–99)
HCT VFR BLD AUTO: 29.3 % (ref 37–47)
HGB BLD-MCNC: 9.3 G/DL (ref 12–16)
MCH RBC QN AUTO: 29.5 PG (ref 27–33)
MCHC RBC AUTO-ENTMCNC: 31.7 G/DL (ref 33.6–35)
MCV RBC AUTO: 93 FL (ref 81.4–97.8)
PLATELET # BLD AUTO: 268 K/UL (ref 164–446)
PMV BLD AUTO: 10.4 FL (ref 9–12.9)
POTASSIUM SERPL-SCNC: 4.1 MMOL/L (ref 3.6–5.5)
RBC # BLD AUTO: 3.15 M/UL (ref 4.2–5.4)
SODIUM SERPL-SCNC: 140 MMOL/L (ref 135–145)
WBC # BLD AUTO: 7.8 K/UL (ref 4.8–10.8)

## 2017-02-27 PROCEDURE — A9270 NON-COVERED ITEM OR SERVICE: HCPCS | Performed by: PSYCHIATRY & NEUROLOGY

## 2017-02-27 PROCEDURE — 700111 HCHG RX REV CODE 636 W/ 250 OVERRIDE (IP): Performed by: NURSE PRACTITIONER

## 2017-02-27 PROCEDURE — A9270 NON-COVERED ITEM OR SERVICE: HCPCS | Performed by: NURSE PRACTITIONER

## 2017-02-27 PROCEDURE — 85027 COMPLETE CBC AUTOMATED: CPT

## 2017-02-27 PROCEDURE — 700102 HCHG RX REV CODE 250 W/ 637 OVERRIDE(OP): Performed by: NURSE PRACTITIONER

## 2017-02-27 PROCEDURE — 700102 HCHG RX REV CODE 250 W/ 637 OVERRIDE(OP): Performed by: PSYCHIATRY & NEUROLOGY

## 2017-02-27 PROCEDURE — A9270 NON-COVERED ITEM OR SERVICE: HCPCS | Performed by: INTERNAL MEDICINE

## 2017-02-27 PROCEDURE — 80048 BASIC METABOLIC PNL TOTAL CA: CPT

## 2017-02-27 PROCEDURE — 700112 HCHG RX REV CODE 229: Performed by: NURSE PRACTITIONER

## 2017-02-27 PROCEDURE — 99233 SBSQ HOSP IP/OBS HIGH 50: CPT | Performed by: INTERNAL MEDICINE

## 2017-02-27 PROCEDURE — 700102 HCHG RX REV CODE 250 W/ 637 OVERRIDE(OP): Performed by: INTERNAL MEDICINE

## 2017-02-27 RX ADMIN — HYDROCODONE BITARTRATE AND ACETAMINOPHEN 2 TABLET: 5; 325 TABLET ORAL at 02:45

## 2017-02-27 RX ADMIN — CLOPIDOGREL 75 MG: 75 TABLET, FILM COATED ORAL at 08:30

## 2017-02-27 RX ADMIN — LEVOTHYROXINE SODIUM 75 MCG: 75 TABLET ORAL at 06:21

## 2017-02-27 RX ADMIN — ENOXAPARIN SODIUM 40 MG: 40 INJECTION SUBCUTANEOUS at 08:29

## 2017-02-27 RX ADMIN — ESCITALOPRAM OXALATE 20 MG: 10 TABLET ORAL at 08:30

## 2017-02-27 RX ADMIN — METOPROLOL TARTRATE 25 MG: 25 TABLET, FILM COATED ORAL at 08:30

## 2017-02-27 RX ADMIN — OMEPRAZOLE 20 MG: 20 CAPSULE, DELAYED RELEASE ORAL at 08:30

## 2017-02-27 RX ADMIN — FUROSEMIDE 40 MG: 20 TABLET ORAL at 08:37

## 2017-02-27 RX ADMIN — OXYCODONE HYDROCHLORIDE 5 MG: 5 TABLET ORAL at 11:48

## 2017-02-27 RX ADMIN — ASPIRIN 81 MG: 81 TABLET ORAL at 08:30

## 2017-02-27 RX ADMIN — POTASSIUM CHLORIDE 20 MEQ: 1500 TABLET, EXTENDED RELEASE ORAL at 08:30

## 2017-02-27 RX ADMIN — DOCUSATE SODIUM 100 MG: 100 CAPSULE ORAL at 08:37

## 2017-02-27 RX ADMIN — LOSARTAN POTASSIUM 25 MG: 25 TABLET, FILM COATED ORAL at 08:30

## 2017-02-27 RX ADMIN — OXYCODONE HYDROCHLORIDE 5 MG: 5 TABLET ORAL at 06:21

## 2017-02-27 ASSESSMENT — PAIN SCALES - GENERAL
PAINLEVEL_OUTOF10: 4
PAINLEVEL_OUTOF10: 0
PAINLEVEL_OUTOF10: 7
PAINLEVEL_OUTOF10: 6
PAINLEVEL_OUTOF10: 0
PAINLEVEL_OUTOF10: 8
PAINLEVEL_OUTOF10: 7

## 2017-02-27 NOTE — PROGRESS NOTES
Pulmonary Critical Care Progress Note        DOS:  2/27/2017    Chief Complaint: Intermittent and worsening chest pain with (+) cardiac cath findings    History of Present Illness: 71 y.o. female admitted for multivessel coronary artery disease and CABG     ROS:  Respiratory: negative, Cardiac: positive chest pain, negative orthopnea and negative leg swelling, GI: negative.  All other systems negative.    Interval Events:  24 hour interval history reviewed    - POD 7   - rehab today?    PFSH:  No change.    Respiratory:     Pulse Oximetry: 91 %    Exam: dimished at the bases, no wheezing  ImagingAvailable data reviewed         Invalid input(s): [id number]    HemoDynamics:  Pulse: 63, Heart Rate (Monitored): 60  NIBP: 141/81 mmHg      Exam: regular rate and rhythm  Imaging: Available data reviewed        Neuro:  GCS Total Unique Coma Score: 15       Exam: no focal deficits noted mental status intact Motor and sensory exam grossly intact follows commands, raises two fingers  Imaging: None - Reviewed    Fluids:  Intake/Output       02/25/17 0700 - 02/26/17 0659 02/26/17 0700 - 02/27/17 0659 02/27/17 0700 - 02/28/17 0659      1530-9629 1149-0955 Total 6596-5099 1476-3836 Total 9441-4828 3969-4524 Total       Intake    P.O.  500  180 680  600  120 720  --  -- --    P.O. 500 180 680 600 120 720 -- -- --    Total Intake 500 180 680 600 120 720 -- -- --       Output    Urine  1800  700 2500  1150  750 1900  --  -- --    Number of Times Voided 9 x -- 9 x 5 x -- 5 x -- -- --    Void (ml) 6204 048 2240 3782 382 9750 -- -- --    Stool  --  -- --  --  -- --  --  -- --    Number of Times Stooled 0 x 0 x 0 x 0 x -- 0 x -- -- --    Total Output 5916 502 5241 8416 528 5321 -- -- --       Net I/O     -1300 -520 -1820 -550 -630 -1180 -- -- --        Weight: 61 kg (134 lb 7.7 oz)  Recent Labs      02/25/17   0415  02/26/17   0430  02/27/17   0628   SODIUM  139  141  140   POTASSIUM  3.7  3.9  4.1   CHLORIDE  107  109  106   CO2   25  22  24   BUN  14  14  14   CREATININE  0.81  0.82  0.94   CALCIUM  8.8  8.9  9.3       GI/Nutrition:  Exam: abdomen is soft and non-tender, normal active bowel sounds  Imaging: Available data reviewed  taking PO  Liver Function  Recent Labs      17   GLUCOSE  105*  93  101*       Heme:  Recent Labs      17   RBC  2.79*  2.75*  3.15*   HEMOGLOBIN  8.3*  8.3*  9.3*   HEMATOCRIT  25.7*  24.9*  29.3*   PLATELETCT  186  210  268       Infectious Disease:  Temp  Av.2 °C (97.1 °F)  Min: 36 °C (96.8 °F)  Max: 36.3 °C (97.3 °F)  Micro: reviewed  Recent Labs      17   WBC  7.0  6.8  7.8     Current Facility-Administered Medications   Medication Dose Frequency Provider Last Rate Last Dose   • losartan (COZAAR) tablet 25 mg  25 mg Q DAY Aleksandra Murphy, A.P.N.   25 mg at 17   • furosemide (LASIX) tablet 40 mg  40 mg Q DAY Jennifer Edwards M.D.   40 mg at 17 0837   • potassium chloride SA (Kdur) tablet 20 mEq  20 mEq DAILY Coco Corcoran A.P.N.   20 mEq at 17   • aspirin EC (ECOTRIN) tablet 81 mg  81 mg DAILY Aleksandra Murphy, A.P.N.   81 mg at 17   • omeprazole (PRILOSEC) capsule 20 mg  20 mg DAILY Jennifer Edwards M.D.   20 mg at 17   • escitalopram (LEXAPRO) tablet 20 mg  20 mg DAILY Zully Ballard M.D.   20 mg at 17   • Respiratory Care per Protocol   Continuous RT Aleksandra Murphy, A.P.N.       • NS infusion   Continuous Aleksandra Murphy, A.P.N.   Stopped at 17 1457   • docusate sodium (COLACE) capsule 100 mg  100 mg QAM Aleksandra Murphy, A.P.N.   100 mg at 17 0837    And   • senna-docusate (PERICOLACE or SENOKOT S) 8.6-50 MG per tablet 1 Tab  1 Tab Nightly Aleksandra Murphy, A.P.N.   1 Tab at 17 2000    And   • senna-docusate (PERICOLACE or SENOKOT S) 8.6-50 MG per tablet 1 Tab  1 Tab Q24HRS MATY Lake  RAE Leest, A.P.N.        And   • lactulose 20 GM/30ML solution 30 mL  30 mL Q24HRS PRN Aleksandra Murphy, A.P.N.   30 mL at 02/24/17 0823    And   • bisacodyl (DULCOLAX) suppository 10 mg  10 mg Q24HRS PRN Aleksandra Leest, A.P.N.        And   • fleet enema 133 mL  1 Each Once PRN Aleksandra Leest, A.P.N.       • enoxaparin (LOVENOX) inj 40 mg  40 mg DAILY Aleksandra Leest, A.P.N.   40 mg at 02/27/17 0829   • metoprolol (LOPRESSOR) tablet 25 mg  25 mg BID Aleksandra Murphy, A.P.N.   25 mg at 02/27/17 0830   • clopidogrel (PLAVIX) tablet 75 mg  75 mg DAILY Aleksandra Murphy, A.P.N.   75 mg at 02/27/17 0830   • oxycodone immediate-release (ROXICODONE) tablet 2.5 mg  2.5 mg Q3HRS PRN Aleksandra Leest, A.P.N.   2.5 mg at 02/25/17 2053   • oxycodone immediate-release (ROXICODONE) tablet 5 mg  5 mg Q3HRS PRN Aleksandra Leest, A.P.N.   5 mg at 02/27/17 0621   • tramadol (ULTRAM) 50 MG tablet 50 mg  50 mg Q4HRS PRN Aleksandra Murphy, A.P.N.   50 mg at 02/24/17 0823   • ondansetron (ZOFRAN) syringe/vial injection 4 mg  4 mg Q6HRS PRN Aleksandra Leest, A.P.N.        Or   • prochlorperazine (COMPAZINE) injection 10 mg  10 mg Q6HRS PRN Aleksandra Leest, A.P.N.        Or   • promethazine (PHENERGAN) suppository 25 mg  25 mg Q6HRS PRN Aleksandra Leest, A.P.N.       • acetaminophen (TYLENOL) tablet 650 mg  650 mg Q4HRS PRN Aleksandra Leest, A.P.N.        Or   • acetaminophen (TYLENOL) suppository 650 mg  650 mg Q4HRS PRN Aleksandra Murphy, A.P.N.       • mag hydrox-al hydrox-simeth (MAALOX PLUS ES or MYLANTA DS) suspension 30 mL  30 mL Q4HRS PRN Aleksandra Murphy, A.P.N.       • diphenhydrAMINE (BENADRYL) tablet/capsule 25 mg  25 mg HS PRN - MR X 1 Aleksandra Murphy, A.P.N.   25 mg at 02/26/17 0102   • hydrocodone-acetaminophen (NORCO) 5-325 MG per tablet 1-2 Tab  1-2 Tab Q6HRS PRN Aleksandra Murphy, A.P.N.   2 Tab at 02/27/17 0245   • levothyroxine (SYNTHROID) tablet 75 mcg  75 mcg AM ES Sarah Servin, A.P.N.   75 mcg at 02/27/17 0621   • atorvastatin  (LIPITOR) tablet 20 mg  20 mg QHS KIMBERLY BradleyN.   20 mg at 02/26/17 2000   • pneumococcal 13-Loan Conj Vacc (PREVNAR 13) syringe 0.5 mL  0.5 mL Once PRN Rody Eugene M.D.   Stopped at 02/19/17 2247     Last reviewed on 2/17/2017  1:57 PM by Kellie Soler, T    Quality  Measures:  Radiology images reviewed, Labs reviewed and Medications reviewed                    Problems/Plan:    Ventilator dependant respiratory failure status - resolving   - Weaned per protocol and extubated 2/20   - cont RT/O2 protocols   - IS   CAD s/p 4-vessel coronary artery bypass grafting     - uncomplicated   - CT and pacer wires removed 2/22  Hypotension likely related to vasoplegia.   - improved   - vasopressors off  Acute metabolic acidosis.   - improved  Hypokalemia   - replete as needed  Hyperglycemia.   - ISS and NPH  History of systemic arterial hypertension.   -  regimen   History of hypothyroidism.  History of type 2 diabetes mellitus.  History of tobacco dependence in remission.  History of dyslipidemia.    D/w'd team, [facility], [facility]; working on d/c plan today

## 2017-02-27 NOTE — DISCHARGE PLANNING
[facility] has accepted patient. Received transport form from Vito(VERONICA). Arranged patients transport to [facility] at 1100 via [facility] van. DC summary needs addendum. Adi) notified of transport time via phone.

## 2017-02-27 NOTE — DISCHARGE PLANNING
DC to [facility] today 11AM Call from Gama at [facility]., she has completed PASRR.  [facility] providing transport.

## 2017-02-27 NOTE — CARE PLAN
Problem: Post Op Day 4 CABG/Heart Valve Replacement  Goal: Optimal care of the Post Op CABG/Heart Valve replacement Post Op Day 4  Outcome: PROGRESSING AS EXPECTED  Intervention: Daily Weights  Completed  Intervention: Shower daily and clean incisions twice daily with soap and water  Completed. Incision cleaned pt showered   Intervention: Up in chair for all meals  Completed   Intervention: Ambulate, increasing the distance each time x 3 and before bed  Completed.   Intervention: IS q 1 hour while awake and record best IS volume  Completed. Best IS 1500  Intervention: Consider removal of barrera, chest tube and pacer wire if not already done  Completed.   Intervention: Discharge Education  Completed. All questions answered. Resources provided.   Intervention: Add special instructions for cardiac surgery discharge instructions - [facility] to after visit summary and review with patient  Completed.

## 2017-02-27 NOTE — DISCHARGE PLANNING
Patient has clearance for Skilled DC to [facility].  Called [facility] requesting call to [facility] for final acceptance.  Transport form completed and faxed to [facility] requesting 130 transfer standard wheel chair.  Discussed with patient and she is agreeable.  Need PASRR.

## 2017-02-27 NOTE — CARE PLAN
Problem: Post Op Day 6 CABG/Heart Valve Replacement  Goal: Optimal care of the Post Op CABG/Heart Valve replacement Post Op Day 4  Intervention: Daily Weights  Done, 61kg  Intervention: Up in chair for all meals  Up to chair for breakfast  Intervention: Ambulate, increasing the distance each time x 3 and before bed  Ambulated in am 200 ft  Intervention: IS q 1 hour while awake and record best IS volume  Best IS in am 1500

## 2017-02-27 NOTE — DISCHARGE PLANNING
[facility] called [facility] Following up on the SNF referral. [facility] left a message for Abram the admissions coordinator.

## 2017-02-27 NOTE — CARE PLAN
Problem: Post Op Day 6 CABG/Heart Valve Replacement  Goal: Optimal care of the Post Op CABG/Heart Valve replacement Post Op Day 4  Intervention: IS q 1 hour while awake and record best IS volume  IS done best 1250  Intervention: Consider removal of barrera, chest tube and pacer wire if not already done  NA  Intervention: Discharge Education  Continued every shift until DC

## 2017-02-27 NOTE — PROGRESS NOTES
Transport for pt to go to [facility] is here. Paper work for transfer provided form  [facility]. Report Called to [facility]. Report given to ANYA Robert. All questions answered.

## 2017-02-27 NOTE — PROGRESS NOTES
Pt transferred via [employer] to [facility]. Discharge paper work completed with pt and son at bedside. All questions answered. All belongings accounted for. Pt off unit.

## 2017-02-27 NOTE — PROGRESS NOTES
Patient cleared by [facility] for transfer to SNF today.  See dictated addendum to DC summary for details.

## 2017-02-27 NOTE — DISCHARGE INSTRUCTIONS
Discharge Instructions    Discharged to [facility] home by medical transport with hospital escort. Discharged via wheelchair, hospital escort: Yes.  Special equipment needed: Not Applicable    Be sure to schedule a follow-up appointment with your primary care doctor or any specialists as instructed.     Discharge Plan:   Influenza Vaccine Indication: Patient Refuses    I understand that a diet low in cholesterol, fat, and sodium is recommended for good health. Unless I have been given specific instructions below for another diet, I accept this instruction as my diet prescription.   Other diet: cardiac    Special Instructions: heart surgery and acute coronary syndrome    Cardiac Surgery Discharge Instructions/[facility]    Activity:  1. NO driving for 4 weeks after surgery. You may ride as a passenger.  2. NO lifting of any item over 10 lbs (e.g. gallon of milk) for 6 weeks after surgery.  Do not raise both arms above head, only one at a time.  Do not push or pull with your arms.  3. Walk as much as possible! Walk a minimum of 4 times per day. Depending on your fatigue and comfort level, you may walk as much as you wish. There is no maximum.  4. Other physical activities (sex, housework, gardening, etc.) are OK after 4 weeks or after 8 weeks if you want to golf (start by putting, then advance to chipping, then to driving).  5. Continue using incentive spirometer for 2 weeks.  If you are going home on oxygen and you were not on oxygen prior to surgery, keep using until you are oxygen free.  6. Weigh daily and write it down starting  the next morning after you arrive home. Call your doctor for a weight gain of 2-4 lbs in 1-2 days.    Incision Care:  1.  Home health nurse or patient may remove chest dressing 7 days post-surgery (on February 27, 2017) or sooner if the battery runs out or the device alarms. When the battery runs out, the vacuum will lose suction and the dressing will puff up.  Slowly roll down the  dressing edges, until the entire dressing is removed. Then the entire dressing/incision vacuum may be thrown away.   2. Once the chest dressing is removed, keep the incision open to air. SHOWER EVERYDAY-no baths. Make sure to clean your incision(s) twice daily with plain, perfume and dye-free soap (if you shower in the morning, stand at the sink at bedtime and clean incision with soap and water). Then pat incision(s) dry with clean towel. Avoid creams or lotions on the incision(s).             3. If there is any increase in redness or swelling, or separation of the incision line, or thick drainage* from any of the incisions, call [facility] ([phone]). * Clear, thin drainage is not abnormal especially from the leg incision and/or chest tube sites.       4. Continue to wear your CELINE Stockings for 4 weeks on the leg(s) with the incision(s) or swelling. You may take off the stocking(s) when in bed or when the leg(s) are elevated.    General Instructions:  1. If you are on the blood thinner Coumadin (warfarin), you will need your coumadin levels checked periodically.  2. You have been referred to Cardiac Rehab which is highly recommended for you after heart surgery. You can start Cardiac Rehab 30 days after surgery.  If you do not have an appointment at the time of discharge call [phone] to schedule an appointment.  3. Your Primary Care Doctor typically handles Home Oxygen. The Home Oxygen service that drops off your tanks should be checking your oxygen saturation levels. Oxygen may be stopped when you are > 90 % saturated on room air. Check with your Primary Care Doctor if you are unsure.    Prescription Refills/Questions:   Call your usual Pharmacy for ALL refills  1. Pain medication questions only: Call [facility] at [phone].  (Remember that refills may require additional physician approval and will need at least 24 hours’ notice. Please call for refills on Mondays through Fridays from  9 am to 4 pm).  2. For all other medications (except pain medication): Call your Cardiology Group or Primary Care Doctor (not [facility]) for refills or questions.    [facility] IS ALWAYS AVAILABLE TO ANSWER YOUR QUESTIONS. DO NOT HESITATE TO CALL!    Acute Coronary Syndrome (ACS) is a diagnosis that encompasses cardiac-related chest pain and heart attack. ACS occurs when the blood flow to the heart muscle is severely reduced or cut off completely due to a slow process called atherosclerosis.  Atherosclerosis is a disease in which the coronary arteries become narrow from a buildup of fat, cholesterol, and other substances that combine to form plaque. If the plaque breaks, a blood clot will form and block the blood flow to the heart muscle. This lack of blood flow can cause damage or death to the heart muscle which is called a heart attack or Myocardial Infarction (MI). There are two different types of MIs:  ST Elevation Myocardial Infarction or STEMI (the most severe type of heart attack) and Non-ST Elevation Myocardial Infarction or NSTEMI.    Treatment Plan:  · Cardiac Diet  - Low fat, low salt, low cholesterol   · Cardiac Rehab  - Your doctor has ordered you a referral to [facility].  Call [phone] to schedule an appointment.  · Attend my follow-up appointment with my Cardiologist.  · Take my medications as prescribed by my doctor  · Exercise daily  · Lower my bad cholesterol and raise my good cholesterol and lower my blood pressure    Medications:  Certain medications are used to treat ACS.  Remember to always take medications as prescribed and never stop talking medications unless told by your doctor.    You have been prescribed the following medicatons:    Aspirin - Aspirin is used as a blood thinning medication and you will require this medication indefinitely.  Anti-platelet/blood thinner - Your Anti-platelet/Blood thinning medication is called clopidogrel, and is used in combination  with aspirin to prevent clots from forming in your heart and/or around your stent.  Your doctor will determine how long you need to be on this medicine, but generally if you have a Drug Eluding stent, you will need to take this medication for at least one month, and you have a Bare Metal stent, you will need the medication for at least 3 months.  Some patients require this medication indefinitely.  Beta-Blocker - Beta-Blocker metoprolol is used to lower blood pressure and heart rate, and/or helps your heart heal after a heart attack.  Statin - Statin atorvastatin is used to lower cholesterol.    · Is patient discharged on Warfarin / Coumadin?   No     · Is patient Post Blood Transfusion?  No    Depression / Suicide Risk    As you are discharged from this [facility] facility, it is important to learn how to keep safe from harming yourself.    Recognize the warning signs:  · Abrupt changes in personality, positive or negative- including increase in energy   · Giving away possessions  · Change in eating patterns- significant weight changes-  positive or negative  · Change in sleeping patterns- unable to sleep or sleeping all the time   · Unwillingness or inability to communicate  · Depression  · Unusual sadness, discouragement and loneliness  · Talk of wanting to die  · Neglect of personal appearance   · Rebelliousness- reckless behavior  · Withdrawal from people/activities they love  · Confusion- inability to concentrate     If you or a loved one observes any of these behaviors or has concerns about self-harm, here's what you can do:  · Talk about it- your feelings and reasons for harming yourself  · Remove any means that you might use to hurt yourself (examples: pills, rope, extension cords, firearm)  · Get professional help from the community (Mental Health, Substance Abuse, psychological counseling)  · Do not be alone:Call your Safe Contact- someone whom you trust who will be there for you.  · Call your local  CRISIS HOTLINE 442-5360 or 836-683-0605  · Call your local Children's Mobile Crisis Response Team [place] (270) 641-9342 or www.BitGravity  · Call the toll free National Suicide Prevention Hotlines   · National Suicide Prevention Lifeline 402-204-KCJC (0254)  · National [employer] Line Network 800-SUICIDE (305-7102)

## 2017-02-28 NOTE — DISCHARGE SUMMARY
ADDENDUM    Patient has been declined by rehab; however, she has been accepted by skilled   nursing facility.  Patient is stable and able for transfer today to transfer   to skilled nursing and medications are the same per medication list.  Vital   signs are stable.  Cleared for discharge today.  See prior note for details.       ____________________________________     BIN BABIN    DD:  02/27/2017 10:26:34  DT:  02/27/2017 21:22:15    D#:  [id number]  Job#:  [id number]

## 2017-03-07 ENCOUNTER — OFFICE VISIT (OUTPATIENT)
Dept: CARDIOLOGY | Facility: MEDICAL CENTER | Age: 72
End: 2017-03-07
Payer: MEDICARE

## 2017-03-07 VITALS
WEIGHT: 127 LBS | DIASTOLIC BLOOD PRESSURE: 68 MMHG | OXYGEN SATURATION: 93 % | BODY MASS INDEX: 23.37 KG/M2 | HEART RATE: 64 BPM | SYSTOLIC BLOOD PRESSURE: 106 MMHG | HEIGHT: 62 IN

## 2017-03-07 DIAGNOSIS — Z95.1 S/P CABG X 4: ICD-10-CM

## 2017-03-07 DIAGNOSIS — E78.5 HYPERLIPIDEMIA, UNSPECIFIED HYPERLIPIDEMIA TYPE: ICD-10-CM

## 2017-03-07 DIAGNOSIS — I25.10 CORONARY ARTERY DISEASE INVOLVING NATIVE CORONARY ARTERY OF NATIVE HEART WITHOUT ANGINA PECTORIS: ICD-10-CM

## 2017-03-07 PROBLEM — R07.9 CHEST PAIN: Status: RESOLVED | Noted: 2017-02-19 | Resolved: 2017-03-07

## 2017-03-07 PROCEDURE — 3017F COLORECTAL CA SCREEN DOC REV: CPT | Performed by: NURSE PRACTITIONER

## 2017-03-07 PROCEDURE — G8432 DEP SCR NOT DOC, RNG: HCPCS | Performed by: NURSE PRACTITIONER

## 2017-03-07 PROCEDURE — 4040F PNEUMOC VAC/ADMIN/RCVD: CPT | Mod: 8P | Performed by: NURSE PRACTITIONER

## 2017-03-07 PROCEDURE — G8484 FLU IMMUNIZE NO ADMIN: HCPCS | Performed by: NURSE PRACTITIONER

## 2017-03-07 PROCEDURE — 3044F HG A1C LEVEL LT 7.0%: CPT | Performed by: NURSE PRACTITIONER

## 2017-03-07 PROCEDURE — 1101F PT FALLS ASSESS-DOCD LE1/YR: CPT | Mod: 8P | Performed by: NURSE PRACTITIONER

## 2017-03-07 PROCEDURE — 3014F SCREEN MAMMO DOC REV: CPT | Mod: 8P | Performed by: NURSE PRACTITIONER

## 2017-03-07 PROCEDURE — 1036F TOBACCO NON-USER: CPT | Performed by: NURSE PRACTITIONER

## 2017-03-07 PROCEDURE — 99214 OFFICE O/P EST MOD 30 MIN: CPT | Performed by: NURSE PRACTITIONER

## 2017-03-07 PROCEDURE — G8420 CALC BMI NORM PARAMETERS: HCPCS | Performed by: NURSE PRACTITIONER

## 2017-03-07 PROCEDURE — 1111F DSCHRG MED/CURRENT MED MERGE: CPT | Performed by: NURSE PRACTITIONER

## 2017-03-07 PROCEDURE — G8598 ASA/ANTIPLAT THER USED: HCPCS | Performed by: NURSE PRACTITIONER

## 2017-03-07 RX ORDER — ATORVASTATIN CALCIUM 20 MG/1
20 TABLET, FILM COATED ORAL
Qty: 30 TAB | Refills: 6 | Status: SHIPPED | OUTPATIENT
Start: 2017-03-07 | End: 2017-06-21

## 2017-03-07 RX ORDER — CLOPIDOGREL BISULFATE 75 MG/1
75 TABLET ORAL DAILY
Qty: 30 TAB | Refills: 2 | Status: SHIPPED | OUTPATIENT
Start: 2017-03-07

## 2017-03-07 RX ORDER — OXYCODONE HYDROCHLORIDE 5 MG/1
5 TABLET ORAL EVERY 4 HOURS PRN
COMMUNITY
End: 2017-06-21

## 2017-03-07 RX ORDER — ACETAMINOPHEN 325 MG/1
650 TABLET ORAL EVERY 4 HOURS PRN
COMMUNITY
End: 2017-03-12

## 2017-03-07 ASSESSMENT — ENCOUNTER SYMPTOMS
LOSS OF CONSCIOUSNESS: 0
HEADACHES: 0
PND: 0
DIZZINESS: 0
ORTHOPNEA: 0
PALPITATIONS: 0
BRUISES/BLEEDS EASILY: 0
ABDOMINAL PAIN: 0
SHORTNESS OF BREATH: 0
CHILLS: 0
FEVER: 0
MYALGIAS: 0

## 2017-03-07 NOTE — MR AVS SNAPSHOT
"        Carole Kingsley   3/7/2017 1:40 PM   Office Visit   MRN: [id number]    Department:  Heart [facility]   Dept Phone:  [phone]    Description:  Female : 1945   Provider:  WARREN Silva           Allergies as of 3/7/2017     Allergen Noted Reactions    Tape 2014   Contact Dermatitis    \"paper is OK\"      You were diagnosed with     Coronary artery disease involving native coronary artery of native heart without angina pectoris   [2530752]       S/P CABG x 4   [908358]       Hyperlipidemia, unspecified hyperlipidemia type   [4804421]       Uncontrolled type 2 diabetes mellitus without complication, without long-term current use of insulin (CMS-HCC)   [3697198]         Vital Signs     Blood Pressure Pulse Height Weight Body Mass Index Oxygen Saturation    106/68 mmHg 64 1.575 m (5' 2.01\") 57.607 kg (127 lb) 23.22 kg/m2 93%    Smoking Status                   Former Smoker           Basic Information     Date Of Birth Sex Race Ethnicity Preferred Language    1945 Female White  Origin (Ugandan,Cypriot,Cypriot,Duarte, etc) English      Your appointments     2017 11:00 AM   Established Patient with Angel Ambrose M.D.   [facility] ([facility])    [address], [address]  [facility] [id number] [phone]           You will be receiving a confirmation call a few days before your appointment from our automated call confirmation system.            2017  1:15 PM   FOLLOW UP with Rody Eugene M.D.   [facility]    [address]  [place] [id number] [phone]              Problem List              ICD-10-CM Priority Class Noted - Resolved    Hypothyroidism E03.9   2010 - Present    Hypertension I10   2010 - Present    Hyperlipidemia E78.5   2012 - Present    DIABETES MELLITUS    2012 - Present    Granular cell tumor D21.9 "   Unknown - Present    Hydronephrosis N13.30   5/15/2013 - Present    Pyelonephritis N12   5/15/2013 - Present    GOUT    5/16/2013 - Present    Depression F32.9   5/16/2013 - Present    Anemia D64.9   5/16/2013 - Present    Atherosclerosis I70.90   6/12/2014 - Present    Peripheral vascular disease (CMS-HCC) I73.9   7/14/2014 - Present    Diabetes mellitus (CMS-HCC) E11.9   8/11/2014 - Present    Diabetes mellitus type 2, uncontrolled (CMS-HCC) E11.65   8/11/2014 - Present    Other nonspecific abnormal cardiovascular system function study R94.39   10/3/2014 - Present    Type 2 diabetes mellitus without complication (CMS-HCC) E11.9   11/9/2015 - Present    Coronary artery disease involving native coronary artery of native heart without angina pectoris I25.10   5/17/2016 - Present    Chest pain R07.9   2/19/2017 - Present    CAD (coronary artery disease) I25.10   3/7/2017 - Present    S/P CABG x 4 Z95.1   3/7/2017 - Present      Health Maintenance        Date Due Completion Dates    DIABETES MONOFILAMENT / LE EXAM 6/26/1947 ---    RETINAL SCREENING 12/26/1964 ---    URINE ACR / MICROALBUMIN 12/26/1964 ---    IMM DTaP/Tdap/Td Vaccine (1 - Tdap) 12/26/1965 ---    PAP SMEAR 12/26/1967 ---    MAMMOGRAM 12/26/1986 ---    COLONOSCOPY 12/26/1996 ---    IMM ZOSTER VACCINE 12/26/2006 ---    BONE DENSITY 12/26/2011 ---    IMM PNEUMOCOCCAL 65+ (ADULT) LOW/MEDIUM RISK SERIES (1 of 2 - PCV13) 12/26/2011 ---    FASTING LIPID PROFILE 5/16/2014 5/16/2013, 7/2/2012, 7/31/2006, 9/12/2005    IMM INFLUENZA (1) 9/1/2016 ---    A1C SCREENING 8/20/2017 2/20/2017, 5/16/2013, 5/15/2013, 7/2/2012    SERUM CREATININE 2/27/2018 2/27/2017, 2/26/2017, 2/25/2017, 2/24/2017, 2/23/2017, 2/22/2017, 2/21/2017, 2/20/2017, 2/18/2017, 10/14/2016, 5/9/2016, 2/24/2015, 10/28/2014, 7/14/2014, 6/12/2014, 5/17/2013, 5/16/2013, 7/2/2012, 7/16/2007, 7/31/2006, 9/12/2005            Current Immunizations     No immunizations on file.      Below and/or attached  are the medications your provider expects you to take. Review all of your home medications and newly ordered medications with your provider and/or pharmacist. Follow medication instructions as directed by your provider and/or pharmacist. Please keep your medication list with you and share with your provider. Update the information when medications are discontinued, doses are changed, or new medications (including over-the-counter products) are added; and carry medication information at all times in the event of emergency situations     Allergies:  TAPE - Contact Dermatitis               Medications  Valid as of: March 07, 2017 -  2:17 PM    Generic Name Brand Name Tablet Size Instructions for use    Acetaminophen (Tab) TYLENOL 325 MG Take 650 mg by mouth every four hours as needed.        Aspirin (Tablet Delayed Response) aspirin 81 MG Take 81 mg by mouth every day.        Atorvastatin Calcium (Tab) LIPITOR 20 MG Take 1 Tab by mouth every bedtime.        Calcium   Take 600 mg by mouth every day.        Cholecalciferol (Cap) VITAMIN D3 5000 UNIT Take 5,000 Units by mouth every day.        Clopidogrel Bisulfate (Tab) PLAVIX 75 MG Take 1 Tab by mouth every day.        Cyanocobalamin (Cap) Cyanocobalamin 5000 MCG Take 5,000 mcg by mouth every day.        DiazePAM (Tab) VALIUM 5 MG Take 5 mg by mouth 4 times a day as needed for Anxiety.        Docusate Sodium (Cap)  MG Take 100 mg by mouth every morning.        Escitalopram Oxalate (Tab) LEXAPRO 20 MG Take 1 Tab by mouth every day.        Folic Acid (Tab) FOLVITE 400 MCG Take 400 mcg by mouth every day.        Furosemide (Tab) LASIX 40 MG Take 1 Tab by mouth every day.        Gemfibrozil (Tab) LOPID 600 MG TAKE ONE TABLET BY MOUTH TWICE DAILY        Hydrocodone-Acetaminophen (Tab) NORCO 5-325 MG Take 1-2 Tabs by mouth every 6 hours as needed.        Insulin NPH Human (Isophane) (Suspension) HUMULIN,NOVOLIN 100 UNIT/ML Inject  as instructed.        Levothyroxine  Sodium (Tab) SYNTHROID 75 MCG TAKE ONE TABLET BY MOUTH ONCE DAILY        Losartan Potassium (Tab) COZAAR 25 MG Take 1 Tab by mouth every day.        MetFORMIN HCl (TABLET SR 24 HR) GLUCOPHAGE  MG Take 500 mg by mouth 2 times a day.        Metoprolol Tartrate (Tab) LOPRESSOR 25 MG Take 1 Tab by mouth 2 Times a Day.        Omeprazole (CAPSULE DELAYED RELEASE) PRILOSEC 20 MG Take 1 Cap by mouth every day.        OxyCODONE HCl (Tab) ROXICODONE 5 MG Take 5 mg by mouth every four hours as needed for Severe Pain.        Potassium Chloride Beata CR (Tab CR) Kdur 20 MEQ Take 1 Tab by mouth every day.        .                 Medicines prescribed today were sent to:     [facility] [address] [place] [address] - [address] [address]    Phone: [phone] Fax: [phone]    Open 24 Hours?: No      Medication refill instructions:       If your prescription bottle indicates you have medication refills left, it is not necessary to call your provider’s office. Please contact your pharmacy and they will refill your medication.    If your prescription bottle indicates you do not have any refills left, you may request refills at any time through one of the following ways: The online [employer] system (except Urgent Care), by calling your provider’s office, or by asking your pharmacy to contact your provider’s office with a refill request. Medication refills are processed only during regular business hours and may not be available until the next business day. Your provider may request additional information or to have a follow-up visit with you prior to refilling your medication.   *Please Note: Medication refills are assigned a new Rx number when refilled electronically. Your pharmacy may indicate that no refills were authorized even though a new prescription for the same medication is available at the pharmacy. Please request the medicine by name with the pharmacy before contacting your  provider for a refill.        Your To Do List     Future Labs/Procedures Complete By Expires    COMP METABOLIC PANEL  As directed 3/8/2018    HEMOGLOBIN A1C  As directed 3/8/2018    LIPID PROFILE  As directed 3/8/2018      Referral     A referral request has been sent to our patient care coordination department. Please allow 3-5 business days for us to process this request and contact you either by phone or mail. If you do not hear from us by the 5th business day, please call us at [phone].           [employer] Access Code: [email or web address]  Expires: 3/29/2017 11:07 AM    [employer]  A secure, online tool to manage your health information     [employer]® is a secure, online tool that connects you to your personalized health information from the privacy of your home -- day or night - making it very easy for you to manage your healthcare. Once the activation process is completed, you can even access your medical information using the [employer] meredith, which is available for free in the Apple Meredith store or Google Play store.     [employer] provides the following levels of access (as shown below):   My Chart Features   [facility] Primary Care Doctor [facility]  Specialists [facility]  Urgent  [facility]  Primary Care  Doctor   Email your healthcare team securely and privately 24/7 X X X    Manage appointments: schedule your next appointment; view details of past/upcoming appointments X      Request prescription refills. X      View recent personal medical records, including lab and immunizations X X X X   View health record, including health history, allergies, medications X X X X   Read reports about your outpatient visits, procedures, consult and ER notes X X X X   See your discharge summary, which is a recap of your hospital and/or ER visit that includes your diagnosis, lab results, and care plan. X X       How to register for [employer]:  1. Go to  [email or web address].  2. Click on the Sign Up Now box, which  takes you to the New Member Sign Up page. You will need to provide the following information:  a. Enter your [employer] Access Code exactly as it appears at the top of this page. (You will not need to use this code after you’ve completed the sign-up process. If you do not sign up before the expiration date, you must request a new code.)   b. Enter your date of birth.   c. Enter your home email address.   d. Click Submit, and follow the next screen’s instructions.  3. Create a [employer] ID. This will be your [employer] login ID and cannot be changed, so think of one that is secure and easy to remember.  4. Create a [employer] password. You can change your password at any time.  5. Enter your Password Reset Question and Answer. This can be used at a later time if you forget your password.   6. Enter your e-mail address. This allows you to receive e-mail notifications when new information is available in [employer].  7. Click Sign Up. You can now view your health information.    For assistance activating your [employer] account, call [phone]

## 2017-03-07 NOTE — PROGRESS NOTES
Subjective:   Carole Kingsley is a 71 y.o. female who presents today for hospital FU of CABG x 4.    Carole is a 71 year old female with history hypertension, hyperlipidemia, diabetes mellitus, peripheral vascular disease and known multivessel CAD (since October 2014), and she was referred to CABG but declined.  More recently, she was seen at [facility] with chest pain, and was told to FU here. She was admitted to [facility], where coronary angiogram confirmed severe multivessel disease. She was again referred to CABG, which was done on 2/20/2017 by Dr. Dowell. She did well, and was discharged to a rehab facility.    She is here today for follow-up with her son. Generally, she is doing quite well. Some mild incisional tenderness, but no overt chest pain, pressure or discomfort; no palpitations or fluttering of her heart; no shortness of breath, orthopnea or PND; no dizziness, lightheadedness or syncope; no edema. BP has been stable; glucose is running  at home.    Past Medical History   Diagnosis Date   • Granular cell tumor 2007     Pituitary / status post transsphenoidal resection   • Hypertension    • Hypothyroidism      Primary   • Vitamin D deficiency    • Gout    • DIABETES MELLITUS      Oral meds   • Heart burn    • Unspecified cataract      Early stage   • Psychiatric problem      Anxiety   • Renal disorder 2013     Hospitalized for kidney infection   • Hiatus hernia syndrome    • Pituitary adenoma (CMS-[facility]) 2008   • Peripheral vascular disease with claudication (CMS-[facility]) 2014     Status post corrective surgery   • Coronary artery disease 2014   • Hyperlipidemia      Especially hypertriglyceridemia   • S/P CABG x 4 February 2017     CABG x 4 (LIMA to distal LAD, rSVG to distal diagonal, rSVG to distal circumflex, rSVG to distal PDA) by Dr. Dowell.     Past Surgical History   Procedure Laterality Date   • Recovery  6/12/2014     Performed by Ir-Recovery Surgery at [facility]  "[place] [facility]S   • Other  2013     Eye (laser)   • Recovery  7/14/2014     Performed by Ir-Recovery Surgery at SURGERY SAME DAY [facility] ORS   • Hysterectomy, total abdominal     • Angioplasty  7/2014     Legs   • Recovery  10/3/2014     Performed by Cath-Recovery Surgery at SURGERY SAME DAY [facility] ORS   • Multiple coronary artery bypass endo vein harvest  2/20/2017     Procedure: MULTIPLE CORONARY ARTERY BYPASS ENDO VEIN HARVEST X4, PREVENA DRESSING;  Surgeon: Hernán Dowell M.D.;  Location: SURGERY [facility];  Service:    • Adan  2/20/2017     Procedure: ADAN ;  Surgeon: Hernán Dowell M.D.;  Location: SURGERY [facility];  Service:    • Multiple coronary artery bypass  February 2017     CABG x 4 (LIMA to distal LAD, rSVG to distal diagonal, rSVG to distal circumflex, rSVG to distal PDA) by Dr. Dowell.     Family History   Problem Relation Age of Onset   • Other Mother      Colon cancer   • Heart Attack Paternal Uncle      MI in 60     History   Smoking status   • Former Smoker -- 25 years   • Types: Cigarettes   • Quit date: 01/08/2014   Smokeless tobacco   • Never Used     Allergies   Allergen Reactions   • Tape Contact Dermatitis     \"paper is OK\"     Outpatient Encounter Prescriptions as of 3/7/2017   Medication Sig Dispense Refill   • insulin NPH (HUMULIN,NOVOLIN) 100 UNIT/ML Suspension Inject  as instructed.     • oxycodone immediate-release (ROXICODONE) 5 MG Tab Take 5 mg by mouth every four hours as needed for Severe Pain.     • acetaminophen (TYLENOL) 325 MG Tab Take 650 mg by mouth every four hours as needed.     • clopidogrel (PLAVIX) 75 MG Tab Take 1 Tab by mouth every day. 30 Tab 2   • atorvastatin (LIPITOR) 20 MG Tab Take 1 Tab by mouth every bedtime. 30 Tab 6   • losartan (COZAAR) 25 MG Tab Take 1 Tab by mouth every day. 30 Tab    • escitalopram (LEXAPRO) 20 MG tablet Take 1 Tab by mouth every day. 30 Tab    • docusate sodium 100 MG Cap Take 100 mg by mouth every morning. 60 Cap    • " "furosemide (LASIX) 40 MG Tab Take 1 Tab by mouth every day. 60 Tab    • hydrocodone-acetaminophen (NORCO) 5-325 MG Tab per tablet Take 1-2 Tabs by mouth every 6 hours as needed. 20 Tab 0   • metoprolol (LOPRESSOR) 25 MG Tab Take 1 Tab by mouth 2 Times a Day. 60 Tab    • omeprazole (PRILOSEC) 20 MG delayed-release capsule Take 1 Cap by mouth every day. 30 Cap    • potassium chloride SA (KDUR) 20 MEQ Tab CR Take 1 Tab by mouth every day. 60 Tab 11   • cholecalciferol (VITAMIN D3) 5000 UNIT Cap Take 5,000 Units by mouth every day.     • Cyanocobalamin 5000 MCG Cap Take 5,000 mcg by mouth every day.     • metformin ER (GLUCOPHAGE XR) 500 MG TABLET SR 24 HR Take 500 mg by mouth 2 times a day.     • diazepam (VALIUM) 5 MG Tab Take 5 mg by mouth 4 times a day as needed for Anxiety.     • levothyroxine (SYNTHROID) 75 MCG Tab TAKE ONE TABLET BY MOUTH ONCE DAILY 30 Tab 6   • gemfibrozil (LOPID) 600 MG Tab TAKE ONE TABLET BY MOUTH TWICE DAILY 60 Tab 0   • aspirin 81 MG EC tablet Take 81 mg by mouth every day.     • folic acid (FOLVITE) 400 MCG tablet Take 400 mcg by mouth every day.     • CALCIUM PO Take 600 mg by mouth every day.     • [DISCONTINUED] atorvastatin (LIPITOR) 20 MG Tab Take 1 Tab by mouth every bedtime. 30 Tab    • [DISCONTINUED] clopidogrel (PLAVIX) 75 MG Tab Take 1 Tab by mouth every day. 30 Tab      No facility-administered encounter medications on file as of 3/7/2017.     Review of Systems   Constitutional: Negative for fever and chills.   Respiratory: Negative for shortness of breath.    Cardiovascular: Negative for chest pain, palpitations, orthopnea, leg swelling and PND.   Gastrointestinal: Negative for abdominal pain.   Musculoskeletal: Negative for myalgias.   Neurological: Negative for dizziness, loss of consciousness and headaches.   Endo/Heme/Allergies: Does not bruise/bleed easily.        Objective:   /68 mmHg  Pulse 64  Ht 1.575 m (5' 2.01\")  Wt 57.607 kg (127 lb)  BMI 23.22 kg/m2  SpO2 " 93%    Physical Exam   Constitutional: She is oriented to person, place, and time. She appears well-developed and well-nourished. No distress.   HENT:   Head: Normocephalic.   Eyes: Conjunctivae and EOM are normal.   Neck: Normal range of motion. Neck supple. No JVD present.   Cardiovascular: Normal rate, regular rhythm, normal heart sounds and intact distal pulses.  Exam reveals no gallop and no friction rub.    No murmur heard.  Pulmonary/Chest: Effort normal and breath sounds normal.   Sternotomy incision is healing well. No redness, swelling or drainage noted.   Abdominal: Soft. Bowel sounds are normal.   Musculoskeletal: Normal range of motion. She exhibits no edema.   Wearing support hose.  Saphenectomy incisions are healing well too.   Neurological: She is alert and oriented to person, place, and time.   Skin: Skin is warm and dry.   Psychiatric: She has a normal mood and affect. Her behavior is normal.     DATE OF SERVICE:  02/20/2017 - OPERATION PERFORMED:    Coronary bypass grafting x4, left FRANCISCO to the distal LAD,   reverse saphenous vein graft to the distal diagonal, reverse saphenous vein    graft to the distal circumflex, reverse saphenous vein graft to the distal    PDA, endoscopic vein harvest, transesophageal echocardiography, temporary    Pacemaker.    CONCLUSIONS OF ECHOCARDIOGRAM OF 2/17/2017 - REVIEWED WITH PATIENT:  Normal left ventricular size, wall thickness, and systolic function.  Left ventricular ejection fraction is visually estimated to be 70%.  Grade I diastolic dysfunction.  Aortic sclerosis without stenosis.  Right ventricular systolic pressure is estimated to be 30 mmHg.    Lab Results   Component Value Date/Time    CHOLESTEROL,* 05/16/2013 04:17 AM    * 05/16/2013 04:17 AM    HDL 13* 05/16/2013 04:17 AM    TRIGLYCERIDES 383* 05/16/2013 04:17 AM       Lab Results   Component Value Date/Time    SODIUM 140 02/27/2017 06:28 AM    POTASSIUM 4.1 02/27/2017 06:28 AM     CHLORIDE 106 02/27/2017 06:28 AM    CO2 24 02/27/2017 06:28 AM    GLUCOSE 101* 02/27/2017 06:28 AM    BUN 14 02/27/2017 06:28 AM    CREATININE 0.94 02/27/2017 06:28 AM    BUN-CREATININE RATIO 26 02/01/2010 12:46 PM    GLOM FILT RATE, EST >59 02/01/2010 12:46 PM     Lab Results   Component Value Date/Time    ALKALINE PHOSPHATASE 68 02/20/2017 01:40 AM    AST(SGOT) 16 02/20/2017 01:40 AM    ALT(SGPT) 15 02/20/2017 01:40 AM    TOTAL BILIRUBIN 0.4 02/20/2017 01:40 AM      Lab Results   Component Value Date/Time    WBC 7.8 02/27/2017 06:28 AM    RBC 3.15* 02/27/2017 06:28 AM    HEMOGLOBIN 9.3* 02/27/2017 06:28 AM    HEMATOCRIT 29.3* 02/27/2017 06:28 AM    MCV 93.0 02/27/2017 06:28 AM    MCH 29.5 02/27/2017 06:28 AM    MCHC 31.7* 02/27/2017 06:28 AM    MPV 10.4 02/27/2017 06:28 AM           Assessment:     1. Coronary artery disease involving native coronary artery of native heart without angina pectoris  REFERRAL TO INTENSIVE CARDIAC REHAB/CARDIAC REHAB    clopidogrel (PLAVIX) 75 MG Tab    atorvastatin (LIPITOR) 20 MG Tab   2. S/P CABG x 4  REFERRAL TO INTENSIVE CARDIAC REHAB/CARDIAC REHAB    clopidogrel (PLAVIX) 75 MG Tab    atorvastatin (LIPITOR) 20 MG Tab   3. Hyperlipidemia, unspecified hyperlipidemia type  COMP METABOLIC PANEL    LIPID PROFILE   4. Uncontrolled type 2 diabetes mellitus without complication, without long-term current use of insulin (CMS-HCC)  HEMOGLOBIN A1C       Medical Decision Making:  Today's Assessment / Status / Plan:     1. CAD, status post recent CABG x 4, doing well. She is reminded about Plavix x 3 months. She does have FU with Dr. Dowell on 3/13/2017. She is also referred to cardiac rehab, even if she can only do it 1-2 times per week.    2. Hyperlipidemia, treated with Lopid and Lipitor. Repeat CMP and lipid panel prior to next appointment.    3. Diabetes mellitus, treated and mostly stable. To FU with PCP.    4. Gout, stable.    Same medications for now. Stressed ASA, Plavix, statin, BB  and ARB. FU with Dr. Eugene in 3 months (as she lives in [place], [place]); FU sooner if clinical condition changes.    Collaborating MD: Valorie

## 2017-03-07 NOTE — Clinical Note
[facility]   [address]  [place] [address]  Phone: [phone]  Fax: [phone]              Carole Kingsley  1945    Encounter Date: 3/7/2017    WARREN Silva          PROGRESS NOTE:  Subjective:   Carole Kingsley is a 71 y.o. female who presents today for hospital FU of CABG x 4.    Carole is a 71 year old female with history hypertension, hyperlipidemia, diabetes mellitus, peripheral vascular disease and known multivessel CAD (since October 2014), and she was referred to CABG but declined.  More recently, she was seen at [facility] with chest pain, and was told to FU here. She was admitted to [facility], where coronary angiogram confirmed severe multivessel disease. She was again referred to CABG, which was done on 2/20/2017 by Dr. Dowell. She did well, and was discharged to a rehab facility.    She is here today for follow-up with her son. Generally, she is doing quite well. Some mild incisional tenderness, but no overt chest pain, pressure or discomfort; no palpitations or fluttering of her heart; no shortness of breath, orthopnea or PND; no dizziness, lightheadedness or syncope; no edema. BP has been stable; glucose is running  at home.    Past Medical History   Diagnosis Date   • Granular cell tumor 2007     Pituitary / status post transsphenoidal resection   • Hypertension    • Hypothyroidism      Primary   • Vitamin D deficiency    • Gout    • DIABETES MELLITUS      Oral meds   • Heart burn    • Unspecified cataract      Early stage   • Psychiatric problem      Anxiety   • Renal disorder 2013     Hospitalized for kidney infection   • Hiatus hernia syndrome    • Pituitary adenoma (CMS-HCC) 2008   • Peripheral vascular disease with claudication (CMS-HCC) 2014     Status post corrective surgery   • Coronary artery disease 2014   • Hyperlipidemia      Especially hypertriglyceridemia    "  • S/P CABG x 4 February 2017     CABG x 4 (LIMA to distal LAD, rSVG to distal diagonal, rSVG to distal circumflex, rSVG to distal PDA) by Dr. Dowell.     Past Surgical History   Procedure Laterality Date   • Recovery  6/12/2014     Performed by Ir-Recovery Surgery at SURGERY [facility]   • Other  2013     Eye (laser)   • Recovery  7/14/2014     Performed by Ir-Recovery Surgery at SURGERY SAME DAY [facility] ORS   • Hysterectomy, total abdominal     • Angioplasty  7/2014     Legs   • Recovery  10/3/2014     Performed by Cath-Recovery Surgery at SURGERY SAME DAY [facility] ORS   • Multiple coronary artery bypass endo vein harvest  2/20/2017     Procedure: MULTIPLE CORONARY ARTERY BYPASS ENDO VEIN HARVEST X4, PREVENA DRESSING;  Surgeon: Hernán Dowell M.D.;  Location: SURGERY [facility];  Service:    • Adan  2/20/2017     Procedure: ADAN ;  Surgeon: Hernán Dowell M.D.;  Location: SURGERY [facility];  Service:    • Multiple coronary artery bypass  February 2017     CABG x 4 (LIMA to distal LAD, rSVG to distal diagonal, rSVG to distal circumflex, rSVG to distal PDA) by Dr. Dowell.     Family History   Problem Relation Age of Onset   • Other Mother      Colon cancer   • Heart Attack Paternal Uncle      MI in 60     History   Smoking status   • Former Smoker -- 25 years   • Types: Cigarettes   • Quit date: 01/08/2014   Smokeless tobacco   • Never Used     Allergies   Allergen Reactions   • Tape Contact Dermatitis     \"paper is OK\"     Outpatient Encounter Prescriptions as of 3/7/2017   Medication Sig Dispense Refill   • insulin NPH (HUMULIN,NOVOLIN) 100 UNIT/ML Suspension Inject  as instructed.     • oxycodone immediate-release (ROXICODONE) 5 MG Tab Take 5 mg by mouth every four hours as needed for Severe Pain.     • acetaminophen (TYLENOL) 325 MG Tab Take 650 mg by mouth every four hours as needed.     • clopidogrel (PLAVIX) 75 MG Tab Take 1 Tab by mouth every day. 30 Tab 2   • atorvastatin (LIPITOR) 20 MG " Tab Take 1 Tab by mouth every bedtime. 30 Tab 6   • losartan (COZAAR) 25 MG Tab Take 1 Tab by mouth every day. 30 Tab    • escitalopram (LEXAPRO) 20 MG tablet Take 1 Tab by mouth every day. 30 Tab    • docusate sodium 100 MG Cap Take 100 mg by mouth every morning. 60 Cap    • furosemide (LASIX) 40 MG Tab Take 1 Tab by mouth every day. 60 Tab    • hydrocodone-acetaminophen (NORCO) 5-325 MG Tab per tablet Take 1-2 Tabs by mouth every 6 hours as needed. 20 Tab 0   • metoprolol (LOPRESSOR) 25 MG Tab Take 1 Tab by mouth 2 Times a Day. 60 Tab    • omeprazole (PRILOSEC) 20 MG delayed-release capsule Take 1 Cap by mouth every day. 30 Cap    • potassium chloride SA (KDUR) 20 MEQ Tab CR Take 1 Tab by mouth every day. 60 Tab 11   • cholecalciferol (VITAMIN D3) 5000 UNIT Cap Take 5,000 Units by mouth every day.     • Cyanocobalamin 5000 MCG Cap Take 5,000 mcg by mouth every day.     • metformin ER (GLUCOPHAGE XR) 500 MG TABLET SR 24 HR Take 500 mg by mouth 2 times a day.     • diazepam (VALIUM) 5 MG Tab Take 5 mg by mouth 4 times a day as needed for Anxiety.     • levothyroxine (SYNTHROID) 75 MCG Tab TAKE ONE TABLET BY MOUTH ONCE DAILY 30 Tab 6   • gemfibrozil (LOPID) 600 MG Tab TAKE ONE TABLET BY MOUTH TWICE DAILY 60 Tab 0   • aspirin 81 MG EC tablet Take 81 mg by mouth every day.     • folic acid (FOLVITE) 400 MCG tablet Take 400 mcg by mouth every day.     • CALCIUM PO Take 600 mg by mouth every day.     • [DISCONTINUED] atorvastatin (LIPITOR) 20 MG Tab Take 1 Tab by mouth every bedtime. 30 Tab    • [DISCONTINUED] clopidogrel (PLAVIX) 75 MG Tab Take 1 Tab by mouth every day. 30 Tab      No facility-administered encounter medications on file as of 3/7/2017.     Review of Systems   Constitutional: Negative for fever and chills.   Respiratory: Negative for shortness of breath.    Cardiovascular: Negative for chest pain, palpitations, orthopnea, leg swelling and PND.   Gastrointestinal: Negative for abdominal pain.  "  Musculoskeletal: Negative for myalgias.   Neurological: Negative for dizziness, loss of consciousness and headaches.   Endo/Heme/Allergies: Does not bruise/bleed easily.        Objective:   /68 mmHg  Pulse 64  Ht 1.575 m (5' 2.01\")  Wt 57.607 kg (127 lb)  BMI 23.22 kg/m2  SpO2 93%    Physical Exam   Constitutional: She is oriented to person, place, and time. She appears well-developed and well-nourished. No distress.   HENT:   Head: Normocephalic.   Eyes: Conjunctivae and EOM are normal.   Neck: Normal range of motion. Neck supple. No JVD present.   Cardiovascular: Normal rate, regular rhythm, normal heart sounds and intact distal pulses.  Exam reveals no gallop and no friction rub.    No murmur heard.  Pulmonary/Chest: Effort normal and breath sounds normal.   Sternotomy incision is healing well. No redness, swelling or drainage noted.   Abdominal: Soft. Bowel sounds are normal.   Musculoskeletal: Normal range of motion. She exhibits no edema.   Wearing support hose.  Saphenectomy incisions are healing well too.   Neurological: She is alert and oriented to person, place, and time.   Skin: Skin is warm and dry.   Psychiatric: She has a normal mood and affect. Her behavior is normal.     DATE OF SERVICE:  02/20/2017 - OPERATION PERFORMED:    Coronary bypass grafting x4, left FRANCISCO to the distal LAD,   reverse saphenous vein graft to the distal diagonal, reverse saphenous vein    graft to the distal circumflex, reverse saphenous vein graft to the distal    PDA, endoscopic vein harvest, transesophageal echocardiography, temporary    Pacemaker.    CONCLUSIONS OF ECHOCARDIOGRAM OF 2/17/2017 - REVIEWED WITH PATIENT:  Normal left ventricular size, wall thickness, and systolic function.  Left ventricular ejection fraction is visually estimated to be 70%.  Grade I diastolic dysfunction.  Aortic sclerosis without stenosis.  Right ventricular systolic pressure is estimated to be 30 mmHg.    Lab Results   Component " Value Date/Time    CHOLESTEROL,* 05/16/2013 04:17 AM    * 05/16/2013 04:17 AM    HDL 13* 05/16/2013 04:17 AM    TRIGLYCERIDES 383* 05/16/2013 04:17 AM       Lab Results   Component Value Date/Time    SODIUM 140 02/27/2017 06:28 AM    POTASSIUM 4.1 02/27/2017 06:28 AM    CHLORIDE 106 02/27/2017 06:28 AM    CO2 24 02/27/2017 06:28 AM    GLUCOSE 101* 02/27/2017 06:28 AM    BUN 14 02/27/2017 06:28 AM    CREATININE 0.94 02/27/2017 06:28 AM    BUN-CREATININE RATIO 26 02/01/2010 12:46 PM    GLOM FILT RATE, EST >59 02/01/2010 12:46 PM     Lab Results   Component Value Date/Time    ALKALINE PHOSPHATASE 68 02/20/2017 01:40 AM    AST(SGOT) 16 02/20/2017 01:40 AM    ALT(SGPT) 15 02/20/2017 01:40 AM    TOTAL BILIRUBIN 0.4 02/20/2017 01:40 AM      Lab Results   Component Value Date/Time    WBC 7.8 02/27/2017 06:28 AM    RBC 3.15* 02/27/2017 06:28 AM    HEMOGLOBIN 9.3* 02/27/2017 06:28 AM    HEMATOCRIT 29.3* 02/27/2017 06:28 AM    MCV 93.0 02/27/2017 06:28 AM    MCH 29.5 02/27/2017 06:28 AM    MCHC 31.7* 02/27/2017 06:28 AM    MPV 10.4 02/27/2017 06:28 AM           Assessment:     1. Coronary artery disease involving native coronary artery of native heart without angina pectoris  REFERRAL TO INTENSIVE CARDIAC REHAB/CARDIAC REHAB    clopidogrel (PLAVIX) 75 MG Tab    atorvastatin (LIPITOR) 20 MG Tab   2. S/P CABG x 4  REFERRAL TO INTENSIVE CARDIAC REHAB/CARDIAC REHAB    clopidogrel (PLAVIX) 75 MG Tab    atorvastatin (LIPITOR) 20 MG Tab   3. Hyperlipidemia, unspecified hyperlipidemia type  COMP METABOLIC PANEL    LIPID PROFILE   4. Uncontrolled type 2 diabetes mellitus without complication, without long-term current use of insulin (CMS-HCC)  HEMOGLOBIN A1C       Medical Decision Making:  Today's Assessment / Status / Plan:     1. CAD, status post recent CABG x 4, doing well. She is reminded about Plavix x 3 months. She does have FU with Dr. Dowell on 3/13/2017. She is also referred to cardiac rehab, even if she can only  do it 1-2 times per week.    2. Hyperlipidemia, treated with Lopid and Lipitor. Repeat CMP and lipid panel prior to next appointment.    3. Diabetes mellitus, treated and mostly stable. To FU with PCP.    4. Gout, stable.    Same medications for now. Stressed ASA, Plavix, statin, BB and ARB. FU with Dr. Eugene in 3 months (as she lives in [place], [place]); FU sooner if clinical condition changes.    Collaborating MD: Valorie      No Recipients

## 2017-03-12 ENCOUNTER — HOSPITAL ENCOUNTER (INPATIENT)
Facility: MEDICAL CENTER | Age: 72
LOS: 9 days | DRG: 683 | End: 2017-03-21
Attending: EMERGENCY MEDICINE | Admitting: INTERNAL MEDICINE
Payer: MEDICARE

## 2017-03-12 ENCOUNTER — APPOINTMENT (OUTPATIENT)
Dept: RADIOLOGY | Facility: MEDICAL CENTER | Age: 72
DRG: 683 | End: 2017-03-12
Attending: INTERNAL MEDICINE
Payer: MEDICARE

## 2017-03-12 ENCOUNTER — APPOINTMENT (OUTPATIENT)
Dept: RADIOLOGY | Facility: MEDICAL CENTER | Age: 72
DRG: 683 | End: 2017-03-12
Attending: EMERGENCY MEDICINE
Payer: MEDICARE

## 2017-03-12 ENCOUNTER — RESOLUTE PROFESSIONAL BILLING HOSPITAL PROF FEE (OUTPATIENT)
Dept: HOSPITALIST | Facility: MEDICAL CENTER | Age: 72
End: 2017-03-12
Payer: MEDICARE

## 2017-03-12 DIAGNOSIS — R06.02 SHORTNESS OF BREATH: ICD-10-CM

## 2017-03-12 DIAGNOSIS — N28.9 RENAL INSUFFICIENCY: ICD-10-CM

## 2017-03-12 PROBLEM — N17.9 ACUTE RENAL FAILURE (ARF) (HCC): Status: ACTIVE | Noted: 2017-03-12

## 2017-03-12 LAB
ALBUMIN SERPL BCP-MCNC: 4.3 G/DL (ref 3.2–4.9)
ALBUMIN/GLOB SERPL: 1.2 G/DL
ALP SERPL-CCNC: 116 U/L (ref 30–99)
ALT SERPL-CCNC: 11 U/L (ref 2–50)
ANION GAP SERPL CALC-SCNC: 16 MMOL/L (ref 0–11.9)
APPEARANCE UR: ABNORMAL
APTT PPP: 36.5 SEC (ref 24.7–36)
AST SERPL-CCNC: 13 U/L (ref 12–45)
BACTERIA #/AREA URNS HPF: ABNORMAL /HPF
BASOPHILS # BLD AUTO: 0.6 % (ref 0–1.8)
BASOPHILS # BLD: 0.05 K/UL (ref 0–0.12)
BILIRUB SERPL-MCNC: 0.4 MG/DL (ref 0.1–1.5)
BILIRUB UR QL STRIP.AUTO: NEGATIVE
BNP SERPL-MCNC: 326 PG/ML (ref 0–100)
BUN SERPL-MCNC: 35 MG/DL (ref 8–22)
CALCIUM SERPL-MCNC: 9.9 MG/DL (ref 8.5–10.5)
CHLORIDE SERPL-SCNC: 101 MMOL/L (ref 96–112)
CK SERPL-CCNC: 25 U/L (ref 0–154)
CO2 SERPL-SCNC: 19 MMOL/L (ref 20–33)
COLOR UR: ABNORMAL
CREAT SERPL-MCNC: 1.49 MG/DL (ref 0.5–1.4)
CULTURE IF INDICATED INDCX: YES UA CULTURE
EKG IMPRESSION: NORMAL
EOSINOPHIL # BLD AUTO: 0.33 K/UL (ref 0–0.51)
EOSINOPHIL NFR BLD: 3.8 % (ref 0–6.9)
EPI CELLS #/AREA URNS HPF: ABNORMAL /HPF
ERYTHROCYTE [DISTWIDTH] IN BLOOD BY AUTOMATED COUNT: 46.4 FL (ref 35.9–50)
ETHANOL BLD-MCNC: 0 G/DL
FLUAV H1 2009 PAND RNA SPEC QL NAA+PROBE: NOT DETECTED
FLUAV RNA SPEC QL NAA+PROBE: NEGATIVE
FLUAV+FLUBV AG SPEC QL IA: NORMAL
FLUBV RNA SPEC QL NAA+PROBE: NEGATIVE
GFR SERPL CREATININE-BSD FRML MDRD: 35 ML/MIN/1.73 M 2
GLOBULIN SER CALC-MCNC: 3.7 G/DL (ref 1.9–3.5)
GLUCOSE SERPL-MCNC: 80 MG/DL (ref 65–99)
GLUCOSE UR STRIP.AUTO-MCNC: NEGATIVE MG/DL
HCT VFR BLD AUTO: 33.1 % (ref 37–47)
HGB BLD-MCNC: 10.8 G/DL (ref 12–16)
HYALINE CASTS #/AREA URNS LPF: ABNORMAL /LPF
IMM GRANULOCYTES # BLD AUTO: 0.02 K/UL (ref 0–0.11)
IMM GRANULOCYTES NFR BLD AUTO: 0.2 % (ref 0–0.9)
INR PPP: 1.18 (ref 0.87–1.13)
KETONES UR STRIP.AUTO-MCNC: NEGATIVE MG/DL
LEUKOCYTE ESTERASE UR QL STRIP.AUTO: ABNORMAL
LIPASE SERPL-CCNC: 24 U/L (ref 11–82)
LYMPHOCYTES # BLD AUTO: 1.37 K/UL (ref 1–4.8)
LYMPHOCYTES NFR BLD: 15.6 % (ref 22–41)
MCH RBC QN AUTO: 29.5 PG (ref 27–33)
MCHC RBC AUTO-ENTMCNC: 32.6 G/DL (ref 33.6–35)
MCV RBC AUTO: 90.4 FL (ref 81.4–97.8)
MICRO URNS: ABNORMAL
MONOCYTES # BLD AUTO: 0.88 K/UL (ref 0–0.85)
MONOCYTES NFR BLD AUTO: 10 % (ref 0–13.4)
NEUTROPHILS # BLD AUTO: 6.12 K/UL (ref 2–7.15)
NEUTROPHILS NFR BLD: 69.8 % (ref 44–72)
NITRITE UR QL STRIP.AUTO: NEGATIVE
NRBC # BLD AUTO: 0 K/UL
NRBC BLD AUTO-RTO: 0 /100 WBC
PH UR STRIP.AUTO: 5.5 [PH]
PLATELET # BLD AUTO: 453 K/UL (ref 164–446)
PMV BLD AUTO: 9.9 FL (ref 9–12.9)
POTASSIUM SERPL-SCNC: 3.4 MMOL/L (ref 3.6–5.5)
PROT SERPL-MCNC: 8 G/DL (ref 6–8.2)
PROT UR QL STRIP: NEGATIVE MG/DL
PROTHROMBIN TIME: 15.4 SEC (ref 12–14.6)
RBC # BLD AUTO: 3.66 M/UL (ref 4.2–5.4)
RBC # URNS HPF: ABNORMAL /HPF
RBC UR QL AUTO: NEGATIVE
SIGNIFICANT IND 70042: NORMAL
SITE SITE: NORMAL
SODIUM SERPL-SCNC: 136 MMOL/L (ref 135–145)
SOURCE SOURCE: NORMAL
SP GR UR STRIP.AUTO: 1.01
TROPONIN I SERPL-MCNC: <0.01 NG/ML (ref 0–0.04)
WBC # BLD AUTO: 8.8 K/UL (ref 4.8–10.8)
WBC #/AREA URNS HPF: ABNORMAL /HPF

## 2017-03-12 PROCEDURE — 83690 ASSAY OF LIPASE: CPT

## 2017-03-12 PROCEDURE — 80307 DRUG TEST PRSMV CHEM ANLYZR: CPT

## 2017-03-12 PROCEDURE — 700102 HCHG RX REV CODE 250 W/ 637 OVERRIDE(OP): Performed by: EMERGENCY MEDICINE

## 2017-03-12 PROCEDURE — 81001 URINALYSIS AUTO W/SCOPE: CPT

## 2017-03-12 PROCEDURE — 85610 PROTHROMBIN TIME: CPT

## 2017-03-12 PROCEDURE — 99223 1ST HOSP IP/OBS HIGH 75: CPT | Performed by: INTERNAL MEDICINE

## 2017-03-12 PROCEDURE — 770020 HCHG ROOM/CARE - TELE (206)

## 2017-03-12 PROCEDURE — 87400 INFLUENZA A/B EACH AG IA: CPT

## 2017-03-12 PROCEDURE — 84484 ASSAY OF TROPONIN QUANT: CPT

## 2017-03-12 PROCEDURE — 93005 ELECTROCARDIOGRAM TRACING: CPT

## 2017-03-12 PROCEDURE — 87077 CULTURE AEROBIC IDENTIFY: CPT

## 2017-03-12 PROCEDURE — 83880 ASSAY OF NATRIURETIC PEPTIDE: CPT

## 2017-03-12 PROCEDURE — 93971 EXTREMITY STUDY: CPT

## 2017-03-12 PROCEDURE — A9270 NON-COVERED ITEM OR SERVICE: HCPCS | Performed by: EMERGENCY MEDICINE

## 2017-03-12 PROCEDURE — 85025 COMPLETE CBC W/AUTO DIFF WBC: CPT

## 2017-03-12 PROCEDURE — 700105 HCHG RX REV CODE 258: Performed by: INTERNAL MEDICINE

## 2017-03-12 PROCEDURE — 99285 EMERGENCY DEPT VISIT HI MDM: CPT

## 2017-03-12 PROCEDURE — 87086 URINE CULTURE/COLONY COUNT: CPT

## 2017-03-12 PROCEDURE — 71010 DX-CHEST-LIMITED (1 VIEW): CPT

## 2017-03-12 PROCEDURE — 80053 COMPREHEN METABOLIC PANEL: CPT

## 2017-03-12 PROCEDURE — 700102 HCHG RX REV CODE 250 W/ 637 OVERRIDE(OP): Performed by: INTERNAL MEDICINE

## 2017-03-12 PROCEDURE — 700111 HCHG RX REV CODE 636 W/ 250 OVERRIDE (IP): Performed by: INTERNAL MEDICINE

## 2017-03-12 PROCEDURE — 700105 HCHG RX REV CODE 258: Performed by: EMERGENCY MEDICINE

## 2017-03-12 PROCEDURE — 73560 X-RAY EXAM OF KNEE 1 OR 2: CPT | Mod: LT

## 2017-03-12 PROCEDURE — 85730 THROMBOPLASTIN TIME PARTIAL: CPT

## 2017-03-12 PROCEDURE — 87502 INFLUENZA DNA AMP PROBE: CPT

## 2017-03-12 PROCEDURE — 87503 INFLUENZA DNA AMP PROB ADDL: CPT

## 2017-03-12 PROCEDURE — A9270 NON-COVERED ITEM OR SERVICE: HCPCS | Performed by: INTERNAL MEDICINE

## 2017-03-12 PROCEDURE — 82550 ASSAY OF CK (CPK): CPT

## 2017-03-12 PROCEDURE — 87186 SC STD MICRODIL/AGAR DIL: CPT

## 2017-03-12 PROCEDURE — 93971 EXTREMITY STUDY: CPT | Mod: 26 | Performed by: SURGERY

## 2017-03-12 PROCEDURE — 36415 COLL VENOUS BLD VENIPUNCTURE: CPT

## 2017-03-12 RX ORDER — DOCUSATE SODIUM 100 MG/1
100 CAPSULE, LIQUID FILLED ORAL EVERY MORNING
Status: DISCONTINUED | OUTPATIENT
Start: 2017-03-13 | End: 2017-03-21 | Stop reason: HOSPADM

## 2017-03-12 RX ORDER — MORPHINE SULFATE 4 MG/ML
2 INJECTION, SOLUTION INTRAMUSCULAR; INTRAVENOUS
Status: DISCONTINUED | OUTPATIENT
Start: 2017-03-12 | End: 2017-03-21 | Stop reason: HOSPADM

## 2017-03-12 RX ORDER — CLOPIDOGREL BISULFATE 75 MG/1
75 TABLET ORAL DAILY
Status: DISCONTINUED | OUTPATIENT
Start: 2017-03-13 | End: 2017-03-21 | Stop reason: HOSPADM

## 2017-03-12 RX ORDER — LEVOTHYROXINE SODIUM 0.07 MG/1
75 TABLET ORAL
COMMUNITY
End: 2022-02-08

## 2017-03-12 RX ORDER — DEXTROSE MONOHYDRATE 25 G/50ML
25 INJECTION, SOLUTION INTRAVENOUS
Status: DISCONTINUED | OUTPATIENT
Start: 2017-03-12 | End: 2017-03-21 | Stop reason: HOSPADM

## 2017-03-12 RX ORDER — LOSARTAN POTASSIUM 50 MG/1
50 TABLET ORAL DAILY
Status: ON HOLD | COMMUNITY
End: 2017-03-21

## 2017-03-12 RX ORDER — LEVOTHYROXINE SODIUM 0.07 MG/1
75 TABLET ORAL
Status: DISCONTINUED | OUTPATIENT
Start: 2017-03-13 | End: 2017-03-21 | Stop reason: HOSPADM

## 2017-03-12 RX ORDER — OXYCODONE HYDROCHLORIDE 5 MG/1
5 TABLET ORAL EVERY 4 HOURS PRN
Status: DISCONTINUED | OUTPATIENT
Start: 2017-03-12 | End: 2017-03-21 | Stop reason: HOSPADM

## 2017-03-12 RX ORDER — OXYCODONE HYDROCHLORIDE 5 MG/1
5 TABLET ORAL ONCE
Status: COMPLETED | OUTPATIENT
Start: 2017-03-12 | End: 2017-03-12

## 2017-03-12 RX ORDER — SODIUM CHLORIDE 9 MG/ML
INJECTION, SOLUTION INTRAVENOUS CONTINUOUS
Status: DISCONTINUED | OUTPATIENT
Start: 2017-03-12 | End: 2017-03-16

## 2017-03-12 RX ORDER — ATORVASTATIN CALCIUM 20 MG/1
20 TABLET, FILM COATED ORAL
Status: DISCONTINUED | OUTPATIENT
Start: 2017-03-12 | End: 2017-03-21 | Stop reason: HOSPADM

## 2017-03-12 RX ORDER — ONDANSETRON 4 MG/1
4 TABLET, ORALLY DISINTEGRATING ORAL EVERY 4 HOURS PRN
Status: DISCONTINUED | OUTPATIENT
Start: 2017-03-12 | End: 2017-03-21 | Stop reason: HOSPADM

## 2017-03-12 RX ORDER — BISACODYL 10 MG
10 SUPPOSITORY, RECTAL RECTAL
Status: DISCONTINUED | OUTPATIENT
Start: 2017-03-12 | End: 2017-03-20

## 2017-03-12 RX ORDER — DIAZEPAM 5 MG/1
5 TABLET ORAL 4 TIMES DAILY PRN
Status: DISCONTINUED | OUTPATIENT
Start: 2017-03-12 | End: 2017-03-21 | Stop reason: HOSPADM

## 2017-03-12 RX ORDER — ONDANSETRON 2 MG/ML
4 INJECTION INTRAMUSCULAR; INTRAVENOUS EVERY 4 HOURS PRN
Status: DISCONTINUED | OUTPATIENT
Start: 2017-03-12 | End: 2017-03-21 | Stop reason: HOSPADM

## 2017-03-12 RX ORDER — AMOXICILLIN 250 MG
2 CAPSULE ORAL 2 TIMES DAILY
Status: DISCONTINUED | OUTPATIENT
Start: 2017-03-12 | End: 2017-03-20

## 2017-03-12 RX ORDER — HEPARIN SODIUM 5000 [USP'U]/ML
5000 INJECTION, SOLUTION INTRAVENOUS; SUBCUTANEOUS EVERY 8 HOURS
Status: DISCONTINUED | OUTPATIENT
Start: 2017-03-12 | End: 2017-03-16

## 2017-03-12 RX ORDER — GEMFIBROZIL 600 MG/1
600 TABLET, FILM COATED ORAL
Status: DISCONTINUED | OUTPATIENT
Start: 2017-03-13 | End: 2017-03-12

## 2017-03-12 RX ORDER — SODIUM CHLORIDE 9 MG/ML
500 INJECTION, SOLUTION INTRAVENOUS ONCE
Status: COMPLETED | OUTPATIENT
Start: 2017-03-12 | End: 2017-03-12

## 2017-03-12 RX ORDER — ESCITALOPRAM OXALATE 10 MG/1
20 TABLET ORAL DAILY
Status: DISCONTINUED | OUTPATIENT
Start: 2017-03-13 | End: 2017-03-21 | Stop reason: HOSPADM

## 2017-03-12 RX ORDER — POLYETHYLENE GLYCOL 3350 17 G/17G
1 POWDER, FOR SOLUTION ORAL
Status: DISCONTINUED | OUTPATIENT
Start: 2017-03-12 | End: 2017-03-20

## 2017-03-12 RX ADMIN — SODIUM CHLORIDE: 900 INJECTION INTRAVENOUS at 21:12

## 2017-03-12 RX ADMIN — HEPARIN SODIUM 5000 UNITS: 5000 INJECTION, SOLUTION INTRAVENOUS; SUBCUTANEOUS at 21:26

## 2017-03-12 RX ADMIN — SODIUM CHLORIDE 500 ML: 9 INJECTION, SOLUTION INTRAVENOUS at 19:33

## 2017-03-12 RX ADMIN — STANDARDIZED SENNA CONCENTRATE AND DOCUSATE SODIUM 2 TABLET: 8.6; 5 TABLET, FILM COATED ORAL at 21:23

## 2017-03-12 RX ADMIN — Medication 60 MG: at 23:42

## 2017-03-12 RX ADMIN — ATORVASTATIN CALCIUM 20 MG: 20 TABLET, FILM COATED ORAL at 21:21

## 2017-03-12 RX ADMIN — METOPROLOL TARTRATE 25 MG: 25 TABLET, FILM COATED ORAL at 21:22

## 2017-03-12 RX ADMIN — Medication: at 20:35

## 2017-03-12 RX ADMIN — CEFTRIAXONE SODIUM 1 G: 1 INJECTION, POWDER, FOR SOLUTION INTRAMUSCULAR; INTRAVENOUS at 21:21

## 2017-03-12 RX ADMIN — OXYCODONE HYDROCHLORIDE 5 MG: 5 TABLET ORAL at 19:34

## 2017-03-12 ASSESSMENT — LIFESTYLE VARIABLES
EVER_SMOKED: YES
EVER HAD A DRINK FIRST THING IN THE MORNING TO STEADY YOUR NERVES TO GET RID OF A HANGOVER: NO
TOTAL SCORE: 1
TOTAL SCORE: 1
ON A TYPICAL DAY WHEN YOU DRINK ALCOHOL HOW MANY DRINKS DO YOU HAVE: 2
TOTAL SCORE: 1
HAVE PEOPLE ANNOYED YOU BY CRITICIZING YOUR DRINKING: YES
ALCOHOL_USE: YES
HOW MANY TIMES IN THE PAST YEAR HAVE YOU HAD 5 OR MORE DRINKS IN A DAY: 30
EVER FELT BAD OR GUILTY ABOUT YOUR DRINKING: NO
HAVE YOU EVER FELT YOU SHOULD CUT DOWN ON YOUR DRINKING: NO
CONSUMPTION TOTAL: POSITIVE
AVERAGE NUMBER OF DAYS PER WEEK YOU HAVE A DRINK CONTAINING ALCOHOL: 4

## 2017-03-12 ASSESSMENT — PAIN SCALES - GENERAL
PAINLEVEL_OUTOF10: 7
PAINLEVEL_OUTOF10: 7

## 2017-03-12 NOTE — IP AVS SNAPSHOT
3/21/2017          Carole Kingsley  [phone]  [place] [address]    Dear Carole:    [facility] wants to ensure your discharge home is safe and you or your loved ones have had all your questions answered regarding your care after you leave the hospital.    You may receive a telephone call within two days of your discharge.  This call is to make certain you understand your discharge instructions as well as ensure we provided you with the best care possible during your stay with us.     The call will only last approximately 3-5 minutes and will be done by a nurse.    Once again, we want to ensure your discharge home is safe and that you have a clear understanding of any next steps in your care.  If you have any questions or concerns, please do not hesitate to contact us, we are here for you.  Thank you for choosing [facility] for your healthcare needs.    Sincerely,    Jorge Luis Casanova    [facility]

## 2017-03-12 NOTE — ED PROVIDER NOTES
ED Provider Note    Scribed for Brit Arredondo M.D. by Bong Cohn. 3/12/2017, 4:23 PM.    Primary care provider: ABIMAEL Todd  Means of arrival: Walk In  History obtained from: Patient  History limited by: None     CHIEF COMPLAINT  Chief Complaint   Patient presents with   • Shortness of Breath   • Back Pain     HPI  Carole Kingsley is a 71 y.o. female who presents to the Emergency Department due to shortness of breath. Patient had multiple coronary artery bypass surgery performed by Dr. Dowell on February 20th. When patient was discharged 4 days ago, she was able to ambulate and had normal activity levels initially. However, three days ago, patient had an onset of dry cough and drowsiness. Her cough and drowsiness has been worsening in the last three days. Patient has had worsening shortness of breath during exertion in the last three days as well. She complains of mid back pain associated with her cough in the last few days. She rates her current back pain as mild. Patient also complains of some left calf tightness that has been constant in the last few days associated with left knee pain from a chronic injury. Patient called her cardiologist, Dr. Eugene, today and it was recommended that patient be evaluated in the ED. She denies any nausea, vomiting, fever, chills, numbness, chest pain.     REVIEW OF SYSTEMS  Pertinent positives include shortness of breath, cough, drowsiness. Pertinent negatives include no nausea, vomiting, fever, chills, numbness, chest pain.  All other systems reviewed and negative.    C.     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    SURGICAL HISTORY   has past surgical history that includes recovery (6/12/2014); other (2013); recovery (7/14/2014); hysterectomy, total abdominal; angioplasty (7/2014); recovery (10/3/2014); multiple coronary artery bypass endo vein harvest (2/20/2017); liz (2/20/2017); and multiple coronary artery bypass (February 2017).    SOCIAL HISTORY  Social History   Substance Use Topics   • Smoking status: Former Smoker -- 25 years     Types: Cigarettes     Quit date: 01/08/2014   • Smokeless tobacco: Never Used   • Alcohol Use: 1.0 oz/week     2 Standard drinks or equivalent per week      Comment: 2 a night but not for the past month/ Vodka      History   Drug Use No     FAMILY HISTORY  Family History   Problem Relation Age of Onset   • Other Mother      Colon cancer   • Heart Attack Paternal Uncle      MI in 60     CURRENT MEDICATIONS  Home Medications     Reviewed by Damián Logan M.D. (Physician) on 03/12/17 at [id number]  Med List Status: Complete    Medication Last Dose Status    aspirin EC (ECOTRIN) 81 MG Tablet Delayed Response 3/11/2017 Active    atorvastatin (LIPITOR) 20 MG Tab 3/11/2017 Active    clopidogrel (PLAVIX) 75 MG Tab 3/11/2017 Active    diazepam (VALIUM) 5 MG Tab 3/11/2017 Active    docusate sodium 100 MG Cap 3/11/2017 Active    escitalopram (LEXAPRO) 20 MG tablet 3/11/2017 Active    furosemide (LASIX) 40 MG Tab 3/11/2017 Active    gemfibrozil (LOPID) 600 MG Tab 3/11/2017 Active    levothyroxine (SYNTHROID) 75 MCG Tab 3/11/2017 Active    losartan (COZAAR) 50 MG Tab 3/11/2017 Active    metformin ER (GLUCOPHAGE XR) 500 MG TABLET SR 24 HR 3/11/2017 Active    metoprolol (LOPRESSOR) 25 MG Tab 3/11/2017 Active    omeprazole (PRILOSEC) 20 MG delayed-release capsule 3/11/2017 Active    oxycodone immediate-release (ROXICODONE) 5 MG Tab 3/12/2017 Active    potassium chloride SA (KDUR) 20 MEQ Tab CR 3/11/2017 Active              ALLERGIES  Allergies  "  Allergen Reactions   • Tape Contact Dermatitis     \"paper is OK\"     PHYSICAL EXAM  Vital Signs: /50 mmHg  Pulse 80  Temp(Src) 36.6 °C (97.9 °F) (Temporal)  Resp 18  Wt 54.885 kg (121 lb)  SpO2 97%  Constitutional: Alert, no acute distress  HENT: Normocephalic, atraumatic, moist mucus membranes  Eyes: Pupils equal and reactive, normal conjunctiva, non-icteric  Neck: Supple, normal range of motion, no stridor  Cardiovascular: Regular rhythm, Normal peripheral perfusion, no cyanosis, Normal cardiac auscultation  Pulmonary: Quiet breath sounds in bilateral lower bases, No respiratory distress, normal work of breathing, no accessory muscle usage, Clear to auscultation bilaterally  Abdomen: Soft, non tender, no peritoneal signs, bowel sounds are present.   Skin: Warm, dry, no rashes or lesions  Back: No pain with active range of motion  Musculoskeletal: Mild palpable effusion to left knee. Normal range of motion in all extremities  Neurologic: Alert, oriented, normal motor function, no speech deficits  Psychiatric: Flat affect    DIAGNOSTIC STUDIES/PROCEDURES:    LABS  Labs Reviewed   BTYPE NATRIURETIC PEPTIDE - Abnormal; Notable for the following:     B Natriuretic Peptide 326 (*)     All other components within normal limits    Narrative:     Indicate which anticoagulants the patient is on:->UNKNOWN   CBC WITH DIFFERENTIAL - Abnormal; Notable for the following:     RBC 3.66 (*)     Hemoglobin 10.8 (*)     Hematocrit 33.1 (*)     MCHC 32.6 (*)     Platelet Count 453 (*)     Lymphocytes 15.60 (*)     Monos (Absolute) 0.88 (*)     All other components within normal limits    Narrative:     Indicate which anticoagulants the patient is on:->UNKNOWN   COMP METABOLIC PANEL - Abnormal; Notable for the following:     Potassium 3.4 (*)     Co2 19 (*)     Anion Gap 16.0 (*)     Bun 35 (*)     Creatinine 1.49 (*)     Alkaline Phosphatase 116 (*)     Globulin 3.7 (*)     All other components within normal limits    " Narrative:     Indicate which anticoagulants the patient is on:->UNKNOWN   PROTHROMBIN TIME - Abnormal; Notable for the following:     PT 15.4 (*)     INR 1.18 (*)     All other components within normal limits    Narrative:     Indicate which anticoagulants the patient is on:->UNKNOWN   APTT - Abnormal; Notable for the following:     APTT 36.5 (*)     All other components within normal limits    Narrative:     Indicate which anticoagulants the patient is on:->UNKNOWN   ESTIMATED GFR - Abnormal; Notable for the following:     GFR If  42 (*)     GFR If Non  35 (*)     All other components within normal limits    Narrative:     Indicate which anticoagulants the patient is on:->UNKNOWN   URINALYSIS,CULTURE IF INDICATED - Abnormal; Notable for the following:     Character Sl Cloudy (*)     Leukocyte Esterase Large (*)     All other components within normal limits   URINE MICROSCOPIC (W/UA) - Abnormal; Notable for the following:     WBC 20-50 (*)     RBC 2-5 (*)     Bacteria Many (*)     Hyaline Cast 3-5 (*)     All other components within normal limits   TROPONIN    Narrative:     Indicate which anticoagulants the patient is on:->UNKNOWN   LIPASE    Narrative:     Indicate which anticoagulants the patient is on:->UNKNOWN   INFLUENZA RAPID   INFLUENZA BY PCR, A/B/H1N1   DIAGNOSTIC ALCOHOL   URINE CULTURE(NEW)   [facility] POC GLUCOSE     All labs reviewed by me.    EKG  12 Lead EKG interpreted by me to show:  Sinus rhythm, rate 75, no ST elevation or depression, T-wave inversions present in leads V3, V4, V5. On previous EKG these leads demonstrated flat T waves. No ectopy.    Radiology results revealed:   LE VENOUS DUPLEX (Specify in Comments Left, Right Or Bilateral)   Preliminary Result      DX-CHEST-LIMITED (1 VIEW)   Final Result      No evidence of acute cardiopulmonary process.      US-RENAL    (Results Pending)        COURSE & MEDICAL DECISION MAKING  Pertinent Labs & Imaging studies  reviewed. (See chart for details)    Review of old medical records for continuity of care. Patient had 4 Vessel bypass surgery performed by Dr. Larios on 2/20/17 and was discharged to skilled nursing 2/27/17.      Differential diagnoses include but are not limited to: Acute coronary syndrome, electrolyte abnormality, hypovolemia, anemia, postoperative complication, pneumonia, influenza, DVT.    4:23 PM - Patient seen and examined at bedside. Ordered LE Venous Duplex, Chest X Ray, diagnostic alcohol, influenza rapid, GFR, troponin, BNP, CBC, CMP, APTT, lipase to evaluate her symptoms.     6:29 PM Spoke with Dr. Schreiber, Hospitalist, about the patient's condition. Dr. Schreiber was updated of patient's condition and is agreeable for admission.       6:43 PM Spoke with Dr. Young, Cardiology, about the patient's condition.      Decision Making:  This is a 71 y.o. year old female who presents with generalized fatigue, mild shortness of breath. Additionally complains of loss of appetite. No chest pain, she does have persistent postoperative pain that is not changed. No focal weakness to suggest CVA, she does complain of generalized fatigue only. She has had a normal physical exam with quiet breath sounds bilaterally. No coarse breath sounds, no wheezing.    On laboratory evaluation she does have bacteria and white blood cells in the urine, no symptoms of urinary tract infection. Will defer antibiotics to hospitalist. Rapid influenza and PCR are negative. Lower extremity ultrasound is negative for DVT. Suspect left knee swelling is due to chronic knee injury. Troponin is undetectable, BNP slightly elevated at 326, EKG with T-wave inversions, these leads did previously have abnormalities, doubt new ischemic changes. White blood count is 8.8, no fevers, no hypotension, no tachycardia, doubt infectious etiology, doubt SIRS, doubt sepsis. No electrolyte abnormalities. Creatinine is 1.49 which is elevated from previous of 0.9.  Lipase is normal doubt pancreatitis. Chest x-ray is negative for acute process.    At this time, it does appear that the patient has mild renal insufficiency, likely secondary to poor by mouth intake. Plan at this time is for admission. She states she is having difficulty ambulating secondary to fatigue. We will have her evaluated by PT for assistance with ambulation. At consult to Dr. Young who is on-call for Dr. Eugene, he kindly agrees to evaluate the patient in the emergency department. Plan at this time is for admission to hospitalist service.    Disposition:  Patient will be admitted to the hospital under the care of Dr. Schreiber (Hospitalist) in guarded condition.     FINAL IMPRESSION  1. Renal insufficiency    2. Shortness of breath          Bong CAM (Scribe), am scribing for, and in the presence of, Brit Arredondo M.D..    Electronically signed by: Bong Cohn (Chaitanyaibbear), 3/12/2017    Brit CAM M.D. personally performed the services described in this documentation, as scribed by Bong Cohn in my presence, and it is both accurate and complete.    The note accurately reflects work and decisions made by me.  Brit Arredondo  3/12/2017  7:52 PM

## 2017-03-12 NOTE — IP AVS SNAPSHOT
" Home Care Instructions                                                                                                                  Name:Carole Kingsley  Medical Record Number:[id number]  CSN: [id number]    YOB: 1945   Age: 71 y.o.  Sex: female  HT:1.575 m (5' 2\") WT: 59 kg (130 lb 1.1 oz)          Admit Date: 3/12/2017     Discharge Date:   Today's Date: 3/21/2017  Attending Doctor:  Evan Estrada M.D.                  Allergies:  Tape            Discharge Instructions       Discharge Instructions    Discharged to home by car with relative. Discharged via wheelchair, hospital escort: Refused.  Special equipment needed: Not Applicable    Be sure to schedule a follow-up appointment with your primary care doctor or any specialists as instructed.     Discharge Plan:   Pneumococcal Vaccine Given - only chart on this line when given: Given (See MAR)  Influenza Vaccine Indication: Patient Refuses    I understand that a diet low in cholesterol, fat, and sodium is recommended for good health. Unless I have been given specific instructions below for another diet, I accept this instruction as my diet prescription.   Other diet: Diabetic, Cardiac    Special Instructions: None    · Is patient discharged on Warfarin / Coumadin?   No     · Is patient Post Blood Transfusion?  No    Depression / Suicide Risk    As you are discharged from this [facility] facility, it is important to learn how to keep safe from harming yourself.    Recognize the warning signs:  · Abrupt changes in personality, positive or negative- including increase in energy   · Giving away possessions  · Change in eating patterns- significant weight changes-  positive or negative  · Change in sleeping patterns- unable to sleep or sleeping all the time   · Unwillingness or inability to communicate  · Depression  · Unusual sadness, discouragement and loneliness  · Talk of wanting to die  · Neglect of personal appearance   · Rebelliousness- " reckless behavior  · Withdrawal from people/activities they love  · Confusion- inability to concentrate     If you or a loved one observes any of these behaviors or has concerns about self-harm, here's what you can do:  · Talk about it- your feelings and reasons for harming yourself  · Remove any means that you might use to hurt yourself (examples: pills, rope, extension cords, firearm)  · Get professional help from the community (Mental Health, Substance Abuse, psychological counseling)  · Do not be alone:Call your Safe Contact- someone whom you trust who will be there for you.  · Call your local CRISIS HOTLINE 158-7899 or 001-817-2494  · Call your local Children's Mobile Crisis Response Team [place] (079) 225-8942 or www.PercuVision  · Call the toll free National Suicide Prevention Hotlines   · National Suicide Prevention Lifeline 077-627-BQVH (7346)  · [facility] Line Network 800-VXZQQOC (236-8268)    Urinary Tract Infection  Urinary tract infections (UTIs) can develop anywhere along your urinary tract. Your urinary tract is your body's drainage system for removing wastes and extra water. Your urinary tract includes two kidneys, two ureters, a bladder, and a urethra. Your kidneys are a pair of bean-shaped organs. Each kidney is about the size of your fist. They are located below your ribs, one on each side of your spine.  CAUSES  Infections are caused by microbes, which are microscopic organisms, including fungi, viruses, and bacteria. These organisms are so small that they can only be seen through a microscope. Bacteria are the microbes that most commonly cause UTIs.  SYMPTOMS   Symptoms of UTIs may vary by age and gender of the patient and by the location of the infection. Symptoms in young women typically include a frequent and intense urge to urinate and a painful, burning feeling in the bladder or urethra during urination. Older women and men are more likely to be tired, shaky, and weak and  have muscle aches and abdominal pain. A fever may mean the infection is in your kidneys. Other symptoms of a kidney infection include pain in your back or sides below the ribs, nausea, and vomiting.  DIAGNOSIS  To diagnose a UTI, your caregiver will ask you about your symptoms. Your caregiver also will ask to provide a urine sample. The urine sample will be tested for bacteria and white blood cells. White blood cells are made by your body to help fight infection.  TREATMENT   Typically, UTIs can be treated with medication. Because most UTIs are caused by a bacterial infection, they usually can be treated with the use of antibiotics. The choice of antibiotic and length of treatment depend on your symptoms and the type of bacteria causing your infection.  HOME CARE INSTRUCTIONS  · If you were prescribed antibiotics, take them exactly as your caregiver instructs you. Finish the medication even if you feel better after you have only taken some of the medication.  · Drink enough water and fluids to keep your urine clear or pale yellow.  · Avoid caffeine, tea, and carbonated beverages. They tend to irritate your bladder.  · Empty your bladder often. Avoid holding urine for long periods of time.  · Empty your bladder before and after sexual intercourse.  · After a bowel movement, women should cleanse from front to back. Use each tissue only once.  SEEK MEDICAL CARE IF:   · You have back pain.  · You develop a fever.  · Your symptoms do not begin to resolve within 3 days.  SEEK IMMEDIATE MEDICAL CARE IF:   · You have severe back pain or lower abdominal pain.  · You develop chills.  · You have nausea or vomiting.  · You have continued burning or discomfort with urination.  MAKE SURE YOU:   · Understand these instructions.  · Will watch your condition.  · Will get help right away if you are not doing well or get worse.     This information is not intended to replace advice given to you by your health care provider. Make sure  "you discuss any questions you have with your health care provider.     Document Released: 09/27/2006 Document Revised: 01/08/2016 Document Reviewed: 01/25/2013  [employer] Interactive Patient Education ©2016 [employer]    PICC Home Guide  A peripherally inserted central catheter (PICC) is a long, thin, flexible tube that is inserted into a vein in the upper arm. It is a form of intravenous (IV) access. It is considered to be a \"central\" line because the tip of the PICC ends in a large vein in your chest. This large vein is called the superior vena cava (SVC). The PICC tip ends in the SVC because there is a lot of blood flow in the SVC. This allows medicines and IV fluids to be quickly distributed throughout the body. The PICC is inserted using a sterile technique by a specially trained nurse or physician. After the PICC is inserted, a chest X-ray exam is done to be sure it is in the correct place.   A PICC may be placed for different reasons, such as:  · To give medicines and liquid nutrition that can only be given through a central line. Examples are:  ¨ Certain antibiotic treatments.  ¨ Chemotherapy.  ¨ Total parenteral nutrition (TPN).  · To take frequent blood samples.  · To give IV fluids and blood products.  · If there is difficulty placing a peripheral intravenous (PIV) catheter.  If taken care of properly, a PICC can remain in place for several months. A PICC can also allow a person to go home from the hospital early. Medicine and PICC care can be managed at home by a family member or home health care team.  WHAT PROBLEMS CAN HAPPEN WHEN I HAVE A PICC?  Problems with a PICC can occasionally occur. These may include the following:  · A blood clot (thrombus) forming in or at the tip of the PICC. This can cause the PICC to become clogged. A clot-dissolving medicine called tissue plasminogen activator (tPA) can be given through the PICC to help break up the clot.  · Inflammation of the vein (phlebitis) in which " "the PICC is placed. Signs of inflammation may include redness, pain at the insertion site, red streaks, or being able to feel a \"cord\" in the vein where the PICC is located.  · Infection in the PICC or at the insertion site. Signs of infection may include fever, chills, redness, swelling, or pus drainage from the PICC insertion site.  · PICC movement (malposition). The PICC tip may move from its original position due to excessive physical activity, forceful coughing, sneezing, or vomiting.  · A break or cut in the PICC. It is important to not use scissors near the PICC.  · Nerve or tendon irritation or injury during PICC insertion.  WHAT SHOULD I KEEP IN MIND ABOUT ACTIVITIES WHEN I HAVE A PICC?  · You may bend your arm and move it freely. If your PICC is near or at the bend of your elbow, avoid activity with repeated motion at the elbow.  · Rest at home for the remainder of the day following PICC line insertion.  · Avoid lifting heavy objects as instructed by your health care provider.  · Avoid using a crutch with the arm on the same side as your PICC. You may need to use a walker.  WHAT SHOULD I KNOW ABOUT MY PICC DRESSING?  · Keep your PICC bandage (dressing) clean and dry to prevent infection.  ¨ Ask your health care provider when you may shower. Ask your health care provider to teach you how to wrap the PICC when you do take a shower.  · Change the PICC dressing as instructed by your health care provider.  · Change your PICC dressing if it becomes loose or wet.  WHAT SHOULD I KNOW ABOUT PICC CARE?  · Check the PICC insertion site daily for leakage, redness, swelling, or pain.  · Do not take a bath, swim, or use hot tubs when you have a PICC. Cover PICC line with clear plastic wrap and tape to keep it dry while showering.  · Flush the PICC as directed by your health care provider. Let your health care provider know right away if the PICC is difficult to flush or does not flush. Do not use force to flush the " "PICC.  · Do not use a syringe that is less than 10 mL to flush the PICC.  · Never pull or tug on the PICC.  · Avoid blood pressure checks on the arm with the PICC.  · Keep your PICC identification card with you at all times.  · Do not take the PICC out yourself. Only a trained clinical professional should remove the PICC.  SEEK IMMEDIATE MEDICAL CARE IF:  · Your PICC is accidentally pulled all the way out. If this happens, cover the insertion site with a bandage or gauze dressing. Do not throw the PICC away. Your health care provider will need to inspect it.  · Your PICC was tugged or pulled and has partially come out. Do not  push the PICC back in.  · There is any type of drainage, redness, or swelling where the PICC enters the skin.  · You cannot flush the PICC, it is difficult to flush, or the PICC leaks around the insertion site when it is flushed.  · You hear a \"flushing\" sound when the PICC is flushed.  · You have pain, discomfort, or numbness in your arm, shoulder, or jaw on the same side as the PICC.  · You feel your heart \"racing\" or skipping beats.  · You notice a hole or tear in the PICC.  · You develop chills or a fever.  MAKE SURE YOU:   · Understand these instructions.  · Will watch your condition.  · Will get help right away if you are not doing well or get worse.     This information is not intended to replace advice given to you by your health care provider. Make sure you discuss any questions you have with your health care provider.     Document Released: 06/23/2004 Document Revised: 01/08/2016 Document Reviewed: 08/25/2014  [employer] Interactive Patient Education ©2016 [employer]            Your appointments     Mar 29, 2017  1:40 PM   HOSPITAL FOLLOW UP with WARREN Mccann   [facility] (--)    [address], [address]  [place] [address]   [phone]            Mar 30, 2017  1:00 PM   ORIENTATION with ICR RN   [facility] " ([facility])    [address]  [address]  [facility] [address]   [phone]            Apr 17, 2017 11:00 AM   Established Patient with Angel Ambrose M.D.   [facility] ([facility])    [address]  [facility] [id number] [phone]           You will be receiving a confirmation call a few days before your appointment from our automated call confirmation system.            Jun 21, 2017  1:15 PM   FOLLOW UP with Rody Eugene M.D.   [facility] (--)    [address]  [facility] [id number] [phone]              Follow-up Information     1. Follow up with ABIMAEL Todd. Schedule an appointment as soon as possible for a visit in 2 weeks.    Specialty:  Family Medicine    Why:  Follow-up appointment    Contact information    [address]  [facility] [address]  [phone]          2. Schedule an appointment as soon as possible for a visit with Duncan Del Rio M.D..    Specialty:  Orthopaedics    Why:  Follow-up appointment    Contact information    [address]  [address]  [facility] [address]  [phone]           Discharge Medication Instructions:    Below are the medications your physician expects you to take upon discharge:    Review all your home medications and newly ordered medications with your doctor and/or pharmacist. Follow medication instructions as directed by your doctor and/or pharmacist.    Please keep your medication list with you and share with your physician.               Medication List      START taking these medications        Instructions    amlodipine 5 MG Tabs   Last time this was given:  5 mg on 3/21/2017  9:30 AM   Commonly known as:  NORVASC    Take 1 Tab by mouth every day.   Dose:  5 mg       ascorbic acid 500 MG tablet   Last time this was given:  500 mg on 3/20/2017  8:35 PM   Commonly known as:  VITAMIN C    Take 1 Tab by mouth 3 times a day.   Dose:   500 mg       carvedilol 6.25 MG Tabs   Last time this was given:  6.25 mg on 3/21/2017  7:51 AM   Commonly known as:  COREG    Take 1 Tab by mouth 2 times a day, with meals.   Dose:  6.25 mg       colchicine 0.6 MG Tabs   Last time this was given:  0.6 mg on 3/21/2017  9:31 AM   Commonly known as:  COLCRYS    Take 1 Tab by mouth 2 Times a Day.   Dose:  0.6 mg       ferrous gluconate 324 (38 FE) MG Tabs   Last time this was given:  324 mg on 3/21/2017  7:51 AM   Commonly known as:  FERGON    Take 1 Tab by mouth 2 times a day, with meals.   Dose:  324 mg       multivitamin Tabs   Last time this was given:  1 Tab on 3/21/2017  9:30 AM    Take 1 Tab by mouth every day.   Dose:  1 Tab       NS SOLN 100 mL with cefTRIAXone 2 GM SOLR 2 g   Last time this was given:  2 g on 3/21/2017  9:31 AM    2 g by Intravenous route every 24 hours.   Dose:  2 g         CHANGE how you take these medications        Instructions    losartan 100 MG Tabs   What changed:    - medication strength  - how much to take   Last time this was given:  100 mg on 3/21/2017  9:30 AM   Commonly known as:  COZAAR    Take 1 Tab by mouth every day.   Dose:  100 mg         CONTINUE taking these medications        Instructions    aspirin EC 81 MG Tbec   Last time this was given:  81 mg on 3/21/2017  9:30 AM   Commonly known as:  ECOTRIN    Take 81 mg by mouth every day.   Dose:  81 mg       atorvastatin 20 MG Tabs   Last time this was given:  20 mg on 3/20/2017  8:36 PM   Commonly known as:  LIPITOR    Take 1 Tab by mouth every bedtime.   Dose:  20 mg       clopidogrel 75 MG Tabs   Last time this was given:  75 mg on 3/21/2017  9:30 AM   Commonly known as:  PLAVIX    Take 1 Tab by mouth every day.   Dose:  75 mg       diazepam 5 MG Tabs   Commonly known as:  VALIUM    Take 5 mg by mouth 4 times a day as needed for Anxiety.   Dose:  5 mg        MG Caps   Last time this was given:  100 mg on 3/21/2017  9:30 AM    Take 100 mg by mouth every morning.      Dose:  100 mg       escitalopram 20 MG tablet   Last time this was given:  20 mg on 3/21/2017  9:31 AM   Commonly known as:  LEXAPRO    Take 1 Tab by mouth every day.   Dose:  20 mg       gemfibrozil 600 MG Tabs   Commonly known as:  LOPID    TAKE ONE TABLET BY MOUTH TWICE DAILY       levothyroxine 75 MCG Tabs   Last time this was given:  75 mcg on 3/21/2017  6:19 AM   Commonly known as:  SYNTHROID    Take 75 mcg by mouth Every morning on an empty stomach.   Dose:  75 mcg       metformin  MG Tb24   Commonly known as:  GLUCOPHAGE XR    Take 500 mg by mouth 2 times a day, with meals.   Dose:  500 mg       oxycodone immediate-release 5 MG Tabs   Last time this was given:  5 mg on 3/21/2017 12:28 PM   Commonly known as:  ROXICODONE    Take 5 mg by mouth every four hours as needed for Severe Pain.   Dose:  5 mg         STOP taking these medications     furosemide 40 MG Tabs   Commonly known as:  LASIX       metoprolol 25 MG Tabs   Commonly known as:  LOPRESSOR       omeprazole 20 MG delayed-release capsule   Commonly known as:  PRILOSEC       potassium chloride SA 20 MEQ Tbcr   Commonly known as:  Kdur               Instructions           Diet / Nutrition:    Follow any diet instructions given to you by your doctor or the dietician, including how much salt (sodium) you are allowed each day.    If you are overweight, talk to your doctor about a weight reduction plan.    Activity:    Remain physically active following your doctor's instructions about exercise and activity.    Rest often.     Any time you become even a little tired or short of breath, SIT DOWN and rest.    Worsening Symptoms:    Report any of the following signs and symptoms to the doctor's office immediately:    *Pain of jaw, arm, or neck  *Chest pain not relieved by medication                               *Dizziness or loss of consciousness  *Difficulty breathing even when at rest   *More tired than usual                                        *Bleeding drainage or swelling of surgical site  *Swelling of feet, ankles, legs or stomach                 *Fever (>100ºF)  *Pink or blood tinged sputum  *Weight gain (3lbs/day or 5lbs /week)           *Shock from internal defibrillator (if applicable)  *Palpitations or irregular heartbeats                *Cool and/or numb extremities    Stroke Awareness    Common Risk Factors for Stroke include:    Age  Atrial Fibrillation  Carotid Artery Stenosis  Diabetes Mellitus  Excessive alcohol consumption  High blood pressure  Overweight   Physical inactivity  Smoking    Warning signs and symptoms of a stroke include:    *Sudden numbness or weakness of the face, arm or leg (especially on one side of the body).  *Sudden confusion, trouble speaking or understanding.  *Sudden trouble seeing in one or both eyes.  *Sudden trouble walking, dizziness, loss of balance or coordination.Sudden severe headache with no known cause.    It is very important to get treatment quickly when a stroke occurs. If you experience any of the above warning signs, call 911 immediately.                   Disclaimer         Quit Smoking / Tobacco Use:    I understand the use of any tobacco products increases my chance of suffering from future heart disease or stroke and could cause other illnesses which may shorten my life. Quitting the use of tobacco products is the single most important thing I can do to improve my health. For further information on smoking / tobacco cessation call a Toll Free Quit Line at 1-597.137.3251 (*[employer]) or 1-919.661.2412 (American Lung Association) or you can access the web based program at www.lungusa.org.    [place] Tobacco Users Help Line:  (817) 677-4084       Toll Free: 1-408.357.2211  Quit Tobacco Program [facility] (104)496-4650    Crisis Hotline:    [place] Crisis Hotline:  8-678-DXJTFSJ or 1-349.151.3817    [place] Crisis Hotline:    1-948.827.2081 or  317.835.3076    Discharge Survey:   Thank you for choosing [facility]. We hope we did everything we could to make your hospital stay a pleasant one. You may be receiving a phone survey and we would appreciate your time and participation in answering the questions. Your input is very valuable to us in our efforts to improve our service to our patients and their families.        My signature on this form indicates that:    1. I have reviewed and understand the above information.  2. My questions regarding this information have been answered to my satisfaction.  3. I have formulated a plan with my discharge nurse to obtain my prescribed medications for home.                  Disclaimer         __________________________________                     __________       ________                       Patient Signature                                                 Date                    Time

## 2017-03-12 NOTE — IP AVS SNAPSHOT
" <p align=\"LEFT\"><IMG SRC=\"//[email or web address]$/Images/[email or web address]\" alt=\"Image\" WIDTH=\"50%\" HEIGHT=\"200\" BORDER=\"\"></p>                   Name:Carole Kingsley  Medical Record Number:[id number]  CSN: [id number]    YOB: 1945   Age: 71 y.o.  Sex: female  HT:1.575 m (5' 2\") WT: 59 kg (130 lb 1.1 oz)          Admit Date: 3/12/2017     Discharge Date:   Today's Date: 3/21/2017  Attending Doctor:  Evan Estrada M.D.                  Allergies:  Tape          Your appointments     Mar 29, 2017  1:40 PM   HOSPITAL FOLLOW UP with WARREN Mccann   [facility] ([facility]    [address], [address]  [place] [id number] [phone]            Mar 30, 2017  1:00 PM   ORIENTATION with [facility] RN   [facility] ([facility])    [address]  [address]  [place] [id number] [phone]            Apr 17, 2017 11:00 AM   Established Patient with Angel Ambrose M.D.   [facility] ([facility])    [address]  [place] [id number] [phone]           You will be receiving a confirmation call a few days before your appointment from our automated call confirmation system.            Jun 21, 2017  1:15 PM   FOLLOW UP with Rody Eugene M.D.   [facility]    [address]  [place] [address] [phone]              Follow-up Information     1. Follow up with ABIMAEL Todd. Schedule an appointment as soon as possible for a visit in 2 weeks.    Specialty:  Family Medicine    Why:  Follow-up appointment    Contact information    [address] [place] [address]  [phone]          2. Schedule an appointment as soon as possible for a visit with Duncan Del Rio M.D..    Specialty:  Orthopaedics    Why:  Follow-up appointment    Contact information    [address]  [address]  [place] [address]  [phone]           Medication " List      Take these Medications        Instructions    amlodipine 5 MG Tabs   Commonly known as:  NORVASC    Take 1 Tab by mouth every day.   Dose:  5 mg       ascorbic acid 500 MG tablet   Commonly known as:  VITAMIN C    Take 1 Tab by mouth 3 times a day.   Dose:  500 mg       aspirin EC 81 MG Tbec   Commonly known as:  ECOTRIN    Take 81 mg by mouth every day.   Dose:  81 mg       atorvastatin 20 MG Tabs   Commonly known as:  LIPITOR    Take 1 Tab by mouth every bedtime.   Dose:  20 mg       carvedilol 6.25 MG Tabs   Commonly known as:  COREG    Take 1 Tab by mouth 2 times a day, with meals.   Dose:  6.25 mg       clopidogrel 75 MG Tabs   Commonly known as:  PLAVIX    Take 1 Tab by mouth every day.   Dose:  75 mg       colchicine 0.6 MG Tabs   Commonly known as:  COLCRYS    Take 1 Tab by mouth 2 Times a Day.   Dose:  0.6 mg       diazepam 5 MG Tabs   Commonly known as:  VALIUM    Take 5 mg by mouth 4 times a day as needed for Anxiety.   Dose:  5 mg        MG Caps    Take 100 mg by mouth every morning.   Dose:  100 mg       escitalopram 20 MG tablet   Commonly known as:  LEXAPRO    Take 1 Tab by mouth every day.   Dose:  20 mg       ferrous gluconate 324 (38 FE) MG Tabs   Commonly known as:  FERGON    Take 1 Tab by mouth 2 times a day, with meals.   Dose:  324 mg       gemfibrozil 600 MG Tabs   Commonly known as:  LOPID    TAKE ONE TABLET BY MOUTH TWICE DAILY       levothyroxine 75 MCG Tabs   Commonly known as:  SYNTHROID    Take 75 mcg by mouth Every morning on an empty stomach.   Dose:  75 mcg       losartan 100 MG Tabs   What changed:    - medication strength  - how much to take   Commonly known as:  COZAAR    Take 1 Tab by mouth every day.   Dose:  100 mg       metformin  MG Tb24   Commonly known as:  GLUCOPHAGE XR    Take 500 mg by mouth 2 times a day, with meals.   Dose:  500 mg       multivitamin Tabs    Take 1 Tab by mouth every day.   Dose:  1 Tab       NS SOLN 100 mL with cefTRIAXone 2 GM  SOLR 2 g    2 g by Intravenous route every 24 hours.   Dose:  2 g       oxycodone immediate-release 5 MG Tabs   Commonly known as:  ROXICODONE    Take 5 mg by mouth every four hours as needed for Severe Pain.   Dose:  5 mg

## 2017-03-12 NOTE — IP AVS SNAPSHOT
Startpack Access Code: [email or web address]  Expires: 3/29/2017 12:07 PM    Startpack  A secure, online tool to manage your health information     [employer] Startpack® is a secure, online tool that connects you to your personalized health information from the privacy of your home -- day or night - making it very easy for you to manage your healthcare. Once the activation process is completed, you can even access your medical information using the Startpack meredith, which is available for free in the Apple Meredith store or Google Play store.     Startpack provides the following levels of access (as shown below):   My Chart Features   [facility] Primary Care Doctor [facility]  Specialists [facility]  Urgent  Care Non-[facility]  Primary Care  Doctor   Email your healthcare team securely and privately 24/7 X X X X   Manage appointments: schedule your next appointment; view details of past/upcoming appointments X      Request prescription refills. X      View recent personal medical records, including lab and immunizations X X X X   View health record, including health history, allergies, medications X X X X   Read reports about your outpatient visits, procedures, consult and ER notes X X X X   See your discharge summary, which is a recap of your hospital and/or ER visit that includes your diagnosis, lab results, and care plan. X X       How to register for Startpack:  1. Go to  [email or web address].  2. Click on the Sign Up Now box, which takes you to the New Member Sign Up page. You will need to provide the following information:  a. Enter your Startpack Access Code exactly as it appears at the top of this page. (You will not need to use this code after you’ve completed the sign-up process. If you do not sign up before the expiration date, you must request a new code.)   b. Enter your date of birth.   c. Enter your home email address.   d. Click Submit, and follow the next screen’s instructions.  3. Create a Startpack ID. This will be your [employer]  login ID and cannot be changed, so think of one that is secure and easy to remember.  4. Create a [employer] password. You can change your password at any time.  5. Enter your Password Reset Question and Answer. This can be used at a later time if you forget your password.   6. Enter your e-mail address. This allows you to receive e-mail notifications when new information is available in [employer].  7. Click Sign Up. You can now view your health information.    For assistance activating your [employer] account, call [phone]

## 2017-03-13 ENCOUNTER — APPOINTMENT (OUTPATIENT)
Dept: RADIOLOGY | Facility: MEDICAL CENTER | Age: 72
DRG: 683 | End: 2017-03-13
Attending: INTERNAL MEDICINE
Payer: MEDICARE

## 2017-03-13 PROBLEM — E87.6 HYPOKALEMIA: Status: ACTIVE | Noted: 2017-03-13

## 2017-03-13 PROBLEM — R79.89 ELEVATED BRAIN NATRIURETIC PEPTIDE (BNP) LEVEL: Status: ACTIVE | Noted: 2017-03-13

## 2017-03-13 PROBLEM — D64.9 NORMOCYTIC ANEMIA: Status: ACTIVE | Noted: 2017-03-13

## 2017-03-13 PROBLEM — E87.20 METABOLIC ACIDOSIS: Status: ACTIVE | Noted: 2017-03-13

## 2017-03-13 LAB
ANION GAP SERPL CALC-SCNC: 13 MMOL/L (ref 0–11.9)
BASOPHILS # BLD AUTO: 0.7 % (ref 0–1.8)
BASOPHILS # BLD: 0.06 K/UL (ref 0–0.12)
BUN SERPL-MCNC: 30 MG/DL (ref 8–22)
CALCIUM SERPL-MCNC: 9.1 MG/DL (ref 8.5–10.5)
CHLORIDE SERPL-SCNC: 104 MMOL/L (ref 96–112)
CO2 SERPL-SCNC: 18 MMOL/L (ref 20–33)
CREAT SERPL-MCNC: 1.1 MG/DL (ref 0.5–1.4)
EOSINOPHIL # BLD AUTO: 0.42 K/UL (ref 0–0.51)
EOSINOPHIL NFR BLD: 4.8 % (ref 0–6.9)
ERYTHROCYTE [DISTWIDTH] IN BLOOD BY AUTOMATED COUNT: 46 FL (ref 35.9–50)
GFR SERPL CREATININE-BSD FRML MDRD: 49 ML/MIN/1.73 M 2
GLUCOSE BLD-MCNC: 115 MG/DL (ref 65–99)
GLUCOSE BLD-MCNC: 126 MG/DL (ref 65–99)
GLUCOSE BLD-MCNC: 67 MG/DL (ref 65–99)
GLUCOSE BLD-MCNC: 78 MG/DL (ref 65–99)
GLUCOSE BLD-MCNC: 94 MG/DL (ref 65–99)
GLUCOSE BLD-MCNC: 95 MG/DL (ref 65–99)
GLUCOSE SERPL-MCNC: 117 MG/DL (ref 65–99)
HCT VFR BLD AUTO: 29.7 % (ref 37–47)
HGB BLD-MCNC: 9.5 G/DL (ref 12–16)
IMM GRANULOCYTES # BLD AUTO: 0.03 K/UL (ref 0–0.11)
IMM GRANULOCYTES NFR BLD AUTO: 0.3 % (ref 0–0.9)
LYMPHOCYTES # BLD AUTO: 1.26 K/UL (ref 1–4.8)
LYMPHOCYTES NFR BLD: 14.4 % (ref 22–41)
MCH RBC QN AUTO: 29.2 PG (ref 27–33)
MCHC RBC AUTO-ENTMCNC: 32 G/DL (ref 33.6–35)
MCV RBC AUTO: 91.4 FL (ref 81.4–97.8)
MONOCYTES # BLD AUTO: 0.91 K/UL (ref 0–0.85)
MONOCYTES NFR BLD AUTO: 10.4 % (ref 0–13.4)
NEUTROPHILS # BLD AUTO: 6.08 K/UL (ref 2–7.15)
NEUTROPHILS NFR BLD: 69.4 % (ref 44–72)
NRBC # BLD AUTO: 0 K/UL
NRBC BLD AUTO-RTO: 0 /100 WBC
PLATELET # BLD AUTO: 414 K/UL (ref 164–446)
PMV BLD AUTO: 10.3 FL (ref 9–12.9)
POTASSIUM SERPL-SCNC: 2.9 MMOL/L (ref 3.6–5.5)
RBC # BLD AUTO: 3.25 M/UL (ref 4.2–5.4)
SODIUM SERPL-SCNC: 135 MMOL/L (ref 135–145)
WBC # BLD AUTO: 8.8 K/UL (ref 4.8–10.8)

## 2017-03-13 PROCEDURE — 700111 HCHG RX REV CODE 636 W/ 250 OVERRIDE (IP): Performed by: INTERNAL MEDICINE

## 2017-03-13 PROCEDURE — 99233 SBSQ HOSP IP/OBS HIGH 50: CPT | Performed by: INTERNAL MEDICINE

## 2017-03-13 PROCEDURE — 85025 COMPLETE CBC W/AUTO DIFF WBC: CPT

## 2017-03-13 PROCEDURE — G8980 MOBILITY D/C STATUS: HCPCS | Mod: CI

## 2017-03-13 PROCEDURE — G8989 SELF CARE D/C STATUS: HCPCS | Mod: CI

## 2017-03-13 PROCEDURE — 700112 HCHG RX REV CODE 229: Performed by: INTERNAL MEDICINE

## 2017-03-13 PROCEDURE — G8988 SELF CARE GOAL STATUS: HCPCS | Mod: CI

## 2017-03-13 PROCEDURE — G8979 MOBILITY GOAL STATUS: HCPCS | Mod: CI

## 2017-03-13 PROCEDURE — 770020 HCHG ROOM/CARE - TELE (206)

## 2017-03-13 PROCEDURE — 700105 HCHG RX REV CODE 258: Performed by: INTERNAL MEDICINE

## 2017-03-13 PROCEDURE — 97162 PT EVAL MOD COMPLEX 30 MIN: CPT

## 2017-03-13 PROCEDURE — 700102 HCHG RX REV CODE 250 W/ 637 OVERRIDE(OP): Performed by: INTERNAL MEDICINE

## 2017-03-13 PROCEDURE — G8978 MOBILITY CURRENT STATUS: HCPCS | Mod: CI

## 2017-03-13 PROCEDURE — 97165 OT EVAL LOW COMPLEX 30 MIN: CPT

## 2017-03-13 PROCEDURE — A9270 NON-COVERED ITEM OR SERVICE: HCPCS | Performed by: INTERNAL MEDICINE

## 2017-03-13 PROCEDURE — 36415 COLL VENOUS BLD VENIPUNCTURE: CPT

## 2017-03-13 PROCEDURE — 76775 US EXAM ABDO BACK WALL LIM: CPT

## 2017-03-13 PROCEDURE — 82962 GLUCOSE BLOOD TEST: CPT | Mod: 91

## 2017-03-13 PROCEDURE — 80048 BASIC METABOLIC PNL TOTAL CA: CPT

## 2017-03-13 PROCEDURE — 90471 IMMUNIZATION ADMIN: CPT

## 2017-03-13 PROCEDURE — 90670 PCV13 VACCINE IM: CPT | Performed by: INTERNAL MEDICINE

## 2017-03-13 PROCEDURE — G8987 SELF CARE CURRENT STATUS: HCPCS | Mod: CI

## 2017-03-13 RX ADMIN — OXYCODONE HYDROCHLORIDE 5 MG: 5 TABLET ORAL at 15:16

## 2017-03-13 RX ADMIN — ESCITALOPRAM OXALATE 20 MG: 10 TABLET ORAL at 09:53

## 2017-03-13 RX ADMIN — ASPIRIN 81 MG: 81 TABLET ORAL at 09:54

## 2017-03-13 RX ADMIN — HEPARIN SODIUM 5000 UNITS: 5000 INJECTION, SOLUTION INTRAVENOUS; SUBCUTANEOUS at 20:59

## 2017-03-13 RX ADMIN — HEPARIN SODIUM 5000 UNITS: 5000 INJECTION, SOLUTION INTRAVENOUS; SUBCUTANEOUS at 04:35

## 2017-03-13 RX ADMIN — SODIUM CHLORIDE: 900 INJECTION INTRAVENOUS at 05:51

## 2017-03-13 RX ADMIN — CLOPIDOGREL 75 MG: 75 TABLET, FILM COATED ORAL at 09:54

## 2017-03-13 RX ADMIN — OXYCODONE HYDROCHLORIDE 5 MG: 5 TABLET ORAL at 01:05

## 2017-03-13 RX ADMIN — PNEUMOCOCCAL 13-VALENT CONJUGATE VACCINE 0.5 ML: 2.2; 2.2; 2.2; 2.2; 2.2; 4.4; 2.2; 2.2; 2.2; 2.2; 2.2; 2.2; 2.2 INJECTION, SUSPENSION INTRAMUSCULAR at 04:29

## 2017-03-13 RX ADMIN — DOCUSATE SODIUM 100 MG: 100 CAPSULE ORAL at 09:54

## 2017-03-13 RX ADMIN — METOPROLOL TARTRATE 25 MG: 25 TABLET, FILM COATED ORAL at 20:59

## 2017-03-13 RX ADMIN — OXYCODONE HYDROCHLORIDE 5 MG: 5 TABLET ORAL at 20:59

## 2017-03-13 RX ADMIN — STANDARDIZED SENNA CONCENTRATE AND DOCUSATE SODIUM 1 TABLET: 8.6; 5 TABLET, FILM COATED ORAL at 20:59

## 2017-03-13 RX ADMIN — METOPROLOL TARTRATE 25 MG: 25 TABLET, FILM COATED ORAL at 09:54

## 2017-03-13 RX ADMIN — OXYCODONE HYDROCHLORIDE 5 MG: 5 TABLET ORAL at 05:50

## 2017-03-13 RX ADMIN — ATORVASTATIN CALCIUM 20 MG: 20 TABLET, FILM COATED ORAL at 20:59

## 2017-03-13 RX ADMIN — LEVOTHYROXINE SODIUM 75 MCG: 75 TABLET ORAL at 05:58

## 2017-03-13 RX ADMIN — CEFTRIAXONE SODIUM 1 G: 1 INJECTION, POWDER, FOR SOLUTION INTRAMUSCULAR; INTRAVENOUS at 09:54

## 2017-03-13 RX ADMIN — HEPARIN SODIUM 5000 UNITS: 5000 INJECTION, SOLUTION INTRAVENOUS; SUBCUTANEOUS at 15:15

## 2017-03-13 RX ADMIN — Medication 60 MG: at 09:58

## 2017-03-13 ASSESSMENT — PAIN SCALES - GENERAL
PAINLEVEL_OUTOF10: 4
PAINLEVEL_OUTOF10: 4
PAINLEVEL_OUTOF10: 5
PAINLEVEL_OUTOF10: 7
PAINLEVEL_OUTOF10: 5
PAINLEVEL_OUTOF10: 7
PAINLEVEL_OUTOF10: 5

## 2017-03-13 ASSESSMENT — ENCOUNTER SYMPTOMS
SPUTUM PRODUCTION: 0
PND: 0
PALPITATIONS: 0
SHORTNESS OF BREATH: 0
HEARTBURN: 0
MYALGIAS: 0
FOCAL WEAKNESS: 0
NERVOUS/ANXIOUS: 0
FEVER: 0
DIZZINESS: 0
CHILLS: 0
VOMITING: 0
WEIGHT LOSS: 0
WEAKNESS: 1
COUGH: 0
CLAUDICATION: 0
NAUSEA: 0
INSOMNIA: 0
EYE REDNESS: 0
DIARRHEA: 0
DEPRESSION: 0
BACK PAIN: 0
EYE PAIN: 0
HEADACHES: 0
NECK PAIN: 0
STRIDOR: 0
BLURRED VISION: 0
ABDOMINAL PAIN: 0
SEIZURES: 0
EYE DISCHARGE: 0
ORTHOPNEA: 0

## 2017-03-13 ASSESSMENT — GAIT ASSESSMENTS
GAIT LEVEL OF ASSIST: STAND BY ASSIST
DEVIATION: ANTALGIC;OTHER (COMMENT)
DISTANCE (FEET): 40

## 2017-03-13 ASSESSMENT — ACTIVITIES OF DAILY LIVING (ADL): TOILETING: INDEPENDENT

## 2017-03-13 NOTE — THERAPY
"Physical Therapy Evaluation completed.   Bed Mobility:  Supine to Sit:  (up in chair)  Transfers: Sit to Stand: Stand by Assist  Gait: Level Of Assist: Stand by Assist with No Equipment Needed       Plan of Care: Patient with no further skilled PT needs in the acute care setting at this time  Discharge Recommendations: Equipment: No Equipment Needed. See below    Pt presents to PT with impaired endurance and strength associated with recent medical co-morbidities and limited mobility. Pt is also slighlty limited with LLE knee ROM and functional strength 2' to what appears to be arthritic changes. After initial evaluation and pt education, pt has no further skilled acute PT needs. Pt would likely benefit from continued skilled PT after medical d/c to home for strengthening and assistance with pain management re: LLE knee. She reports no concerns with functional ability to enter/exit and navigate her home and reports son will be available to assist as needed with transport and IADLs.     See \"Rehab Therapy-Acute\" Patient Summary Report for complete documentation.     "

## 2017-03-13 NOTE — PROGRESS NOTES
2 RN skin check performed. Pt has well approximated surgical scaring of mid chest and medial RLE immediately distal to knee. Both scars open to air. Pt has no non-blanchable redness or impaired integrity.

## 2017-03-13 NOTE — CONSULTS
Dictated    MOMO liekly over diuresis      Give 1 L iVF overnight  Monitor for localizing symptoms    It is my pleasure to participate in the care of Ms. Kingsley.  Please do not hesitate to contact me with questions or concerns.    Surinder Young MD PhD FAC  Cardiologist [facility]

## 2017-03-13 NOTE — ED NOTES
Med rec updated and complete  Allergies reviewed.  Pt arrives with 2 lists of current medications.  1 prior to open heart and 1 after upon discharge.  Pt also has current medications at bedside.  One bag containing medications prior to   Surgery. Pt stated not taking those medications  Currently.  Pt currently taking the medications that she was   given upon discharge from [facility].  All medications at bedside  Included in the medications are control  Substances.  Instructed pt/family to take medications home.

## 2017-03-13 NOTE — CONSULTS
DATE OF SERVICE:  03/12/2017    REASON FOR CONSULTATION:  I was asked by Dr. Brit Arredondo, emergency room   staff to evaluate this patient who presents with fatigue after recent bypass   surgery.    CHIEF COMPLAINT:  Fatigue and feeling unwell just for the past 48 hours   especially.    HISTORY OF PRESENT ILLNESS:  This is a very pleasant 71-year-old woman who   recently underwent CABG for severe coronary artery disease with prior   pituitary adenoma resection, hypertension, diabetes, who had CABG in mid   February and was discharged to a rehab and had rehab appropriately and has   been home on the 9th, so 4 days ago, she had a low-grade temperature and then   has had progressive fatigue especially in the last 24 hours.  She denies any   localizing symptoms.  She does have a dry chronic cough and had tickle in her   throat, which has persisted from her surgery through the rehab.  For the last   24 hours, she has been afebrile.  She has been trying to increase her fluid   intake adequately, but she has also been taking her pills, except today.  She   just felt too much fatigued and is unable to take the medications.    PAST SURGICAL HISTORY:  Coronary artery disease, status post CABG on   02/20/2017, hyperlipidemia, peripheral vascular disease with claudication and   intervention, pituitary adenoma, status post transsphenoidal resection,   hypothyroidism, gout, diabetes on oral medications, anxiety, hiatal hernia.    PAST SURGICAL HISTORY:  Eye surgery, hysterectomy, angioplasty in her legs   bilaterally, CABG in 2017.    FAMILY HISTORY:  Her uncle had a heart attack in his 60s.  Mother had colon   cancer.    ALLERGIES:  TO TAPE.    HOME MEDICATIONS:  Oxycodone/acetaminophen, Plavix, Lipitor, losartan,   Lexapro, Colace, furosemide, Norco, metoprolol 25 b.i.d., omeprazole,   potassium chloride, vitamin D3, B12, metformin, diazepam, levothyroxine,   gemfibrozil, aspirin, folate, calcium.    SOCIAL HISTORY:  She is  , quit smoking 3 years ago with prior alcohol   use.    REVIEW OF SYSTEMS:  Otherwise negative except as mentioned as pertinent   positive and negatives in the HPI.  She also reports a rash on her neck, which   was in response to the tape that was used during that surgery and has   persisted.  She tried some medications for that topically.    PHYSICAL EXAMINATION:  VITAL SIGNS:  She is afebrile, heart rate has been normal and sinus rhythm,   blood pressure is normal and she is satting 94% on room air.  She is 55   kilograms, 121 pounds.  HEENT:  Eyes are anicteric.  Mouth is little bit dry, but not significantly   dry.  NECK:  Shows no JVD.  CHEST:  Clear to auscultation.  HEART:  Regular rate and rhythm.  S1, S2.  ABDOMEN:  Soft, nontender.  EXTREMITIES:  Warm and well perfused, 2+ pulses, no edema in the legs.  NEUROLOGIC:  Grossly nonfocal.  SKIN:  Grossly nonfocal.    DIAGNOSTIC DATA:  EKG shows sinus rhythm with no acute changes.    LABORATORY DATA:  Show mild anemia, postoperative hematocrit is 33, normal   platelet count.  Mild hypokalemia with potassium of 3.4, anion gap of 16 with   a BUN of 35 and INR of 1.5, which has increased from 0.9 on discharge.  LFTs   were normal.  This is in the setting of dehydration.  Troponin is normal.  BNP   is 326.  INR of 1.2.  _____ was negative.    Chest x-ray shows no acute process with postsurgical changes on her   echocardiogram.  On recent hospitalization, it was 70% with no significant   valvular disease.    OVERALL IMPRESSION:  1.  Coronary artery disease, status post coronary artery bypass grafting, less   than a month ago.  2.  Acute kidney injury, likely due to dehydration and over-diuresis with   probable poor p.o. intake as well.    RECOMMENDATIONS:  It is my pleasure to see the patient for evaluation of her   fatigue.  This certainly could be just all due to hypovolemia and being on the   diuretics.  Postoperatively, she appears to be back down to her  preoperative   weight and so we will hold her diuretics.  She would likely benefit from IV   fluids overnight with close monitoring of her creatinine and urine output.    Check her UA for further evaluation.    Thank you for the opportunity to participate in her care.  Please do not   hesitate to contact me with questions or concerns.       ____________________________________     MD TYRA Blanc / BUNNY    DD:  03/12/2017 18:59:13  DT:  03/12/2017 19:34:54    D#:  [id number]  Job#:  [id number]

## 2017-03-13 NOTE — PROGRESS NOTES
Assumed Pt care at approximately 2115 upon Pt's arrival from ED. Pt AOx4 and declines any interventions for stated 7/10 incisional sternal pain until next dose of PO analgesic if due. Pt son initially at bedside. Pt attached to Tele-box and monitor room notified. Pt oriented to room, surroundings, immediate POC, and call light system. Pt witnessed as ambulatyory from gurney to bed and from bed to bathroom and back as standbye assist. Pt denies any other requests at this time. Call light and phone within reach.

## 2017-03-13 NOTE — PROGRESS NOTES
Hospital Medicine Progress Note, Adult, Complex               Author: Anam Escudero Date & Time created: 3/13/2017  7:53 AM     CC: generalized weakness    Interval History:  72 y/o F with recent history of CABG 4 vls, HTN, DMII, PVD, admitted for above.    Feeling a little better. Still complaint about weakness.    Review of Systems:  Review of Systems   Constitutional: Positive for malaise/fatigue. Negative for fever, chills and weight loss.   HENT: Negative for congestion and nosebleeds.    Eyes: Negative for blurred vision, pain, discharge and redness.   Respiratory: Negative for cough, sputum production, shortness of breath and stridor.    Cardiovascular: Negative for chest pain, palpitations and orthopnea.   Gastrointestinal: Negative for heartburn, nausea, vomiting, abdominal pain and diarrhea.   Genitourinary: Negative for dysuria, urgency and frequency.   Musculoskeletal: Negative for myalgias, back pain and neck pain.   Skin: Negative for itching and rash.   Neurological: Positive for weakness. Negative for dizziness, focal weakness, seizures and headaches.   Psychiatric/Behavioral: Negative for depression. The patient is not nervous/anxious and does not have insomnia.        Physical Exam:  Physical Exam   Constitutional: She is oriented to person, place, and time. No distress.   HENT:   Head: Normocephalic and atraumatic.   Mouth/Throat: Oropharynx is clear and moist.   Eyes: Conjunctivae and EOM are normal. Pupils are equal, round, and reactive to light.   Neck: Normal range of motion. Neck supple. No tracheal deviation present. No thyromegaly present.   Cardiovascular: Normal rate and regular rhythm.    No murmur heard.  Pulmonary/Chest: Effort normal and breath sounds normal. No respiratory distress. She has no wheezes.   Abdominal: Soft. Bowel sounds are normal. She exhibits no distension. There is no tenderness.   Musculoskeletal: She exhibits no edema or tenderness.   Neurological: She is alert and  oriented to person, place, and time. No cranial nerve deficit.   Skin: Skin is warm and dry. She is not diaphoretic. No erythema.   Psychiatric: She has a normal mood and affect. Her behavior is normal. Thought content normal.       Labs:        Invalid input(s): [id number]  Recent Labs      17   CPKTOTAL  25   TROPONINI  <0.01   BNPBTYPENAT  326*     Recent Labs      17   SODIUM  136  135   POTASSIUM  3.4*  2.9*   CHLORIDE  101  104   CO2  19*  18*   BUN  35*  30*   CREATININE  1.49*  1.10   CALCIUM  9.9  9.1     Recent Labs      17   ALTSGPT  11   --    ASTSGOT  13   --    ALKPHOSPHAT  116*   --    TBILIRUBIN  0.4   --    LIPASE  24   --    GLUCOSE  80  117*     Recent Labs      17   RBC  3.66*  3.25*   HEMOGLOBIN  10.8*  9.5*   HEMATOCRIT  33.1*  29.7*   PLATELETCT  453*  414   PROTHROMBTM  15.4*   --    APTT  36.5*   --    INR  1.18*   --      Recent Labs      17   WBC  8.8  8.8   NEUTSPOLYS  69.80  69.40   LYMPHOCYTES  15.60*  14.40*   MONOCYTES  10.00  10.40   EOSINOPHILS  3.80  4.80   BASOPHILS  0.60  0.70   ASTSGOT  13   --    ALTSGPT  11   --    ALKPHOSPHAT  116*   --    TBILIRUBIN  0.4   --            Hemodynamics:  Temp (24hrs), Av.7 °C (98.1 °F), Min:36.6 °C (97.8 °F), Max:36.9 °C (98.4 °F)  Temperature: 36.6 °C (97.8 °F)  Pulse  Av.8  Min: 63  Max: 80Heart Rate (Monitored): 74  Blood Pressure : 124/69 mmHg, NIBP: 150/80 mmHg     Respiratory:    Respiration: 16, Pulse Oximetry: 96 %           Fluids:    Intake/Output Summary (Last 24 hours) at 17 0753  Last data filed at 03/13/17 0600   Gross per 24 hour   Intake   1475 ml   Output      0 ml   Net   1475 ml     Weight: 56.2 kg (123 lb 14.4 oz)  GI/Nutrition:  Orders Placed This Encounter   Procedures   • Diet Order     Standing Status: Standing      Number of Occurrences: 1      Standing Expiration Date:       Order Specific Question:  Diet:     Answer:  Diabetic [3]     Medical Decision Making, by Problem:  Active Hospital Problems    Diagnosis   • *Acute renal failure (ARF) (CMS-HCC) [N17.9]  - related to diuresis  - holding diuresis, on gentle IVF  - Cr: improving  - avoid nephrotoxic drugs  - follow cmp in am     • S/P CABG x 4 [Z95.1]   - 2 weeks ago  - card: following  - continue asa, statin, plavix, bblocker     • Hypertension [I10]  - BP: ok     • Type 2 diabetes mellitus without complication (CMS-HCC) [E11.9]  - on insulin management, hypoglycemic protocol     • Hypothyroidism [E03.9]     • Normocytic anemia [D64.9]     • Hypokalemia [E87.6]  - worsening  - replete as needed  - follow cmp in am     • Metabolic acidosis [E87.2]  - from MOMO  - plan as above     • Elevated brain natriuretic peptide (BNP) level [R79.89]  - BNP: 300  - echo: 2/17: LVEF: 65%  - holding lasix due to MOMO       Labs reviewed, Medications reviewed and Radiology images reviewed        DVT Prophylaxis: Heparin      Antibiotics: Treating active infection/contamination beyond 24 hours perioperative coverage  Assessed for rehab: Patient was assess for and/or received rehabilitation services during this hospitalization

## 2017-03-13 NOTE — ED NOTES
Assumed care of pt at this time. Admitting MD at bedside. Pt has rash to neck, MD states he will order benadryl. Ice pack given to pt to place on neck and told not to touch it. Pt medicated per MAR for pain and fluids. Pt's family member is at bedside.

## 2017-03-13 NOTE — CARE PLAN
Problem: Nutritional:  Goal: Achieve adequate nutritional intake  Patient will consume >50% of meals and supplements  Outcome: NOT MET

## 2017-03-13 NOTE — THERAPY
"Occupational Therapy Evaluation completed.   Functional Status:  Supervised/SBA with ADLs and txfs  Plan of Care: Patient with no further skilled OT needs in the acute care setting at this time  See \"Rehab Therapy-Acute\" Patient Summary Report for complete documentation.    "

## 2017-03-13 NOTE — PROGRESS NOTES
Pt provided with snack and juice in response to borderline hypoglycemic FSBS of 67. Pt consumed 4 oz juice, 1 container applesauce and 2 gram cracker. Follow up FSBS 95. Pt denies any SS of hypoglycemia.

## 2017-03-13 NOTE — H&P
CHIEF COMPLAINT:  Generalized weakness.    HISTORY OF PRESENT ILLNESS:  This is a 71-year-old female who underwent   4-vessel bypass a couple weeks ago, did stay 1 week in ICU and eventually was   transitioned to cardiac rehab facility.  She stayed for another week there.    She was just discharged 5 days ago.  According to the son, patient was doing   fine on the day that she was discharged.  However, the following day, she   started having some nonproductive cough and associated shortness of breath.    Over the past 3 days, patient's condition deteriorated with generalized   weakness to the point that she was unable to ambulate today.  She denies any   fever or dysuria.  She attributed her urinary frequency from Lasix that she   was taking.  She was kind of nauseated and no appetite for the past 3 days.    Denies any chest pain.  Today, she started having some left flank pain, no   dysuria, no hematuria.  Positive urinary frequency.  Because of her   generalized weakness, her son decided to go to the emergency room.  Initial   workup here showed a urinary tract infection and acute renal failure.    PAST MEDICAL HISTORY:  Coronary artery disease, diabetes type 2, hypertension,   peripheral arterial disease.    PAST SURGICAL HISTORY:  CABG x4 and catheterization of the left superficial   femoral, popliteal, tibial, peroneal, trunk and posterior tibial arteries   and atherectomy of the tibioperoneal trunk.  Also, underwent left   tibioperoneal trunk and posterior tibial artery angioplasty x2.    SOCIAL HISTORY:  Used to smoke 4-5 cigarettes per day, but quit 30 years ago.    Drinks alcohol occasionally.  Denies illicit drug use.    FAMILY HISTORY:  Mother had colon cancer.    ALLERGIES:  TO TAPE.    HOME MEDICATIONS:  Aspirin 81 mg daily, Lipitor 20 mg daily, Plavix 75 mg   daily, Valium 5 mg 4 times a day p.r.n. for anxiety, docusate sodium 100 mg   every morning, Lexapro 20 mg daily, Lasix 40 mg daily, Lopid 600 mg  twice a   day, levothyroxine 75 mcg daily, losartan 50 mg daily, metformin 500 mg   b.i.d., metoprolol 25 mg b.i.d., omeprazole 20 mg daily, Roxicodone 5 mg every   4 hours as needed for severe pain, potassium chloride 20 mEq daily.    REVIEW OF SYSTEMS:  Patient is complaining of left knee pain, which started   about a couple of days ago.  All other systems reviewed were negative.    PHYSICAL EXAMINATION:  VITAL SIGNS:  Blood pressure is 119/50, pulse of 80, respiratory rate of 18,   temperature 36.6, oxygen is 97% on room air.  GENERAL:  The patient is elderly individual lying in bed comfortably, not in   distress.  HEENT:  Head is normocephalic, atraumatic.  Eyes, pupils are reactive to   light, anicteric sclerae.  Pinkish palpebral conjunctivae.  Oral mucosa, no   oral lesions noted, dry mucosa.  NECK:  No JVD, no lymphadenopathy, no thyromegaly.  CHEST AND LUNGS:  Equal expansion.  Clear to auscultation bilaterally.  No   crackles, no wheezing, incision site from the CABG looks clean and dry.  CARDIOVASCULAR SYSTEM:  Regular rate and rhythm.  S1, S2 heard.  No murmurs   noted.  GASTROINTESTINAL:  Positive bowel sounds.  No tenderness.  No   hepatosplenomegaly.  EXTREMITIES:  Pulses palpable in both upper and lower extremities.  No edema   in the lower extremities.  NEUROLOGIC:  Cranial nerves II-XII intact.  Alert and oriented x3.  SKIN:  No cyanosis.  Capillary refill time normal.  Positive rash in the   anterior part of her neck.    LABORATORY DATA:  WBC is 8.8, hemoglobin 10.8, hematocrit 33.1, platelet count   of 453.  Sodium 136, potassium 3.4, chloride 101, CO2 of 19, anion gap of 16,   glucose 80, BUN 35, creatinine 1.49.  INR is 1.18, troponin I less than 0.01.    BNP of 3026.  Urinalysis, wbc's 20-50, rbc's 2-5, leukocyte esterase large,   nitrite negative.    RADIOGRAPHIC STUDIES:  Chest x-ray, no acute cardiopulmonary process.  EKG,   sinus rhythm.    ASSESSMENT AND PLAN:  1.  Generalized weakness  could be secondary to acute renal failure.  We will   hydrate her with normal saline at 75 mL an hour.  I will hold losartan and   Glucophage.  We will get physical therapy and occupational therapy.  Will get ultrasound of the kidney.  2.  Urinary tract infection.  We will get a urine culture.  I will start her   on Rocephin 1 g IV daily.  3.  Rash in anterior neck area.  We will give Benadryl cream twice a day.  4.  Diabetes type 2.  I will put her on diabetic diet and low sliding scale   insulin q.a.c.  5.  Left knee pain.  We will get an x-ray of the left knee.  Continue   oxycodone p.r.n.  6.  Coronary artery disease.  Continue Plavix and aspirin.  7.  Hypertension.  We will continue to monitor blood pressure.  I am holding   losartan for now because of acute renal failure.  8.  Hyperlipidemia.  Continue Lipitor.  9.  Will check CPK. If that is elevated, I will hold Lipitor.  I will add CoQ10.  10.  Deep venous thrombosis.  I will put her on heparin 5000 units subQ q.8   hours.      MEDICAL DECISION MAKING:  More than 2 midnights.       ____________________________________     MD ALFONSO Tejeda / BUNNY    DD:  03/12/2017 20:03:10  DT:  03/12/2017 21:06:57    D#:  [id number]  Job#:  [id number]

## 2017-03-13 NOTE — PROGRESS NOTES
Interval History:  70 y/o female with recent CABG 4 v, HTN, DMII, PVD admitted for fatigue and felt unwell;  Prob MOMO d/t dehydration  Feels little better but hypokalemia which may contributed to her weakness and fatigue complaints    Physical Exam   Blood pressure 131/72, pulse 72, temperature 36.7 °C (98.1 °F), temperature source Temporal, resp. rate 16, weight 56.2 kg (123 lb 14.4 oz), SpO2 94 %, not currently breastfeeding.    Constitutional:  SHe appears well-developed.   HENT: Normocephalic and atraumatic. No scleral icterus.   Neck: No JVD present.   Cardiovascular: Normal rate. Exam reveals no gallop and no friction rub. No murmur heard.   Pulmonary/Chest: CTAB coarse   Abdominal: S/NT/ND BS+   Musculoskeletal: SHe exhibits no edema. Pulses present.  Skin: Skin is warm and dry.       Intake/Output Summary (Last 24 hours) at 03/13/17 1237  Last data filed at 03/13/17 0600   Gross per 24 hour   Intake   1475 ml   Output      0 ml   Net   1475 ml       LABS:  Lab Results   Component Value Date/Time    CHOLESTEROL,* 05/16/2013 04:17 AM    * 05/16/2013 04:17 AM    HDL 13* 05/16/2013 04:17 AM    TRIGLYCERIDES 383* 05/16/2013 04:17 AM       Lab Results   Component Value Date/Time    WBC 8.8 03/13/2017 01:33 AM    RBC 3.25* 03/13/2017 01:33 AM    HEMOGLOBIN 9.5* 03/13/2017 01:33 AM    HEMATOCRIT 29.7* 03/13/2017 01:33 AM    MCV 91.4 03/13/2017 01:33 AM    NEUTROPHILS-POLYS 69.40 03/13/2017 01:33 AM    LYMPHOCYTES 14.40* 03/13/2017 01:33 AM    MONOCYTES 10.40 03/13/2017 01:33 AM    EOSINOPHILS 4.80 03/13/2017 01:33 AM    BASOPHILS 0.70 03/13/2017 01:33 AM    HYPOCHROMIA 1+ 05/30/2016 08:35 AM    ANISOCYTOSIS 2+ 05/30/2016 08:35 AM     Lab Results   Component Value Date/Time    SODIUM 135 03/13/2017 01:33 AM    POTASSIUM 2.9* 03/13/2017 01:33 AM    CHLORIDE 104 03/13/2017 01:33 AM    CO2 18* 03/13/2017 01:33 AM    GLUCOSE 117* 03/13/2017 01:33 AM    BUN 30* 03/13/2017 01:33 AM    CREATININE 1.10  03/13/2017 01:33 AM    BUN-CREATININE RATIO 26 02/01/2010 12:46 PM    GLOM FILT RATE, EST >59 02/01/2010 12:46 PM     Lab Results   Component Value Date    HBA1C 6.8* 02/20/2017      Lab Results   Component Value Date/Time    ALKALINE PHOSPHATASE 116* 03/12/2017 04:57 PM    AST(SGOT) 13 03/12/2017 04:57 PM    ALT(SGPT) 11 03/12/2017 04:57 PM    TOTAL BILIRUBIN 0.4 03/12/2017 04:57 PM      Lab Results   Component Value Date/Time    B NATRIURETIC PEPTIDE 326* 03/12/2017 04:57 PM      No results found for: TSH  Lab Results   Component Value Date/Time    PT 15.4* 03/12/2017 04:57 PM    INR 1.18* 03/12/2017 04:57 PM        Medications reviewed    Imaging reviewed  EKG: SINUS RHYTHM   RIGHT AXIS DEVIATION   NONSPECIFIC T ABNORMALITIES, ANT-LAT LEADS     ECHO(2/18/2017):Normal left ventricular size, wall thickness, and systolic function.  Left ventricular ejection fraction is visually estimated to be 70%.  Grade I diastolic dysfunction.  Aortic sclerosis without stenosis.  Right ventricular systolic pressure is estimated to be 30 mmHg    Impressions:  CAD, s/p 4v CABG  MOMO d/t dehydration  Fatigue  Anemia  Hypokalemia  Mildly elevated BNP    Recommendations:  Rehydrate  Monitor lytes and replenish; K>4.0 and Mg>2.2    Will follow  Thx

## 2017-03-13 NOTE — PROGRESS NOTES
Cardiology Progress Note               Author: Nayeli Walsh Date & Time created: 3/13/2017  12:47 PM     Interval History:    Consultation for generalized weakness and progressive fatigue in the setting of recent CABG    Admitted with generalized weakness, progressive fatigue ×24 hours, chronic cough, dry, acute kidney injury likely due to dehydration and over diuresis          History of CAD status post CABG × (LIMA to distal LAD, SVG to distal diagonal, SVG to distal circumflex, SVG to distal PDA), diabetes, hypertension, peripheral arterial disease status post angioplasty to her legs, pituitary adenoma resection, hyperlipidemia, gout, hypothyroid, anxiety    Labs reviewed  WBC = 8.8  K = 2.9  Negative troponin  BNP = 326  CR = 1.10, improved    BP = 131/72  HR = 90    Echocardiogram 17 showed LVEF 65%,    Review of Systems   Respiratory: Negative for cough and shortness of breath.    Cardiovascular: Negative for chest pain, palpitations, orthopnea, claudication, leg swelling and PND.       Physical Exam   Constitutional: She is oriented to person, place, and time.   HENT:   Head: Normocephalic.   Eyes: Conjunctivae are normal.   Neck: No JVD present. No thyromegaly present.   Cardiovascular: Normal rate and regular rhythm.    Pulses:       Carotid pulses are 2+ on the left side.       Radial pulses are 2+ on the right side, and 2+ on the left side.        Posterior tibial pulses are 2+ on the right side, and 2+ on the left side.   Pulmonary/Chest: She has no wheezes.   Abdominal: Soft.   Musculoskeletal: She exhibits no edema.   Neurological: She is alert and oriented to person, place, and time.   Skin: Skin is warm and dry.   Sternotomy healing well       Hemodynamics:  Temp (24hrs), Av.7 °C (98.1 °F), Min:36.6 °C (97.8 °F), Max:36.9 °C (98.4 °F)  Temperature: 36.7 °C (98.1 °F)  Pulse  Av  Min: 63  Max: 80Heart Rate (Monitored): 74  Blood Pressure : 131/72 mmHg, NIBP: 150/80 mmHg      Respiratory:    Respiration: 16, Pulse Oximetry: 94 %           Fluids:     Weight: 56.2 kg (123 lb 14.4 oz)  GI/Nutrition:  Orders Placed This Encounter   Procedures   • Diet Order     Standing Status: Standing      Number of Occurrences: 1      Standing Expiration Date:      Order Specific Question:  Diet:     Answer:  Diabetic [3]     Lab Results:  Recent Labs      03/12/17 1657 03/13/17   0133   WBC  8.8  8.8   RBC  3.66*  3.25*   HEMOGLOBIN  10.8*  9.5*   HEMATOCRIT  33.1*  29.7*   MCV  90.4  91.4   MCH  29.5  29.2   MCHC  32.6*  32.0*   RDW  46.4  46.0   PLATELETCT  453*  414   MPV  9.9  10.3     Recent Labs      03/12/17 1657 03/13/17   0133   SODIUM  136  135   POTASSIUM  3.4*  2.9*   CHLORIDE  101  104   CO2  19*  18*   GLUCOSE  80  117*   BUN  35*  30*   CREATININE  1.49*  1.10   CALCIUM  9.9  9.1     Recent Labs      03/12/17   1657   APTT  36.5*   INR  1.18*     Recent Labs      03/12/17 1657   BNPBTYPENAT  326*     Recent Labs      03/12/17 1657   CPKTOTAL  25   TROPONINI  <0.01   BNPBTYPENAT  326*             Medical Decision Making, by Problem:  Active Hospital Problems    Diagnosis   • *Acute renal failure (ARF) (CMS-HCC) [N17.9]   • S/P CABG x 4 [Z95.1]   • Hypertension [I10]   • Type 2 diabetes mellitus without complication (CMS-[facility]) [E11.9]   • Hypothyroidism [E03.9]   • Normocytic anemia [D64.9]   • Hypokalemia [E87.6]   • Metabolic acidosis [E87.2]   • Elevated brain natriuretic peptide (BNP) level [R79.89]       Plan:    Presented with progressive weakness and generalized fatigue, found to have UTI, dehydration, acute kidney injury    Home Lasix 40 was DC    Currently on aspirin, Lipitor 20, Plavix, metoprolol 25      UTI, on Rocephin      Acute kidney injury, resolved    , Plan for rehydration and monitor electrolytes    No rhythm issues overnight    Preserved EF    Medications reviewed and Labs reviewed

## 2017-03-13 NOTE — DIETARY
"Nutrition Services: Poor Oral Intake and Unplanned Weight Loss  70 yo female with admit dx: acute renal failure  PMH: granular cell tumor, HTN, hypothyroidism, vitamin D, DM, heart burn, psychiatric problem, hiatus hernia syndrome, pituitary adenoma, CAD, HLD, s/p CABG x 4    Diet order: Diabetic diet  Pertinent Labs: K+ 2.9, BUN 30, GFR 49  Pertinent Meds: rocephin, colace, coenzyme Q-10, SSI, senokot. Prn: morphine, zofran, MOM, dulcolax  Fluids: NS infusion at 75 ml/hr  GI: last BM 3/11  WT: 56.2 kg stand up scale  HT: 5'2\"  BMI: 22.66 kg/m^2  SKIN: no skin breakdown noted    Patient seen for noted poor oral intake and unplanned weight loss noted in nutrition admit screen. The patient reports that she has lost ~12 lbs since she had a CABG three weeks ago (9%, SEVERE). The patient states she has not had much of an appetite. She states that soups and other fluid type foods have been working for her. Discussed adding Boost Glucose Control supplements and the patient was receptive to adding them. The patient appeared thin and frail and seemed quite tired during my visit. The patient likely has malnutrition due to decreased nutritional intake as evidenced by poor oral intake and unplanned weight loss. Discussed the importance of good nutrition, especially to maintain lean body mass and strength after a major surgery.    PLAN - Nutrition Rep to see patient daily for meal choices. Dietary to provide supplements prn.  RECOMMEND - Continue diabetic diet; encourage good PO. Obtain weights to monitor fluid and nutrition status. Please document all intake as percentage of meal consumed.  RD monitoring.    "

## 2017-03-14 LAB
ALBUMIN SERPL BCP-MCNC: 3.8 G/DL (ref 3.2–4.9)
ALBUMIN/GLOB SERPL: 1.2 G/DL
ALP SERPL-CCNC: 113 U/L (ref 30–99)
ALT SERPL-CCNC: 13 U/L (ref 2–50)
ANION GAP SERPL CALC-SCNC: 11 MMOL/L (ref 0–11.9)
AST SERPL-CCNC: 19 U/L (ref 12–45)
BACTERIA UR CULT: ABNORMAL
BACTERIA UR CULT: ABNORMAL
BILIRUB SERPL-MCNC: 0.3 MG/DL (ref 0.1–1.5)
BUN SERPL-MCNC: 10 MG/DL (ref 8–22)
CALCIUM SERPL-MCNC: 9.1 MG/DL (ref 8.5–10.5)
CHLORIDE SERPL-SCNC: 108 MMOL/L (ref 96–112)
CO2 SERPL-SCNC: 18 MMOL/L (ref 20–33)
CREAT SERPL-MCNC: 0.71 MG/DL (ref 0.5–1.4)
GFR SERPL CREATININE-BSD FRML MDRD: >60 ML/MIN/1.73 M 2
GLOBULIN SER CALC-MCNC: 3.1 G/DL (ref 1.9–3.5)
GLUCOSE BLD-MCNC: 128 MG/DL (ref 65–99)
GLUCOSE BLD-MCNC: 129 MG/DL (ref 65–99)
GLUCOSE BLD-MCNC: 86 MG/DL (ref 65–99)
GLUCOSE BLD-MCNC: 90 MG/DL (ref 65–99)
GLUCOSE SERPL-MCNC: 98 MG/DL (ref 65–99)
IRON SATN MFR SERPL: 7 % (ref 15–55)
IRON SERPL-MCNC: 20 UG/DL (ref 40–170)
PHOSPHATE SERPL-MCNC: 2.8 MG/DL (ref 2.5–4.5)
POTASSIUM SERPL-SCNC: 3.5 MMOL/L (ref 3.6–5.5)
PROT SERPL-MCNC: 6.9 G/DL (ref 6–8.2)
SIGNIFICANT IND 70042: ABNORMAL
SITE SITE: ABNORMAL
SODIUM SERPL-SCNC: 137 MMOL/L (ref 135–145)
SOURCE SOURCE: ABNORMAL
TIBC SERPL-MCNC: 307 UG/DL (ref 250–450)

## 2017-03-14 PROCEDURE — 83550 IRON BINDING TEST: CPT

## 2017-03-14 PROCEDURE — 700102 HCHG RX REV CODE 250 W/ 637 OVERRIDE(OP): Performed by: INTERNAL MEDICINE

## 2017-03-14 PROCEDURE — 80053 COMPREHEN METABOLIC PANEL: CPT

## 2017-03-14 PROCEDURE — 83540 ASSAY OF IRON: CPT

## 2017-03-14 PROCEDURE — 700111 HCHG RX REV CODE 636 W/ 250 OVERRIDE (IP): Performed by: INTERNAL MEDICINE

## 2017-03-14 PROCEDURE — 700105 HCHG RX REV CODE 258: Performed by: INTERNAL MEDICINE

## 2017-03-14 PROCEDURE — 82962 GLUCOSE BLOOD TEST: CPT | Mod: 91

## 2017-03-14 PROCEDURE — A9270 NON-COVERED ITEM OR SERVICE: HCPCS | Performed by: INTERNAL MEDICINE

## 2017-03-14 PROCEDURE — A9270 NON-COVERED ITEM OR SERVICE: HCPCS | Performed by: NURSE PRACTITIONER

## 2017-03-14 PROCEDURE — 770020 HCHG ROOM/CARE - TELE (206)

## 2017-03-14 PROCEDURE — 700102 HCHG RX REV CODE 250 W/ 637 OVERRIDE(OP): Performed by: NURSE PRACTITIONER

## 2017-03-14 PROCEDURE — 700112 HCHG RX REV CODE 229: Performed by: INTERNAL MEDICINE

## 2017-03-14 PROCEDURE — 36415 COLL VENOUS BLD VENIPUNCTURE: CPT

## 2017-03-14 PROCEDURE — 99233 SBSQ HOSP IP/OBS HIGH 50: CPT | Performed by: INTERNAL MEDICINE

## 2017-03-14 PROCEDURE — 84100 ASSAY OF PHOSPHORUS: CPT

## 2017-03-14 RX ORDER — LOSARTAN POTASSIUM 50 MG/1
50 TABLET ORAL
Status: DISCONTINUED | OUTPATIENT
Start: 2017-03-14 | End: 2017-03-14

## 2017-03-14 RX ORDER — LOSARTAN POTASSIUM 50 MG/1
100 TABLET ORAL
Status: DISCONTINUED | OUTPATIENT
Start: 2017-03-15 | End: 2017-03-21 | Stop reason: HOSPADM

## 2017-03-14 RX ADMIN — OXYCODONE HYDROCHLORIDE 5 MG: 5 TABLET ORAL at 17:09

## 2017-03-14 RX ADMIN — ATORVASTATIN CALCIUM 20 MG: 20 TABLET, FILM COATED ORAL at 20:33

## 2017-03-14 RX ADMIN — ESCITALOPRAM OXALATE 20 MG: 10 TABLET ORAL at 09:34

## 2017-03-14 RX ADMIN — SODIUM CHLORIDE: 900 INJECTION INTRAVENOUS at 20:34

## 2017-03-14 RX ADMIN — MORPHINE SULFATE 2 MG: 4 INJECTION INTRAVENOUS at 20:33

## 2017-03-14 RX ADMIN — CEFTRIAXONE SODIUM 1 G: 1 INJECTION, POWDER, FOR SOLUTION INTRAMUSCULAR; INTRAVENOUS at 09:34

## 2017-03-14 RX ADMIN — ASPIRIN 81 MG: 81 TABLET ORAL at 09:34

## 2017-03-14 RX ADMIN — HEPARIN SODIUM 5000 UNITS: 5000 INJECTION, SOLUTION INTRAVENOUS; SUBCUTANEOUS at 06:37

## 2017-03-14 RX ADMIN — STANDARDIZED SENNA CONCENTRATE AND DOCUSATE SODIUM 2 TABLET: 8.6; 5 TABLET, FILM COATED ORAL at 20:33

## 2017-03-14 RX ADMIN — CLOPIDOGREL 75 MG: 75 TABLET, FILM COATED ORAL at 09:33

## 2017-03-14 RX ADMIN — HEPARIN SODIUM 5000 UNITS: 5000 INJECTION, SOLUTION INTRAVENOUS; SUBCUTANEOUS at 20:33

## 2017-03-14 RX ADMIN — MORPHINE SULFATE 2 MG: 4 INJECTION INTRAVENOUS at 15:45

## 2017-03-14 RX ADMIN — OXYCODONE HYDROCHLORIDE 5 MG: 5 TABLET ORAL at 23:02

## 2017-03-14 RX ADMIN — METOPROLOL TARTRATE 25 MG: 25 TABLET, FILM COATED ORAL at 20:33

## 2017-03-14 RX ADMIN — DOCUSATE SODIUM 100 MG: 100 CAPSULE ORAL at 09:34

## 2017-03-14 RX ADMIN — LOSARTAN POTASSIUM 50 MG: 50 TABLET, FILM COATED ORAL at 12:46

## 2017-03-14 RX ADMIN — Medication 60 MG: at 09:33

## 2017-03-14 RX ADMIN — METOPROLOL TARTRATE 25 MG: 25 TABLET, FILM COATED ORAL at 09:34

## 2017-03-14 RX ADMIN — OXYCODONE HYDROCHLORIDE 5 MG: 5 TABLET ORAL at 06:37

## 2017-03-14 RX ADMIN — SODIUM CHLORIDE: 900 INJECTION INTRAVENOUS at 02:25

## 2017-03-14 RX ADMIN — LEVOTHYROXINE SODIUM 75 MCG: 75 TABLET ORAL at 06:37

## 2017-03-14 RX ADMIN — OXYCODONE HYDROCHLORIDE 5 MG: 5 TABLET ORAL at 12:46

## 2017-03-14 RX ADMIN — HEPARIN SODIUM 5000 UNITS: 5000 INJECTION, SOLUTION INTRAVENOUS; SUBCUTANEOUS at 15:03

## 2017-03-14 RX ADMIN — OXYCODONE HYDROCHLORIDE 5 MG: 5 TABLET ORAL at 02:23

## 2017-03-14 ASSESSMENT — ENCOUNTER SYMPTOMS
CLAUDICATION: 0
COUGH: 0
PALPITATIONS: 0
SHORTNESS OF BREATH: 0
PND: 0
ORTHOPNEA: 0

## 2017-03-14 ASSESSMENT — PAIN SCALES - GENERAL
PAINLEVEL_OUTOF10: 8
PAINLEVEL_OUTOF10: 10
PAINLEVEL_OUTOF10: 8
PAINLEVEL_OUTOF10: 10
PAINLEVEL_OUTOF10: 3
PAINLEVEL_OUTOF10: 10
PAINLEVEL_OUTOF10: 8
PAINLEVEL_OUTOF10: 9
PAINLEVEL_OUTOF10: 8

## 2017-03-14 NOTE — PROGRESS NOTES
Assumed care at 0645 . Report received from night  RN. Patient's chart and MAR reviewed. Assessment complete. Pt is resting. Patient was updated on plan of care. Questions answered and concerns addressed. Reports pain of 0 on a 0-10 scale. Pt denies any additional needs at this time. White board updated. Call light, hospital room phone and personal belongings within reach. Patient stating she recently got pain medication and is doing much better

## 2017-03-14 NOTE — CARE PLAN
Problem: Communication  Goal: The ability to communicate needs accurately and effectively will improve  Outcome: PROGRESSING SLOWER THAN EXPECTED  Pt is alert and oriented x 4 . Pt is oriented to the environment and call light. I have discussed with the pt the plan of care, treatments and medications and the pt verbalizes agreement and understanding. The pt has been able to communicate her needs effectively and has been given the opportunity to ask any questions. All pt needs have been met and questions have been answered at this time. Hourly rounding in place.         Problem: Safety  Goal: Will remain free from injury  Outcome: PROGRESSING AS EXPECTED  Pt assessed at beginning of shift. Pt determined to be 1x assist with walker . Fall precautions in place. Call light and personal possessions in reach, bed alarm on . Hourly rounding in place.

## 2017-03-14 NOTE — PROGRESS NOTES
Cardiology Progress Note               Author: Nayeli Walsh Date & Time created: 3/14/2017  9:51 AM     Interval History:    Consultation for generalized weakness and progressive fatigue in the setting of recent CABG    Admitted with generalized weakness, progressive fatigue ×24 hours, chronic cough, dry, acute kidney injury likely due to dehydration and over diuresis      History of CAD status post CABG ×4  17 (LIMA to distal LAD, SVG to distal diagonal, SVG to distal circumflex, SVG to distal PDA), diabetes, hypertension, peripheral arterial disease status post angioplasty to her legs, pituitary adenoma resection, hyperlipidemia, gout, hypothyroid, anxiety    Labs reviewed  Negative troponin  BNP = 326  CR = 1.10, improved    BP = 170/80  HR = 90    Echocardiogram 17 showed LVEF 65%,    Review of Systems   Respiratory: Negative for cough and shortness of breath.    Cardiovascular: Negative for chest pain, palpitations, orthopnea, claudication, leg swelling and PND.       Physical Exam   Constitutional: She is oriented to person, place, and time.   HENT:   Head: Normocephalic.   Eyes: Conjunctivae are normal.   Neck: No JVD present. No thyromegaly present.   Cardiovascular: Normal rate and regular rhythm.    Pulses:       Carotid pulses are 2+ on the left side.       Radial pulses are 2+ on the right side, and 2+ on the left side.        Posterior tibial pulses are 2+ on the right side, and 2+ on the left side.   Pulmonary/Chest: She has no wheezes.   Abdominal: Soft.   Musculoskeletal: She exhibits no edema.   Neurological: She is alert and oriented to person, place, and time.   Skin: Skin is warm and dry.   Sternotomy healing well       Hemodynamics:  Temp (24hrs), Av.9 °C (98.4 °F), Min:36.3 °C (97.3 °F), Max:37.7 °C (99.9 °F)  Temperature: 37.7 °C (99.9 °F)  Pulse  Av.2  Min: 63  Max: 80   Blood Pressure : (!) 172/84 mmHg     Respiratory:    Respiration: 16, Pulse Oximetry: 92 %            Fluids:     Weight: 59 kg (130 lb 1.1 oz)  GI/Nutrition:  Orders Placed This Encounter   Procedures   • Diet Order     Standing Status: Standing      Number of Occurrences: 1      Standing Expiration Date:      Order Specific Question:  Diet:     Answer:  Diabetic [3]     Lab Results:  Recent Labs      03/12/17 1657 03/13/17   0133   WBC  8.8  8.8   RBC  3.66*  3.25*   HEMOGLOBIN  10.8*  9.5*   HEMATOCRIT  33.1*  29.7*   MCV  90.4  91.4   MCH  29.5  29.2   MCHC  32.6*  32.0*   RDW  46.4  46.0   PLATELETCT  453*  414   MPV  9.9  10.3     Recent Labs      03/12/17 1657 03/13/17 0133   SODIUM  136  135   POTASSIUM  3.4*  2.9*   CHLORIDE  101  104   CO2  19*  18*   GLUCOSE  80  117*   BUN  35*  30*   CREATININE  1.49*  1.10   CALCIUM  9.9  9.1     Recent Labs      03/12/17 1657   APTT  36.5*   INR  1.18*     Recent Labs      03/12/17 1657   BNPBTYPENAT  326*     Recent Labs      03/12/17 1657   CPKTOTAL  25   TROPONINI  <0.01   BNPBTYPENAT  326*             Medical Decision Making, by Problem:  Active Hospital Problems    Diagnosis   • *Acute renal failure (ARF) (CMS-HCC) [N17.9]   • S/P CABG x 4 [Z95.1]   • Hypertension [I10]   • Type 2 diabetes mellitus without complication (CMS-HCC) [E11.9]   • Hypothyroidism [E03.9]   • Normocytic anemia [D64.9]   • Hypokalemia [E87.6]   • Metabolic acidosis [E87.2]   • Elevated brain natriuretic peptide (BNP) level [R79.89]       Plan:    Hypertensive this am   May start home meds Losartan 50 mg   Repeat BMP today    Presented with progressive weakness and generalized fatigue, found to have UTI, dehydration, acute kidney injury    Home Lasix 40 was DC    Currently on aspirin, Lipitor 20, Plavix, metoprolol 25      UTI, on Rocephin      Acute kidney injury, resolved     Plan for rehydration and monitor electrolytes    No rhythm issues overnight    Preserved EF    Medications reviewed and Labs reviewed

## 2017-03-14 NOTE — PROGRESS NOTES
Received report from day RN assumed care at 1915. Pt A&Ox 4 . Pt states 4 /10 pain in her sternum at this time. Plan of care discussed with Pt, verbalized understanding. No family present at bedside. Assessment completed. All Pt needs met at this time. Call light within reach, bed alarm on , bed locked and in low position. Will continue to monitor.

## 2017-03-15 ENCOUNTER — APPOINTMENT (OUTPATIENT)
Dept: RADIOLOGY | Facility: MEDICAL CENTER | Age: 72
DRG: 683 | End: 2017-03-15
Attending: INTERNAL MEDICINE
Payer: MEDICARE

## 2017-03-15 ENCOUNTER — PATIENT OUTREACH (OUTPATIENT)
Dept: HEALTH INFORMATION MANAGEMENT | Facility: OTHER | Age: 72
End: 2017-03-15

## 2017-03-15 PROBLEM — M19.90 OSTEOARTHRITIS: Status: ACTIVE | Noted: 2017-03-15

## 2017-03-15 LAB
FOLATE SERPL-MCNC: 18.6 NG/ML
GLUCOSE BLD-MCNC: 120 MG/DL (ref 65–99)
GLUCOSE BLD-MCNC: 129 MG/DL (ref 65–99)
GLUCOSE BLD-MCNC: 96 MG/DL (ref 65–99)
GLUCOSE BLD-MCNC: 99 MG/DL (ref 65–99)
VIT B12 SERPL-MCNC: 1239 PG/ML (ref 211–911)

## 2017-03-15 PROCEDURE — 700111 HCHG RX REV CODE 636 W/ 250 OVERRIDE (IP): Performed by: INTERNAL MEDICINE

## 2017-03-15 PROCEDURE — 700102 HCHG RX REV CODE 250 W/ 637 OVERRIDE(OP): Performed by: INTERNAL MEDICINE

## 2017-03-15 PROCEDURE — 700105 HCHG RX REV CODE 258: Performed by: INTERNAL MEDICINE

## 2017-03-15 PROCEDURE — 36415 COLL VENOUS BLD VENIPUNCTURE: CPT

## 2017-03-15 PROCEDURE — A9270 NON-COVERED ITEM OR SERVICE: HCPCS | Performed by: INTERNAL MEDICINE

## 2017-03-15 PROCEDURE — 73560 X-RAY EXAM OF KNEE 1 OR 2: CPT | Mod: RT

## 2017-03-15 PROCEDURE — 82962 GLUCOSE BLOOD TEST: CPT | Mod: 91

## 2017-03-15 PROCEDURE — 82746 ASSAY OF FOLIC ACID SERUM: CPT

## 2017-03-15 PROCEDURE — 700112 HCHG RX REV CODE 229: Performed by: INTERNAL MEDICINE

## 2017-03-15 PROCEDURE — 99233 SBSQ HOSP IP/OBS HIGH 50: CPT | Performed by: INTERNAL MEDICINE

## 2017-03-15 PROCEDURE — 82607 VITAMIN B-12: CPT

## 2017-03-15 PROCEDURE — 770020 HCHG ROOM/CARE - TELE (206)

## 2017-03-15 RX ORDER — PREDNISONE 20 MG/1
20 TABLET ORAL DAILY
Status: DISCONTINUED | OUTPATIENT
Start: 2017-03-15 | End: 2017-03-20

## 2017-03-15 RX ADMIN — MORPHINE SULFATE 2 MG: 4 INJECTION INTRAVENOUS at 09:50

## 2017-03-15 RX ADMIN — SODIUM CHLORIDE: 900 INJECTION INTRAVENOUS at 12:37

## 2017-03-15 RX ADMIN — LEVOTHYROXINE SODIUM 75 MCG: 75 TABLET ORAL at 06:09

## 2017-03-15 RX ADMIN — CEFTRIAXONE SODIUM 1 G: 1 INJECTION, POWDER, FOR SOLUTION INTRAMUSCULAR; INTRAVENOUS at 09:44

## 2017-03-15 RX ADMIN — LOSARTAN POTASSIUM 100 MG: 50 TABLET, FILM COATED ORAL at 09:45

## 2017-03-15 RX ADMIN — Medication 60 MG: at 09:45

## 2017-03-15 RX ADMIN — OXYCODONE HYDROCHLORIDE 5 MG: 5 TABLET ORAL at 03:55

## 2017-03-15 RX ADMIN — OXYCODONE HYDROCHLORIDE 5 MG: 5 TABLET ORAL at 19:16

## 2017-03-15 RX ADMIN — CLOPIDOGREL 75 MG: 75 TABLET, FILM COATED ORAL at 09:45

## 2017-03-15 RX ADMIN — HEPARIN SODIUM 5000 UNITS: 5000 INJECTION, SOLUTION INTRAVENOUS; SUBCUTANEOUS at 21:43

## 2017-03-15 RX ADMIN — PREDNISONE 20 MG: 20 TABLET ORAL at 21:42

## 2017-03-15 RX ADMIN — MORPHINE SULFATE 2 MG: 4 INJECTION INTRAVENOUS at 21:43

## 2017-03-15 RX ADMIN — ATORVASTATIN CALCIUM 20 MG: 20 TABLET, FILM COATED ORAL at 21:42

## 2017-03-15 RX ADMIN — METOPROLOL TARTRATE 25 MG: 25 TABLET, FILM COATED ORAL at 09:45

## 2017-03-15 RX ADMIN — ASPIRIN 81 MG: 81 TABLET ORAL at 09:44

## 2017-03-15 RX ADMIN — OXYCODONE HYDROCHLORIDE 5 MG: 5 TABLET ORAL at 12:36

## 2017-03-15 RX ADMIN — DOCUSATE SODIUM 100 MG: 100 CAPSULE ORAL at 09:45

## 2017-03-15 RX ADMIN — HEPARIN SODIUM 5000 UNITS: 5000 INJECTION, SOLUTION INTRAVENOUS; SUBCUTANEOUS at 12:36

## 2017-03-15 RX ADMIN — HEPARIN SODIUM 5000 UNITS: 5000 INJECTION, SOLUTION INTRAVENOUS; SUBCUTANEOUS at 06:09

## 2017-03-15 RX ADMIN — ESCITALOPRAM OXALATE 20 MG: 10 TABLET ORAL at 09:45

## 2017-03-15 RX ADMIN — METOPROLOL TARTRATE 25 MG: 25 TABLET, FILM COATED ORAL at 21:43

## 2017-03-15 ASSESSMENT — PAIN SCALES - GENERAL
PAINLEVEL_OUTOF10: 0
PAINLEVEL_OUTOF10: 4
PAINLEVEL_OUTOF10: 6
PAINLEVEL_OUTOF10: 9
PAINLEVEL_OUTOF10: 8
PAINLEVEL_OUTOF10: 8

## 2017-03-15 ASSESSMENT — ENCOUNTER SYMPTOMS
PND: 0
CLAUDICATION: 0
SHORTNESS OF BREATH: 0
ORTHOPNEA: 0
PALPITATIONS: 0
COUGH: 0

## 2017-03-15 NOTE — CARE PLAN
Problem: Bowel/Gastric:  Goal: Will not experience complications related to bowel motility  No issues with bowel motility.  Last BM 3/15    Problem: Pain Management  Goal: Pain level will decrease to patient’s comfort goal  Pain is in knees as pt has arthritis.  Medicated prn.

## 2017-03-15 NOTE — PROGRESS NOTES
Assumed care of pt.  Assessment complete.  No signs of distress.  Discussed plan of care.  Call light within reach.  Bed alarm active.  Treaded socks on pt.  Will continue to monitor.

## 2017-03-15 NOTE — PROGRESS NOTES
Pt admitted to [facility] 02/27/2017 to 03/07/2017. Medical records requested.    Plan:  · RN care coordinator notified.

## 2017-03-15 NOTE — CARE PLAN
Problem: Venous Thromboembolism (VTW)/Deep Vein Thrombosis (DVT) Prevention:  Goal: Patient will participate in Venous Thrombosis (VTE)/Deep Vein Thrombosis (DVT)Prevention Measures    03/15/17 0429   OTHER   Pharmacologic Prophylaxis Used Unfractionated Heparin         Problem: Mobility  Goal: Risk for activity intolerance will decrease  Outcome: PROGRESSING SLOWER THAN EXPECTED  Having issues with ambulation. Assist patient with walker. Encourage ambulation.

## 2017-03-15 NOTE — PROGRESS NOTES
HOSPITALIST PROGRESS NOTE (SOAP)   Date of Service  3/14  CHIEF COMPLAINT  71 y.o.yo female presented 3/12/2017 with Shortness of Breath and Back Pain    SUBJECTIVE  Chart reviewed. No new issues or complaints. Awaiting SNF.  PHYSICAL EXAM  Filed Vitals:    03/14/17 0413 03/14/17 0800 03/14/17 1200 03/14/17 1600   BP: 173/80 172/84 168/78 158/95   Pulse: 64 67 68 82   Temp: 36.8 °C (98.2 °F) 37.7 °C (99.9 °F) 37.8 °C (100.1 °F) 36.9 °C (98.5 °F)   TempSrc:       Resp: 18 16 20 20   Height:       Weight:       SpO2: 92% 92% 94% 97%     No intake or output data in the 24 hours ending 03/14/17 1952  Vitals reviewed  General/Constitutional: No acute distress.   Head: Normocephalic, atraumatic  ENT: Oral mucosa is moist. No obvious pharyngeal exudates  Eyes: Pink conjunctiva, no scleral icterus  Neck: Supple, no lymphadenopathy  Cardiovascular: Normal rate and regular rhythm. S1,2 noted. No murmurs, gallops or rubs.  Pulmonary: Clear to auscultation bilaterally. No wheezes, rales or rhonchi  Abdominal: Soft, nontender, not distended, bowel sounds normoactive.  Musculoskeletal: Mild tenderness to palpation of chest wall.  Neurologic: Grossly nonfocal, moving all extremities.  Genitourinary: No gross hematuria  Skin: No obvious rash.  Psychiatric: Pleasant, cooperative.    LABS  Lab Results   Component Value Date/Time    WBC 8.8 03/13/2017 01:33 AM    RBC 3.25* 03/13/2017 01:33 AM    HEMOGLOBIN 9.5* 03/13/2017 01:33 AM    HEMATOCRIT 29.7* 03/13/2017 01:33 AM    MCV 91.4 03/13/2017 01:33 AM    MCH 29.2 03/13/2017 01:33 AM    MCHC 32.0* 03/13/2017 01:33 AM    MPV 10.3 03/13/2017 01:33 AM    NEUTROPHILS-POLYS 69.40 03/13/2017 01:33 AM    LYMPHOCYTES 14.40* 03/13/2017 01:33 AM    MONOCYTES 10.40 03/13/2017 01:33 AM    EOSINOPHILS 4.80 03/13/2017 01:33 AM    BASOPHILS 0.70 03/13/2017 01:33 AM    HYPOCHROMIA 1+ 05/30/2016 08:35 AM    ANISOCYTOSIS 2+ 05/30/2016 08:35 AM      Lab Results   Component Value Date/Time    SODIUM 137  03/14/2017 09:24 AM    POTASSIUM 3.5* 03/14/2017 09:24 AM    CHLORIDE 108 03/14/2017 09:24 AM    CO2 18* 03/14/2017 09:24 AM    GLUCOSE 98 03/14/2017 09:24 AM    BUN 10 03/14/2017 09:24 AM    CREATININE 0.71 03/14/2017 09:24 AM    BUN-CREATININE RATIO 26 02/01/2010 12:46 PM    GLOM FILT RATE, EST >59 02/01/2010 12:46 PM      Lab Results   Component Value Date/Time    PT 15.4* 03/12/2017 04:57 PM    INR 1.18* 03/12/2017 04:57 PM      BLOOD CULTURE   Date Value Ref Range Status   05/16/2013 No growth after 5 days of incubation.  Final     Labs reviewed  Imaging reviewed  ASSESSMENT and PLAN      Acute renal failure (ARF) (CMS-HCC)    Metabolic acidosis    Elevated brain natriuretic peptide (BNP) level  Due to overdiuresis. Resolved with holding diuretics.  Active Problems:    Hypertension  Uncontrolled. Increase losartan now to 100, Cr- nromalized. Continue metoprolol    S/P CABG x 4  On aspirin, statin, Plavix    Type 2 diabetes mellitus without complication (CMS-[facility])  Stable. On sliding scale insulin.    Hypothyroidism  Continue levothyroxine.    Normocytic anemia  Hb 9-10 stable. No bleeding. Obtain iron panel, B12, folate and outpatient follow up to [facility].    Hypokalemia  Resolving. Recheck potassium.    PharmacologicDVT prophylaxis    I spent 38 minutes, reviewing the chart, notes, vitals, labs, imaging, ordering labs, evaluating Caroleradha Kingsley for assessment, enacting the plan above and writing this note. 50% of the time was spent in counseling Carole Escobarnelson Kingsley and answering questions. All of the above were discussed.

## 2017-03-15 NOTE — PROGRESS NOTES
Cardiology Progress Note               Author: Nayeli Walsh Date & Time created: 3/15/2017  9:03 AM     Interval History:    Consultation for generalized weakness and progressive fatigue in the setting of recent CABG    Admitted with generalized weakness, progressive fatigue ×24 hours, chronic cough, dry, acute kidney injury likely due to dehydration and over diuresis      History of CAD status post CABG ×4  17 (LIMA to distal LAD, SVG to distal diagonal, SVG to distal circumflex, SVG to distal PDA), diabetes, hypertension, peripheral arterial disease status post angioplasty to her legs, pituitary adenoma resection, hyperlipidemia, gout, hypothyroid, anxiety    Labs reviewed  Negative troponin  BNP = 326  CR = 0.7, improved    BP = 134/70  HR = 70    Echocardiogram 17 showed LVEF 65%,    Review of Systems   Respiratory: Negative for cough and shortness of breath.    Cardiovascular: Negative for chest pain, palpitations, orthopnea, claudication, leg swelling and PND.   Musculoskeletal:        Joint pain to bilateral knees  May delay DC , unable to ambuate           Physical Exam   Constitutional: She is oriented to person, place, and time.   HENT:   Head: Normocephalic.   Eyes: Conjunctivae are normal.   Neck: No JVD present. No thyromegaly present.   Cardiovascular: Normal rate and regular rhythm.    Pulses:       Carotid pulses are 2+ on the left side.       Radial pulses are 2+ on the right side, and 2+ on the left side.        Posterior tibial pulses are 2+ on the right side, and 2+ on the left side.   Pulmonary/Chest: She has no wheezes.   Abdominal: Soft.   Musculoskeletal: She exhibits no edema.   Neurological: She is alert and oriented to person, place, and time.   Skin: Skin is warm and dry.   Sternotomy healing well       Hemodynamics:  Temp (24hrs), Av.9 °C (98.5 °F), Min:36 °C (96.8 °F), Max:37.8 °C (100.1 °F)  Temperature: 36 °C (96.8 °F)  Pulse  Av.7  Min: 63  Max: 86   Blood  Pressure : 134/71 mmHg     Respiratory:    Respiration: 20, Pulse Oximetry: 92 %        RUL Breath Sounds: Diminished, RML Breath Sounds: Diminished, RLL Breath Sounds: Diminished, KATERYNA Breath Sounds: Diminished, LLL Breath Sounds: Diminished  Fluids:     Weight: 63.7 kg (140 lb 6.9 oz)  GI/Nutrition:  Orders Placed This Encounter   Procedures   • Diet Order     Standing Status: Standing      Number of Occurrences: 1      Standing Expiration Date:      Order Specific Question:  Diet:     Answer:  Diabetic [3]     Lab Results:  Recent Labs      03/12/17 1657 03/13/17 0133   WBC  8.8  8.8   RBC  3.66*  3.25*   HEMOGLOBIN  10.8*  9.5*   HEMATOCRIT  33.1*  29.7*   MCV  90.4  91.4   MCH  29.5  29.2   MCHC  32.6*  32.0*   RDW  46.4  46.0   PLATELETCT  453*  414   MPV  9.9  10.3     Recent Labs      03/12/17 1657 03/13/17 0133 03/14/17   0924   SODIUM  136  135  137   POTASSIUM  3.4*  2.9*  3.5*   CHLORIDE  101  104  108   CO2  19*  18*  18*   GLUCOSE  80  117*  98   BUN  35*  30*  10   CREATININE  1.49*  1.10  0.71   CALCIUM  9.9  9.1  9.1     Recent Labs      03/12/17 1657   APTT  36.5*   INR  1.18*     Recent Labs      03/12/17 1657   BNPBTYPENAT  326*     Recent Labs      03/12/17 1657   CPKTOTAL  25   TROPONINI  <0.01   BNPBTYPENAT  326*             Medical Decision Making, by Problem:  Active Hospital Problems    Diagnosis   • *Acute renal failure (ARF) (CMS-[facility]) [N17.9]   • S/P CABG x 4 [Z95.1]   • Hypertension [I10]   • Type 2 diabetes mellitus without complication (CMS-[facility]) [E11.9]   • Hypothyroidism [E03.9]   • Normocytic anemia [D64.9]   • Hypokalemia [E87.6]   • Metabolic acidosis [E87.2]   • Elevated brain natriuretic peptide (BNP) level [R79.89]       Plan:      Hypertension, well controlled today   Arthritis flair up, hard time ambulating    Presented with progressive weakness and generalized fatigue, found to have UTI, dehydration, acute kidney injury    Home Lasix 40 was DC    CAD s/p CABG  2/27/17 , Currently on aspirin, Lipitor 20, Plavix, metoprolol 25 BID, Losartan    UTI, on Rocephin    Acute kidney injury, resolved     Plan for rehydration and monitor electrolytes    No rhythm issues overnight    Preserved EF    Will  arrange for follow-up appointment with cardiology office in 2 weeks    D/W Dr. Min, will s/o please call with any questions     Medications reviewed and Labs reviewed

## 2017-03-15 NOTE — PROGRESS NOTES
Received bedside report. Assumed care of patient. Patient alert and oriented. Assessment complete, patietn updated on plan of care all questions answered, patient verbalizes understanding.  Bed in lowest position, call light within reach. Hourly rounding in place. Will continue to monitor.

## 2017-03-15 NOTE — PROGRESS NOTES
Sitting up in bed. Assessment completed. Educated on medications, activity and safety protocol. All questions answered. Denies further needs.

## 2017-03-16 PROBLEM — M00.9 ARTHRITIS, SEPTIC, KNEE (HCC): Status: ACTIVE | Noted: 2017-03-16

## 2017-03-16 LAB
ANION GAP SERPL CALC-SCNC: 12 MMOL/L (ref 0–11.9)
APPEARANCE FLD: NORMAL
BODY FLD TYPE: NORMAL
BODY FLD TYPE: NORMAL
BUN SERPL-MCNC: 11 MG/DL (ref 8–22)
CALCIUM SERPL-MCNC: 9 MG/DL (ref 8.5–10.5)
CHLORIDE SERPL-SCNC: 109 MMOL/L (ref 96–112)
CO2 SERPL-SCNC: 16 MMOL/L (ref 20–33)
COLOR FLD: NORMAL
CREAT SERPL-MCNC: 0.74 MG/DL (ref 0.5–1.4)
CRP SERPL HS-MCNC: 31.28 MG/DL (ref 0–0.75)
CRYSTALS FLD MICRO: NORMAL
ERYTHROCYTE [DISTWIDTH] IN BLOOD BY AUTOMATED COUNT: 45.6 FL (ref 35.9–50)
ERYTHROCYTE [SEDIMENTATION RATE] IN BLOOD BY WESTERGREN METHOD: >120 MM/HOUR (ref 0–30)
GFR SERPL CREATININE-BSD FRML MDRD: >60 ML/MIN/1.73 M 2
GLUCOSE BLD-MCNC: 123 MG/DL (ref 65–99)
GLUCOSE BLD-MCNC: 142 MG/DL (ref 65–99)
GLUCOSE BLD-MCNC: 160 MG/DL (ref 65–99)
GLUCOSE BLD-MCNC: 239 MG/DL (ref 65–99)
GLUCOSE SERPL-MCNC: 163 MG/DL (ref 65–99)
GRAM STN SPEC: NORMAL
HCT VFR BLD AUTO: 28.4 % (ref 37–47)
HGB BLD-MCNC: 9.1 G/DL (ref 12–16)
LYMPHOCYTES NFR FLD: 1 %
MCH RBC QN AUTO: 28.6 PG (ref 27–33)
MCHC RBC AUTO-ENTMCNC: 32 G/DL (ref 33.6–35)
MCV RBC AUTO: 89.3 FL (ref 81.4–97.8)
MONONUC CELLS NFR FLD: 1 %
NEUTROPHILS NFR FLD: 98 %
PLATELET # BLD AUTO: 374 K/UL (ref 164–446)
PMV BLD AUTO: 10.1 FL (ref 9–12.9)
POTASSIUM SERPL-SCNC: 3.3 MMOL/L (ref 3.6–5.5)
RBC # BLD AUTO: 3.18 M/UL (ref 4.2–5.4)
RBC # FLD: NORMAL CELLS/UL
SIGNIFICANT IND 70042: NORMAL
SITE SITE: NORMAL
SODIUM SERPL-SCNC: 137 MMOL/L (ref 135–145)
SOURCE SOURCE: NORMAL
WBC # BLD AUTO: 12.7 K/UL (ref 4.8–10.8)
WBC # FLD: NORMAL CELLS/UL

## 2017-03-16 PROCEDURE — A9270 NON-COVERED ITEM OR SERVICE: HCPCS | Performed by: INTERNAL MEDICINE

## 2017-03-16 PROCEDURE — 85652 RBC SED RATE AUTOMATED: CPT

## 2017-03-16 PROCEDURE — 0S9C3ZX DRAINAGE OF RIGHT KNEE JOINT, PERCUTANEOUS APPROACH, DIAGNOSTIC: ICD-10-PCS | Performed by: ORTHOPAEDIC SURGERY

## 2017-03-16 PROCEDURE — 36415 COLL VENOUS BLD VENIPUNCTURE: CPT

## 2017-03-16 PROCEDURE — 700111 HCHG RX REV CODE 636 W/ 250 OVERRIDE (IP): Performed by: INTERNAL MEDICINE

## 2017-03-16 PROCEDURE — 700102 HCHG RX REV CODE 250 W/ 637 OVERRIDE(OP): Performed by: INTERNAL MEDICINE

## 2017-03-16 PROCEDURE — 80048 BASIC METABOLIC PNL TOTAL CA: CPT

## 2017-03-16 PROCEDURE — 770020 HCHG ROOM/CARE - TELE (206)

## 2017-03-16 PROCEDURE — 700111 HCHG RX REV CODE 636 W/ 250 OVERRIDE (IP)

## 2017-03-16 PROCEDURE — 87205 SMEAR GRAM STAIN: CPT

## 2017-03-16 PROCEDURE — 86140 C-REACTIVE PROTEIN: CPT

## 2017-03-16 PROCEDURE — 700105 HCHG RX REV CODE 258

## 2017-03-16 PROCEDURE — 82962 GLUCOSE BLOOD TEST: CPT

## 2017-03-16 PROCEDURE — 700105 HCHG RX REV CODE 258: Performed by: INTERNAL MEDICINE

## 2017-03-16 PROCEDURE — 89060 EXAM SYNOVIAL FLUID CRYSTALS: CPT

## 2017-03-16 PROCEDURE — 85027 COMPLETE CBC AUTOMATED: CPT

## 2017-03-16 PROCEDURE — 99233 SBSQ HOSP IP/OBS HIGH 50: CPT | Performed by: INTERNAL MEDICINE

## 2017-03-16 PROCEDURE — 87070 CULTURE OTHR SPECIMN AEROBIC: CPT

## 2017-03-16 PROCEDURE — 89051 BODY FLUID CELL COUNT: CPT

## 2017-03-16 RX ADMIN — VANCOMYCIN HYDROCHLORIDE 1600 MG: 100 INJECTION, POWDER, LYOPHILIZED, FOR SOLUTION INTRAVENOUS at 16:48

## 2017-03-16 RX ADMIN — ATORVASTATIN CALCIUM 20 MG: 20 TABLET, FILM COATED ORAL at 20:05

## 2017-03-16 RX ADMIN — OXYCODONE HYDROCHLORIDE 5 MG: 5 TABLET ORAL at 04:14

## 2017-03-16 RX ADMIN — LOSARTAN POTASSIUM 100 MG: 50 TABLET, FILM COATED ORAL at 09:35

## 2017-03-16 RX ADMIN — HEPARIN SODIUM 5000 UNITS: 5000 INJECTION, SOLUTION INTRAVENOUS; SUBCUTANEOUS at 06:04

## 2017-03-16 RX ADMIN — OXYCODONE HYDROCHLORIDE 5 MG: 5 TABLET ORAL at 23:19

## 2017-03-16 RX ADMIN — INSULIN LISPRO 2 UNITS: 100 INJECTION, SOLUTION INTRAVENOUS; SUBCUTANEOUS at 20:10

## 2017-03-16 RX ADMIN — SODIUM CHLORIDE: 900 INJECTION INTRAVENOUS at 09:35

## 2017-03-16 RX ADMIN — CEFTRIAXONE SODIUM 1 G: 1 INJECTION, POWDER, FOR SOLUTION INTRAMUSCULAR; INTRAVENOUS at 09:37

## 2017-03-16 RX ADMIN — Medication 60 MG: at 09:35

## 2017-03-16 RX ADMIN — METOPROLOL TARTRATE 25 MG: 25 TABLET, FILM COATED ORAL at 09:34

## 2017-03-16 RX ADMIN — ESCITALOPRAM OXALATE 20 MG: 10 TABLET ORAL at 09:35

## 2017-03-16 RX ADMIN — METOPROLOL TARTRATE 25 MG: 25 TABLET, FILM COATED ORAL at 20:05

## 2017-03-16 RX ADMIN — ASPIRIN 81 MG: 81 TABLET ORAL at 09:35

## 2017-03-16 RX ADMIN — OXYCODONE HYDROCHLORIDE 5 MG: 5 TABLET ORAL at 16:54

## 2017-03-16 RX ADMIN — STANDARDIZED SENNA CONCENTRATE AND DOCUSATE SODIUM 2 TABLET: 8.6; 5 TABLET, FILM COATED ORAL at 20:05

## 2017-03-16 RX ADMIN — PREDNISONE 20 MG: 20 TABLET ORAL at 09:34

## 2017-03-16 RX ADMIN — INSULIN LISPRO 1 UNITS: 100 INJECTION, SOLUTION INTRAVENOUS; SUBCUTANEOUS at 06:05

## 2017-03-16 RX ADMIN — CLOPIDOGREL 75 MG: 75 TABLET, FILM COATED ORAL at 09:35

## 2017-03-16 RX ADMIN — SODIUM CHLORIDE: 900 INJECTION INTRAVENOUS at 06:09

## 2017-03-16 RX ADMIN — LEVOTHYROXINE SODIUM 75 MCG: 75 TABLET ORAL at 06:04

## 2017-03-16 ASSESSMENT — PAIN SCALES - GENERAL
PAINLEVEL_OUTOF10: 7
PAINLEVEL_OUTOF10: 8
PAINLEVEL_OUTOF10: 7

## 2017-03-16 NOTE — PROGRESS NOTES
"Pharmacy Kinetics 71 y.o. female on vancomycin day #1 3/16/2017    Currently loaded with 1600 mg once 3/16/17 at 1600    Indication for Treatment: Septic knee    Pertinent history per medical record: Admitted on 3/12/2017 for UTI and MOMO. Patient underwent a 4-vessel bypass a few weeks ago. She did a 1 week ICU stay and was sent to cardiac rehab where she spent another week. She was discharged home 5 days before presenting to the ER today. Four days ago she had a nonproductive cough and SOB. Over the past 3 days, she had generalized weakness until she was unable to ambulate. She has some flank pain and urinary frequency. Initial workup showed a UTI and MOMO.    Other antibiotics: ceftriaxone 1 gm q24h     Allergies: Tape     List concerns for renal function:   Age   Renal insult upon admission    Pertinent cultures to date:   (3/12) UR - POS for klebsiella pneumoniae   (3/12) influenza - NEG    Recent Labs      17   0258   WBC  12.7*     Recent Labs      17   0924  17   0258   BUN  10  11   CREATININE  0.71  0.74   ALBUMIN  3.8   --      Intake/Output Summary (Last 24 hours) at 17 1541  Last data filed at 17 0614   Gross per 24 hour   Intake    815 ml   Output   1400 ml   Net   -585 ml      Blood pressure 144/83, pulse 65, temperature 36.8 °C (98.3 °F), temperature source Temporal, resp. rate 18, height 1.575 m (5' 2\"), weight 62.4 kg (137 lb 9.1 oz), SpO2 98 %, not currently breastfeeding. Temp (24hrs), Av.9 °C (98.5 °F), Min:36.1 °C (97 °F), Max:37.7 °C (99.9 °F)      A/P   1. Vancomycin dose change: start 900 mg q24h (15 mg/kg) per protocol  2. Next vancomycin level: 3/18/17 at 1630 prior to the 3rd total dose (ordered)  3. Goal trough: 12-16 mcg/mL  4. Comments:  a. New start. Patient does not appear to have any knee hardware, except surgical clips about the knee, that would require a higher trough goal. Due to patient's age and MOMO/UTI upon admission, I will start the patient on " q24h dosing per protocol. No cultures indicative of a MRSA infection. R knee XR was positive for osteoarthritis. MD plans to get an orthopedics consult in the morning. Pharmacy will continue to monitor and adjust dosing as clinically appropriate. Recommend rapid de-escalation, pending cultures.    Opal Guerra, PharmD

## 2017-03-16 NOTE — PROGRESS NOTES
Per Pre-op department, pt not on board for procedure for the rest of the day after being cancelled earlier. Will resume pt diet for dinner tonight.

## 2017-03-16 NOTE — PROGRESS NOTES
Received call that pt will be going down to pre-op for I&D of right knee. Pt updated on POC and has been NPO since med pass this morning at 0935. Report given to ANYA Gastelum. Awaiting transport. Preop checklist completed to best of ability.

## 2017-03-16 NOTE — CARE PLAN
Problem: Nutritional:  Goal: Achieve adequate nutritional intake  Patient will consume >50% of meals and supplements   Outcome: PROGRESSING SLOWER THAN EXPECTED  document intake of all meals and supplements as % taken in ADL's to provide interdisciplinary communication across all shifts

## 2017-03-16 NOTE — PROGRESS NOTES
HOSPITALIST PROGRESS NOTE (SOAP)   Date of Service  3/15  CHIEF COMPLAINT  71 y.o.yo female presented 3/12/2017 with Shortness of Breath and Back Pain    SUBJECTIVE  She was having difficulty with PE because of knee pain. She says she has osteoarthritis but also history of gout.   PHYSICAL EXAM  Filed Vitals:    03/14/17 2324 03/15/17 0400 03/15/17 0743 03/15/17 1055   BP: 142/71 137/83 134/71 133/71   Pulse: 84 86 69 72   Temp: 36.9 °C (98.4 °F) 36.4 °C (97.5 °F) 36 °C (96.8 °F) 36.1 °C (97 °F)   TempSrc:       Resp: 16 16 20 18   Height:       Weight:       SpO2: 94% 96% 92% 94%     No intake or output data in the 24 hours ending 03/15/17 1918Vitals reviewed  General/Constitutional: No acute distress.    Head: Normocephalic, atraumatic  ENT: Oral mucosa is moist. No obvious pharyngeal exudates  Eyes: Pink conjunctiva, no scleral icterus  Neck: Supple, no lymphadenopathy  Cardiovascular: Normal rate and regular rhythm. S1,2 noted. No murmurs, gallops or rubs.  Pulmonary: Clear to auscultation bilaterally. No wheezes, rales or rhonchi  Abdominal: Soft, nontender, not distended, bowel sounds normoactive.  Musculoskeletal: Mild tenderness to palpation of chest wall CABG incision site. L knee soreness but R knee slightly swollen and exquisitely tender to tough; not particularly warm or erythematous.  Neurologic: Grossly nonfocal, moving all extremities.  Genitourinary: No gross hematuria  Skin: No obvious rash.  Psychiatric: Pleasant, cooperative.  LABS  Lab Results   Component Value Date/Time    WBC 8.8 03/13/2017 01:33 AM    RBC 3.25* 03/13/2017 01:33 AM    HEMOGLOBIN 9.5* 03/13/2017 01:33 AM    HEMATOCRIT 29.7* 03/13/2017 01:33 AM    MCV 91.4 03/13/2017 01:33 AM    MCH 29.2 03/13/2017 01:33 AM    MCHC 32.0* 03/13/2017 01:33 AM    MPV 10.3 03/13/2017 01:33 AM    NEUTROPHILS-POLYS 69.40 03/13/2017 01:33 AM    LYMPHOCYTES 14.40* 03/13/2017 01:33 AM    MONOCYTES 10.40 03/13/2017 01:33 AM    EOSINOPHILS 4.80 03/13/2017  01:33 AM    BASOPHILS 0.70 03/13/2017 01:33 AM    HYPOCHROMIA 1+ 05/30/2016 08:35 AM    ANISOCYTOSIS 2+ 05/30/2016 08:35 AM      Lab Results   Component Value Date/Time    SODIUM 137 03/14/2017 09:24 AM    POTASSIUM 3.5* 03/14/2017 09:24 AM    CHLORIDE 108 03/14/2017 09:24 AM    CO2 18* 03/14/2017 09:24 AM    GLUCOSE 98 03/14/2017 09:24 AM    BUN 10 03/14/2017 09:24 AM    CREATININE 0.71 03/14/2017 09:24 AM    BUN-CREATININE RATIO 26 02/01/2010 12:46 PM    GLOM FILT RATE, EST >59 02/01/2010 12:46 PM      Lab Results   Component Value Date/Time    PT 15.4* 03/12/2017 04:57 PM    INR 1.18* 03/12/2017 04:57 PM      BLOOD CULTURE   Date Value Ref Range Status   05/16/2013 No growth after 5 days of incubation.  Final     Labs reviewed  Imaging reviewed  ASSESSMENT and PLAN    Acute renal failure (ARF) (CMS-HCC)    Metabolic acidosis    Elevated brain natriuretic peptide (BNP) level  Due to overdiuresis. Resolved with holding diuretics.  Active Problems:    Hypertension  Uncontrolled. Increase losartan now to 100, Cr- nromalized. Continue metoprolol    S/P CABG x 4  On aspirin, statin, Plavix    Type 2 diabetes mellitus without complication (CMS-HCC)  Stable. On sliding scale insulin.    Hypothyroidism  Continue levothyroxine.    Normocytic anemia  Hb 9-10 stable. No bleeding. Obtain iron panel, B12, folate and outpatient follow up to [facility].    Hypokalemia  Resolving. Recheck potassium.    Gout    Osteoarthritis  R knee by clinical exam. No fever or leukocytosis to suggest infection. Ordered R knee XR that showed no fracture, positive for osteoarthritis but also has moderate knee effusion. Meanwhile will start prednisone 20mg PO daily but will avoid NSAIDs due to recent cardiovascular disease and risk of bleeding. Orthopedics consultation in the morning.    PharmacologicDVT prophylaxis    I spent 48 minutes, reviewing the chart, notes, vitals, labs, imaging, ordering labs, evaluating  Carole Kingsley for assessment, enacting the plan above and writing this note. 50% of the time was spent in counseling Carole Kingsley andanswering questions. ALl of the above were discussed. Medical decision making is therefore complex.

## 2017-03-16 NOTE — DIETARY
Nutrition: Day 4 of admit.  72 yo female admitted with acute renal failure and poor po intake and weight loss (12#) prior to admit.  Weight today 62.4 kg (bed scale) is increased 7.6 kg since admit stand up scale wt 54.8 kg.  Question fluid overload. She has been on a diabetic diet with supplements provided TID with meals and uncertain intake. She is NPO now for possible procedure. Restart diet as appropriate.     Recommendations:  1) encourage intake of meals and supplements  2) document intake of all meals and supplements as % taken in ADL's to provide interdisciplinary communication across all shifts   3) monitor daily weights for adequacy of nutrition and fluid balance  4) pt to be seen daily by nutrition representative for meal and snack preferences    Discussed with RN

## 2017-03-16 NOTE — PROGRESS NOTES
Sitting up in bed. States ambulation is more difficult today. Assessment completed. Pulses present in all extremities. Right knee more swollen and red. Educated on plan of care including medications, labs, activity and safety protocol. No further questions at this time.

## 2017-03-16 NOTE — CARE PLAN
Problem: Communication  Goal: The ability to communicate needs accurately and effectively will improve  Outcome: PROGRESSING AS EXPECTED  Educated patient on POC and medication. Patient verbalized understanding.     Problem: Mobility  Goal: Risk for activity intolerance will decrease  Outcome: PROGRESSING AS EXPECTED  Patient educated to call for assistance. Call light is close to patient, bedside table is close, bed is in the lowest position.

## 2017-03-16 NOTE — PROGRESS NOTES
Assumed care of patient bedside report received from night shift RN. Patient is AOx4 and resting in bed. Call light within reach and fall precautions in place. Bed locked and in lowest position.

## 2017-03-16 NOTE — CARE PLAN
Problem: Safety  Goal: Will remain free from injury  Outcome: PROGRESSING AS EXPECTED  Bed alarm turned on. All personal items with in reach.    Problem: Pain Management  Goal: Pain level will decrease to patient’s comfort goal  Outcome: PROGRESSING SLOWER THAN EXPECTED  Still with pain with ambulation. Medicating according to orders.

## 2017-03-17 LAB
ANION GAP SERPL CALC-SCNC: 10 MMOL/L (ref 0–11.9)
BUN SERPL-MCNC: 18 MG/DL (ref 8–22)
CALCIUM SERPL-MCNC: 9 MG/DL (ref 8.5–10.5)
CHLORIDE SERPL-SCNC: 112 MMOL/L (ref 96–112)
CO2 SERPL-SCNC: 18 MMOL/L (ref 20–33)
CREAT SERPL-MCNC: 0.71 MG/DL (ref 0.5–1.4)
ERYTHROCYTE [DISTWIDTH] IN BLOOD BY AUTOMATED COUNT: 46.5 FL (ref 35.9–50)
GFR SERPL CREATININE-BSD FRML MDRD: >60 ML/MIN/1.73 M 2
GLUCOSE BLD-MCNC: 121 MG/DL (ref 65–99)
GLUCOSE BLD-MCNC: 149 MG/DL (ref 65–99)
GLUCOSE BLD-MCNC: 161 MG/DL (ref 65–99)
GLUCOSE BLD-MCNC: 179 MG/DL (ref 65–99)
GLUCOSE SERPL-MCNC: 125 MG/DL (ref 65–99)
HCT VFR BLD AUTO: 24.4 % (ref 37–47)
HGB BLD-MCNC: 8 G/DL (ref 12–16)
MCH RBC QN AUTO: 29.3 PG (ref 27–33)
MCHC RBC AUTO-ENTMCNC: 32.8 G/DL (ref 33.6–35)
MCV RBC AUTO: 89.4 FL (ref 81.4–97.8)
PLATELET # BLD AUTO: 347 K/UL (ref 164–446)
PMV BLD AUTO: 10.3 FL (ref 9–12.9)
POTASSIUM SERPL-SCNC: 3.3 MMOL/L (ref 3.6–5.5)
RBC # BLD AUTO: 2.73 M/UL (ref 4.2–5.4)
SODIUM SERPL-SCNC: 140 MMOL/L (ref 135–145)
WBC # BLD AUTO: 13.8 K/UL (ref 4.8–10.8)

## 2017-03-17 PROCEDURE — 700102 HCHG RX REV CODE 250 W/ 637 OVERRIDE(OP): Performed by: INTERNAL MEDICINE

## 2017-03-17 PROCEDURE — 85027 COMPLETE CBC AUTOMATED: CPT

## 2017-03-17 PROCEDURE — 770020 HCHG ROOM/CARE - TELE (206)

## 2017-03-17 PROCEDURE — 700105 HCHG RX REV CODE 258: Performed by: INTERNAL MEDICINE

## 2017-03-17 PROCEDURE — 700112 HCHG RX REV CODE 229: Performed by: INTERNAL MEDICINE

## 2017-03-17 PROCEDURE — 82962 GLUCOSE BLOOD TEST: CPT

## 2017-03-17 PROCEDURE — 700111 HCHG RX REV CODE 636 W/ 250 OVERRIDE (IP): Performed by: INTERNAL MEDICINE

## 2017-03-17 PROCEDURE — 700105 HCHG RX REV CODE 258

## 2017-03-17 PROCEDURE — A9270 NON-COVERED ITEM OR SERVICE: HCPCS | Performed by: INTERNAL MEDICINE

## 2017-03-17 PROCEDURE — 99233 SBSQ HOSP IP/OBS HIGH 50: CPT | Performed by: INTERNAL MEDICINE

## 2017-03-17 PROCEDURE — 36415 COLL VENOUS BLD VENIPUNCTURE: CPT

## 2017-03-17 PROCEDURE — 80048 BASIC METABOLIC PNL TOTAL CA: CPT

## 2017-03-17 PROCEDURE — 700111 HCHG RX REV CODE 636 W/ 250 OVERRIDE (IP)

## 2017-03-17 RX ORDER — HYDRALAZINE HYDROCHLORIDE 20 MG/ML
20 INJECTION INTRAMUSCULAR; INTRAVENOUS EVERY 4 HOURS PRN
Status: DISCONTINUED | OUTPATIENT
Start: 2017-03-17 | End: 2017-03-21 | Stop reason: HOSPADM

## 2017-03-17 RX ADMIN — OXYCODONE HYDROCHLORIDE 5 MG: 5 TABLET ORAL at 13:24

## 2017-03-17 RX ADMIN — INSULIN LISPRO 1 UNITS: 100 INJECTION, SOLUTION INTRAVENOUS; SUBCUTANEOUS at 20:54

## 2017-03-17 RX ADMIN — LOSARTAN POTASSIUM 100 MG: 50 TABLET, FILM COATED ORAL at 08:45

## 2017-03-17 RX ADMIN — CLOPIDOGREL 75 MG: 75 TABLET, FILM COATED ORAL at 08:45

## 2017-03-17 RX ADMIN — VANCOMYCIN HYDROCHLORIDE 900 MG: 100 INJECTION, POWDER, LYOPHILIZED, FOR SOLUTION INTRAVENOUS at 18:25

## 2017-03-17 RX ADMIN — Medication 60 MG: at 08:44

## 2017-03-17 RX ADMIN — METOPROLOL TARTRATE 25 MG: 25 TABLET, FILM COATED ORAL at 08:45

## 2017-03-17 RX ADMIN — STANDARDIZED SENNA CONCENTRATE AND DOCUSATE SODIUM 2 TABLET: 8.6; 5 TABLET, FILM COATED ORAL at 20:34

## 2017-03-17 RX ADMIN — DOCUSATE SODIUM 100 MG: 100 CAPSULE ORAL at 08:45

## 2017-03-17 RX ADMIN — METOPROLOL TARTRATE 25 MG: 25 TABLET, FILM COATED ORAL at 20:34

## 2017-03-17 RX ADMIN — OXYCODONE HYDROCHLORIDE 5 MG: 5 TABLET ORAL at 04:30

## 2017-03-17 RX ADMIN — OXYCODONE HYDROCHLORIDE 5 MG: 5 TABLET ORAL at 20:34

## 2017-03-17 RX ADMIN — INSULIN LISPRO 1 UNITS: 100 INJECTION, SOLUTION INTRAVENOUS; SUBCUTANEOUS at 11:56

## 2017-03-17 RX ADMIN — ASPIRIN 81 MG: 81 TABLET ORAL at 08:45

## 2017-03-17 RX ADMIN — HYDRALAZINE HYDROCHLORIDE 20 MG: 20 INJECTION INTRAMUSCULAR; INTRAVENOUS at 20:42

## 2017-03-17 RX ADMIN — MORPHINE SULFATE 2 MG: 4 INJECTION INTRAVENOUS at 16:31

## 2017-03-17 RX ADMIN — LEVOTHYROXINE SODIUM 75 MCG: 75 TABLET ORAL at 06:23

## 2017-03-17 RX ADMIN — STANDARDIZED SENNA CONCENTRATE AND DOCUSATE SODIUM 2 TABLET: 8.6; 5 TABLET, FILM COATED ORAL at 08:45

## 2017-03-17 RX ADMIN — PREDNISONE 20 MG: 20 TABLET ORAL at 08:45

## 2017-03-17 RX ADMIN — ATORVASTATIN CALCIUM 20 MG: 20 TABLET, FILM COATED ORAL at 20:34

## 2017-03-17 RX ADMIN — ESCITALOPRAM OXALATE 20 MG: 10 TABLET ORAL at 08:44

## 2017-03-17 RX ADMIN — CEFTRIAXONE 2 G: 2 INJECTION, POWDER, FOR SOLUTION INTRAMUSCULAR; INTRAVENOUS at 08:46

## 2017-03-17 ASSESSMENT — PAIN SCALES - GENERAL
PAINLEVEL_OUTOF10: 8
PAINLEVEL_OUTOF10: 3
PAINLEVEL_OUTOF10: 8
PAINLEVEL_OUTOF10: 5
PAINLEVEL_OUTOF10: 8
PAINLEVEL_OUTOF10: 8

## 2017-03-17 NOTE — PROGRESS NOTES
"Pharmacy Kinetics 71 y.o. female on vancomycin day # 2 3/17/2017    Currently on Vancomycin 900 mg q24h    Indication for Treatment: Septic knee    Pertinent history per medical record: Admitted on 3/12/2017 for UTI and MOMO. Patient underwent a 4-vessel bypass a few weeks ago. She did a 1 week ICU stay and was sent to cardiac rehab where she spent another week. She was discharged home 5 days before presenting to the ER. While admitted, she had progressive weakness until she was unable to ambulate with right knee pain. Imaging of knee showed osteoarthritis and effusion. Arthrocentesis was performed 3/16 and aspirate sent for evaluation.     Other antibiotics: none (completed 5 days ceftriaxone for UTI)    Allergies: Tape     List concerns for renal function:              Age              Renal insult upon admission    Pertinent cultures to date:    3/16/17 R knee synovial fluid:  No growth at 24 hours.    Recent Labs      17   0258  17   0225   WBC  12.7*  13.8*     Recent Labs      17   0258  17   0225   BUN  11  18   CREATININE  0.74  0.71     No results for input(s): VANCOTROUGH, VANCOPEAK, VANCORANDOM in the last 72 hours.  Intake/Output Summary (Last 24 hours) at 17 1255  Last data filed at 17 0400   Gross per 24 hour   Intake      0 ml   Output    850 ml   Net   -850 ml      Blood pressure 144/72, pulse 65, temperature 36.3 °C (97.3 °F), temperature source Temporal, resp. rate 18, height 1.575 m (5' 2\"), weight 62.2 kg (137 lb 2 oz), SpO2 97 %, not currently breastfeeding. Temp (24hrs), Av.7 °C (98 °F), Min:36.2 °C (97.2 °F), Max:37.2 °C (98.9 °F)      A/P   1. Vancomycin dose change: continue 900 mg q24h  2. Next vancomycin level: 3/18/17 @1630  3. Goal trough: 12-16 mcg/mL  4. Comments: Pt seen by Infectious Disease, vanco to continue for now. Vitals are stable. Knee aspirate = no growth at 24 hours. Vanco level ordered for tomorrow, will assess for further dosing at " that time. Pharmacy to follow.    Estrella BentleyD

## 2017-03-17 NOTE — PROGRESS NOTES
Report received from Alexis JOYNER. Patient lying in bed at this time. Patient updated on POC, verbalized understanding. No immediate concerns for patient at this time. All safety measures in place.

## 2017-03-17 NOTE — PROGRESS NOTES
HOSPITALIST PROGRESS NOTE (SOAP)   Date of Service  3/16  CHIEF COMPLAINT  71 y.o.yo female presented 3/12/2017 with Shortness of Breath and Back Pain    SUBJECTIVE  No fever but has mild leukocytosis. Still has R knee pain and effusion. Anticipating arthrocentesis.  PHYSICAL EXAM  Filed Vitals:    03/16/17 0750 03/16/17 1214 03/16/17 1400 03/16/17 1549   BP: 142/69 126/74 144/83 168/87   Pulse: 61 62 65 63   Temp: 36.4 °C (97.6 °F) 36.1 °C (97 °F) 36.8 °C (98.3 °F) 36.2 °C (97.2 °F)   TempSrc:       Resp: 16 18 18 18   Height:       Weight:       SpO2: 96% 98%  95%       Intake/Output Summary (Last 24 hours) at 03/16/17 1831  Last data filed at 03/16/17 0614   Gross per 24 hour   Intake    815 ml   Output   1400 ml   Net   -585 ml   Vitals reviewed  General/Constitutional: No acute distress.    Head: Normocephalic, atraumatic  ENT: Oral mucosa is moist. No obvious pharyngeal exudates  Eyes: Pink conjunctiva, no scleral icterus  Neck: Supple, no lymphadenopathy  Cardiovascular: Normal rate and regular rhythm. S1,2 noted. No murmurs, gallops or rubs.  Pulmonary: Clear to auscultation bilaterally. No wheezes, rales or rhonchi  Abdominal: Soft, nontender, not distended, bowel sounds normoactive.  Musculoskeletal: Mild tenderness to palpation of chest wall CABG incision site. L knee soreness but R knee slightly swollen and exquisitely tender to tough; not particularly warm or erythematous.  Neurologic: Grossly nonfocal, moving all extremities.  Genitourinary: No gross hematuria  Skin: No obvious rash.  Psychiatric: Pleasant, cooperative.  LABS  Lab Results   Component Value Date/Time    WBC 12.7* 03/16/2017 02:58 AM    RBC 3.18* 03/16/2017 02:58 AM    HEMOGLOBIN 9.1* 03/16/2017 02:58 AM    HEMATOCRIT 28.4* 03/16/2017 02:58 AM    MCV 89.3 03/16/2017 02:58 AM    MCH 28.6 03/16/2017 02:58 AM    MCHC 32.0* 03/16/2017 02:58 AM    MPV 10.1 03/16/2017 02:58 AM    NEUTROPHILS-POLYS 69.40 03/13/2017 01:33 AM    LYMPHOCYTES  14.40* 03/13/2017 01:33 AM    MONOCYTES 10.40 03/13/2017 01:33 AM    EOSINOPHILS 4.80 03/13/2017 01:33 AM    BASOPHILS 0.70 03/13/2017 01:33 AM    HYPOCHROMIA 1+ 05/30/2016 08:35 AM    ANISOCYTOSIS 2+ 05/30/2016 08:35 AM      Lab Results   Component Value Date/Time    SODIUM 137 03/16/2017 02:58 AM    POTASSIUM 3.3* 03/16/2017 02:58 AM    CHLORIDE 109 03/16/2017 02:58 AM    CO2 16* 03/16/2017 02:58 AM    GLUCOSE 163* 03/16/2017 02:58 AM    BUN 11 03/16/2017 02:58 AM    CREATININE 0.74 03/16/2017 02:58 AM    BUN-CREATININE RATIO 26 02/01/2010 12:46 PM    GLOM FILT RATE, EST >59 02/01/2010 12:46 PM      Lab Results   Component Value Date/Time    PT 15.4* 03/12/2017 04:57 PM    INR 1.18* 03/12/2017 04:57 PM      BLOOD CULTURE   Date Value Ref Range Status   05/16/2013 No growth after 5 days of incubation.  Final     Labs reviewed  Imaging reviewed  ASSESSMENT and PLAN  Acute renal failure (ARF) (CMS-HCC)    Metabolic acidosis    Elevated brain natriuretic peptide (BNP) level  Due to overdiuresis. Resolved with holding diuretics.  Active Problems:    Hypertension  Uncontrolled. Increase losartan now to 100, Cr- nromalized. Continue metoprolol    S/P CABG x 4  On aspirin, statin, Plavix    Type 2 diabetes mellitus without complication (CMS-HCC)  Stable. On sliding scale insulin.    Hypothyroidism  Continue levothyroxine.    Normocytic anemia  Hb 9-10 stable. No bleeding. Obtain iron panel, B12, folate and outpatient follow up to [facility].    Hypokalemia  Resolving. Recheck potassium.    Gout    Osteoarthritis    Septic knee? Possible.  Severe knee pain on weightbearing and palpation. No fever or leukocytosis to suggest infection. Ordered R knee XR that showed no fracture, positive for osteoarthritis but also has moderate knee effusion. Meanwhile will start prednisone 20mg PO daily but will avoid NSAIDs due to recent cardiovascular disease and risk of bleeding. I have called orthopedics.  They did arthrocentesis. Did show bloody effusion with WBCs. Planned for InD of the knee. I ordered ESR and CRP and they were profoundly elevated. Meanwhile I have added vancomycin to the regimen and increased Rocephin dose to cover possible septic knee. Will have ID consultation in the AM.    Discontinue pharmacologic DVT prophylaxis because of bloody knee effusion and anticipated InD; SCDs to unafected limb instead    I spent 49 minutes, reviewing the chart, notes, vitals, labs, imaging, ordering labs, evaluating Carole Knigsley for assessment, enacting the plan above and writing this note. 50% of the time was spent in counseling Carole Kingsley and answering questions. All of the above were discussed. Medical decision making is therefore complex.

## 2017-03-17 NOTE — CONSULTS
DATE OF SERVICE:  03/16/2017    CHIEF COMPLAINT:  Bilateral knee pain, right worse than left.    HISTORY OF PRESENT ILLNESS:  The patient is a pleasant 71-year-old female who   underwent a 4-vessel CABG several weeks ago with a 1-week stay in the ICU and   eventually transferred to cardiac rehab facility.  She was recently   discharged; however, she developed a nonproductive cough and shortness of   breath.  She also had flank pain, urinary frequency and weakness at the time   of the admission.  She was initially diagnosed with the urinary tract   infection and acute renal failure and admitted to the hospital.  She noted the   onset of pain in both of her knees after her surgery.  It has been similar in   intensities and improving over the past 5 days while she has been in the   hospital.  The pain is not particularly severe, although it is bothersome.    She does have an effusion on the right.  She has been receiving antibiotics   during this time for her urinary tract infection.  She states she has   basically gout in her left knee, does not recall any attacks in the right   knee.  She does have some arthritis on that side.    PAST MEDICAL HISTORY:  Significant for:  1.  Coronary artery disease.  2.  Diabetes type 2.  3.  Hypertension.  4.  Peripheral artery disease.    PAST SURGICAL HISTORY:  1.  Four-vessel CABG in February 2017.  2.  Peripheral angioplasty x2.    SOCIAL HISTORY:  Former smoker, quit 30 years ago.  Drinks alcohol   occasionally.  Denies illicit drug use.    FAMILY HISTORY:  Maternal history of colon cancer.    ALLERGIES:  ADHESIVE TAPE.    HOME MEDICATIONS:  1.  Aspirin.  2.  Lipitor.  3.  Plavix.  4.  Valium.  5.  Colace.  6.  Lexapro.  7.  Lasix.  8.  Lopid.  9.  Levothyroxine.  10.  Losartan.  11.  Metformin.  13.  Metoprolol.  14.  Omeprazole.  15.  Roxicodone.   16.  Potassium chloride.    REVIEW OF SYSTEMS:  As per HPI, otherwise negative.    PHYSICAL EXAMINATION:   GENERAL:   Well-appearing female in no acute distress.  VITAL SIGNS:  Temperature 36.4 degrees Celsius, pulse 61, respirations 16, O2   saturation 96% on room air, blood pressure 142/69, height 5 feet 2 inches   tall, weight 62 kg.  HEENT:  Normocephalic, atraumatic.  Eyes:  Pupils equal, round, reactive to   light.  Extraocular muscles are intact.  NECK:  Nontender to palpation.  Pain-free range of motion.  CHEST:  Symmetric expansion.  No respiratory distress.  Nontender to palpation   of the chest wall.  CARDIOVASCULAR:  Regular rate and rhythm.  ABDOMEN:  Soft, nontender, nondistended.  LYMPHATICS:  No bilateral lower extremity lymphedema or lymphadenopathy.  PSYCHIATRIC:  Appropriate response to questions, alert and oriented x3.    Pleasant mood and affect.  NEUROLOGIC:  Intact light touch sensation throughout bilateral feet.  Intact   motor function in the tibialis anterior, gastroc soleus complex, EHL, and   peroneals.  VASCULAR:  Warm and perfused feet.  Palpable dorsalis pedis pulses   bilaterally.  MUSCULOSKELETAL:  She has some discomfort with maximal flexion, extension;   however, mid arc passive range of motion is not painful.  She has range of   motion with full extension to greater than 100 degrees of flexion on the right   with minimal discomfort and does not appear particularly consistent with a   septic knee.  She does have a moderate effusion on that side.  No effusion on   the left.    ASSESSMENT:  Right knee arthritis.    PLAN:  Given that she does have a mild leukocytosis, she has been getting   antibiotics here and her pain has been improving while getting antibiotics.  I   think it is reasonable to aspirate the right knee, although her exam is not   particularly concerning for a septic knee.  This will give us more information   as to whether this will need to be treated as an infected knee or not.  She   is happy with the plan.  I discussed the risks, benefits and alternatives of   the procedure,  including the risk of iatrogenic infection and the benefit of   diagnostic utility and alternatives including observation.  She is in   agreement with the plan to proceed.  Further treatment will depend on the   results of her aspiration.    ADDENDUM:  Aspiration results received today, which demonstrate 55,000 white   cells, 98% polys.  Gram stain is negative.  Crystal analysis demonstrates   moderate monosodium urate crystals consistent with gouty arthritis, so   impression will be right knee gouty arthritis.    I do think surgical management is necessary at this time; however, we will   follow the results of the cultures just because she has been on antibiotics   for while in the hospital and in theory given the very high percentage of   polys, this could conceivably be an infection with superimposed gout; that is   not particularly likely.  At this time, she can be treated presumptively for   gout.  We will continue to follow her cultures.  If these turn positive, she   may require debridement.       ____________________________________     MD FRANKIE Suarez / BUNNY    DD:  03/16/2017 21:50:35  DT:  03/16/2017 22:34:22    D#:  [id number]  Job#:  [id number]

## 2017-03-17 NOTE — CARE PLAN
Problem: Mobility  Goal: Risk for activity intolerance will decrease  Outcome: PROGRESSING AS EXPECTED  Pt. At edge of bed for all meals. Pt. Ambulated in hallway.

## 2017-03-17 NOTE — PROGRESS NOTES
Sitting up in bed. Answered numerous questions about possible procedure in morning. States pain in knee but refuses medication at this time. Call light with in reach.

## 2017-03-17 NOTE — PROGRESS NOTES
Seen and examined.  Consult dictated.  Arthrocentesis results positive for gout, with elevated WBC and polys.    Plan:  - Non-operative management for now for likely gout  - Follow cultures, if positive, will need I&D

## 2017-03-17 NOTE — CARE PLAN
Problem: Pain Management  Goal: Pain level will decrease to patient’s comfort goal  Outcome: PROGRESSING AS EXPECTED  Pt. Assessed and reassessed using the 0-10 pain scale. Pt. Medicated per MAR.

## 2017-03-17 NOTE — OP REPORT
DATE OF SERVICE:  03/16/2017    PREOPERATIVE DIAGNOSIS:  Right knee pain, rule out septic arthritis.    POSTOPERATIVE DIAGNOSIS:  Right knee pain, rule out septic arthritis.    PROCEDURE:  Right knee arthrocentesis.    SURGEON:  Duncan Del Rio MD    OPERATIVE INDICATIONS:  The patient is a pleasant 71-year-old female with   right knee pain and an effusion.  She has ongoing infectious issues from her   urinary tract infection.  Her pain has been improving well on her oral   antibiotics.  She has some pain with range of motion, however, not   particularly painful, which would normally be with septic arthritis.  Given   these findings, she is a reasonable candidate for arthrocentesis to rule out   septic arthritis.  Discussed risks including the possibility of iatrogenic   infection, benefits including the diagnostic utility of the procedure and   alternatives including observation.  She elected to proceed.  Her verbal   consent was obtained.    DESCRIPTION OF PROCEDURE:  Following this, the patient was positioned supine.    Under aseptic conditions after a local prep a 22-gauge needle was used   infiltrate approximately 6 mL of 1% lidocaine and skin overlying the   superolateral portal.  We did advance an 18-gauge needle into the knee without   any difficulty and obtained about 7 mL of turbid fluid from the knee.  A   sterile bandage was then applied.  The fluid was sent for cell count and   crystal analysis, as well as Gram stain and culture.  She tolerated the   procedure well, suffering no complications.    POSTOPERATIVE PLAN:  Follow up aspirate results for further treatment plans.       ____________________________________     MD FRANKIE Suarez / NTS    DD:  03/16/2017 21:52:33  DT:  03/16/2017 22:38:58    D#:  [id number]  Job#:  [id number]    cc: Duncan Del Rio MD

## 2017-03-17 NOTE — CARE PLAN
Problem: Safety  Goal: Will remain free from injury  Outcome: PROGRESSING AS EXPECTED  Bed alarm turned on. All personal items with in reach.     Problem: Pain Management  Goal: Pain level will decrease to patient’s comfort goal  Outcome: PROGRESSING AS EXPECTED  Pain being controlled with oxycodone.

## 2017-03-17 NOTE — PROGRESS NOTES
Assumed care at 0715. Bedside report received from Night ANYA Espinoza. Patient's chart and MAR reviewed. 12 hour chart check complete. Pt is awake in bed. Pt is A & O x 4. Patient was updated on plan of care for the day. Questions answered and concerns addressed.  Pt denies any additional needs at this time. White board updated. Call light, phone and personal belongings within reach. Bed alarm on and working appropriately. Vital signs stable.

## 2017-03-17 NOTE — CONSULTS
ADULT  INFECTIOUS DISEASES INPATIENT CONSULT NOTE     Date of Service:  03/17/17    Consult Requested By: Evan Estrada M.D.    Reason for Consultation: Right septic knee arthritis and klebsiella UTI    History of Present Illness:   Carole Kingsley is a 71 y.o. Woman with a history of DM2, gout and recent CABG in February who was transferred to [facility] for cardiac rehab and then discharged home recently admitted 3/12/2017 for generalized malaise. She states she was doing well at [facility] but when she returned home, she spent most of her days in bed. She also noted left knee pain and swelling and attributed her symptoms to gout. She denies any recent fevers, chills, dysuria, abdominal pain or diarrhea. Given her weakness, she was taken to the hospital for further evaluation. On presentation, she was afebrile and without leukocytosis. Urinalysis revealed 20-50 WBCs, large LE and many bacteria with urine cultures growing klebsiella. During the course of her hospitalization, she developed right knee pain, swelling and erythema and felt it was secondary to gout. However, she was evaluated by orthopedic surgery who performed an arthrocentesis on 3/16/17 with fluid analysis revealing greater than 50K WBCs in addition to moderate monosodium urate crystals. She is currently on ceftriaxone and vancomycin pending joint cultures. She states the swelling and erythema have greatly improved. ID service was consulted for further management.    Review Of Systems:  ROS are negative except as noted above in the HPI.    PMH:   Past Medical History   Diagnosis Date   • Granular cell tumor 2007     Pituitary / status post transsphenoidal resection   • Hypertension    • Hypothyroidism      Primary   • Vitamin D deficiency    • Gout    • DIABETES MELLITUS      Oral meds   • Heart burn    • Unspecified cataract      Early stage   • Psychiatric problem      Anxiety   • Renal disorder 2013     Hospitalized for kidney  infection   • Hiatus hernia syndrome    • Pituitary adenoma (CMS-HCC) 2008   • Peripheral vascular disease with claudication (CMS-HCC) 2014     Status post corrective surgery   • Coronary artery disease 2014   • Hyperlipidemia      Especially hypertriglyceridemia   • S/P CABG x 4 February 2017     CABG x 4 (LIMA to distal LAD, rSVG to distal diagonal, rSVG to distal circumflex, rSVG to distal PDA) by Dr. Dowell.         PSH:  Past Surgical History   Procedure Laterality Date   • Recovery  6/12/2014     Performed by Ir-Recovery Surgery at SURGERY [facility] ORS   • Other  2013     Eye (laser)   • Recovery  7/14/2014     Performed by Ir-Recovery Surgery at SURGERY SAME DAY [facility]S   • Hysterectomy, total abdominal     • Angioplasty  7/2014     Legs   • Recovery  10/3/2014     Performed by Cath-Recovery Surgery at SURGERY SAME DAY [facility]   • Multiple coronary artery bypass endo vein harvest  2/20/2017     Procedure: MULTIPLE CORONARY ARTERY BYPASS ENDO VEIN HARVEST X4, PREVENA DRESSING;  Surgeon: Hernán Dowell M.D.;  Location: SURGERY [facility];  Service:    • Adan  2/20/2017     Procedure: ADAN ;  Surgeon: Hernán Dowell M.D.;  Location: SURGERY [facility];  Service:    • Multiple coronary artery bypass  February 2017     CABG x 4 (LIMA to distal LAD, rSVG to distal diagonal, rSVG to distal circumflex, rSVG to distal PDA) by Dr. Dowell.       FAMILY HX:  Family History   Problem Relation Age of Onset   • Other Mother      Colon cancer   • Heart Attack Paternal Uncle      MI in 60   negative for DM2    SOCIAL HX:  Social History     Social History   • Marital Status:      Spouse Name: N/A   • Number of Children: N/A   • Years of Education: N/A     Occupational History   • Not on file.     Social History Main Topics   • Smoking status: Former Smoker -- 25 years     Types: Cigarettes     Quit date: 01/08/2014   • Smokeless tobacco: Never Used   • Alcohol Use: 1.0 oz/week     2 Standard  "drinks or equivalent per week      Comment: 2 a night but not for the past month/ Vodka   • Drug Use: No   • Sexual Activity: Not on file     Other Topics Concern   • Not on file     Social History Narrative     History   Smoking status   • Former Smoker -- 25 years   • Types: Cigarettes   • Quit date: 01/08/2014   Smokeless tobacco   • Never Used     History   Alcohol Use   • 1.0 oz/week   • 2 Standard drinks or equivalent per week     Comment: 2 a night but not for the past month/ Vodka       Allergies/Intolerances:  Allergies   Allergen Reactions   • Tape Contact Dermatitis     \"paper is OK\"       History reviewed with the patient    Other Current Medications:    Current facility-administered medications:   •  MD ALERT... vancomycin per pharmacy protocol, , Other, PRN, Evan Estrada M.D.  •  vancomycin 900 mg in  mL IVPB, 15 mg/kg, Intravenous, Q24HR, Opal Guerra, PHARMD  •  predniSONE (DELTASONE) tablet 20 mg, 20 mg, Oral, DAILY, Evan Estrada M.D., 20 mg at 03/17/17 0845  •  losartan (COZAAR) tablet 100 mg, 100 mg, Oral, Q DAY, Evan Estrada M.D., 100 mg at 03/17/17 0845  •  diphenhydramine-ZnAcetate (BENADRYL ITCH) 1-0.1 % cream, , Topical, BID PRN, Damián Logan M.D.  •  aspirin EC (ECOTRIN) tablet 81 mg, 81 mg, Oral, DAILY, Damián Logan M.D., 81 mg at 03/17/17 0845  •  atorvastatin (LIPITOR) tablet 20 mg, 20 mg, Oral, QHS, Damián Logan M.D., 20 mg at 03/16/17 2005  •  clopidogrel (PLAVIX) tablet 75 mg, 75 mg, Oral, DAILY, Damián Logan M.D., 75 mg at 03/17/17 0845  •  diazepam (VALIUM) tablet 5 mg, 5 mg, Oral, 4X/DAY PRN, Damián Logan M.D.  •  docusate sodium (COLACE) capsule 100 mg, 100 mg, Oral, QAM, Damián Logan M.D., 100 mg at 03/17/17 0845  •  escitalopram (LEXAPRO) tablet 20 mg, 20 mg, Oral, DAILY, Damián Logan M.D., 20 mg at 03/17/17 0844  •  levothyroxine (SYNTHROID) tablet 75 mcg, 75 mcg, Oral, AM ES, Damián Logan M.D., 75 mcg at 03/17/17 0623  •  " "metoprolol (LOPRESSOR) tablet 25 mg, 25 mg, Oral, BID, Damián Logan M.D., 25 mg at 17 0845  •  oxycodone immediate-release (ROXICODONE) tablet 5 mg, 5 mg, Oral, Q4HRS PRN, Damián Logan M.D., 5 mg at 17 0430  •  senna-docusate (PERICOLACE or SENOKOT S) 8.6-50 MG per tablet 2 Tab, 2 Tab, Oral, BID, 2 Tab at 17 0845 **AND** polyethylene glycol/lytes (MIRALAX) PACKET 1 Packet, 1 Packet, Oral, QDAY PRN **AND** magnesium hydroxide (MILK OF MAGNESIA) suspension 30 mL, 30 mL, Oral, QDAY PRN **AND** bisacodyl (DULCOLAX) suppository 10 mg, 10 mg, Rectal, QDAY PRN, Damián Logan M.D.  •  ondansetron (ZOFRAN) syringe/vial injection 4 mg, 4 mg, Intravenous, Q4HRS PRN, Damián Logan M.D.  •  ondansetron (ZOFRAN ODT) dispertab 4 mg, 4 mg, Oral, Q4HRS PRN, Damián Logan M.D.  •  insulin lispro (HUMALOG) injection 1-6 Units, 1-6 Units, Subcutaneous, 4X/DAY ACHS, Damián Logan M.D., Stopped at 17 0700  •  Action is required: Protocol 1073 Hypoglycemia has been implemented, , , Once **AND** Protocol 1073 Inclusion Criteria, , , CONTINUOUS **AND** Protocol 1073 NOTIFY, , , Once **AND** Protocol 1073 Initiate protocol immediately if FSBG is less than or equal to 70 mg/dL, , , CONTINUOUS **AND** glucose 4 g chewable tablet 16 g, 16 g, Oral, Q15 MIN PRN **AND** dextrose 50% (D50W) injection 25 mL, 25 mL, Intravenous, Q15 MIN PRN, Damián Logan M.D.  •  morphine (pf) 4 mg/ml injection 2 mg, 2 mg, Intravenous, Q2HRS PRN, Damián Logan M.D., 2 mg at 03/15/17 2143  •  coenzyme Q-10 capsule 60 mg, 60 mg, Oral, DAILY, Damián Logan M.D., 60 mg at 17 0844  [unfilled]    Most Recent Vital Signs:  /76 mmHg  Pulse 66  Temp(Src) 36.6 °C (97.8 °F) (Temporal)  Resp 18  Ht 1.575 m (5' 2\")  Wt 62.2 kg (137 lb 2 oz)  BMI 25.07 kg/m2  SpO2 96%  Breastfeeding? No  Temp  Av.8 °C (98.3 °F)  Min: 36 °C (96.8 °F)  Max: 37.8 °C (100.1 °F)    Physical Exam:  General: " well-appearing, no acute distress  HEENT: sclera anicteric, PERRL, EOMI, MMM, no oral lesions  Neck: supple, no lymphadenopathy  Chest: CTAB, no r/r/w, normal work of breathing. Sternotomy incision well healed. Small scab at superior region on incision.  Cardiac: RRR, normal S1 S2, no m/r/g   Abdomen: + bowel sounds, soft, non-tender, non-distended, no HSM  Extremities: WWP, no edema, 2+ pulses. R knee without edema, or erythema. Slight TTP  Skin: no rashes or worrisome lesions  Neuro: Alert and oriented times 3, non-focal exam    Pertinent Lab Results:  Recent Labs      03/16/17 0258 03/17/17 0225   WBC  12.7*  13.8*      Recent Labs      03/16/17 0258 03/17/17 0225   HEMOGLOBIN  9.1*  8.0*   HEMATOCRIT  28.4*  24.4*   MCV  89.3  89.4   MCH  28.6  29.3   PLATELETCT  374  347         Recent Labs      03/16/17 0258 03/17/17 0225   SODIUM  137  140   POTASSIUM  3.3*  3.3*   CHLORIDE  109  112   CO2  16*  18*   CREATININE  0.74  0.71        No results for input(s): ALBUMIN in the last 72 hours.    Invalid input(s): AST, ALT, ALKPHOS, BILITOT, TOTALBILIRUB, BILIRUBINTOT, BILIRUBINDIR, BILIRUBININD, ALKALINEPHOS     Pertinent Micro:  Results     Procedure Component Value Units Date/Time    GRAM STAIN [[id number]] Collected:  03/16/17 0956    Order Status:  Completed Specimen Information:  Synovial Updated:  03/16/17 2129     Significant Indicator .      Source SYNO      Site Right  Knee      Gram Stain Result --      Result:        Moderate WBCs.  No organisms seen.      Narrative:      Collected By:CHRISTA CAGLE X    FLUID CULTURE W/GRAM STAIN [[id number]] Collected:  03/16/17 0956    Order Status:  Completed Specimen Information:  Body Fluid from Synovial Fluid Updated:  03/16/17 1343    Narrative:      Collected By:CHRISTA CAGLE X    URINE CULTURE(NEW) [[id number]]  (Abnormal)  (Susceptibility) Collected:  03/12/17 1916    Order Status:  Completed Specimen Information:  Urine Updated:   03/14/17 0842     Significant Indicator POS (POS)      Source UR      Site --      Urine Culture -- (A)      Urine Culture -- (A)      Result:        Klebsiella pneumoniae  >100,000 cfu/mL      Culture & Susceptibility     KLEBSIELLA PNEUMONIAE     Antibiotic Sensitivity Microscan Unit Status    Ampicillin Resistant >16 mcg/mL Final    Cefepime Sensitive <=8 mcg/mL Final    Cefotaxime Sensitive <=2 mcg/mL Final    Cefotetan Sensitive <=16 mcg/mL Final    Ceftazidime Sensitive <=1 mcg/mL Final    Ceftriaxone Sensitive <=8 mcg/mL Final    Cefuroxime Sensitive <=4 mcg/mL Final    Cephalothin Sensitive <=8 mcg/mL Final    Ciprofloxacin Sensitive <=1 mcg/mL Final    Gentamicin Sensitive <=4 mcg/mL Final    Levofloxacin Sensitive <=2 mcg/mL Final    Nitrofurantoin Intermediate 64 mcg/mL Final    Pip/Tazobactam Sensitive <=16 mcg/mL Final    Piperacillin Resistant >64 mcg/mL Final    Tigecycline Sensitive <=2 mcg/mL Final    Tobramycin Sensitive <=4 mcg/mL Final    Trimeth/Sulfa Sensitive <=2/38 mcg/mL Final                       URINALYSIS,CULTURE IF INDICATED [[id number]]  (Abnormal) Collected:  03/12/17 1916    Order Status:  Completed Specimen Information:  Other Updated:  03/12/17 1932     Micro Urine Req Microscopic      Color Lt. Yellow      Character Sl Cloudy (A)      Specific Gravity 1.009      Ph 5.5      Glucose Negative mg/dL      Ketones Negative mg/dL      Protein Negative mg/dL      Bilirubin Negative      Nitrite Negative      Leukocyte Esterase Large (A)      Occult Blood Negative      Culture Indicated Yes UA Culture     INFLUENZA BY PCR, A/B/H1N1 [[id number]] Collected:  03/12/17 1747    Order Status:  Completed Specimen Information:  Respirate from Nasopharyngeal Updated:  03/12/17 1926     Influenza virus A RNA Negative      Influenza virus B RNA Negative      Influenza A 2009, H1N1, PCR Not Detected     INFLUENZA RAPID [[id number]] Collected:  03/12/17 1747    Order Status:  Completed Specimen  Information:  Respirate from Respiratory Updated:  03/12/17 1827     Significant Indicator NEG      Source RESP      Site Nasopharyngeal      Rapid Influenza A-B --      Result:        Negative for Influenza A and Influenza B antigens.  Infection due to influenza A or B cannot be ruled out  since the antigen present in the specimen may be below the  detection limit of the test. Culture confirmation of  negative samples is recommended.          BLOOD CULTURE   Date Value Ref Range Status   05/16/2013 No growth after 5 days of incubation.  Final        Studies: Xray R knee negative    IMPRESSION:   1. Right knee septic arthritis  2. Gout of right knee  3. Klebsiella pneumoniae lower urinary tract infection  4. Recent CABG February 2017  5. DM2      PLAN:   Carole Kingsley is a 71 y.o. Woman with a history of gout, DM2 and recent CABG admitted for generalized malaise and weakness found to have a urinary tract infection with klebsiella pneumoniae, and gout of the right knee with superimposed septic arthritis. Joint aspirate reveals monosodium urate crystals in addition to > 50K WBCs consistent with infection. Agree with current abx for now pending right knee cultures. Further recommendations per clinical course and culture results.      Discussed with IM. Will continue to follow    Jayna Antoine M.D.

## 2017-03-18 LAB
ANION GAP SERPL CALC-SCNC: 12 MMOL/L (ref 0–11.9)
BUN SERPL-MCNC: 25 MG/DL (ref 8–22)
CALCIUM SERPL-MCNC: 9.1 MG/DL (ref 8.5–10.5)
CHLORIDE SERPL-SCNC: 111 MMOL/L (ref 96–112)
CO2 SERPL-SCNC: 18 MMOL/L (ref 20–33)
CREAT SERPL-MCNC: 0.8 MG/DL (ref 0.5–1.4)
ERYTHROCYTE [DISTWIDTH] IN BLOOD BY AUTOMATED COUNT: 47.8 FL (ref 35.9–50)
GFR SERPL CREATININE-BSD FRML MDRD: >60 ML/MIN/1.73 M 2
GLUCOSE BLD-MCNC: 126 MG/DL (ref 65–99)
GLUCOSE BLD-MCNC: 132 MG/DL (ref 65–99)
GLUCOSE BLD-MCNC: 159 MG/DL (ref 65–99)
GLUCOSE BLD-MCNC: 97 MG/DL (ref 65–99)
GLUCOSE SERPL-MCNC: 104 MG/DL (ref 65–99)
HCT VFR BLD AUTO: 25 % (ref 37–47)
HGB BLD-MCNC: 8.3 G/DL (ref 12–16)
MCH RBC QN AUTO: 29.4 PG (ref 27–33)
MCHC RBC AUTO-ENTMCNC: 33.2 G/DL (ref 33.6–35)
MCV RBC AUTO: 88.7 FL (ref 81.4–97.8)
PLATELET # BLD AUTO: 393 K/UL (ref 164–446)
PMV BLD AUTO: 10.3 FL (ref 9–12.9)
POTASSIUM SERPL-SCNC: 3.5 MMOL/L (ref 3.6–5.5)
RBC # BLD AUTO: 2.82 M/UL (ref 4.2–5.4)
SODIUM SERPL-SCNC: 141 MMOL/L (ref 135–145)
WBC # BLD AUTO: 12.2 K/UL (ref 4.8–10.8)

## 2017-03-18 PROCEDURE — 93971 EXTREMITY STUDY: CPT

## 2017-03-18 PROCEDURE — 82962 GLUCOSE BLOOD TEST: CPT | Mod: 91

## 2017-03-18 PROCEDURE — 700111 HCHG RX REV CODE 636 W/ 250 OVERRIDE (IP): Performed by: INTERNAL MEDICINE

## 2017-03-18 PROCEDURE — 700102 HCHG RX REV CODE 250 W/ 637 OVERRIDE(OP): Performed by: INTERNAL MEDICINE

## 2017-03-18 PROCEDURE — 80048 BASIC METABOLIC PNL TOTAL CA: CPT

## 2017-03-18 PROCEDURE — 770006 HCHG ROOM/CARE - MED/SURG/GYN SEMI*

## 2017-03-18 PROCEDURE — 85027 COMPLETE CBC AUTOMATED: CPT

## 2017-03-18 PROCEDURE — A9270 NON-COVERED ITEM OR SERVICE: HCPCS | Performed by: INTERNAL MEDICINE

## 2017-03-18 PROCEDURE — 99233 SBSQ HOSP IP/OBS HIGH 50: CPT | Performed by: INTERNAL MEDICINE

## 2017-03-18 PROCEDURE — 700105 HCHG RX REV CODE 258: Performed by: INTERNAL MEDICINE

## 2017-03-18 PROCEDURE — 700112 HCHG RX REV CODE 229: Performed by: INTERNAL MEDICINE

## 2017-03-18 PROCEDURE — 36415 COLL VENOUS BLD VENIPUNCTURE: CPT

## 2017-03-18 RX ADMIN — Medication 60 MG: at 08:42

## 2017-03-18 RX ADMIN — METOPROLOL TARTRATE 25 MG: 25 TABLET, FILM COATED ORAL at 20:46

## 2017-03-18 RX ADMIN — OXYCODONE HYDROCHLORIDE 5 MG: 5 TABLET ORAL at 20:46

## 2017-03-18 RX ADMIN — ASPIRIN 81 MG: 81 TABLET ORAL at 08:42

## 2017-03-18 RX ADMIN — MORPHINE SULFATE 2 MG: 4 INJECTION INTRAVENOUS at 08:34

## 2017-03-18 RX ADMIN — PREDNISONE 20 MG: 20 TABLET ORAL at 08:43

## 2017-03-18 RX ADMIN — DOCUSATE SODIUM 100 MG: 100 CAPSULE ORAL at 08:42

## 2017-03-18 RX ADMIN — LEVOTHYROXINE SODIUM 75 MCG: 75 TABLET ORAL at 06:04

## 2017-03-18 RX ADMIN — OXYCODONE HYDROCHLORIDE 5 MG: 5 TABLET ORAL at 10:57

## 2017-03-18 RX ADMIN — LOSARTAN POTASSIUM 100 MG: 50 TABLET, FILM COATED ORAL at 08:42

## 2017-03-18 RX ADMIN — STANDARDIZED SENNA CONCENTRATE AND DOCUSATE SODIUM 2 TABLET: 8.6; 5 TABLET, FILM COATED ORAL at 20:46

## 2017-03-18 RX ADMIN — INSULIN LISPRO 1 UNITS: 100 INJECTION, SOLUTION INTRAVENOUS; SUBCUTANEOUS at 17:26

## 2017-03-18 RX ADMIN — METOPROLOL TARTRATE 25 MG: 25 TABLET, FILM COATED ORAL at 08:41

## 2017-03-18 RX ADMIN — ATORVASTATIN CALCIUM 20 MG: 20 TABLET, FILM COATED ORAL at 21:00

## 2017-03-18 RX ADMIN — OXYCODONE HYDROCHLORIDE 5 MG: 5 TABLET ORAL at 15:52

## 2017-03-18 RX ADMIN — MORPHINE SULFATE 2 MG: 4 INJECTION INTRAVENOUS at 17:34

## 2017-03-18 RX ADMIN — ONDANSETRON 4 MG: 2 INJECTION, SOLUTION INTRAMUSCULAR; INTRAVENOUS at 08:43

## 2017-03-18 RX ADMIN — CEFTRIAXONE 2 G: 2 INJECTION, POWDER, FOR SOLUTION INTRAMUSCULAR; INTRAVENOUS at 10:47

## 2017-03-18 RX ADMIN — CLOPIDOGREL 75 MG: 75 TABLET, FILM COATED ORAL at 08:42

## 2017-03-18 RX ADMIN — ESCITALOPRAM OXALATE 20 MG: 10 TABLET ORAL at 08:42

## 2017-03-18 RX ADMIN — STANDARDIZED SENNA CONCENTRATE AND DOCUSATE SODIUM 2 TABLET: 8.6; 5 TABLET, FILM COATED ORAL at 08:42

## 2017-03-18 ASSESSMENT — ENCOUNTER SYMPTOMS
ABDOMINAL PAIN: 0
CHILLS: 0
FEVER: 0
DIARRHEA: 0
COUGH: 0
NAUSEA: 1
HEADACHES: 0
VOMITING: 0
MYALGIAS: 1

## 2017-03-18 ASSESSMENT — PAIN SCALES - GENERAL
PAINLEVEL_OUTOF10: 7
PAINLEVEL_OUTOF10: 8
PAINLEVEL_OUTOF10: 5
PAINLEVEL_OUTOF10: 8
PAINLEVEL_OUTOF10: 10

## 2017-03-18 NOTE — PROGRESS NOTES
"    Blood pressure 160/77, pulse 68, temperature 37.3 °C (99.2 °F), temperature source Temporal, resp. rate 19, height 1.575 m (5' 2\"), weight 63.4 kg (139 lb 12.4 oz), SpO2 95 %, not currently breastfeeding.    Recent Labs      03/16/17   0258  03/17/17   0225  03/18/17   0300   WBC  12.7*  13.8*  12.2*   RBC  3.18*  2.73*  2.82*   HEMOGLOBIN  9.1*  8.0*  8.3*   HEMATOCRIT  28.4*  24.4*  25.0*   MCV  89.3  89.4  88.7   MCH  28.6  29.3  29.4   MCHC  32.0*  32.8*  33.2*   RDW  45.6  46.5  47.8   PLATELETCT  374  347  393   MPV  10.1  10.3  10.3       Intake/Output Summary (Last 24 hours) at 03/18/17 1425  Last data filed at 03/18/17 0815   Gross per 24 hour   Intake    240 ml   Output      0 ml   Net    240 ml       cxs ngtd  Would continue with Dr Del Rio recommendations that nothing surgically has to be done if cultures from knee are negative      "

## 2017-03-18 NOTE — CARE PLAN
Problem: Communication  Goal: The ability to communicate needs accurately and effectively will improve  Intervention: Use communication aids and/or /Language Line as appropriate  White board updated with POC and care team information during bedside report.      Problem: Pain Management  Goal: Pain level will decrease to patient’s comfort goal  Pt assessed for pain regularly and medicated PRN per MAR.

## 2017-03-18 NOTE — PROGRESS NOTES
HOSPITALIST PROGRESS NOTE (SOAP)   Date of Service  3/18  CHIEF COMPLAINT  71 y.o.yo female presented 3/12/2017 with Shortness of Breath and Back Pain    SUBJECTIVE  She complained of R calf pain. I ordered LE Duplex. Otherwsie denied fever, chills.  PHYSICAL EXAM  Filed Vitals:    03/18/17 0415 03/18/17 0753 03/18/17 1026 03/18/17 1517   BP: 164/85 183/89 160/77 135/62   Pulse: 64 68  64   Temp: 36.8 °C (98.2 °F) 37.3 °C (99.2 °F)  36.5 °C (97.7 °F)   TempSrc:       Resp: 18 19 19   Height:       Weight:       SpO2: 96% 95%  96%       Intake/Output Summary (Last 24 hours) at 03/18/17 1651  Last data filed at 03/18/17 0815   Gross per 24 hour   Intake    240 ml   Output      0 ml   Net    240 ml   Vitals reviewed  General/Constitutional: No acute distress.    Head: Normocephalic, atraumatic  ENT: Oral mucosa is moist. No obvious pharyngeal exudates  Eyes: Pink conjunctiva, no scleral icterus  Neck: Supple, no lymphadenopathy  Cardiovascular: Normal rate and regular rhythm. S1,2 noted. No murmurs, gallops or rubs.  Pulmonary: Clear to auscultation bilaterally. No wheezes, rales or rhonchi  Abdominal: Soft, nontender, not distended, bowel sounds normoactive.  Musculoskeletal: Mild tenderness to palpation of chest wall CABG incision site. L knee soreness but R knee slightly swollen and exquisitely tender to tough; not particularly warm or erythematous. Arthorcentesis site not bleeding and swelling has improved.  Neurologic: Grossly nonfocal, moving all extremities.  Genitourinary: No gross hematuria  Skin: No obvious rash.  Psychiatric: Pleasant, cooperative.  LABS  Lab Results   Component Value Date/Time    WBC 12.2* 03/18/2017 03:00 AM    RBC 2.82* 03/18/2017 03:00 AM    HEMOGLOBIN 8.3* 03/18/2017 03:00 AM    HEMATOCRIT 25.0* 03/18/2017 03:00 AM    MCV 88.7 03/18/2017 03:00 AM    MCH 29.4 03/18/2017 03:00 AM    MCHC 33.2* 03/18/2017 03:00 AM    MPV 10.3 03/18/2017 03:00 AM    NEUTROPHILS-POLYS 69.40 03/13/2017 01:33  AM    LYMPHOCYTES 14.40* 03/13/2017 01:33 AM    MONOCYTES 10.40 03/13/2017 01:33 AM    EOSINOPHILS 4.80 03/13/2017 01:33 AM    BASOPHILS 0.70 03/13/2017 01:33 AM    HYPOCHROMIA 1+ 05/30/2016 08:35 AM    ANISOCYTOSIS 2+ 05/30/2016 08:35 AM      Lab Results   Component Value Date/Time    SODIUM 141 03/18/2017 03:00 AM    POTASSIUM 3.5* 03/18/2017 03:00 AM    CHLORIDE 111 03/18/2017 03:00 AM    CO2 18* 03/18/2017 03:00 AM    GLUCOSE 104* 03/18/2017 03:00 AM    BUN 25* 03/18/2017 03:00 AM    CREATININE 0.80 03/18/2017 03:00 AM    BUN-CREATININE RATIO 26 02/01/2010 12:46 PM    GLOM FILT RATE, EST >59 02/01/2010 12:46 PM      Lab Results   Component Value Date/Time    PT 15.4* 03/12/2017 04:57 PM    INR 1.18* 03/12/2017 04:57 PM      BLOOD CULTURE   Date Value Ref Range Status   05/16/2013 No growth after 5 days of incubation.  Final     Labs reviewed  Imaging reviewed  ASSESSMENT and PLAN  Acute renal failure (ARF) (CMS-[facility])    Metabolic acidosis    Elevated brain natriuretic peptide (BNP) level  Due to overdiuresis. Resolved with holding diuretics.  Active Problems:    Hypertension  Uncontrolled. Increase losartan now to 100, Cr- nromalized. Continue metoprolol    S/P CABG x 4  On aspirin, statin, Plavix    Type 2 diabetes mellitus without complication (CMS-[facility])  Stable. On sliding scale insulin.    Hypothyroidism  Continue levothyroxine.    Normocytic anemia  Hb 9-10 stable. No bleeding. Obtain iron panel, B12, folate and outpatient follow up to [facility].    Hypokalemia  Resolving. Recheck potassium.    Gout    Osteoarthritis    Septic knee arthritis.    Calf hematoma  Severe knee pain on weightbearing and palpation. No fever or leukocytosis to suggest infection. Ordered R knee XR that showed no fracture, positive for osteoarthritis but also has moderate knee effusion. Meanwhile will start prednisone 20mg PO daily but will avoid NSAIDs due to recent cardiovascular disease and risk of  bleeding. I have called orthopedics. They did arthrocentesis. Did show bloody effusion with WBCs. Initially planned for InD of the knee. I ordered ESR and CRP and they were profoundly elevated.  Meanwhile I have added vancomycin to the regimen and increased Rocephin dose to cover possible septic knee. Consulted Dr. Antoine, ID, and will treat superimposed septic arthritis with current antibiotics and follow aspirate cultures and sensitivities. Orthopedics reported crystals consistent with gout and cancelled InD of the knee. U/S today showed calf hematoma.    Discontinued pharmacologic DVT prophylaxis because of bloody knee effusion and anticipated InD; SCDs to unafected limb instead for now. Continue to hold off DVT prophylaxis bec of calf hematoma.    I spent 41 minutes, reviewing the chart, notes, vitals, labs, imaging, ordering labs, evaluating Carole Kingsley for assessment, enacting the plan above and writing this note. 50% of the time was spent in counseling Carole Kingsley and answering questions. Medical decision making is therefore complex.

## 2017-03-18 NOTE — PROGRESS NOTES
HOSPITALIST PROGRESS NOTE (SOAP)   Date of Service  3/17  CHIEF COMPLAINT  71 y.o.yo female presented 3/12/2017 with Shortness of Breath and Back Pain    SUBJECTIVEw  Swelling improved. She is anticipating possible procedure; Orthopedics deliberating if InD needed.  PHYSICAL EXAM  Filed Vitals:    03/17/17 0415 03/17/17 0810 03/17/17 1128 03/17/17 1548   BP: 153/84 170/76 144/72 179/76   Pulse: 76 66 65 65   Temp:  36.6 °C (97.8 °F) 36.3 °C (97.3 °F) 36.4 °C (97.6 °F)   TempSrc:       Resp:  18 18 18   Height:       Weight:       SpO2:  96% 97% 96%   General/Constitutional: No acute distress.    Head: Normocephalic, atraumatic  ENT: Oral mucosa is moist. No obvious pharyngeal exudates  Eyes: Pink conjunctiva, no scleral icterus  Neck: Supple, no lymphadenopathy  Cardiovascular: Normal rate and regular rhythm. S1,2 noted. No murmurs, gallops or rubs.  Pulmonary: Clear to auscultation bilaterally. No wheezes, rales or rhonchi  Abdominal: Soft, nontender, not distended, bowel sounds normoactive.  Musculoskeletal: Mild tenderness to palpation of chest wall CABG incision site. L knee soreness but R knee slightly swollen and exquisitely tender to tough; not particularly warm or erythematous. Arthorcentesis site not bleeding and swelling has improved.  Neurologic: Grossly nonfocal, moving all extremities.  Genitourinary: No gross hematuria  Skin: No obvious rash.  Psychiatric: Pleasant, cooperative.    Intake/Output Summary (Last 24 hours) at 03/17/17 2010  Last data filed at 03/17/17 0900   Gross per 24 hour   Intake    240 ml   Output    850 ml   Net   -610 ml   Vitals reviewed    LABS  Lab Results   Component Value Date/Time    WBC 13.8* 03/17/2017 02:25 AM    RBC 2.73* 03/17/2017 02:25 AM    HEMOGLOBIN 8.0* 03/17/2017 02:25 AM    HEMATOCRIT 24.4* 03/17/2017 02:25 AM    MCV 89.4 03/17/2017 02:25 AM    MCH 29.3 03/17/2017 02:25 AM    MCHC 32.8* 03/17/2017 02:25 AM    MPV 10.3 03/17/2017 02:25 AM    NEUTROPHILS-POLYS  69.40 03/13/2017 01:33 AM    LYMPHOCYTES 14.40* 03/13/2017 01:33 AM    MONOCYTES 10.40 03/13/2017 01:33 AM    EOSINOPHILS 4.80 03/13/2017 01:33 AM    BASOPHILS 0.70 03/13/2017 01:33 AM    HYPOCHROMIA 1+ 05/30/2016 08:35 AM    ANISOCYTOSIS 2+ 05/30/2016 08:35 AM      Lab Results   Component Value Date/Time    SODIUM 140 03/17/2017 02:25 AM    POTASSIUM 3.3* 03/17/2017 02:25 AM    CHLORIDE 112 03/17/2017 02:25 AM    CO2 18* 03/17/2017 02:25 AM    GLUCOSE 125* 03/17/2017 02:25 AM    BUN 18 03/17/2017 02:25 AM    CREATININE 0.71 03/17/2017 02:25 AM    BUN-CREATININE RATIO 26 02/01/2010 12:46 PM    GLOM FILT RATE, EST >59 02/01/2010 12:46 PM      Lab Results   Component Value Date/Time    PT 15.4* 03/12/2017 04:57 PM    INR 1.18* 03/12/2017 04:57 PM      BLOOD CULTURE   Date Value Ref Range Status   05/16/2013 No growth after 5 days of incubation.  Final     Labs reviewed  Imaging reviewed  ASSESSMENT and PLAN  Acute renal failure (ARF) (CMS-HCC)    Metabolic acidosis    Elevated brain natriuretic peptide (BNP) level  Due to overdiuresis. Resolved with holding diuretics.  Active Problems:    Hypertension  Uncontrolled. Increase losartan now to 100, Cr- nromalized. Continue metoprolol    S/P CABG x 4  On aspirin, statin, Plavix    Type 2 diabetes mellitus without complication (CMS-HCC)  Stable. On sliding scale insulin.    Hypothyroidism  Continue levothyroxine.    Normocytic anemia  Hb 9-10 stable. No bleeding. Obtain iron panel, B12, folate and outpatient follow up to [facility].    Hypokalemia  Resolving. Recheck potassium.    Gout    Osteoarthritis    Septic knee arthritis.  Severe knee pain on weightbearing and palpation. No fever or leukocytosis to suggest infection. Ordered R knee XR that showed no fracture, positive for osteoarthritis but also has moderate knee effusion. Meanwhile will start prednisone 20mg PO daily but will avoid NSAIDs due to recent cardiovascular disease and risk  of bleeding. I have called orthopedics. They did arthrocentesis. Did show bloody effusion with WBCs. Initially planned for InD of the knee. I ordered ESR and CRP and they were profoundly elevated.  Meanwhile I have added vancomycin to the regimen and increased Rocephin dose to cover possible septic knee. Consulted Dr. Antoine, ID, and will treat superimposed septic arthritis with current antibiotics and follow aspirate cultures and sensitivities. Orthopedics reported crystals consistent with gout and cancelled InD of the knee.    Discontinued pharmacologic DVT prophylaxis because of bloody knee effusion and anticipated InD; SCDs to unafected limb instead for now, will resume pharmacologic DVT prophylaxis tomorrow.    I spent 49 minutes, reviewing the chart, notes, vitals, labs, imaging, ordering labs, evaluating Caroleradha Kingsley for assessment, enacting the plan above and writing this note. 50% of the time was spent in counseling Carole Escobarnelson Kingsley and answering questions and also speaking with ID. All of the above were discussed. Medical decision making is therefore complex.

## 2017-03-18 NOTE — PROGRESS NOTES
Infectious Disease Progress Note    Author: Jayna Antoine M.D. DOS & Time created: 3/18/2017  9:55 AM    Chief Complaint:  Chief Complaint   Patient presents with   • Shortness of Breath   • Back Pain   FU for R septic knee arthritis    Interval History:  3/18 AF, WBC 12.2, sleeping but arousable, having nausea and diffuse pain this am, states she has more mobility in her R knee and she can now ambulate   Labs Reviewed, Medications Reviewed, Radiology Reviewed and Wound Reviewed.    Review of Systems:  Review of Systems   Constitutional: Negative for fever and chills.   Respiratory: Negative for cough.    Cardiovascular: Negative for chest pain.   Gastrointestinal: Positive for nausea. Negative for vomiting, abdominal pain and diarrhea.   Genitourinary: Negative for dysuria.   Musculoskeletal: Positive for myalgias.   Neurological: Negative for headaches.   All other systems reviewed and are negative.      Hemodynamics:  Temp (24hrs), Av.8 °C (98.2 °F), Min:36.3 °C (97.3 °F), Max:37.3 °C (99.2 °F)  Temperature: 37.3 °C (99.2 °F)  Pulse  Av.2  Min: 61  Max: 86   Blood Pressure : (!) 183/89 mmHg       Physical Exam:  Physical Exam   Constitutional: She is oriented to person, place, and time. She appears well-developed.   HENT:   Head: Normocephalic and atraumatic.   Eyes: EOM are normal. Pupils are equal, round, and reactive to light.   Neck: Neck supple.   Cardiovascular: Normal rate, regular rhythm and normal heart sounds.    Sternotomy incision well healed and clean   Pulmonary/Chest: Effort normal and breath sounds normal.   Abdominal: Soft. There is no tenderness.   Musculoskeletal: Normal range of motion. She exhibits no edema or tenderness.   R knee without edema, fluctuance or erythema  Active ROM   Neurological: She is alert and oriented to person, place, and time.   Skin: No rash noted.       Meds:    Current facility-administered medications:   •  hydrALAZINE (APRESOLINE) injection 20 mg, 20  mg, Intravenous, Q4HRS PRN, Evan Estrada M.D., 20 mg at 03/17/17 2042  •  MD ALERT... vancomycin per pharmacy protocol, , Other, PRN, Evan Estrada M.D.  •  vancomycin 900 mg in  mL IVPB, 15 mg/kg, Intravenous, Q24HR, Opal Guerra, PHARMD, Stopped at 03/17/17 1955  •  predniSONE (DELTASONE) tablet 20 mg, 20 mg, Oral, DAILY, Evan Estrada M.D., 20 mg at 03/18/17 0843  •  losartan (COZAAR) tablet 100 mg, 100 mg, Oral, Q DAY, Evan Estrada M.D., 100 mg at 03/18/17 0842  •  diphenhydramine-ZnAcetate (BENADRYL ITCH) 1-0.1 % cream, , Topical, BID PRN, Damián Logan M.D.  •  aspirin EC (ECOTRIN) tablet 81 mg, 81 mg, Oral, DAILY, Damián Logan M.D., 81 mg at 03/18/17 0842  •  atorvastatin (LIPITOR) tablet 20 mg, 20 mg, Oral, QHS, Damián Logan M.D., 20 mg at 03/17/17 2034  •  clopidogrel (PLAVIX) tablet 75 mg, 75 mg, Oral, DAILY, Damián Logan M.D., 75 mg at 03/18/17 0842  •  diazepam (VALIUM) tablet 5 mg, 5 mg, Oral, 4X/DAY PRN, Damián Logan M.D.  •  docusate sodium (COLACE) capsule 100 mg, 100 mg, Oral, QAM, Damián Logan M.D., 100 mg at 03/18/17 0842  •  escitalopram (LEXAPRO) tablet 20 mg, 20 mg, Oral, DAILY, Damián Logan M.D., 20 mg at 03/18/17 0842  •  levothyroxine (SYNTHROID) tablet 75 mcg, 75 mcg, Oral, AM ES, Damián Logan M.D., 75 mcg at 03/18/17 0604  •  metoprolol (LOPRESSOR) tablet 25 mg, 25 mg, Oral, BID, Damián Logan M.D., 25 mg at 03/18/17 0841  •  oxycodone immediate-release (ROXICODONE) tablet 5 mg, 5 mg, Oral, Q4HRS PRN, Damián Logan M.D., 5 mg at 03/17/17 2034  •  senna-docusate (PERICOLACE or SENOKOT S) 8.6-50 MG per tablet 2 Tab, 2 Tab, Oral, BID, 2 Tab at 03/18/17 0842 **AND** polyethylene glycol/lytes (MIRALAX) PACKET 1 Packet, 1 Packet, Oral, QDAY PRN **AND** magnesium hydroxide (MILK OF MAGNESIA) suspension 30 mL, 30 mL, Oral, QDAY PRN **AND** bisacodyl (DULCOLAX) suppository 10 mg, 10 mg, Rectal, QDAY PRN, Damián Logan M.D.  •  ondansetron  (ZOFRAN) syringe/vial injection 4 mg, 4 mg, Intravenous, Q4HRS PRN, Damián Logan M.D., 4 mg at 03/18/17 0843  •  ondansetron (ZOFRAN ODT) dispertab 4 mg, 4 mg, Oral, Q4HRS PRN, Damián Logan M.D.  •  insulin lispro (HUMALOG) injection 1-6 Units, 1-6 Units, Subcutaneous, 4X/DAY ACHS, Damián Logan M.D., Stopped at 03/18/17 0700  •  Action is required: Protocol 1073 Hypoglycemia has been implemented, , , Once **AND** Protocol 1073 Inclusion Criteria, , , CONTINUOUS **AND** Protocol 1073 NOTIFY, , , Once **AND** Protocol 1073 Initiate protocol immediately if FSBG is less than or equal to 70 mg/dL, , , CONTINUOUS **AND** glucose 4 g chewable tablet 16 g, 16 g, Oral, Q15 MIN PRN **AND** dextrose 50% (D50W) injection 25 mL, 25 mL, Intravenous, Q15 MIN PRN, Damián Logan M.D.  •  morphine (pf) 4 mg/ml injection 2 mg, 2 mg, Intravenous, Q2HRS PRN, MIRANDA BhaktaDZulma, 2 mg at 03/18/17 0834  •  coenzyme Q-10 capsule 60 mg, 60 mg, Oral, DAILY, BRAD Bhakta.DZulma, 60 mg at 03/18/17 0842    Labs:  Recent Labs      03/16/17 0258 03/17/17 0225 03/18/17   0300   WBC  12.7*  13.8*  12.2*   RBC  3.18*  2.73*  2.82*   HEMOGLOBIN  9.1*  8.0*  8.3*   HEMATOCRIT  28.4*  24.4*  25.0*   MCV  89.3  89.4  88.7   MCH  28.6  29.3  29.4   RDW  45.6  46.5  47.8   PLATELETCT  374  347  393   MPV  10.1  10.3  10.3     Recent Labs      03/16/17 0258 03/17/17 0225 03/18/17   0300   SODIUM  137  140  141   POTASSIUM  3.3*  3.3*  3.5*   CHLORIDE  109  112  111   CO2  16*  18*  18*   GLUCOSE  163*  125*  104*   BUN  11  18  25*     Recent Labs      03/16/17   0258  03/17/17   0225  03/18/17   0300   CREATININE  0.74  0.71  0.80       Imaging:  Dx-chest-2 Views    2/19/2017 2/19/2017 7:12 PM HISTORY/REASON FOR EXAM:  Preoperative respiratory evaluation TECHNIQUE/EXAM DESCRIPTION AND NUMBER OF VIEWS: Two views of the chest. COMPARISON:  None. FINDINGS: The cardiac silhouette  and mediastinal contours are normal.   Calcification is in the aortic knob. No discrete opacity, pleural fluid or pneumothorax. No suspicious bony lesions.     2/19/2017  No acute findings.    Dx-chest-limited (1 View)    3/12/2017  3/12/2017 5:13 PM HISTORY/REASON FOR EXAM: Cough TECHNIQUE/EXAM DESCRIPTION AND NUMBER OF VIEWS: Single AP view of the chest. COMPARISON: 2/21/2017 FINDINGS: There is no evidence of focal infiltrate or pulmonary edema. The heart is normal in size. There is no pleural effusion. Postsurgical changes are again seen.     3/12/2017  No evidence of acute cardiopulmonary process.    Dx-chest-portable (1 View)    2/21/2017 2/21/2017 6:07 AM HISTORY/REASON FOR EXAM:  Post Op Heart Surgery; POD #1. TECHNIQUE/EXAM DESCRIPTION AND NUMBER OF VIEWS: Single portable view of the chest. COMPARISON: 2/20/2017. FINDINGS: The cardiomediastinal silhouette is stably enlarged. There has been interval extubation. Lines and tubes are stably positioned.     2/21/2017  1.  Interval extubation. 2.  Stable enlargement of the cardiomediastinal silhouette and mild left lower lobe atelectasis and/or consolidation.    Dx-chest-portable (1 View)    2/20/2017 2/20/2017 1:13 PM HISTORY/REASON FOR EXAM:  timed 1200 in main OR for post open heart and line/tube placement. TECHNIQUE/EXAM DESCRIPTION AND NUMBER OF VIEWS: Single portable view of the chest. COMPARISON: 2/19/2017 FINDINGS: Endotracheal tube projects at the level of the aortic arch. Right central line projects over the SVC. Mediastinal drains are noted. Sternotomy wires are seen. The heart is not enlarged. Atherosclerotic calcification is seen. Left midlung linear opacities  likely represent atelectasis. Retrocardiac opacity likely represents atelectasis. No definite pneumothorax is identified.     2/20/2017  Linear left midlung opacities likely represent atelectasis. Retrocardiac opacity may represent atelectasis or consolidation. Lines and tubes as above described. Atherosclerotic plaque.    Dx-knee  2- Right    3/15/2017  3/15/2017 2:19 PM HISTORY/REASON FOR EXAM:  Pain/Deformity Following Trauma TECHNIQUE/EXAM DESCRIPTION AND NUMBER OF VIEWS:  2 views of the RIGHT knee. COMPARISON: None FINDINGS: No acute fracture or dislocation. Mild tricompartmental osteoarthritis. Moderate knee joint effusion.     3/15/2017  1. No acute osseous abnormality.    Dx-knee 2- Left    3/12/2017  3/12/2017 11:10 PM HISTORY/REASON FOR EXAM:  Atraumatic Pain/Swelling/Deformity Atraumatic left knee pain TECHNIQUE/EXAM DESCRIPTION AND NUMBER OF VIEWS:  2 views of the LEFT knee. COMPARISON: None FINDINGS: No acute fracture or dislocation. No significant joint arthropathy. Small knee joint effusion. Vascular calcification. Multiple surgical clips about the knee.     3/12/2017  1. No acute osseous abnormality. Small knee joint effusion.    Us-renal    3/13/2017  3/13/2017 7:12 AM HISTORY/REASON FOR EXAM:  Renal failure TECHNIQUE/EXAM DESCRIPTION: Renal ultrasound. COMPARISON:  May 17, 2013 FINDINGS: The right kidney measures 10.7 cm. The left kidney measures 10.1 cm. There is no hydronephrosis. There is a 2.2 cm right lower pole renal cyst. There are no abnormal calcifications. The bladder demonstrates no focal wall abnormality.     3/13/2017  2.2 cm right renal cyst. Otherwise unremarkable renal ultrasound.    Micro:  BLOOD CULTURE   Date Value Ref Range Status   05/16/2013 No growth after 5 days of incubation.  Final        Assessment:  Active Hospital Problems    Diagnosis   • S/P CABG x 4 [Z95.1]   • Hypertension [I10]   • Type 2 diabetes mellitus without complication (CMS-HCC) [E11.9]   • Hypothyroidism [E03.9]   • Arthritis, septic, knee (CMS-HCC) [M00.9]   • Osteoarthritis [M19.90]       Plan:  Right septic knee arthritis, superimposed infection  S/p arthrocentesis 3/16 - cloudy fluid, >55K WBCs, +monosodium urate crystals  Gram stain - negative, Cx - NGTD (pt had been on abx prior)  D/c vancomycin as no evidence of MRSA  Continue  ceftriaxone    Gouty arthritis of the R knee  +monosodium urate crystals    Klebsiella UTI  Abx per above    DM2  Optimize BS to assist with healing of infection    Recent CABG    DW IM

## 2017-03-18 NOTE — PROGRESS NOTES
Bedside report received from ANYA Colindres. POC discussed with pt; all questions answered at this time. White board updated. Appropriate functioning of tele monitor confirmed. All needs met at this time.

## 2017-03-18 NOTE — PROGRESS NOTES
Report received from Mili JOYNER. Pt. Sleeping in bed. All safety measures in place. No immediate concerns at this time.

## 2017-03-19 LAB
ANION GAP SERPL CALC-SCNC: 8 MMOL/L (ref 0–11.9)
BACTERIA FLD AEROBE CULT: NORMAL
BUN SERPL-MCNC: 24 MG/DL (ref 8–22)
CALCIUM SERPL-MCNC: 9.1 MG/DL (ref 8.5–10.5)
CHLORIDE SERPL-SCNC: 112 MMOL/L (ref 96–112)
CO2 SERPL-SCNC: 21 MMOL/L (ref 20–33)
CREAT SERPL-MCNC: 0.87 MG/DL (ref 0.5–1.4)
ERYTHROCYTE [DISTWIDTH] IN BLOOD BY AUTOMATED COUNT: 52.5 FL (ref 35.9–50)
FERRITIN SERPL-MCNC: 186.3 NG/ML (ref 10–291)
GFR SERPL CREATININE-BSD FRML MDRD: >60 ML/MIN/1.73 M 2
GLUCOSE BLD-MCNC: 130 MG/DL (ref 65–99)
GLUCOSE BLD-MCNC: 165 MG/DL (ref 65–99)
GLUCOSE BLD-MCNC: 191 MG/DL (ref 65–99)
GLUCOSE BLD-MCNC: 90 MG/DL (ref 65–99)
GLUCOSE SERPL-MCNC: 87 MG/DL (ref 65–99)
GRAM STN SPEC: NORMAL
HCT VFR BLD AUTO: 28.3 % (ref 37–47)
HGB BLD-MCNC: 8.8 G/DL (ref 12–16)
MCH RBC QN AUTO: 29.3 PG (ref 27–33)
MCHC RBC AUTO-ENTMCNC: 31.1 G/DL (ref 33.6–35)
MCV RBC AUTO: 94.3 FL (ref 81.4–97.8)
PLATELET # BLD AUTO: 372 K/UL (ref 164–446)
PMV BLD AUTO: 9.9 FL (ref 9–12.9)
POTASSIUM SERPL-SCNC: 3.9 MMOL/L (ref 3.6–5.5)
RBC # BLD AUTO: 3 M/UL (ref 4.2–5.4)
SIGNIFICANT IND 70042: NORMAL
SITE SITE: NORMAL
SODIUM SERPL-SCNC: 141 MMOL/L (ref 135–145)
SOURCE SOURCE: NORMAL
WBC # BLD AUTO: 9.1 K/UL (ref 4.8–10.8)

## 2017-03-19 PROCEDURE — 80048 BASIC METABOLIC PNL TOTAL CA: CPT

## 2017-03-19 PROCEDURE — A9270 NON-COVERED ITEM OR SERVICE: HCPCS | Performed by: INTERNAL MEDICINE

## 2017-03-19 PROCEDURE — 82962 GLUCOSE BLOOD TEST: CPT | Mod: 91

## 2017-03-19 PROCEDURE — 700102 HCHG RX REV CODE 250 W/ 637 OVERRIDE(OP): Performed by: INTERNAL MEDICINE

## 2017-03-19 PROCEDURE — 99233 SBSQ HOSP IP/OBS HIGH 50: CPT | Performed by: INTERNAL MEDICINE

## 2017-03-19 PROCEDURE — 770006 HCHG ROOM/CARE - MED/SURG/GYN SEMI*

## 2017-03-19 PROCEDURE — 700111 HCHG RX REV CODE 636 W/ 250 OVERRIDE (IP): Performed by: INTERNAL MEDICINE

## 2017-03-19 PROCEDURE — 36415 COLL VENOUS BLD VENIPUNCTURE: CPT

## 2017-03-19 PROCEDURE — 700112 HCHG RX REV CODE 229: Performed by: INTERNAL MEDICINE

## 2017-03-19 PROCEDURE — 82728 ASSAY OF FERRITIN: CPT

## 2017-03-19 PROCEDURE — 85027 COMPLETE CBC AUTOMATED: CPT

## 2017-03-19 PROCEDURE — 700105 HCHG RX REV CODE 258: Performed by: INTERNAL MEDICINE

## 2017-03-19 RX ADMIN — LOSARTAN POTASSIUM 100 MG: 50 TABLET, FILM COATED ORAL at 08:50

## 2017-03-19 RX ADMIN — Medication 60 MG: at 08:52

## 2017-03-19 RX ADMIN — METOPROLOL TARTRATE 25 MG: 25 TABLET, FILM COATED ORAL at 08:49

## 2017-03-19 RX ADMIN — CLOPIDOGREL 75 MG: 75 TABLET, FILM COATED ORAL at 08:49

## 2017-03-19 RX ADMIN — INSULIN LISPRO 1 UNITS: 100 INJECTION, SOLUTION INTRAVENOUS; SUBCUTANEOUS at 19:57

## 2017-03-19 RX ADMIN — INSULIN LISPRO 1 UNITS: 100 INJECTION, SOLUTION INTRAVENOUS; SUBCUTANEOUS at 16:46

## 2017-03-19 RX ADMIN — OXYCODONE HYDROCHLORIDE 5 MG: 5 TABLET ORAL at 22:08

## 2017-03-19 RX ADMIN — METOPROLOL TARTRATE 25 MG: 25 TABLET, FILM COATED ORAL at 19:45

## 2017-03-19 RX ADMIN — OXYCODONE HYDROCHLORIDE 5 MG: 5 TABLET ORAL at 08:49

## 2017-03-19 RX ADMIN — MORPHINE SULFATE 2 MG: 4 INJECTION INTRAVENOUS at 03:08

## 2017-03-19 RX ADMIN — ATORVASTATIN CALCIUM 20 MG: 20 TABLET, FILM COATED ORAL at 19:45

## 2017-03-19 RX ADMIN — OXYCODONE HYDROCHLORIDE 5 MG: 5 TABLET ORAL at 17:58

## 2017-03-19 RX ADMIN — STANDARDIZED SENNA CONCENTRATE AND DOCUSATE SODIUM 2 TABLET: 8.6; 5 TABLET, FILM COATED ORAL at 08:49

## 2017-03-19 RX ADMIN — DOCUSATE SODIUM 100 MG: 100 CAPSULE ORAL at 08:49

## 2017-03-19 RX ADMIN — OXYCODONE HYDROCHLORIDE 5 MG: 5 TABLET ORAL at 02:35

## 2017-03-19 RX ADMIN — LEVOTHYROXINE SODIUM 75 MCG: 75 TABLET ORAL at 06:45

## 2017-03-19 RX ADMIN — PREDNISONE 20 MG: 20 TABLET ORAL at 08:50

## 2017-03-19 RX ADMIN — OXYCODONE HYDROCHLORIDE 5 MG: 5 TABLET ORAL at 13:46

## 2017-03-19 RX ADMIN — ASPIRIN 81 MG: 81 TABLET ORAL at 08:49

## 2017-03-19 RX ADMIN — STANDARDIZED SENNA CONCENTRATE AND DOCUSATE SODIUM 2 TABLET: 8.6; 5 TABLET, FILM COATED ORAL at 19:45

## 2017-03-19 RX ADMIN — ESCITALOPRAM OXALATE 20 MG: 10 TABLET ORAL at 08:49

## 2017-03-19 RX ADMIN — CEFTRIAXONE 2 G: 2 INJECTION, POWDER, FOR SOLUTION INTRAMUSCULAR; INTRAVENOUS at 08:53

## 2017-03-19 ASSESSMENT — ENCOUNTER SYMPTOMS
VOMITING: 0
NAUSEA: 0
COUGH: 0
CHILLS: 0
HEADACHES: 0
DIARRHEA: 0
MYALGIAS: 1
FEVER: 0
ABDOMINAL PAIN: 0

## 2017-03-19 ASSESSMENT — PAIN SCALES - GENERAL
PAINLEVEL_OUTOF10: 2
PAINLEVEL_OUTOF10: 5
PAINLEVEL_OUTOF10: 7
PAINLEVEL_OUTOF10: 6
PAINLEVEL_OUTOF10: 5
PAINLEVEL_OUTOF10: 2

## 2017-03-19 NOTE — PROGRESS NOTES
Pt received from transport.  Reports a 2/10 back pain.  Declines med intervention at this time as pt received pain med before transfer.  Pt is upself, steady.  Bilateral medial knee harvest wounds from recent CABG surgery, PRITI and CDI.   Healed incision to chest.   Pt denies numbness or tingling.  BLE edema observed.   POC discussed with pt.  2 RN skin check done.  Pt oriented to room.  All needs met at this time.  Bed in low position.  Call light within reach.  Rounding in place.

## 2017-03-19 NOTE — PROGRESS NOTES
HOSPITALIST PROGRESS NOTE (SOAP)   Date of Service  3/19  CHIEF COMPLAINT  71 y.o.yo female presented 3/12/2017 with Shortness of Breath and Back Pain    SUBJECTIVE  Feeling better. Swelling down. No chest pain or shortness of breath. Tolerating meals.   PHYSICAL EXAM  Filed Vitals:    03/18/17 2000 03/19/17 0400 03/19/17 0746 03/19/17 1510   BP: 157/82 177/84 181/87 174/79   Pulse: 56 52 58 60   Temp: 36.2 °C (97.1 °F) 36.4 °C (97.6 °F) 36.5 °C (97.7 °F) 36.6 °C (97.8 °F)   TempSrc:       Resp: 18 16 18 17   Height:       Weight: 63.4 kg (139 lb 12.4 oz)      SpO2: 92% 91% 95% 94%     No intake or output data in the 24 hours ending 03/19/17 1540Vitals reviewed  General/Constitutional: No acute distress.    Head: Normocephalic, atraumatic  ENT: Oral mucosa is moist. No obvious pharyngeal exudates  Eyes: Pink conjunctiva, no scleral icterus  Neck: Supple, no lymphadenopathy  Cardiovascular: Normal rate and regular rhythm. S1,2 noted. No murmurs, gallops or rubs.  Pulmonary: Clear to auscultation bilaterally. No wheezes, rales or rhonchi  Abdominal: Soft, nontender, not distended, bowel sounds normoactive.  Musculoskeletal: Mild tenderness to palpation of chest wall CABG incision site. L knee soreness but R knee slightly swollen and exquisitely tender to tough; not particularly warm or erythematous. Arthorcentesis site not bleeding and swelling has improved.  Neurologic: Grossly nonfocal, moving all extremities.  Genitourinary: No gross hematuria  Skin: No obvious rash.  Psychiatric: Pleasant, cooperative.  LABS  Lab Results   Component Value Date/Time    WBC 9.1 03/19/2017 08:17 AM    RBC 3.00* 03/19/2017 08:17 AM    HEMOGLOBIN 8.8* 03/19/2017 08:17 AM    HEMATOCRIT 28.3* 03/19/2017 08:17 AM    MCV 94.3 03/19/2017 08:17 AM    MCH 29.3 03/19/2017 08:17 AM    MCHC 31.1* 03/19/2017 08:17 AM    MPV 9.9 03/19/2017 08:17 AM    NEUTROPHILS-POLYS 69.40 03/13/2017 01:33 AM    LYMPHOCYTES 14.40* 03/13/2017 01:33 AM     MONOCYTES 10.40 03/13/2017 01:33 AM    EOSINOPHILS 4.80 03/13/2017 01:33 AM    BASOPHILS 0.70 03/13/2017 01:33 AM    HYPOCHROMIA 1+ 05/30/2016 08:35 AM    ANISOCYTOSIS 2+ 05/30/2016 08:35 AM      Lab Results   Component Value Date/Time    SODIUM 141 03/19/2017 08:17 AM    POTASSIUM 3.9 03/19/2017 08:17 AM    CHLORIDE 112 03/19/2017 08:17 AM    CO2 21 03/19/2017 08:17 AM    GLUCOSE 87 03/19/2017 08:17 AM    BUN 24* 03/19/2017 08:17 AM    CREATININE 0.87 03/19/2017 08:17 AM    BUN-CREATININE RATIO 26 02/01/2010 12:46 PM    GLOM FILT RATE, EST >59 02/01/2010 12:46 PM      Lab Results   Component Value Date/Time    PT 15.4* 03/12/2017 04:57 PM    INR 1.18* 03/12/2017 04:57 PM      BLOOD CULTURE   Date Value Ref Range Status   05/16/2013 No growth after 5 days of incubation.  Final     Labs reviewed  Imaging reviewed  Medications reviewed  ASSESSMENT and PLAN  Metabolic acidosis    Elevated brain natriuretic peptide (BNP) level  Due to overdiuresis. Resolved with holding diuretics.  Active Problems:    Hypertension  Uncontrolled. Increase losartan now to 100, Cr- nromalized. Continue metoprolol    S/P CABG x 4  On aspirin, statin, Plavix    Type 2 diabetes mellitus without complication (CMS-HCC)  Stable. On sliding scale insulin.    Hypothyroidism  Continue levothyroxine.    Normocytic anemia  Hb remained at 8+. Stable. No bleeding. Obtained iron panel, B12, folate. Iron sats low, less than 10% however, TIBC normal, showing mixed picture; Mixed picture but iron sat less than 10. Ordered ferritin; if low or normal (i.e. not elevated as would an acute phase reactant) can consider giving ferrous sulfate PO. FOllow up to [facility]-2 weeks for further workup.    Hypokalemia  Resolving. Recheck potassium. Resolved.    Gout    Osteoarthritis    Septic knee arthritis.    Calf hematoma  Severe knee pain on weightbearing and palpation. No fever or leukocytosis to suggest infection. Ordered R knee XR that showed  no fracture, positive for osteoarthritis but also has moderate knee effusion. Meanwhile will start prednisone 20mg PO daily but will avoid NSAIDs due to recent cardiovascular disease and risk of bleeding. I have called orthopedics. They did arthrocentesis. Did show bloody effusion with WBCs. Initially planned for InD of the knee. I ordered ESR and CRP and they were profoundly elevated.  Meanwhile I have added vancomycin to the regimen and increased Rocephin dose to cover possible septic knee. Consulted Dr. Antoine, ID, and will treat superimposed septic arthritis with current antibiotics and follow aspirate cultures and sensitivities. Orthopedics reported crystals consistent with gout and cancelled InD of the knee. U/S today showed calf hematoma. Pain and swelling has now improved and no plan for InD or surgical intervention per Orthopedics if knee aspirate cultures remain negative.    Discontinued pharmacologic DVT prophylaxis because of bloody knee effusion and anticipated InD; SCDs to unafected limb instead. Continued to hold off pharmacologic DVT prophylaxis bec of calf hematoma.    I spent 36 minutes, reviewing the chart, notes, vitals, labs, imaging, ordering labs, evaluating Carole Kingsley for assessment, enacting the plan above and writing this note. 50% of the time was spent in counseling Carole Kingsley andanswering questions. Medical decision making is therefore complex.

## 2017-03-19 NOTE — PROGRESS NOTES
Patient to transfer to [facility]. Attempted report for receiving RN. Extension left for receiving RN to call for report when available.

## 2017-03-19 NOTE — PROGRESS NOTES
Infectious Disease Progress Note    Author: Jayna Antoine M.D. DOS & Time created: 3/19/2017  9:52 AM    Chief Complaint:  Chief Complaint   Patient presents with   • Shortness of Breath   • Back Pain   FU for R septic knee arthritis    Interval History:  3/18 AF, WBC 12.2, sleeping but arousable, having nausea and diffuse pain this am, states she has more mobility in her R knee and she can now ambulate   3/19 AF, WBC 9.1, feels better today, slept well overnight and no nausea, states she has less R knee swelling  Labs Reviewed, Medications Reviewed, Radiology Reviewed and Wound Reviewed.    Review of Systems:  Review of Systems   Constitutional: Negative for fever and chills.   Respiratory: Negative for cough.    Cardiovascular: Negative for chest pain.   Gastrointestinal: Negative for nausea, vomiting, abdominal pain and diarrhea.   Genitourinary: Negative for dysuria.   Musculoskeletal: Positive for myalgias.   Neurological: Negative for headaches.   All other systems reviewed and are negative.      Hemodynamics:  Temp (24hrs), Av.4 °C (97.5 °F), Min:36.2 °C (97.1 °F), Max:36.5 °C (97.7 °F)  Temperature: 36.5 °C (97.7 °F)  Pulse  Av  Min: 52  Max: 86   Blood Pressure : (!) 181/87 mmHg       Physical Exam:  Physical Exam   Constitutional: She is oriented to person, place, and time. She appears well-developed.   HENT:   Head: Normocephalic and atraumatic.   Eyes: EOM are normal. Pupils are equal, round, and reactive to light.   Neck: Neck supple.   Cardiovascular: Normal rate, regular rhythm and normal heart sounds.    Sternotomy incision well healed and clean   Pulmonary/Chest: Effort normal and breath sounds normal.   Abdominal: Soft. There is no tenderness.   Musculoskeletal: Normal range of motion. She exhibits no edema or tenderness.   R knee without edema, fluctuance or erythema  Active ROM   Neurological: She is alert and oriented to person, place, and time.   Skin: No rash noted.        Meds:    Current facility-administered medications:   •  cefTRIAXone (ROCEPHIN) 2 g in  mL IVPB, 2 g, Intravenous, Q24HRS, Jayna Antoine M.D., Stopped at 03/19/17 0923  •  hydrALAZINE (APRESOLINE) injection 20 mg, 20 mg, Intravenous, Q4HRS PRN, Evan Estrada M.D., 20 mg at 03/17/17 2042  •  predniSONE (DELTASONE) tablet 20 mg, 20 mg, Oral, DAILY, Evan Estrada M.D., 20 mg at 03/19/17 0850  •  losartan (COZAAR) tablet 100 mg, 100 mg, Oral, Q DAY, Evan Estrada M.D., 100 mg at 03/19/17 0850  •  diphenhydramine-ZnAcetate (BENADRYL ITCH) 1-0.1 % cream, , Topical, BID PRN, Damián Logan M.D.  •  aspirin EC (ECOTRIN) tablet 81 mg, 81 mg, Oral, DAILY, Damián Logan M.D., 81 mg at 03/19/17 0849  •  atorvastatin (LIPITOR) tablet 20 mg, 20 mg, Oral, QHS, Damián Logan M.D., 20 mg at 03/18/17 2100  •  clopidogrel (PLAVIX) tablet 75 mg, 75 mg, Oral, DAILY, Damián Logan M.D., 75 mg at 03/19/17 0849  •  diazepam (VALIUM) tablet 5 mg, 5 mg, Oral, 4X/DAY PRN, Damián Logan M.D.  •  docusate sodium (COLACE) capsule 100 mg, 100 mg, Oral, QAM, Damián Logan M.D., 100 mg at 03/19/17 0849  •  escitalopram (LEXAPRO) tablet 20 mg, 20 mg, Oral, DAILY, Damián Logan M.D., 20 mg at 03/19/17 0849  •  levothyroxine (SYNTHROID) tablet 75 mcg, 75 mcg, Oral, AM ES, Damián Logan M.D., 75 mcg at 03/19/17 0645  •  metoprolol (LOPRESSOR) tablet 25 mg, 25 mg, Oral, BID, Damián Logan M.D., 25 mg at 03/19/17 0849  •  oxycodone immediate-release (ROXICODONE) tablet 5 mg, 5 mg, Oral, Q4HRS PRN, Damián Logan M.D., 5 mg at 03/19/17 0849  •  senna-docusate (PERICOLACE or SENOKOT S) 8.6-50 MG per tablet 2 Tab, 2 Tab, Oral, BID, 2 Tab at 03/19/17 0849 **AND** polyethylene glycol/lytes (MIRALAX) PACKET 1 Packet, 1 Packet, Oral, QDAY PRN **AND** magnesium hydroxide (MILK OF MAGNESIA) suspension 30 mL, 30 mL, Oral, QDAY PRN **AND** bisacodyl (DULCOLAX) suppository 10 mg, 10 mg, Rectal, QDAY PRN, Damián ROBERSON  PAUL Logan  •  ondansetron (ZOFRAN) syringe/vial injection 4 mg, 4 mg, Intravenous, Q4HRS PRN, Damián Logan M.D., 4 mg at 03/18/17 0843  •  ondansetron (ZOFRAN ODT) dispertab 4 mg, 4 mg, Oral, Q4HRS PRN, Damián Logan M.D.  •  insulin lispro (HUMALOG) injection 1-6 Units, 1-6 Units, Subcutaneous, 4X/DAY ACHS, Damián Logan M.D., Stopped at 03/18/17 2100  •  Action is required: Protocol 1073 Hypoglycemia has been implemented, , , Once **AND** Protocol 1073 Inclusion Criteria, , , CONTINUOUS **AND** Protocol 1073 NOTIFY, , , Once **AND** Protocol 1073 Initiate protocol immediately if FSBG is less than or equal to 70 mg/dL, , , CONTINUOUS **AND** glucose 4 g chewable tablet 16 g, 16 g, Oral, Q15 MIN PRN **AND** dextrose 50% (D50W) injection 25 mL, 25 mL, Intravenous, Q15 MIN PRN, Damián Logan M.D.  •  morphine (pf) 4 mg/ml injection 2 mg, 2 mg, Intravenous, Q2HRS PRN, Damián Logan M.D., 2 mg at 03/19/17 0308  •  coenzyme Q-10 capsule 60 mg, 60 mg, Oral, DAILY, Damián Logan M.D., 60 mg at 03/19/17 0852    Labs:  Recent Labs      03/17/17 0225 03/18/17   0300  03/19/17   0817   WBC  13.8*  12.2*  9.1   RBC  2.73*  2.82*  3.00*   HEMOGLOBIN  8.0*  8.3*  8.8*   HEMATOCRIT  24.4*  25.0*  28.3*   MCV  89.4  88.7  94.3   MCH  29.3  29.4  29.3   RDW  46.5  47.8  52.5*   PLATELETCT  347  393  372   MPV  10.3  10.3  9.9     Recent Labs      03/17/17 0225 03/18/17   0300  03/19/17   0817   SODIUM  140  141  141   POTASSIUM  3.3*  3.5*  3.9   CHLORIDE  112  111  112   CO2  18*  18*  21   GLUCOSE  125*  104*  87   BUN  18  25*  24*     Recent Labs      03/17/17   0225  03/18/17   0300  03/19/17   0817   CREATININE  0.71  0.80  0.87       Imaging:  Dx-chest-2 Views    2/19/2017 2/19/2017 7:12 PM HISTORY/REASON FOR EXAM:  Preoperative respiratory evaluation TECHNIQUE/EXAM DESCRIPTION AND NUMBER OF VIEWS: Two views of the chest. COMPARISON:  None. FINDINGS: The cardiac silhouette  and mediastinal  contours are normal.  Calcification is in the aortic knob. No discrete opacity, pleural fluid or pneumothorax. No suspicious bony lesions.     2/19/2017  No acute findings.    Dx-chest-limited (1 View)    3/12/2017  3/12/2017 5:13 PM HISTORY/REASON FOR EXAM: Cough TECHNIQUE/EXAM DESCRIPTION AND NUMBER OF VIEWS: Single AP view of the chest. COMPARISON: 2/21/2017 FINDINGS: There is no evidence of focal infiltrate or pulmonary edema. The heart is normal in size. There is no pleural effusion. Postsurgical changes are again seen.     3/12/2017  No evidence of acute cardiopulmonary process.    Dx-chest-portable (1 View)    2/21/2017 2/21/2017 6:07 AM HISTORY/REASON FOR EXAM:  Post Op Heart Surgery; POD #1. TECHNIQUE/EXAM DESCRIPTION AND NUMBER OF VIEWS: Single portable view of the chest. COMPARISON: 2/20/2017. FINDINGS: The cardiomediastinal silhouette is stably enlarged. There has been interval extubation. Lines and tubes are stably positioned.     2/21/2017  1.  Interval extubation. 2.  Stable enlargement of the cardiomediastinal silhouette and mild left lower lobe atelectasis and/or consolidation.    Dx-chest-portable (1 View)    2/20/2017 2/20/2017 1:13 PM HISTORY/REASON FOR EXAM:  timed 1200 in main OR for post open heart and line/tube placement. TECHNIQUE/EXAM DESCRIPTION AND NUMBER OF VIEWS: Single portable view of the chest. COMPARISON: 2/19/2017 FINDINGS: Endotracheal tube projects at the level of the aortic arch. Right central line projects over the SVC. Mediastinal drains are noted. Sternotomy wires are seen. The heart is not enlarged. Atherosclerotic calcification is seen. Left midlung linear opacities  likely represent atelectasis. Retrocardiac opacity likely represents atelectasis. No definite pneumothorax is identified.     2/20/2017  Linear left midlung opacities likely represent atelectasis. Retrocardiac opacity may represent atelectasis or consolidation. Lines and tubes as above described.  Atherosclerotic plaque.    Dx-knee 2- Right    3/15/2017  3/15/2017 2:19 PM HISTORY/REASON FOR EXAM:  Pain/Deformity Following Trauma TECHNIQUE/EXAM DESCRIPTION AND NUMBER OF VIEWS:  2 views of the RIGHT knee. COMPARISON: None FINDINGS: No acute fracture or dislocation. Mild tricompartmental osteoarthritis. Moderate knee joint effusion.     3/15/2017  1. No acute osseous abnormality.    Dx-knee 2- Left    3/12/2017  3/12/2017 11:10 PM HISTORY/REASON FOR EXAM:  Atraumatic Pain/Swelling/Deformity Atraumatic left knee pain TECHNIQUE/EXAM DESCRIPTION AND NUMBER OF VIEWS:  2 views of the LEFT knee. COMPARISON: None FINDINGS: No acute fracture or dislocation. No significant joint arthropathy. Small knee joint effusion. Vascular calcification. Multiple surgical clips about the knee.     3/12/2017  1. No acute osseous abnormality. Small knee joint effusion.    Us-renal    3/13/2017  3/13/2017 7:12 AM HISTORY/REASON FOR EXAM:  Renal failure TECHNIQUE/EXAM DESCRIPTION: Renal ultrasound. COMPARISON:  May 17, 2013 FINDINGS: The right kidney measures 10.7 cm. The left kidney measures 10.1 cm. There is no hydronephrosis. There is a 2.2 cm right lower pole renal cyst. There are no abnormal calcifications. The bladder demonstrates no focal wall abnormality.     3/13/2017  2.2 cm right renal cyst. Otherwise unremarkable renal ultrasound.    Micro:  BLOOD CULTURE   Date Value Ref Range Status   05/16/2013 No growth after 5 days of incubation.  Final        Assessment:  Active Hospital Problems    Diagnosis   • S/P CABG x 4 [Z95.1]   • Hypertension [I10]   • Type 2 diabetes mellitus without complication (CMS-[facility]) [E11.9]   • Hypothyroidism [E03.9]   • Arthritis, septic, knee (CMS-[facility]) [M00.9]   • Osteoarthritis [M19.90]       Plan:  Right septic knee arthritis, superimposed infection  S/p arthrocentesis 3/16 - cloudy fluid, >55K WBCs, +monosodium urate crystals  Gram stain - negative, Cx - NGTD (pt had been on abx prior)  D/c  tomasz'dn as no evidence of MRSA  Continue ceftriaxone    Gouty arthritis of the R knee  +monosodium urate crystals in fluid    Klebsiella UTI  Abx per above    DM2  Optimize BS to assist with healing of infection    Recent CABG    DW IM

## 2017-03-19 NOTE — CARE PLAN
Problem: Communication  Goal: The ability to communicate needs accurately and effectively will improve  Outcome: PROGRESSING AS EXPECTED  POC discussed with pt. All medications explained. All questions answered.     Problem: Pain Management  Goal: Pain level will decrease to patient’s comfort goal  Outcome: PROGRESSING AS EXPECTED  Pt assessed for pain regularly and medicated PRN per MAR.

## 2017-03-19 NOTE — CARE PLAN
Problem: Knowledge Deficit  Goal: Knowledge of disease process/condition, treatment plan, diagnostic tests, and medications will improve  POC discussed in length with pt. All questions and concerns addressed.     Problem: Pain Management  Goal: Pain level will decrease to patient’s comfort goal  Pain managed with PRN pain med. Will continue to keep at comfort goal.

## 2017-03-20 ENCOUNTER — APPOINTMENT (OUTPATIENT)
Dept: RADIOLOGY | Facility: MEDICAL CENTER | Age: 72
DRG: 683 | End: 2017-03-20
Attending: INTERNAL MEDICINE
Payer: MEDICARE

## 2017-03-20 PROBLEM — N17.9 AKI (ACUTE KIDNEY INJURY) (HCC): Status: ACTIVE | Noted: 2017-03-20

## 2017-03-20 PROBLEM — N39.0 UTI (URINARY TRACT INFECTION): Status: ACTIVE | Noted: 2017-03-20

## 2017-03-20 PROBLEM — F41.9 ANXIETY: Status: ACTIVE | Noted: 2017-03-20

## 2017-03-20 PROBLEM — T14.8XXA HEMATOMA: Status: ACTIVE | Noted: 2017-03-20

## 2017-03-20 PROBLEM — I25.10 CAD (CORONARY ARTERY DISEASE): Status: ACTIVE | Noted: 2017-03-20

## 2017-03-20 PROBLEM — E78.5 HLD (HYPERLIPIDEMIA): Status: ACTIVE | Noted: 2017-03-20

## 2017-03-20 PROBLEM — K21.9 GERD (GASTROESOPHAGEAL REFLUX DISEASE): Status: ACTIVE | Noted: 2017-03-20

## 2017-03-20 LAB
GLUCOSE BLD-MCNC: 123 MG/DL (ref 65–99)
GLUCOSE BLD-MCNC: 134 MG/DL (ref 65–99)
GLUCOSE BLD-MCNC: 145 MG/DL (ref 65–99)
GLUCOSE BLD-MCNC: 99 MG/DL (ref 65–99)
URATE SERPL-MCNC: 6.3 MG/DL (ref 1.9–8.2)

## 2017-03-20 PROCEDURE — 36415 COLL VENOUS BLD VENIPUNCTURE: CPT

## 2017-03-20 PROCEDURE — 700111 HCHG RX REV CODE 636 W/ 250 OVERRIDE (IP): Performed by: INTERNAL MEDICINE

## 2017-03-20 PROCEDURE — 700102 HCHG RX REV CODE 250 W/ 637 OVERRIDE(OP): Performed by: INTERNAL MEDICINE

## 2017-03-20 PROCEDURE — A9270 NON-COVERED ITEM OR SERVICE: HCPCS | Performed by: INTERNAL MEDICINE

## 2017-03-20 PROCEDURE — 82962 GLUCOSE BLOOD TEST: CPT | Mod: 91

## 2017-03-20 PROCEDURE — 700112 HCHG RX REV CODE 229: Performed by: INTERNAL MEDICINE

## 2017-03-20 PROCEDURE — 700105 HCHG RX REV CODE 258: Performed by: INTERNAL MEDICINE

## 2017-03-20 PROCEDURE — 770006 HCHG ROOM/CARE - MED/SURG/GYN SEMI*

## 2017-03-20 PROCEDURE — 84550 ASSAY OF BLOOD/URIC ACID: CPT

## 2017-03-20 PROCEDURE — 99233 SBSQ HOSP IP/OBS HIGH 50: CPT | Performed by: INTERNAL MEDICINE

## 2017-03-20 RX ORDER — HEPARIN SODIUM 5000 [USP'U]/ML
5000 INJECTION, SOLUTION INTRAVENOUS; SUBCUTANEOUS EVERY 12 HOURS
Status: DISCONTINUED | OUTPATIENT
Start: 2017-03-20 | End: 2017-03-21 | Stop reason: HOSPADM

## 2017-03-20 RX ORDER — AMLODIPINE BESYLATE 5 MG/1
5 TABLET ORAL
Status: DISCONTINUED | OUTPATIENT
Start: 2017-03-20 | End: 2017-03-21 | Stop reason: HOSPADM

## 2017-03-20 RX ORDER — ASCORBIC ACID 500 MG
500 TABLET ORAL 3 TIMES DAILY
Status: DISCONTINUED | OUTPATIENT
Start: 2017-03-20 | End: 2017-03-21 | Stop reason: HOSPADM

## 2017-03-20 RX ORDER — FERROUS GLUCONATE 324(38)MG
324 TABLET ORAL 2 TIMES DAILY WITH MEALS
Status: DISCONTINUED | OUTPATIENT
Start: 2017-03-20 | End: 2017-03-21 | Stop reason: HOSPADM

## 2017-03-20 RX ORDER — POLYETHYLENE GLYCOL 3350 17 G/17G
1 POWDER, FOR SOLUTION ORAL DAILY
Status: DISCONTINUED | OUTPATIENT
Start: 2017-03-20 | End: 2017-03-21 | Stop reason: HOSPADM

## 2017-03-20 RX ORDER — CARVEDILOL 6.25 MG/1
6.25 TABLET ORAL 2 TIMES DAILY WITH MEALS
Status: DISCONTINUED | OUTPATIENT
Start: 2017-03-20 | End: 2017-03-21 | Stop reason: HOSPADM

## 2017-03-20 RX ORDER — COLCHICINE 0.6 MG/1
0.6 TABLET ORAL 2 TIMES DAILY
Status: DISCONTINUED | OUTPATIENT
Start: 2017-03-20 | End: 2017-03-21 | Stop reason: HOSPADM

## 2017-03-20 RX ADMIN — ESCITALOPRAM OXALATE 20 MG: 10 TABLET ORAL at 08:54

## 2017-03-20 RX ADMIN — OXYCODONE HYDROCHLORIDE 5 MG: 5 TABLET ORAL at 14:44

## 2017-03-20 RX ADMIN — METOPROLOL TARTRATE 25 MG: 25 TABLET, FILM COATED ORAL at 08:54

## 2017-03-20 RX ADMIN — THERA TABS 1 TABLET: TAB at 18:42

## 2017-03-20 RX ADMIN — HYDRALAZINE HYDROCHLORIDE 20 MG: 20 INJECTION INTRAMUSCULAR; INTRAVENOUS at 20:37

## 2017-03-20 RX ADMIN — CEFTRIAXONE 2 G: 2 INJECTION, POWDER, FOR SOLUTION INTRAMUSCULAR; INTRAVENOUS at 08:55

## 2017-03-20 RX ADMIN — COLCHICINE 0.6 MG: 0.6 TABLET, FILM COATED ORAL at 20:35

## 2017-03-20 RX ADMIN — OXYCODONE HYDROCHLORIDE 5 MG: 5 TABLET ORAL at 05:21

## 2017-03-20 RX ADMIN — OXYCODONE HYDROCHLORIDE AND ACETAMINOPHEN 500 MG: 500 TABLET ORAL at 18:43

## 2017-03-20 RX ADMIN — HEPARIN SODIUM 5000 UNITS: 5000 INJECTION, SOLUTION INTRAVENOUS; SUBCUTANEOUS at 20:34

## 2017-03-20 RX ADMIN — CARVEDILOL 6.25 MG: 6.25 TABLET, FILM COATED ORAL at 18:43

## 2017-03-20 RX ADMIN — FERROUS GLUCONATE 324 MG: 324 TABLET ORAL at 18:44

## 2017-03-20 RX ADMIN — CLOPIDOGREL 75 MG: 75 TABLET, FILM COATED ORAL at 08:54

## 2017-03-20 RX ADMIN — OXYCODONE HYDROCHLORIDE AND ACETAMINOPHEN 500 MG: 500 TABLET ORAL at 20:35

## 2017-03-20 RX ADMIN — Medication 60 MG: at 08:54

## 2017-03-20 RX ADMIN — HYDRALAZINE HYDROCHLORIDE 20 MG: 20 INJECTION INTRAMUSCULAR; INTRAVENOUS at 05:35

## 2017-03-20 RX ADMIN — DOCUSATE SODIUM 100 MG: 100 CAPSULE ORAL at 08:54

## 2017-03-20 RX ADMIN — OXYCODONE HYDROCHLORIDE 5 MG: 5 TABLET ORAL at 10:22

## 2017-03-20 RX ADMIN — AMLODIPINE BESYLATE 5 MG: 5 TABLET ORAL at 18:43

## 2017-03-20 RX ADMIN — ASPIRIN 81 MG: 81 TABLET ORAL at 08:54

## 2017-03-20 RX ADMIN — STANDARDIZED SENNA CONCENTRATE AND DOCUSATE SODIUM 2 TABLET: 8.6; 5 TABLET, FILM COATED ORAL at 08:54

## 2017-03-20 RX ADMIN — POLYETHYLENE GLYCOL 3350 1 PACKET: 17 POWDER, FOR SOLUTION ORAL at 18:44

## 2017-03-20 RX ADMIN — PREDNISONE 20 MG: 20 TABLET ORAL at 08:54

## 2017-03-20 RX ADMIN — LOSARTAN POTASSIUM 100 MG: 50 TABLET, FILM COATED ORAL at 08:54

## 2017-03-20 RX ADMIN — ATORVASTATIN CALCIUM 20 MG: 20 TABLET, FILM COATED ORAL at 20:36

## 2017-03-20 RX ADMIN — OXYCODONE HYDROCHLORIDE 5 MG: 5 TABLET ORAL at 18:43

## 2017-03-20 ASSESSMENT — ENCOUNTER SYMPTOMS
FEVER: 0
NERVOUS/ANXIOUS: 1
DOUBLE VISION: 0
NECK PAIN: 0
SENSORY CHANGE: 0
HEADACHES: 1
BLURRED VISION: 0
BLOOD IN STOOL: 0
ABDOMINAL PAIN: 0
DIARRHEA: 0
WHEEZING: 0
COUGH: 0
NAUSEA: 0
BACK PAIN: 0
MYALGIAS: 0
DEPRESSION: 1
SHORTNESS OF BREATH: 0
CONSTIPATION: 0
SPEECH CHANGE: 0
VOMITING: 0
CHILLS: 0
HEARTBURN: 0
FOCAL WEAKNESS: 0
DIZZINESS: 0
MYALGIAS: 1

## 2017-03-20 ASSESSMENT — PAIN SCALES - GENERAL
PAINLEVEL_OUTOF10: 8
PAINLEVEL_OUTOF10: 5
PAINLEVEL_OUTOF10: 7
PAINLEVEL_OUTOF10: 5
PAINLEVEL_OUTOF10: 4
PAINLEVEL_OUTOF10: 5
PAINLEVEL_OUTOF10: 5
PAINLEVEL_OUTOF10: 8
PAINLEVEL_OUTOF10: 5

## 2017-03-20 NOTE — PROGRESS NOTES
Received alert and oriented x 4, up self and steady on her feet, still c/o RLE pain and swelling, due med given as ordered, fs done and midnight snack offered, call light within reach, needs attended, will continue to monitor.

## 2017-03-20 NOTE — PROGRESS NOTES
Spoke with Infectious disease MD regarding PICC line placement. PICC RN concerned about placement due to history of kidney issues. Infectious disease MD okay with placement at this time. Pt will require long term antibiotics.

## 2017-03-20 NOTE — PROGRESS NOTES
Hospital Medicine Progress Note, Adult, Complex               Author: Jacques Schreiber Date & Time created: 3/20/2017  2:29 PM     Interval History:  71 y.o. female admitted 3/12/2017 with a CC of Generalized weakness and admitting dx of MOMO.  Patient known to have PMH significant for CAD s/p recent CABG.   Admitted to the hospital. MOMO thought to be 2/2 pre renal etiologies while diuresis continued at home.   UTI on presentation and treated.   Hospital course c/b arthritis / arthropathy, > R knee with findings consistent with gout and superimposed Septic arthritis.   Currently continuing abx per ID.   Discharge is pending arrangement of outpatient antibiotics for the patient.     Today slight headache earlier in am.   Knee pain ongoing but improved. Ambulating well now. No complaints.     Review of Systems:  Review of Systems   Constitutional: Negative for fever and chills.   HENT: Negative for hearing loss.    Eyes: Negative for blurred vision and double vision.   Respiratory: Negative for cough, shortness of breath and wheezing.    Cardiovascular: Negative for chest pain and leg swelling.   Gastrointestinal: Negative for heartburn, nausea, vomiting, abdominal pain, diarrhea, constipation, blood in stool and melena.   Genitourinary: Negative for dysuria, urgency and frequency.   Musculoskeletal: Positive for joint pain. Negative for myalgias, back pain and neck pain.   Skin: Negative for itching and rash.   Neurological: Positive for headaches (Earlier in am today). Negative for dizziness, sensory change, speech change and focal weakness.   Psychiatric/Behavioral: Positive for depression. The patient is nervous/anxious.        Physical Exam:  Physical Exam   Constitutional: She is oriented to person, place, and time. No distress.   HENT:   Head: Normocephalic.   Mouth/Throat: Oropharynx is clear and moist. No oropharyngeal exudate.   Eyes: Conjunctivae are normal. Pupils are equal, round, and reactive to light. No  scleral icterus.   Neck: No JVD present.   Cardiovascular: Normal rate and regular rhythm.    No murmur heard.  Pulmonary/Chest: Effort normal and breath sounds normal. No stridor. No respiratory distress. She has no wheezes. She has no rales.   Abdominal: Soft. Bowel sounds are normal. She exhibits no distension. There is no tenderness. There is no rebound.   Musculoskeletal:   B/L knee swelling > R sided. R sided warm and slightly tender. There is no erythema.    Neurological: She is alert and oriented to person, place, and time. No cranial nerve deficit.   Skin: Skin is warm and dry. She is not diaphoretic. No erythema.   Psychiatric: She has a normal mood and affect. Her behavior is normal. Judgment and thought content normal.       Labs:        Invalid input(s): [id number]      Recent Labs      17   0817   SODIUM  141  141   POTASSIUM  3.5*  3.9   CHLORIDE  111  112   CO2  18*  21   BUN  25*  24*   CREATININE  0.80  0.87   CALCIUM  9.1  9.1     Recent Labs      17   0817   GLUCOSE  104*  87     Recent Labs      17   0817  17   1819   RBC  2.82*  3.00*   --    HEMOGLOBIN  8.3*  8.8*   --    HEMATOCRIT  25.0*  28.3*   --    PLATELETCT  393  372   --    FERRITIN   --    --   186.3     Recent Labs      17   0817   WBC  12.2*  9.1           Hemodynamics:  Temp (24hrs), Av.5 °C (97.7 °F), Min:36.1 °C (97 °F), Max:36.7 °C (98.1 °F)  Temperature: 36.7 °C (98.1 °F)  Pulse  Av.5  Min: 52  Max: 86   Blood Pressure : (!) 161/86 mmHg (RN notified)     Respiratory:    Respiration: 17, Pulse Oximetry: 96 %        RUL Breath Sounds: Clear, RML Breath Sounds: Diminished, RLL Breath Sounds: Diminished, KATERYNA Breath Sounds: Clear, LLL Breath Sounds: Diminished  Fluids:    Intake/Output Summary (Last 24 hours) at 17 1429  Last data filed at 17 0903   Gross per 24 hour   Intake    236 ml   Output      0 ml   Net     236 ml        GI/Nutrition:  Orders Placed This Encounter   Procedures   • DIET ORDER     Standing Status: Standing      Number of Occurrences: 1      Standing Expiration Date:      Order Specific Question:  Diet:     Answer:  Diabetic [3]     Order Specific Question:  Diet:     Answer:  Cardiac [6]     Medical Decision Making, by Problem:  Active Hospital Problems    Diagnosis   • Arthritis, septic, knee (CMS-HCC) [M00.9]  - Diagnosed during hospital stay, R sided  - Gouty arthritis with suspected superimposed septic arthritis is suspected  - Continue gout management as below  - Regarding septic arthritis ID on board. Recommend IV ceftriaxone. Facilitate PICC. Anticipate Abx until 04/13/2017  - SW to arrange for Abx. Pending currently.    • Gout attack [M10.9]  - Acquired. Received five days of prednisone. Improved.   - Stop steroids.   - Start colchicine.   - In two weeks time consider initiation of allopurinol and continue bridging with colchicine.   - Obtain uric acid levels baseline   • CAD (coronary artery disease) [I25.10]  - ASA / Plavix / BB / Statins s/p recent CABG   • MOMO (acute kidney injury) (CMS-HCC) [N17.9]  - POA. Resolved. Pre renal from diuretics   • HLD (hyperlipidemia) [E78.5]  - Atorvastatin   • Anxiety [F41.9]  - PRN valium   • GERD (gastroesophageal reflux disease) [K21.9]  - Stop PPI given associated risk of C Diff while on Abx   • UTI (urinary tract infection) [N39.0]  - POA. Klebsiella pneumoniae  - Treated.    • Hematoma [T14.8]  - R sided. Monitor.   - Should be ok for SC heparin. Monitor.    • Osteoarthritis [M19.90]   • Normocytic anemia [D64.9]  - Anemia of acute blood loss post operative from CABG  - Monitor Hb. Restrictive transfusion strategy.   - Iron / MV support   • S/P CABG x 4 [Z95.1]  - Recent, acute   • Type 2 diabetes mellitus without complication (CMS-HCC) [E11.9]  - Controlled. No manifestations.   - SSI during hospital stay. Reinitiate oral HG agents on discharge   •  Depression [F32.9]  - Lexapro   • Hypertension [I10]  - Uncontrolled.   - Will transition to carvedilol from metoprolol for added HTN control  - Continue losartan, add amlodipine   • Hypothyroidism [E03.9]  - Synthroid   = Disposition: Home once outpatient antibiotics have been arranged.       Time spend: 40 minutes. > 50 % time spend providing counseling / co ordination of care including discussion of care with SW, ID, patient and nursing staff.       Labs reviewed, Medications reviewed and Radiology images reviewed  Abdullahi catheter: No Abdullahi      DVT Prophylaxis: Heparin  DVT prophylaxis - mechanical: SCDs  Ulcer prophylaxis: Not indicated  Antibiotics: Treating active infection/contamination beyond 24 hours perioperative coverage  Assessed for rehab: Patient was assess for and/or received rehabilitation services during this hospitalization

## 2017-03-20 NOTE — CARE PLAN
Problem: Nutritional:  Goal: Achieve adequate nutritional intake  Patient will consume >50% of meals and supplements   Outcome: MET Date Met:  03/20/17  Intervention: Monitor PO intake, weights, and laboratory values  Pt consistently consumes >50% of meals and supplements.

## 2017-03-20 NOTE — PROGRESS NOTES
Infectious Disease Progress Note    Author: Jayna Antoine M.D. DOS & Time created: 3/20/2017  9:39 AM    Chief Complaint:  Chief Complaint   Patient presents with   • Shortness of Breath   • Back Pain   FU for R septic knee arthritis    Interval History:  3/18 AF, WBC 12.2, sleeping but arousable, having nausea and diffuse pain this am, states she has more mobility in her R knee and she can now ambulate   3/19 AF, WBC 9.1, feels better today, slept well overnight and no nausea, states she has less R knee swelling  3/20 AF, no CBC, transferred to [facility] o/n, had a bad night due to elevated BPs and headache, right knee feels fine, plan of care d/w pt   Labs Reviewed, Medications Reviewed, Radiology Reviewed and Wound Reviewed.    Review of Systems:  Review of Systems   Constitutional: Negative for fever and chills.   Respiratory: Negative for cough.    Cardiovascular: Negative for chest pain.   Gastrointestinal: Negative for nausea, vomiting, abdominal pain and diarrhea.   Genitourinary: Negative for dysuria.   Musculoskeletal: Positive for myalgias.   Neurological: Positive for headaches.   All other systems reviewed and are negative.      Hemodynamics:  Temp (24hrs), Av.5 °C (97.7 °F), Min:36.1 °C (97 °F), Max:36.7 °C (98.1 °F)  Temperature: 36.7 °C (98.1 °F)  Pulse  Av.5  Min: 52  Max: 86   Blood Pressure : (!) 161/86 mmHg (RN notified)       Physical Exam:  Physical Exam   Constitutional: She is oriented to person, place, and time. She appears well-developed.   HENT:   Head: Normocephalic and atraumatic.   Eyes: EOM are normal. Pupils are equal, round, and reactive to light.   Neck: Neck supple.   Cardiovascular: Normal rate, regular rhythm and normal heart sounds.    Sternotomy incision well healed and clean   Pulmonary/Chest: Effort normal and breath sounds normal.   Abdominal: Soft. There is no tenderness.   Musculoskeletal: Normal range of motion. She exhibits no edema or tenderness.   R  knee without edema, fluctuance or erythema  Active ROM   Neurological: She is alert and oriented to person, place, and time.   Skin: No rash noted.       Meds:    Current facility-administered medications:   •  cefTRIAXone (ROCEPHIN) 2 g in  mL IVPB, 2 g, Intravenous, Q24HRS, Jayna Antoine M.D., Last Rate: 200 mL/hr at 03/20/17 0855, 2 g at 03/20/17 0855  •  hydrALAZINE (APRESOLINE) injection 20 mg, 20 mg, Intravenous, Q4HRS PRN, Evan Estrada M.D., 20 mg at 03/20/17 0535  •  predniSONE (DELTASONE) tablet 20 mg, 20 mg, Oral, DAILY, Evan Estrada M.D., 20 mg at 03/20/17 0854  •  losartan (COZAAR) tablet 100 mg, 100 mg, Oral, Q DAY, Evan Estrada M.D., 100 mg at 03/20/17 0854  •  diphenhydramine-ZnAcetate (BENADRYL ITCH) 1-0.1 % cream, , Topical, BID PRN, Damián Logan M.D.  •  aspirin EC (ECOTRIN) tablet 81 mg, 81 mg, Oral, DAILY, Damián Logan M.D., 81 mg at 03/20/17 0854  •  atorvastatin (LIPITOR) tablet 20 mg, 20 mg, Oral, QHS, Damián Logan M.D., 20 mg at 03/19/17 1945  •  clopidogrel (PLAVIX) tablet 75 mg, 75 mg, Oral, DAILY, Damián Logan M.D., 75 mg at 03/20/17 0854  •  diazepam (VALIUM) tablet 5 mg, 5 mg, Oral, 4X/DAY PRN, Damián Logan M.D.  •  docusate sodium (COLACE) capsule 100 mg, 100 mg, Oral, QAM, Damián Logan M.D., 100 mg at 03/20/17 0854  •  escitalopram (LEXAPRO) tablet 20 mg, 20 mg, Oral, DAILY, Damián Logan M.D., 20 mg at 03/20/17 0854  •  levothyroxine (SYNTHROID) tablet 75 mcg, 75 mcg, Oral, AM ES, Damián Logan M.D., Stopped at 03/20/17 0519  •  metoprolol (LOPRESSOR) tablet 25 mg, 25 mg, Oral, BID, Damián Logan M.D., 25 mg at 03/20/17 0854  •  oxycodone immediate-release (ROXICODONE) tablet 5 mg, 5 mg, Oral, Q4HRS PRN, Damián Logan M.D., 5 mg at 03/20/17 0521  •  senna-docusate (PERICOLACE or SENOKOT S) 8.6-50 MG per tablet 2 Tab, 2 Tab, Oral, BID, 2 Tab at 03/20/17 0854 **AND** polyethylene glycol/lytes (MIRALAX) PACKET 1 Packet, 1 Packet,  Oral, QDAY PRN **AND** magnesium hydroxide (MILK OF MAGNESIA) suspension 30 mL, 30 mL, Oral, QDAY PRN **AND** bisacodyl (DULCOLAX) suppository 10 mg, 10 mg, Rectal, QDAY PRN, Damián Logan M.D.  •  ondansetron (ZOFRAN) syringe/vial injection 4 mg, 4 mg, Intravenous, Q4HRS PRN, Damián Logan M.D., 4 mg at 03/18/17 0843  •  ondansetron (ZOFRAN ODT) dispertab 4 mg, 4 mg, Oral, Q4HRS PRN, Damián Logan M.D.  •  insulin lispro (HUMALOG) injection 1-6 Units, 1-6 Units, Subcutaneous, 4X/DAY ACHS, Damián Logan M.D., Stopped at 03/20/17 0700  •  Action is required: Protocol 1073 Hypoglycemia has been implemented, , , Once **AND** Protocol 1073 Inclusion Criteria, , , CONTINUOUS **AND** Protocol 1073 NOTIFY, , , Once **AND** Protocol 1073 Initiate protocol immediately if FSBG is less than or equal to 70 mg/dL, , , CONTINUOUS **AND** glucose 4 g chewable tablet 16 g, 16 g, Oral, Q15 MIN PRN **AND** dextrose 50% (D50W) injection 25 mL, 25 mL, Intravenous, Q15 MIN PRN, Damián Logan M.D.  •  morphine (pf) 4 mg/ml injection 2 mg, 2 mg, Intravenous, Q2HRS PRN, Damián Logan M.D., 2 mg at 03/19/17 0308  •  coenzyme Q-10 capsule 60 mg, 60 mg, Oral, DAILY, Damián Logan M.D., 60 mg at 03/20/17 0854    Labs:  Recent Labs      03/18/17   0300 03/19/17   0817   WBC  12.2*  9.1   RBC  2.82*  3.00*   HEMOGLOBIN  8.3*  8.8*   HEMATOCRIT  25.0*  28.3*   MCV  88.7  94.3   MCH  29.4  29.3   RDW  47.8  52.5*   PLATELETCT  393  372   MPV  10.3  9.9     Recent Labs      03/18/17   0300  03/19/17   0817   SODIUM  141  141   POTASSIUM  3.5*  3.9   CHLORIDE  111  112   CO2  18*  21   GLUCOSE  104*  87   BUN  25*  24*     Recent Labs      03/18/17   0300  03/19/17   0817   CREATININE  0.80  0.87       Imaging:  Dx-chest-2 Views    2/19/2017 2/19/2017 7:12 PM HISTORY/REASON FOR EXAM:  Preoperative respiratory evaluation TECHNIQUE/EXAM DESCRIPTION AND NUMBER OF VIEWS: Two views of the chest. COMPARISON:  None. FINDINGS: The  cardiac silhouette  and mediastinal contours are normal.  Calcification is in the aortic knob. No discrete opacity, pleural fluid or pneumothorax. No suspicious bony lesions.     2/19/2017  No acute findings.    Dx-chest-limited (1 View)    3/12/2017  3/12/2017 5:13 PM HISTORY/REASON FOR EXAM: Cough TECHNIQUE/EXAM DESCRIPTION AND NUMBER OF VIEWS: Single AP view of the chest. COMPARISON: 2/21/2017 FINDINGS: There is no evidence of focal infiltrate or pulmonary edema. The heart is normal in size. There is no pleural effusion. Postsurgical changes are again seen.     3/12/2017  No evidence of acute cardiopulmonary process.    Dx-chest-portable (1 View)    2/21/2017 2/21/2017 6:07 AM HISTORY/REASON FOR EXAM:  Post Op Heart Surgery; POD #1. TECHNIQUE/EXAM DESCRIPTION AND NUMBER OF VIEWS: Single portable view of the chest. COMPARISON: 2/20/2017. FINDINGS: The cardiomediastinal silhouette is stably enlarged. There has been interval extubation. Lines and tubes are stably positioned.     2/21/2017  1.  Interval extubation. 2.  Stable enlargement of the cardiomediastinal silhouette and mild left lower lobe atelectasis and/or consolidation.    Dx-chest-portable (1 View)    2/20/2017 2/20/2017 1:13 PM HISTORY/REASON FOR EXAM:  timed 1200 in main OR for post open heart and line/tube placement. TECHNIQUE/EXAM DESCRIPTION AND NUMBER OF VIEWS: Single portable view of the chest. COMPARISON: 2/19/2017 FINDINGS: Endotracheal tube projects at the level of the aortic arch. Right central line projects over the SVC. Mediastinal drains are noted. Sternotomy wires are seen. The heart is not enlarged. Atherosclerotic calcification is seen. Left midlung linear opacities  likely represent atelectasis. Retrocardiac opacity likely represents atelectasis. No definite pneumothorax is identified.     2/20/2017  Linear left midlung opacities likely represent atelectasis. Retrocardiac opacity may represent atelectasis or consolidation. Lines and  tubes as above described. Atherosclerotic plaque.    Dx-knee 2- Right    3/15/2017  3/15/2017 2:19 PM HISTORY/REASON FOR EXAM:  Pain/Deformity Following Trauma TECHNIQUE/EXAM DESCRIPTION AND NUMBER OF VIEWS:  2 views of the RIGHT knee. COMPARISON: None FINDINGS: No acute fracture or dislocation. Mild tricompartmental osteoarthritis. Moderate knee joint effusion.     3/15/2017  1. No acute osseous abnormality.    Dx-knee 2- Left    3/12/2017  3/12/2017 11:10 PM HISTORY/REASON FOR EXAM:  Atraumatic Pain/Swelling/Deformity Atraumatic left knee pain TECHNIQUE/EXAM DESCRIPTION AND NUMBER OF VIEWS:  2 views of the LEFT knee. COMPARISON: None FINDINGS: No acute fracture or dislocation. No significant joint arthropathy. Small knee joint effusion. Vascular calcification. Multiple surgical clips about the knee.     3/12/2017  1. No acute osseous abnormality. Small knee joint effusion.    Us-renal    3/13/2017  3/13/2017 7:12 AM HISTORY/REASON FOR EXAM:  Renal failure TECHNIQUE/EXAM DESCRIPTION: Renal ultrasound. COMPARISON:  May 17, 2013 FINDINGS: The right kidney measures 10.7 cm. The left kidney measures 10.1 cm. There is no hydronephrosis. There is a 2.2 cm right lower pole renal cyst. There are no abnormal calcifications. The bladder demonstrates no focal wall abnormality.     3/13/2017  2.2 cm right renal cyst. Otherwise unremarkable renal ultrasound.    Micro:  BLOOD CULTURE   Date Value Ref Range Status   05/16/2013 No growth after 5 days of incubation.  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 Stop date 04/13/17  PICC ordered    Gouty arthritis of the R knee  +monosodium urate crystals in fluid    Klebsiella UTI  Abx per above    DM2  Optimize BS to assist with healing of infection    Recent CABG    Dispo: Pt from [place] and has PCP. Will need PCP to accept care and take over IV abx at discharge.    MARTIN IM

## 2017-03-20 NOTE — DISCHARGE PLANNING
Dr. Antoine is requesting if pt's PCP can write order for out pt infusion.  VERONICA spoke with Raysa at [facility], she stated that the facility needs a recommendation order and the facility will be able to continue writing orders for pt's rocephin.  Raysa would like to know if PICC line will be placed as their facility is unable to this.

## 2017-03-21 VITALS
RESPIRATION RATE: 18 BRPM | DIASTOLIC BLOOD PRESSURE: 60 MMHG | OXYGEN SATURATION: 95 % | TEMPERATURE: 97.1 F | HEART RATE: 65 BPM | WEIGHT: 130.07 LBS | HEIGHT: 62 IN | SYSTOLIC BLOOD PRESSURE: 141 MMHG | BODY MASS INDEX: 23.94 KG/M2

## 2017-03-21 LAB
GLUCOSE BLD-MCNC: 104 MG/DL (ref 65–99)
GLUCOSE BLD-MCNC: 88 MG/DL (ref 65–99)

## 2017-03-21 PROCEDURE — 99239 HOSP IP/OBS DSCHRG MGMT >30: CPT | Performed by: INTERNAL MEDICINE

## 2017-03-21 PROCEDURE — 700111 HCHG RX REV CODE 636 W/ 250 OVERRIDE (IP): Performed by: INTERNAL MEDICINE

## 2017-03-21 PROCEDURE — A9270 NON-COVERED ITEM OR SERVICE: HCPCS | Performed by: INTERNAL MEDICINE

## 2017-03-21 PROCEDURE — 700105 HCHG RX REV CODE 258: Performed by: INTERNAL MEDICINE

## 2017-03-21 PROCEDURE — 700102 HCHG RX REV CODE 250 W/ 637 OVERRIDE(OP): Performed by: INTERNAL MEDICINE

## 2017-03-21 PROCEDURE — 700112 HCHG RX REV CODE 229: Performed by: INTERNAL MEDICINE

## 2017-03-21 PROCEDURE — 82962 GLUCOSE BLOOD TEST: CPT | Mod: 91

## 2017-03-21 RX ORDER — LOSARTAN POTASSIUM 100 MG/1
100 TABLET ORAL DAILY
Qty: 30 TAB | Refills: 0 | Status: SHIPPED | OUTPATIENT
Start: 2017-03-21

## 2017-03-21 RX ORDER — COLCHICINE 0.6 MG/1
0.6 TABLET ORAL 2 TIMES DAILY
Qty: 60 TAB | Refills: 0 | Status: SHIPPED | OUTPATIENT
Start: 2017-03-21 | End: 2017-06-21

## 2017-03-21 RX ORDER — ASCORBIC ACID 500 MG
500 TABLET ORAL 3 TIMES DAILY
Qty: 90 TAB | Refills: 0 | Status: SHIPPED | OUTPATIENT
Start: 2017-03-21

## 2017-03-21 RX ORDER — FERROUS GLUCONATE 324(38)MG
324 TABLET ORAL 2 TIMES DAILY WITH MEALS
Qty: 60 TAB | Refills: 0 | Status: SHIPPED | OUTPATIENT
Start: 2017-03-21

## 2017-03-21 RX ORDER — CARVEDILOL 6.25 MG/1
6.25 TABLET ORAL 2 TIMES DAILY WITH MEALS
Qty: 60 TAB | Refills: 0 | Status: SHIPPED | OUTPATIENT
Start: 2017-03-21 | End: 2022-02-08

## 2017-03-21 RX ORDER — AMLODIPINE BESYLATE 5 MG/1
5 TABLET ORAL DAILY
Qty: 30 TAB | Refills: 0 | Status: SHIPPED | OUTPATIENT
Start: 2017-03-21

## 2017-03-21 RX ADMIN — DOCUSATE SODIUM 100 MG: 100 CAPSULE ORAL at 09:30

## 2017-03-21 RX ADMIN — LOSARTAN POTASSIUM 100 MG: 50 TABLET, FILM COATED ORAL at 09:30

## 2017-03-21 RX ADMIN — FERROUS GLUCONATE 324 MG: 324 TABLET ORAL at 07:51

## 2017-03-21 RX ADMIN — OXYCODONE HYDROCHLORIDE 5 MG: 5 TABLET ORAL at 12:28

## 2017-03-21 RX ADMIN — HEPARIN SODIUM 5000 UNITS: 5000 INJECTION, SOLUTION INTRAVENOUS; SUBCUTANEOUS at 09:29

## 2017-03-21 RX ADMIN — OXYCODONE HYDROCHLORIDE 5 MG: 5 TABLET ORAL at 06:26

## 2017-03-21 RX ADMIN — ASPIRIN 81 MG: 81 TABLET ORAL at 09:30

## 2017-03-21 RX ADMIN — THERA TABS 1 TABLET: TAB at 09:30

## 2017-03-21 RX ADMIN — ESCITALOPRAM OXALATE 20 MG: 10 TABLET ORAL at 09:31

## 2017-03-21 RX ADMIN — CLOPIDOGREL 75 MG: 75 TABLET, FILM COATED ORAL at 09:30

## 2017-03-21 RX ADMIN — HYDRALAZINE HYDROCHLORIDE 20 MG: 20 INJECTION INTRAMUSCULAR; INTRAVENOUS at 06:19

## 2017-03-21 RX ADMIN — CARVEDILOL 6.25 MG: 6.25 TABLET, FILM COATED ORAL at 07:51

## 2017-03-21 RX ADMIN — COLCHICINE 0.6 MG: 0.6 TABLET, FILM COATED ORAL at 09:31

## 2017-03-21 RX ADMIN — OXYCODONE HYDROCHLORIDE 5 MG: 5 TABLET ORAL at 00:46

## 2017-03-21 RX ADMIN — Medication 60 MG: at 09:29

## 2017-03-21 RX ADMIN — POLYETHYLENE GLYCOL 3350 1 PACKET: 17 POWDER, FOR SOLUTION ORAL at 09:37

## 2017-03-21 RX ADMIN — AMLODIPINE BESYLATE 5 MG: 5 TABLET ORAL at 09:30

## 2017-03-21 RX ADMIN — CEFTRIAXONE 2 G: 2 INJECTION, POWDER, FOR SOLUTION INTRAMUSCULAR; INTRAVENOUS at 09:31

## 2017-03-21 RX ADMIN — LEVOTHYROXINE SODIUM 75 MCG: 75 TABLET ORAL at 06:19

## 2017-03-21 ASSESSMENT — PAIN SCALES - GENERAL
PAINLEVEL_OUTOF10: 8
PAINLEVEL_OUTOF10: 8
PAINLEVEL_OUTOF10: 5
PAINLEVEL_OUTOF10: 9

## 2017-03-21 ASSESSMENT — ENCOUNTER SYMPTOMS
MYALGIAS: 1
COUGH: 0
VOMITING: 0
HEADACHES: 0
DIARRHEA: 0
ABDOMINAL PAIN: 0
NAUSEA: 0
FEVER: 0
CHILLS: 0

## 2017-03-21 NOTE — PROGRESS NOTES
Assumed care of pt after receiving bedside report, pt resting in bed AAOx4, pt medicated per MAR. Pt has MICHELLE PICC dressing CDI, pt upself to BA w/ steady gait. Fall measures in place. Call light within reach, personal belongings close-by, bed in lowest position, IV pole on same side of bathroom, upper bed rails up, hourly rounding in place. Will continue to monitor pt for safety.

## 2017-03-21 NOTE — CARE PLAN
Problem: Communication  Goal: The ability to communicate needs accurately and effectively will improve  Intervention: Educate patient and significant other/support system about the plan of care, procedures, treatments, medications and allow for questions  Pt updated on POC, verbalizes understanding.       Problem: Mobility  Goal: Risk for activity intolerance will decrease  Intervention: Encourage patient to increase activity level in collaboration with Interdisciplinary Team  Pt up to bathroom. Encouraged to ambulate once feeling less fatigued.

## 2017-03-21 NOTE — PROGRESS NOTES
Assumed patient care at 0700. Report received from night shift. Assessment completed. Pt A&Ox4. States pain is 5/10 in back and left leg, managing with PRN medications. PICC line in MICHELLE. Pt is up self, steady gait. Fall precautions in place. Personal possessions and call light within reach.
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
## 2017-03-21 NOTE — DISCHARGE PLANNING
VERONICA faxed DC summary to pt's PCP at [facility] per request of Dr. Schreiber.    VERONICA spoke with Raysa Kidd at [facility] to inform of pt's dc from hospital today.

## 2017-03-21 NOTE — PROGRESS NOTES
PT discharged home via personal vehicle, with son. LORI MONTELONGO'ed. Prescriptions given. Discharge instructions given specifically involving urinary tract infection and PICC line , patient verbalized understanding, 2nd copy of AVS signed and placed in patient chart.

## 2017-03-21 NOTE — CARE PLAN
Problem: Safety  Goal: Will remain free from falls  Outcome: PROGRESSING AS EXPECTED  Pt stand by assist. Wearing non-skid socks. Call light and personal belongings within reach. Calls appropriately.     Problem: Mobility  Goal: Risk for activity intolerance will decrease  Intervention: Encourage patient to increase activity level in collaboration with Interdisciplinary Team  Pt up to edge up bed for meals.

## 2017-03-21 NOTE — PROGRESS NOTES
Infectious Disease Progress Note    Author: Jayna Antoine M.D. DOS & Time created: 3/21/2017  10:39 AM    Chief Complaint:  Chief Complaint   Patient presents with   • Shortness of Breath   • Back Pain   FU for R septic knee arthritis    Interval History:  3/18 AF, WBC 12.2, sleeping but arousable, having nausea and diffuse pain this am, states she has more mobility in her R knee and she can now ambulate   3/19 AF, WBC 9.1, feels better today, slept well overnight and no nausea, states she has less R knee swelling  3/20 AF, no CBC, transferred to [facility] o/n, had a bad night due to elevated BPs and headache, right knee feels fine, plan of care d/w pt   3/21 AF, feels well, PICC placed and outpatient IV abx infusions arranged at [facility] with PCP in [place] taking over care  Labs Reviewed, Medications Reviewed, Radiology Reviewed and Wound Reviewed.    Review of Systems:  Review of Systems   Constitutional: Negative for fever and chills.   Respiratory: Negative for cough.    Cardiovascular: Negative for chest pain.   Gastrointestinal: Negative for nausea, vomiting, abdominal pain and diarrhea.   Genitourinary: Negative for dysuria.   Musculoskeletal: Positive for myalgias.        R calf   Neurological: Negative for headaches.   All other systems reviewed and are negative.      Hemodynamics:  Temp (24hrs), Av.3 °C (97.4 °F), Min:36.2 °C (97.1 °F), Max:36.7 °C (98 °F)  Temperature: 36.2 °C (97.1 °F)  Pulse  Av.7  Min: 52  Max: 86   Blood Pressure : 141/60 mmHg       Physical Exam:  Physical Exam   Constitutional: She is oriented to person, place, and time. She appears well-developed.   HENT:   Head: Normocephalic and atraumatic.   Eyes: EOM are normal. Pupils are equal, round, and reactive to light.   Neck: Neck supple.   Cardiovascular: Normal rate, regular rhythm and normal heart sounds.    Sternotomy incision well healed and clean   Pulmonary/Chest: Effort normal and breath sounds normal.    Abdominal: Soft. There is no tenderness.   Musculoskeletal: Normal range of motion. She exhibits no edema or tenderness.   R knee without edema, fluctuance or erythema  Active ROM    R calf TTP - stable    LUE PICC nontender   Neurological: She is alert and oriented to person, place, and time.   Skin: No rash noted.       Meds:    Current facility-administered medications:   •  carvedilol (COREG) tablet 6.25 mg, 6.25 mg, Oral, BID WITH MEALS, Jacques Schreiber M.D., 6.25 mg at 03/21/17 0751  •  amlodipine (NORVASC) tablet 5 mg, 5 mg, Oral, Q DAY, Jacques Schreiber M.D., 5 mg at 03/21/17 0930  •  ferrous gluconate (FERGON) tablet 324 mg, 324 mg, Oral, BID WITH MEALS, Jacques Schreiber M.D., 324 mg at 03/21/17 0751  •  ascorbic acid (ascorbic acid) tablet 500 mg, 500 mg, Oral, TID, Jacques Schreiber M.D., Stopped at 03/21/17 1100  •  multivitamin (THERAGRAN) tablet 1 Tab, 1 Tab, Oral, DAILY, Jacques Schreiber M.D., 1 Tab at 03/21/17 0930  •  polyethylene glycol/lytes (MIRALAX) PACKET 1 Packet, 1 Packet, Oral, DAILY, Jacques Schreiber M.D., 1 Packet at 03/21/17 0937  •  colchicine (COLCRYS) tablet 0.6 mg, 0.6 mg, Oral, BID, Jacques Schreiber M.D., 0.6 mg at 03/21/17 0931  •  heparin injection 5,000 Units, 5,000 Units, Subcutaneous, Q12HRS, Jacques Schreiber M.D., 5,000 Units at 03/21/17 0929  •  PICC Line Insertion has been implemented, , , Once **AND** May use Lidocaine 1% not to exceed 3 mls for local at insertion site, , , CONTINUOUS **AND** NOTIFY MD, , , Once **AND** Tip to dwell in the superior vena cava, , , CONTINUOUS **AND** Do not use PICC Line until placement verified by Chest X Ray, , , CONTINUOUS **AND** DX-CHEST-FOR PICC LINE Perform procedure in:: PICC Room, , , Once **AND** If radiologist reading of chest X-ray states any of the following the PICC should be used, , , CONTINUOUS **AND** Further evaluation of the PICC placement can be retrieved from X-Ray and Imaging, , , CONTINUOUS **AND** Blood draws through PICC line; draws by RN only, , ,  CONTINUOUS **AND** FLUSHING GUIDELINES WHEN IN USE, , , CONTINUOUS **AND** normal saline PF 10-20 mL, 10-20 mL, Intravenous, PRN **AND** FLUSHING GUIDELINES WHEN NOT IN USE, , , CONTINUOUS **AND** DRESSING MAINTENANCE, , , Once **AND** Change needleless pressure ports and IV tubing every 72 hours per hospital policy, , , CONTINUOUS **AND** TUBING, , , CONTINUOUS **AND** If there is an MD order to remove the PICC line, any RN may remove the PICC line, , , CONTINUOUS **AND** [] PATIENT EDUCATION MATERIALS, , , Prior to discharge **AND** NURSING COMMUNICATION, , , CONTINUOUS, Jayna Antoine M.D.  •  cefTRIAXone (ROCEPHIN) 2 g in  mL IVPB, 2 g, Intravenous, Q24HRS, Jayna Antoine M.D., Last Rate: 200 mL/hr at 17, 2 g at 17  •  hydrALAZINE (APRESOLINE) injection 20 mg, 20 mg, Intravenous, Q4HRS PRN, Evan Estrada M.D., 20 mg at 17  •  losartan (COZAAR) tablet 100 mg, 100 mg, Oral, Q DAY, Evan Estrada M.D., 100 mg at 17  •  diphenhydramine-ZnAcetate (BENADRYL ITCH) 1-0.1 % cream, , Topical, BID PRN, Damián Logan M.D.  •  aspirin EC (ECOTRIN) tablet 81 mg, 81 mg, Oral, DAILY, aDmián Logan M.D., 81 mg at 17  •  atorvastatin (LIPITOR) tablet 20 mg, 20 mg, Oral, QHS, Damián Logan M.D., 20 mg at 17  •  clopidogrel (PLAVIX) tablet 75 mg, 75 mg, Oral, DAILY, Damián Logan M.D., 75 mg at 17  •  diazepam (VALIUM) tablet 5 mg, 5 mg, Oral, 4X/DAY PRN, Damián Logan M.D.  •  docusate sodium (COLACE) capsule 100 mg, 100 mg, Oral, QAM, Damián Logan M.D., 100 mg at 17 0930  •  escitalopram (LEXAPRO) tablet 20 mg, 20 mg, Oral, DAILY, Damián Logan M.D., 20 mg at 17 0931  •  levothyroxine (SYNTHROID) tablet 75 mcg, 75 mcg, Oral, AM ES, Damián Logan M.D., 75 mcg at 17 0619  •  oxycodone immediate-release (ROXICODONE) tablet 5 mg, 5 mg, Oral, Q4HRS PRN, Damián Logan M.D., 5 mg at 17  0626  •  ondansetron (ZOFRAN) syringe/vial injection 4 mg, 4 mg, Intravenous, Q4HRS PRN, Damián Logan M.D., 4 mg at 03/18/17 0843  •  ondansetron (ZOFRAN ODT) dispertab 4 mg, 4 mg, Oral, Q4HRS PRN, Damián Logan M.D.  •  insulin lispro (HUMALOG) injection 1-6 Units, 1-6 Units, Subcutaneous, 4X/DAY ACHS, Damián Logan M.D., Stopped at 03/20/17 0700  •  Action is required: Protocol 1073 Hypoglycemia has been implemented, , , Once **AND** Protocol 1073 Inclusion Criteria, , , CONTINUOUS **AND** Protocol 1073 NOTIFY, , , Once **AND** Protocol 1073 Initiate protocol immediately if FSBG is less than or equal to 70 mg/dL, , , CONTINUOUS **AND** glucose 4 g chewable tablet 16 g, 16 g, Oral, Q15 MIN PRN **AND** dextrose 50% (D50W) injection 25 mL, 25 mL, Intravenous, Q15 MIN PRN, Damián Logan M.D.  •  morphine (pf) 4 mg/ml injection 2 mg, 2 mg, Intravenous, Q2HRS PRN, Damián Logan M.D., 2 mg at 03/19/17 0308  •  coenzyme Q-10 capsule 60 mg, 60 mg, Oral, DAILY, Damián Logan M.D., 60 mg at 03/21/17 0929    Labs:  Recent Labs      03/19/17   0817   WBC  9.1   RBC  3.00*   HEMOGLOBIN  8.8*   HEMATOCRIT  28.3*   MCV  94.3   MCH  29.3   RDW  52.5*   PLATELETCT  372   MPV  9.9     Recent Labs      03/19/17   0817   SODIUM  141   POTASSIUM  3.9   CHLORIDE  112   CO2  21   GLUCOSE  87   BUN  24*     Recent Labs      03/19/17   0817   CREATININE  0.87       Imaging:  Dx-chest-2 Views    2/19/2017 2/19/2017 7:12 PM HISTORY/REASON FOR EXAM:  Preoperative respiratory evaluation TECHNIQUE/EXAM DESCRIPTION AND NUMBER OF VIEWS: Two views of the chest. COMPARISON:  None. FINDINGS: The cardiac silhouette  and mediastinal contours are normal.  Calcification is in the aortic knob. No discrete opacity, pleural fluid or pneumothorax. No suspicious bony lesions.     2/19/2017  No acute findings.    Dx-chest-limited (1 View)    3/12/2017  3/12/2017 5:13 PM HISTORY/REASON FOR EXAM: Cough TECHNIQUE/EXAM DESCRIPTION AND NUMBER  OF VIEWS: Single AP view of the chest. COMPARISON: 2/21/2017 FINDINGS: There is no evidence of focal infiltrate or pulmonary edema. The heart is normal in size. There is no pleural effusion. Postsurgical changes are again seen.     3/12/2017  No evidence of acute cardiopulmonary process.    Dx-chest-portable (1 View)    2/21/2017 2/21/2017 6:07 AM HISTORY/REASON FOR EXAM:  Post Op Heart Surgery; POD #1. TECHNIQUE/EXAM DESCRIPTION AND NUMBER OF VIEWS: Single portable view of the chest. COMPARISON: 2/20/2017. FINDINGS: The cardiomediastinal silhouette is stably enlarged. There has been interval extubation. Lines and tubes are stably positioned.     2/21/2017  1.  Interval extubation. 2.  Stable enlargement of the cardiomediastinal silhouette and mild left lower lobe atelectasis and/or consolidation.    Dx-chest-portable (1 View)    2/20/2017 2/20/2017 1:13 PM HISTORY/REASON FOR EXAM:  timed 1200 in main OR for post open heart and line/tube placement. TECHNIQUE/EXAM DESCRIPTION AND NUMBER OF VIEWS: Single portable view of the chest. COMPARISON: 2/19/2017 FINDINGS: Endotracheal tube projects at the level of the aortic arch. Right central line projects over the SVC. Mediastinal drains are noted. Sternotomy wires are seen. The heart is not enlarged. Atherosclerotic calcification is seen. Left midlung linear opacities  likely represent atelectasis. Retrocardiac opacity likely represents atelectasis. No definite pneumothorax is identified.     2/20/2017  Linear left midlung opacities likely represent atelectasis. Retrocardiac opacity may represent atelectasis or consolidation. Lines and tubes as above described. Atherosclerotic plaque.    Dx-knee 2- Right    3/15/2017  3/15/2017 2:19 PM HISTORY/REASON FOR EXAM:  Pain/Deformity Following Trauma TECHNIQUE/EXAM DESCRIPTION AND NUMBER OF VIEWS:  2 views of the RIGHT knee. COMPARISON: None FINDINGS: No acute fracture or dislocation. Mild tricompartmental osteoarthritis.  Moderate knee joint effusion.     3/15/2017  1. No acute osseous abnormality.    Dx-knee 2- Left    3/12/2017  3/12/2017 11:10 PM HISTORY/REASON FOR EXAM:  Atraumatic Pain/Swelling/Deformity Atraumatic left knee pain TECHNIQUE/EXAM DESCRIPTION AND NUMBER OF VIEWS:  2 views of the LEFT knee. COMPARISON: None FINDINGS: No acute fracture or dislocation. No significant joint arthropathy. Small knee joint effusion. Vascular calcification. Multiple surgical clips about the knee.     3/12/2017  1. No acute osseous abnormality. Small knee joint effusion.    Us-renal    3/13/2017  3/13/2017 7:12 AM HISTORY/REASON FOR EXAM:  Renal failure TECHNIQUE/EXAM DESCRIPTION: Renal ultrasound. COMPARISON:  May 17, 2013 FINDINGS: The right kidney measures 10.7 cm. The left kidney measures 10.1 cm. There is no hydronephrosis. There is a 2.2 cm right lower pole renal cyst. There are no abnormal calcifications. The bladder demonstrates no focal wall abnormality.     3/13/2017  2.2 cm right renal cyst. Otherwise unremarkable renal ultrasound.    Micro:  BLOOD CULTURE   Date Value Ref Range Status   05/16/2013 No growth after 5 days of incubation.  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 Stop date 04/13/17  PICC placed  Outpatient infusions have been scheduled at [facility] and PCP has agreed to take over care in [place]    Gouty arthritis of the R knee, improving  +monosodium urate  crystals in fluid    Klebsiella UTI  Abx per above    DM2  Optimize BS to assist with healing of infection    Recent CABG    Dispo: OK to d/c patient home once outpatient infusions arranged    Pt will follow up with PCP in [place]    DW IM/MSW

## 2017-03-21 NOTE — PROGRESS NOTES
PICC Insertion Procedure Note    PICC needed for longterm antibiotics. Consent confirmed, vessel patency confirmed with ultrasound. Risks and benefits of procedure explained to patient and education regarding central line associated bloodstream infections provided. Questions answered.     PICC placed in LUE per MD order (after unsuccessful attempt on right*) with ultrasound guidance. 4 Fr, single lumen PICC placed in casilic vein after 3 attempt(s). 3 cc's of 1% lidocaine injected intradermally, 21 gauge microintroducer needle and modified Seldinger technique used. 41 cm total catheter length, 1 cm external measurement inserted with good blood return. Each lumen flushed without resistance with 10 mL 0.9% normal saline. PICC line secured with Biopatch and Tegaderm.    PICC tip location in the SVC confirmed by ECG technology. Pt tolerated procedure quite well.  Patient condition relayed to unit RN or ordering physician via this post procedure note in the EMR.     BARD Power PICC ref # [id number], Lot # [id number]    *Attempted to place line on right and encountered muscular and vasospasm causing patient discomfort; abandoned right and placed line in left arm without incidence. Sterile dressing applied to right upper arm puncture site.

## 2017-03-21 NOTE — DISCHARGE SUMMARY
"C O N F I D E N T I A L I N F O R M A T I O N   -------------------------------------------------------------------------------------------------------------------   DISCHARGE SUMMARY    Patient ID:  Carole Kingsley  [id number]  71 y.o.female  1945    Admit date: 3/12/2017    Discharge date and time: 03/21/2017    Admitting Physician: Damián Logan M.D.    Discharge Physician: Jacques Schreiber    CODE STATUS: FULL CODE    Admission Diagnoses: Acute renal failure (ARF) (CMS-HCC)    Discharge Diagnoses:     Arthritis, septic, knee (CMS-HCC)    Gout attack    Hypothyroidism    Hypertension    Depression    Type 2 diabetes mellitus without complication (CMS-HCC)    S/P CABG x 4    Normocytic anemia    Osteoarthritis    CAD (coronary artery disease)    MOMO (acute kidney injury) (CMS-HCC)    HLD (hyperlipidemia)    Anxiety    GERD (gastroesophageal reflux disease)    UTI (urinary tract infection)    Hematoma of R calf    Distal DVT of R calf      Admission Condition: poor    Discharged Condition: good    Indication for Admission: MOMO and UTI    HPI: As dictated by Dr Logan on 03/12/2017 \"This is a 71-year-old female who underwent 4-vessel bypass a couple weeks ago, did stay 1 week in ICU and eventually was transitioned to cardiac rehab facility.  She stayed for another week there. She was just discharged 5 days ago.  According to the son, patient was doing fine on the day that she was discharged.  However, the following day, she started having some nonproductive cough and associated shortness of breath. Over the past 3 days, patient's condition deteriorated with generalized weakness to the point that she was unable to ambulate today.  She denies any fever or dysuria.  She attributed her urinary frequency from Lasix that she was taking.  She was kind of nauseated and no appetite for the past 3 days. Denies any chest pain.  Today, she started having some left flank pain, no dysuria, no hematuria.  Positive " "urinary frequency.  Because of her generalized weakness, her son decided to go to the emergency room.  Initial workup here showed a urinary tract infection and acute renal failure\".    Hospital Course: 71 y.o. female admitted 3/12/2017 with a CC of Generalized weakness and admitting dx of MOMO and UTI. Patient known to have PMH significant for CAD s/p CABG 02/2017 with Dr Dowell from [facility]. Patient presented to the hospital with MOMO and UTI. Given recent CABG, cardiology evaluation was obtained. Patient was evaluated by Dr Young from cardiology. He presentation was presumed prerenal in pathology 2/2 use of diuretics and hypovolemia. US renal was obtained and did not show any hydronephrosis. Her diuretics were stopped, IVF hydration was provided and patient improved clinically and her renal dysfunction resolved. She was noted to have a UTI on presentation which has been adequately treated during the hospital stay. In addition patient c/o LE pain. On presentation LLE, DVT duplex study was obtained on presentation and found to be negative. L sided knee XRAY revealed a small joint effusion. During hospital stay she subsequently developed R sided knee pain, swelling and R calf tenderness. DVT evaluation was obtained with a duplex. It was negative for DVT but revealed partially occlusive thrombus in the epigastric vein. A posterior calf hematoma was seen also. Recommend she has US Duplex completed with her PCP in one week of hospital discharge to assess size of hematoma and if any concerns for propagation. R sided knee XRAY revealed a moderate size knee effusion. Etiology was unknown hence orthopedics consultation was obtained and arthrocentesis obtained on R knee revealing a WBC count of 55,820 with moderate amount of monosodium urate crystals. Cultures remains negative. This was diagnostic of acute gouty arthritis involving the R knee but septic arthritis could not be ruled out given high amount of WBC within the fluid. " Given this ID consultation was obtained and the recommendation has been to continue IV antibiotics (ceftriaxone) for a total of four weeks for management of septic arthritis with a stop date of 04/13/2017. Patient will need to continue this at [facility] following her discharge and the  has made all arrangements for this. Regarding management of acute gouty arthritis she received PO prednisone during the hospital stay. She is being transitioned to PO colchicine on discharge. Recommend she follow up with PCP and Allopurinol be initiated over the coming weeks while she is bridged with colchicine therapy. Her UA levels checked in the hospital were found to be 6.3. Other wise patient with persistent hypertension and her dose of losartan was escalated to 100 mg from 50 mg, amlodipine 5 mg added and metoprolol transitioned to carvedilol for added HTN control. She has a notable history of GERD. Her PPI has been stopped while on IV abx to limit the risk of C Diff. This can be resumed by PCP once her abx are completed. She has post operative anemia. She has been initiated on Iron and MV support. Recommend PCP monitor Blood counts following discharge. Patient improved during the hospital course. Patient ambulating well with no functional deficits. No further acute inpatient needs were noted for the patient and hence patient being discharged home in stable condition with plans to have a one week follow up with PCP and continue outpatient f/u. To facilitate transition of care, I have informed the  to fax a copy of this discharge summary completed upon discharge to the patient's PCP. Discharge planning has been discussed in detail with the patient and written instructions provided to the patient. PICC line has been placed prior to discharge to facilitate outpatient abx therapy.     Things to follow up after discharge:   1) There is concern for septic arthritis involving the R knee joint. Patient  will continue IV ceftriaxone 2 grams once daily under superversion of the Primary care physician per Infectious disease recommendations until 04/13/2017 at [facility]. All arrangement have been made per VERONICA on floor, Jessica. If any concerns regarding this following discharge patient to establish contact with Alexia 5  here at [facility].   2) Patient diagnosed with gouty arthritis involving R knee. PCP to initiate allopurinol once acute exacerbation has completely resolved and continue bridging with colchicine therapy appropriately. Continue to take colchicine 0.6 mg twice daily as prescribed until follow up with PCP.   3) Follow up with PCP within one week of hospital discharge. Discuss hospital course with PCP.   4) Patient noted to have hematoma in Right calf and distal deep venous thrombosis. PCP to obtain an US for interval change in one week of hospital discharge.   5) Monitor blood pressure at home. Write results in a diary. Share with PCP and PCP to change blood pressure medications as clinically appropriate.   6) PCP to continue management of diabetes mellitus.   7) PCP to monitor CBC in four week of hospital discharge and continue management of anemia as clinically appropriate.   8) PCP to monitor renal function in one week of hospital discharge. Diuretics can be reinitiated if appropriate in clinical setting with ongoing close monitoring of renal function.     Consults: cardiology, ID and otherpedic surgery    Significant Studies:   Results for QUEENIE ROSE (MRN [id number]) as of 3/21/2017 14:23   Ref. Range 3/19/2017 08:17   WBC Latest Ref Range: 4.8-10.8 K/uL 9.1   RBC Latest Ref Range: 4.20-5.40 M/uL 3.00 (L)   Hemoglobin Latest Ref Range: 12.0-16.0 g/dL 8.8 (L)   Hematocrit Latest Ref Range: 37.0-47.0 % 28.3 (L)   MCV Latest Ref Range: 81.4-97.8 fL 94.3   MCH Latest Ref Range: 27.0-33.0 pg 29.3   MCHC Latest Ref Range: 33.6-35.0 g/dL 31.1 (L)   RDW Latest Ref  Range: 35.9-50.0 fL 52.5 (H)   Platelet Count Latest Ref Range: 164-446 K/uL 372   MPV Latest Ref Range: 9.0-12.9 fL 9.9     Results for QUEENIE ROSE (MRN [id number]) as of 3/21/2017 14:23   Ref. Range 3/19/2017 08:17   Sodium Latest Ref Range: 135-145 mmol/L 141   Potassium Latest Ref Range: 3.6-5.5 mmol/L 3.9   Chloride Latest Ref Range:  mmol/L 112   Co2 Latest Ref Range: 20-33 mmol/L 21   Anion Gap Latest Ref Range: 0.0-11.9  8.0   Glucose Latest Ref Range: 65-99 mg/dL 87   Bun Latest Ref Range: 8-22 mg/dL 24 (H)   Creatinine Latest Ref Range: 0.50-1.40 mg/dL 0.87   GFR If  Latest Ref Range: >60 mL/min/1.73 m 2 >60   GFR If Non  Latest Ref Range: >60 mL/min/1.73 m 2 >60   Calcium Latest Ref Range: 8.5-10.5 mg/dL 9.1     Results for QUEENIE ROSE (MRN [id number]) as of 3/21/2017 14:23   Ref. Range 3/14/2017 09:24   AST(SGOT) Latest Ref Range: 12-45 U/L 19   ALT(SGPT) Latest Ref Range: 2-50 U/L 13   Alkaline Phosphatase Latest Ref Range: 30-99 U/L 113 (H)   Total Bilirubin Latest Ref Range: 0.1-1.5 mg/dL 0.3   Albumin Latest Ref Range: 3.2-4.9 g/dL 3.8   Total Protein Latest Ref Range: 6.0-8.2 g/dL 6.9   Globulin Latest Ref Range: 1.9-3.5 g/dL 3.1   A-G Ratio Latest Units: g/dL 1.2   Phosphorus Latest Ref Range: 2.5-4.5 mg/dL 2.8       Results for QUEENIE ROSE (MRN [id number]) as of 3/21/2017 14:23   Ref. Range 3/20/2017 18:17   Uric Acid Latest Ref Range: 1.9-8.2 mg/dL 6.3     Results for QUEENIE ROSEO (MRN [id number]) as of 3/21/2017 14:23   Ref. Range 3/14/2017 09:24   Iron Latest Ref Range:  ug/dL 20 (L)   Total Iron Binding Latest Ref Range: 250-450 ug/dL 307   % Saturation Latest Ref Range: 15-55 % 7 (L)     Results for QUEENIE ROSEVALO (MRN [id number]) as of 3/21/2017 14:23   Ref. Range 3/19/2017 18:19   Ferritin Latest Ref Range: 10.0-291.0 ng/mL 186.3     Results for MILTONQUEENIE SALASVALO (MRN [id number]) as of 3/21/2017  14:23   Ref. Range 3/15/2017 03:13   Vitamin B12 -True Cobalamin Latest Ref Range: 211-911 pg/mL 1239 (H)   Folate -Folic Acid Latest Ref Range: >4.0 ng/mL 18.6       Results for QUEENIE ROSE (MRN [id number]) as of 3/21/2017 14:23   Ref. Range 3/16/2017 09:54 3/16/2017 09:54   Fluid Type Unknown Synovial Synovial   Color-Body Fluid Unknown Red    Character-Body Fluid Unknown Cloudy    Total WBC Latest Units: cells/uL 95396    Total RBC Count Latest Units: cells/uL 44715    Polys Latest Units: % 98    Lymphs Latest Units: % 1    Mononuclear Cells - Fluid Latest Units: % 1    Crystals, Body Fluid Latest Ref Range: None Seen   Moderate     Culture & Susceptibility URINE     KLEBSIELLA PNEUMONIAE      Antibiotic Sensitivity Microscan Unit Status     Ampicillin Resistant >16 mcg/mL Final     Cefepime Sensitive <=8 mcg/mL Final     Cefotaxime Sensitive <=2 mcg/mL Final     Cefotetan Sensitive <=16 mcg/mL Final     Ceftazidime Sensitive <=1 mcg/mL Final     Ceftriaxone Sensitive <=8 mcg/mL Final     Cefuroxime Sensitive <=4 mcg/mL Final     Cephalothin Sensitive <=8 mcg/mL Final     Ciprofloxacin Sensitive <=1 mcg/mL Final     Gentamicin Sensitive <=4 mcg/mL Final     Levofloxacin Sensitive <=2 mcg/mL Final     Nitrofurantoin Intermediate 64 mcg/mL Final     Pip/Tazobactam Sensitive <=16 mcg/mL Final     Piperacillin Resistant >64 mcg/mL Final     Tigecycline Sensitive <=2 mcg/mL Final     Tobramycin Sensitive <=4 mcg/mL Final     Trimeth/Sulfa Sensitive <=2/38 mcg/mL Final                     Results for QUEENIE ROSE (MRN [id number]) as of 3/21/2017 14:23   Ref. Range 3/16/2017 09:56 3/16/2017 09:56   Source Unknown SYNO SYNO   Site Unknown Right  Knee Right  Knee   Gram Stain Result Unknown Moderate WBCs.... Moderate WBCs....   Significant Indicator Unknown . NEG         LE VENOUS DUPLEX (DVT)   Final Result    Subacute partially occlusive thrombus in the right epigastric vein.   No evidence of DVT in  the right lower extremity.   Fluid collection along the right calf measuring 13 cm x 1.5 cm which likely    represents a hematoma in the saphenous harvest tract.     DX-CHEST-FOR PICC LINE Perform procedure in:: PICC Room   Final Result      1.  Supportive tubing as described above.   2.  No other significant change from prior exam.   3.  No pneumonia or pneumothorax.      DX-KNEE 2- RIGHT   Final Result         1. No acute osseous abnormality.      US-RENAL   Final Result      2.2 cm right renal cyst. Otherwise unremarkable renal ultrasound.      LE VENOUS DUPLEX (Specify in Comments Left, Right Or Bilateral)   Final Result   no dvt or svt in the left leg   DX-KNEE 2- LEFT   Final Result         1. No acute osseous abnormality. Small knee joint effusion.      DX-CHEST-LIMITED (1 VIEW)   Final Result      No evidence of acute cardiopulmonary process.      IR-PICC LINE PLACEMENT > AGE 5    (Results Pending)       Procedures Performed While Hospitalized: R knee arthrocentesis    Operations During Hospitalization: None    Disposition: Home    Patient Instructions: Given  Activity: activity as tolerated  Diet: cardiac diet and diabetic diet  Wound Care: none needed    Medications at discharge:   Carole Kingsley   Home Medication Instructions JAYASHREE:[id number]    Printed on:03/21/17 0403   Medication Information                      amlodipine (NORVASC) 5 MG Tab  Take 1 Tab by mouth every day.             ascorbic acid (VITAMIN C) 500 MG tablet  Take 1 Tab by mouth 3 times a day.             aspirin EC (ECOTRIN) 81 MG Tablet Delayed Response  Take 81 mg by mouth every day.             atorvastatin (LIPITOR) 20 MG Tab  Take 1 Tab by mouth every bedtime.             carvedilol (COREG) 6.25 MG Tab  Take 1 Tab by mouth 2 times a day, with meals.             clopidogrel (PLAVIX) 75 MG Tab  Take 1 Tab by mouth every day.             colchicine (COLCRYS) 0.6 MG Tab  Take 1 Tab by mouth 2 Times a Day.             diazepam  (VALIUM) 5 MG Tab  Take 5 mg by mouth 4 times a day as needed for Anxiety.             docusate sodium 100 MG Cap  Take 100 mg by mouth every morning.             escitalopram (LEXAPRO) 20 MG tablet  Take 1 Tab by mouth every day.             ferrous gluconate (FERGON) 324 (38 FE) MG Tab  Take 1 Tab by mouth 2 times a day, with meals.             gemfibrozil (LOPID) 600 MG Tab  TAKE ONE TABLET BY MOUTH TWICE DAILY             levothyroxine (SYNTHROID) 75 MCG Tab  Take 75 mcg by mouth Every morning on an empty stomach.             losartan (COZAAR) 100 MG Tab  Take 1 Tab by mouth every day.             metformin ER (GLUCOPHAGE XR) 500 MG TABLET SR 24 HR  Take 500 mg by mouth 2 times a day, with meals.             multivitamin (THERAGRAN) Tab  Take 1 Tab by mouth every day.             NS SOLN 100 mL with cefTRIAXone 2 GM SOLR 2 g  2 g by Intravenous route every 24 hours.             oxycodone immediate-release (ROXICODONE) 5 MG Tab  Take 5 mg by mouth every four hours as needed for Severe Pain.                 Opioid prescription history checked: N/A. None prescribed.     Time spend preparing discharge: 45 minutes. This included face to face with the patient, medication reconciliation, care co ordination with RN involved in patient care and discussion and co ordination with case management.     Signed:  Jacques Schreiber  3/21/2017  2:16 PM

## 2017-03-21 NOTE — DISCHARGE INSTRUCTIONS
Discharge Instructions    Discharged to home by car with relative. Discharged via wheelchair, hospital escort: Refused.  Special equipment needed: Not Applicable    Be sure to schedule a follow-up appointment with your primary care doctor or any specialists as instructed.     Discharge Plan:   Pneumococcal Vaccine Given - only chart on this line when given: Given (See MAR)  Influenza Vaccine Indication: Patient Refuses    I understand that a diet low in cholesterol, fat, and sodium is recommended for good health. Unless I have been given specific instructions below for another diet, I accept this instruction as my diet prescription.   Other diet: Diabetic, Cardiac    Special Instructions: None    · Is patient discharged on Warfarin / Coumadin?   No     · Is patient Post Blood Transfusion?  No    Depression / Suicide Risk    As you are discharged from this [facility] facility, it is important to learn how to keep safe from harming yourself.    Recognize the warning signs:  · Abrupt changes in personality, positive or negative- including increase in energy   · Giving away possessions  · Change in eating patterns- significant weight changes-  positive or negative  · Change in sleeping patterns- unable to sleep or sleeping all the time   · Unwillingness or inability to communicate  · Depression  · Unusual sadness, discouragement and loneliness  · Talk of wanting to die  · Neglect of personal appearance   · Rebelliousness- reckless behavior  · Withdrawal from people/activities they love  · Confusion- inability to concentrate     If you or a loved one observes any of these behaviors or has concerns about self-harm, here's what you can do:  · Talk about it- your feelings and reasons for harming yourself  · Remove any means that you might use to hurt yourself (examples: pills, rope, extension cords, firearm)  · Get professional help from the community (Mental Health, Substance Abuse, psychological counseling)  · Do not  be alone:Call your Safe Contact- someone whom you trust who will be there for you.  · Call your local CRISIS HOTLINE 683-2747 or 909-753-6238  · Call your local Children's Mobile Crisis Response Team [place] (816) 010-2256 or www.Postmaster  · Call the toll free National Suicide Prevention Hotlines   · National Suicide Prevention Lifeline 768-633-JRPY (6636)  · [employer] Line Network 800-SUICIDE (630-9919)    Urinary Tract Infection  Urinary tract infections (UTIs) can develop anywhere along your urinary tract. Your urinary tract is your body's drainage system for removing wastes and extra water. Your urinary tract includes two kidneys, two ureters, a bladder, and a urethra. Your kidneys are a pair of bean-shaped organs. Each kidney is about the size of your fist. They are located below your ribs, one on each side of your spine.  CAUSES  Infections are caused by microbes, which are microscopic organisms, including fungi, viruses, and bacteria. These organisms are so small that they can only be seen through a microscope. Bacteria are the microbes that most commonly cause UTIs.  SYMPTOMS   Symptoms of UTIs may vary by age and gender of the patient and by the location of the infection. Symptoms in young women typically include a frequent and intense urge to urinate and a painful, burning feeling in the bladder or urethra during urination. Older women and men are more likely to be tired, shaky, and weak and have muscle aches and abdominal pain. A fever may mean the infection is in your kidneys. Other symptoms of a kidney infection include pain in your back or sides below the ribs, nausea, and vomiting.  DIAGNOSIS  To diagnose a UTI, your caregiver will ask you about your symptoms. Your caregiver also will ask to provide a urine sample. The urine sample will be tested for bacteria and white blood cells. White blood cells are made by your body to help fight infection.  TREATMENT   Typically, UTIs can be  "treated with medication. Because most UTIs are caused by a bacterial infection, they usually can be treated with the use of antibiotics. The choice of antibiotic and length of treatment depend on your symptoms and the type of bacteria causing your infection.  HOME CARE INSTRUCTIONS  · If you were prescribed antibiotics, take them exactly as your caregiver instructs you. Finish the medication even if you feel better after you have only taken some of the medication.  · Drink enough water and fluids to keep your urine clear or pale yellow.  · Avoid caffeine, tea, and carbonated beverages. They tend to irritate your bladder.  · Empty your bladder often. Avoid holding urine for long periods of time.  · Empty your bladder before and after sexual intercourse.  · After a bowel movement, women should cleanse from front to back. Use each tissue only once.  SEEK MEDICAL CARE IF:   · You have back pain.  · You develop a fever.  · Your symptoms do not begin to resolve within 3 days.  SEEK IMMEDIATE MEDICAL CARE IF:   · You have severe back pain or lower abdominal pain.  · You develop chills.  · You have nausea or vomiting.  · You have continued burning or discomfort with urination.  MAKE SURE YOU:   · Understand these instructions.  · Will watch your condition.  · Will get help right away if you are not doing well or get worse.     This information is not intended to replace advice given to you by your health care provider. Make sure you discuss any questions you have with your health care provider.     Document Released: 09/27/2006 Document Revised: 01/08/2016 Document Reviewed: 01/25/2013  [employer] Interactive Patient Education ©2016 [employer]    PICC Home Guide  A peripherally inserted central catheter (PICC) is a long, thin, flexible tube that is inserted into a vein in the upper arm. It is a form of intravenous (IV) access. It is considered to be a \"central\" line because the tip of the PICC ends in a large vein in your " "chest. This large vein is called the superior vena cava (SVC). The PICC tip ends in the SVC because there is a lot of blood flow in the SVC. This allows medicines and IV fluids to be quickly distributed throughout the body. The PICC is inserted using a sterile technique by a specially trained nurse or physician. After the PICC is inserted, a chest X-ray exam is done to be sure it is in the correct place.   A PICC may be placed for different reasons, such as:  · To give medicines and liquid nutrition that can only be given through a central line. Examples are:  ¨ Certain antibiotic treatments.  ¨ Chemotherapy.  ¨ Total parenteral nutrition (TPN).  · To take frequent blood samples.  · To give IV fluids and blood products.  · If there is difficulty placing a peripheral intravenous (PIV) catheter.  If taken care of properly, a PICC can remain in place for several months. A PICC can also allow a person to go home from the hospital early. Medicine and PICC care can be managed at home by a family member or home health care team.  WHAT PROBLEMS CAN HAPPEN WHEN I HAVE A PICC?  Problems with a PICC can occasionally occur. These may include the following:  · A blood clot (thrombus) forming in or at the tip of the PICC. This can cause the PICC to become clogged. A clot-dissolving medicine called tissue plasminogen activator (tPA) can be given through the PICC to help break up the clot.  · Inflammation of the vein (phlebitis) in which the PICC is placed. Signs of inflammation may include redness, pain at the insertion site, red streaks, or being able to feel a \"cord\" in the vein where the PICC is located.  · Infection in the PICC or at the insertion site. Signs of infection may include fever, chills, redness, swelling, or pus drainage from the PICC insertion site.  · PICC movement (malposition). The PICC tip may move from its original position due to excessive physical activity, forceful coughing, sneezing, or vomiting.  · A " break or cut in the PICC. It is important to not use scissors near the PICC.  · Nerve or tendon irritation or injury during PICC insertion.  WHAT SHOULD I KEEP IN MIND ABOUT ACTIVITIES WHEN I HAVE A PICC?  · You may bend your arm and move it freely. If your PICC is near or at the bend of your elbow, avoid activity with repeated motion at the elbow.  · Rest at home for the remainder of the day following PICC line insertion.  · Avoid lifting heavy objects as instructed by your health care provider.  · Avoid using a crutch with the arm on the same side as your PICC. You may need to use a walker.  WHAT SHOULD I KNOW ABOUT MY PICC DRESSING?  · Keep your PICC bandage (dressing) clean and dry to prevent infection.  ¨ Ask your health care provider when you may shower. Ask your health care provider to teach you how to wrap the PICC when you do take a shower.  · Change the PICC dressing as instructed by your health care provider.  · Change your PICC dressing if it becomes loose or wet.  WHAT SHOULD I KNOW ABOUT PICC CARE?  · Check the PICC insertion site daily for leakage, redness, swelling, or pain.  · Do not take a bath, swim, or use hot tubs when you have a PICC. Cover PICC line with clear plastic wrap and tape to keep it dry while showering.  · Flush the PICC as directed by your health care provider. Let your health care provider know right away if the PICC is difficult to flush or does not flush. Do not use force to flush the PICC.  · Do not use a syringe that is less than 10 mL to flush the PICC.  · Never pull or tug on the PICC.  · Avoid blood pressure checks on the arm with the PICC.  · Keep your PICC identification card with you at all times.  · Do not take the PICC out yourself. Only a trained clinical professional should remove the PICC.  SEEK IMMEDIATE MEDICAL CARE IF:  · Your PICC is accidentally pulled all the way out. If this happens, cover the insertion site with a bandage or gauze dressing. Do not throw the  "PICC away. Your health care provider will need to inspect it.  · Your PICC was tugged or pulled and has partially come out. Do not  push the PICC back in.  · There is any type of drainage, redness, or swelling where the PICC enters the skin.  · You cannot flush the PICC, it is difficult to flush, or the PICC leaks around the insertion site when it is flushed.  · You hear a \"flushing\" sound when the PICC is flushed.  · You have pain, discomfort, or numbness in your arm, shoulder, or jaw on the same side as the PICC.  · You feel your heart \"racing\" or skipping beats.  · You notice a hole or tear in the PICC.  · You develop chills or a fever.  MAKE SURE YOU:   · Understand these instructions.  · Will watch your condition.  · Will get help right away if you are not doing well or get worse.     This information is not intended to replace advice given to you by your health care provider. Make sure you discuss any questions you have with your health care provider.     Document Released: 06/23/2004 Document Revised: 01/08/2016 Document Reviewed: 08/25/2014  [employer]vier Interactive Patient Education ©2016 [employer]          "

## 2017-03-21 NOTE — CARE PLAN
Problem: Safety  Goal: Will remain free from falls  Intervention: Assess risk factors for falls  Fall measures in place. Call light within reach, personal belongings close-by, bed in lowest position, IV pole on same side of bathroom, upper bed rails up, hourly rounding in place. Will continue to monitor pt for safety.       Problem: Infection  Goal: Will remain free from infection  Intervention: Implement standard precautions and perform hand washing before and after patient contact  Pt educated on infection control, pt updated on need for IV abx and handwashing.       Problem: Pain Management  Goal: Pain level will decrease to patient’s comfort goal  Intervention: Follow pain managment plan developed in collaboration with patient and Interdisciplinary Team  Pt medicated for pain per MAR.

## 2017-03-21 NOTE — PROGRESS NOTES
Assumed patient care at 0700. Report received from night shift. Assessment completed. Pt A&Ox4. States pain is 7/10 in back and right lower leg. Updated on POC, verbalizes understanding. Stand by assist. Personal belongings and call light within reach.

## 2017-03-23 ENCOUNTER — PATIENT OUTREACH (OUTPATIENT)
Dept: HEALTH INFORMATION MANAGEMENT | Facility: OTHER | Age: 72
End: 2017-03-23

## 2017-03-31 NOTE — DOCUMENTATION QUERY
"DOCUMENTATION QUERY    PROVIDERS: Please select “Cosign w/ note”to reply to query.    To better represent the severity of illness of your patient, please review the following information and exercise your independent professional judgment in responding to this query.     There is some confusion as to whether the patient had a DVT during this admission.Discharge Summary states, \"Distal DVT of R calf\" as a discharge diagnosis with discharge dictation stating, \"During hospital stay she subsequently developed R sided knee pain, swelling and R calf tenderness. DVT evaluation was obtained with a duplex. It was negative for DVT but revealed partially occlusive thrombus in the epigastric vein.\"    Please clarify    1. Distal DVT R calf ruled in (please document POA or Not POA)  2. Distal DVT R calf ruled out  3. Other explanation of clinical presentation (please document)  4. Unable to determine    The medical record reflects the following:   Clinical Findings  Distal R calf DVT   R Calf hematoma   Treatment  heparin, follow up   Risk Factors     Location within medical record  History and Physical, Progress Notes, Discharge Summary, LE Duplex,Radiology Results and ED Report     Thank you,   Laura Krishna          "

## 2017-04-05 ENCOUNTER — PATIENT OUTREACH (OUTPATIENT)
Dept: HEALTH INFORMATION MANAGEMENT | Facility: OTHER | Age: 72
End: 2017-04-05

## 2017-04-11 ENCOUNTER — PATIENT OUTREACH (OUTPATIENT)
Dept: HEALTH INFORMATION MANAGEMENT | Facility: OTHER | Age: 72
End: 2017-04-11

## 2017-04-11 NOTE — Clinical Note
April 11, 2017        Carole Kingsley  [address]  [place] [address]        Dear Carole:    My name is Catrina and I am a Nurse Care Coordinator with [facility] that follows up with everyone that comes in [facility] from the [facility].      I have been trying to reach you by telephone to follow up with you after your hospital stay and see if there was anything you needed and check and see how you are doing since being home.    If you would like additional assistance or information regarding your healthcare, managing your health care challenges or connecting with community resources, please contact Catrina at [phone].      If you have any questions or concerns, please don't hesitate to call.        Sincerely,    Catrina Vides RN BSN   Care Coordination   [address] [place] [address], [place]  [address]  P: [phone]   [email or web address]    Electronically Signed

## 2017-04-17 ENCOUNTER — APPOINTMENT (OUTPATIENT)
Dept: ENDOCRINOLOGY | Facility: MEDICAL CENTER | Age: 72
End: 2017-04-17
Payer: MEDICARE

## 2017-05-04 ENCOUNTER — TELEPHONE (OUTPATIENT)
Dept: CARDIOLOGY | Facility: MEDICAL CENTER | Age: 72
End: 2017-05-04

## 2017-05-04 NOTE — TELEPHONE ENCOUNTER
----- Message from Iraida Stein sent at 5/4/2017 10:58 AM PDT -----  Regarding: order for phys theraphy  Contact: [phone]  BRIANA/jeanine    Pt calling for an order from AM for her to begin her Cardio physical therapy in [place].  Please call pt to discuss [phone]

## 2017-06-13 RX ORDER — DIAZEPAM 5 MG/1
5 TABLET ORAL 4 TIMES DAILY PRN
Qty: 30 TAB | Refills: 0 | Status: SHIPPED | OUTPATIENT
Start: 2017-06-13 | End: 2022-02-08

## 2017-06-13 NOTE — TELEPHONE ENCOUNTER
Was the patient seen in the last year in this department? Yes     Does patient have an active prescription for medications requested? Yes     Received Request Via: Pharmacy   Pt LV 03/07/2017   Pt next jeramie 06/21/2017   Last fill date 01/26/2017

## 2017-06-21 ENCOUNTER — OFFICE VISIT (OUTPATIENT)
Dept: CARDIOLOGY | Facility: MEDICAL CENTER | Age: 72
End: 2017-06-21
Payer: MEDICARE

## 2017-06-21 VITALS
SYSTOLIC BLOOD PRESSURE: 118 MMHG | WEIGHT: 114 LBS | DIASTOLIC BLOOD PRESSURE: 76 MMHG | HEART RATE: 69 BPM | OXYGEN SATURATION: 94 % | BODY MASS INDEX: 20.98 KG/M2 | HEIGHT: 62 IN

## 2017-06-21 DIAGNOSIS — I73.9 PVD (PERIPHERAL VASCULAR DISEASE) (HCC): ICD-10-CM

## 2017-06-21 DIAGNOSIS — I25.10 CORONARY ARTERY DISEASE INVOLVING NATIVE CORONARY ARTERY OF NATIVE HEART WITHOUT ANGINA PECTORIS: ICD-10-CM

## 2017-06-21 DIAGNOSIS — I10 ESSENTIAL HYPERTENSION: ICD-10-CM

## 2017-06-21 DIAGNOSIS — E78.5 HYPERLIPIDEMIA, UNSPECIFIED HYPERLIPIDEMIA TYPE: ICD-10-CM

## 2017-06-21 DIAGNOSIS — E78.2 MIXED HYPERLIPIDEMIA: ICD-10-CM

## 2017-06-21 LAB — EKG IMPRESSION: NORMAL

## 2017-06-21 PROCEDURE — 99214 OFFICE O/P EST MOD 30 MIN: CPT | Performed by: INTERNAL MEDICINE

## 2017-06-21 PROCEDURE — 93000 ELECTROCARDIOGRAM COMPLETE: CPT | Performed by: INTERNAL MEDICINE

## 2017-06-21 RX ORDER — ATORVASTATIN CALCIUM 40 MG/1
40 TABLET, FILM COATED ORAL
Qty: 90 TAB | Refills: 3 | Status: SHIPPED | OUTPATIENT
Start: 2017-06-21

## 2017-06-21 ASSESSMENT — ENCOUNTER SYMPTOMS
LOSS OF CONSCIOUSNESS: 0
PALPITATIONS: 0
ABDOMINAL PAIN: 0
MYALGIAS: 0
CLAUDICATION: 1
BRUISES/BLEEDS EASILY: 0
SHORTNESS OF BREATH: 0
ORTHOPNEA: 0
SPEECH CHANGE: 0
FEVER: 0
BLOOD IN STOOL: 0
BLURRED VISION: 0
NERVOUS/ANXIOUS: 0
DEPRESSION: 0
PND: 0

## 2017-06-21 NOTE — MR AVS SNAPSHOT
"        Carole Mortensener   2017 1:15 PM   Office Visit   MRN: [id number]    Department:  Heart [facility]   Dept Phone:  [phone]    Description:  Female : 1945   Provider:  Rody Eugene M.D.           Reason for Visit     Follow-Up           Allergies as of 2017     Allergen Noted Reactions    Tape 2014   Contact Dermatitis    \"paper is OK\"      You were diagnosed with     Mixed hyperlipidemia   [272.2.ICD-9-CM]       Coronary artery disease involving native coronary artery of native heart without angina pectoris   [8256238]       Essential hypertension   [6148737]         Vital Signs     Blood Pressure Pulse Height Weight Body Mass Index Oxygen Saturation    118/76 mmHg 69 1.575 m (5' 2\") 51.71 kg (114 lb) 20.85 kg/m2 94%    Smoking Status                   Former Smoker           Basic Information     Date Of Birth Sex Race Ethnicity Preferred Language    1945 Female White  Origin (Lithuanian,Indonesian,Marshallese,Nigerian, etc) English      Problem List              ICD-10-CM Priority Class Noted - Resolved    Hypothyroidism E03.9 Low  2010 - Present    Hypertension I10 Low  2010 - Present    Hyperlipidemia E78.5 High  2012 - Present    Granular cell tumor D21.9   Unknown - Present    Hydronephrosis N13.30   5/15/2013 - Present    Gout attack M10.9 Medium  2013 - Present    Depression F32.9 Low  2013 - Present    Anemia D64.9 Medium  2013 - Present    Peripheral vascular disease (CMS-HCC) I73.9 High  2014 - Present    Type 2 diabetes mellitus without complication (CMS-HCC) E11.9 Low  2015 - Present    Coronary artery disease involving native coronary artery of native heart without angina pectoris I25.10 High  2016 - Present    S/P CABG x 4 Z95.1 Low  3/7/2017 - Present    Acute renal failure (ARF) (CMS-HCC) N17.9   3/12/2017 - Present    Normocytic anemia D64.9 Low  3/13/2017 - Present    Hypokalemia E87.6   3/13/2017 - " Present    Metabolic acidosis E87.2   3/13/2017 - Present    Elevated brain natriuretic peptide (BNP) level R79.89   3/13/2017 - Present    Osteoarthritis M19.90 Low  3/15/2017 - Present    Arthritis, septic, knee (CMS-HCC) M00.9 High  3/16/2017 - Present    CAD (coronary artery disease) I25.10 Low  3/20/2017 - Present    MOMO (acute kidney injury) (CMS-HCC) N17.9 Low  3/20/2017 - Present    HLD (hyperlipidemia) E78.5 Low  3/20/2017 - Present    Anxiety F41.9 Low  3/20/2017 - Present    GERD (gastroesophageal reflux disease) K21.9 Low  3/20/2017 - Present    UTI (urinary tract infection) N39.0 Low  3/20/2017 - Present    Hematoma T14.8 Low  3/20/2017 - Present      Health Maintenance        Date Due Completion Dates    DIABETES MONOFILAMENT / LE EXAM 6/26/1947 ---    RETINAL SCREENING 12/26/1964 ---    URINE ACR / MICROALBUMIN 12/26/1964 ---    IMM DTaP/Tdap/Td Vaccine (1 - Tdap) 12/26/1965 ---    PAP SMEAR 12/26/1967 ---    MAMMOGRAM 12/26/1986 ---    COLONOSCOPY 12/26/1996 ---    IMM ZOSTER VACCINE 12/26/2006 ---    BONE DENSITY 12/26/2011 ---    FASTING LIPID PROFILE 5/16/2014 5/16/2013, 7/2/2012, 7/31/2006, 9/12/2005    A1C SCREENING 8/20/2017 2/20/2017, 5/16/2013, 5/15/2013, 7/2/2012    IMM PNEUMOCOCCAL 65+ (ADULT) LOW/MEDIUM RISK SERIES (2 of 2 - PPSV23) 3/13/2018 3/13/2017    SERUM CREATININE 3/19/2018 3/19/2017, 3/18/2017, 3/17/2017, 3/16/2017, 3/14/2017, 3/14/2017, 3/13/2017, 3/12/2017, 2/27/2017, 2/26/2017, 2/25/2017, 2/24/2017, 2/23/2017, 2/22/2017, 2/21/2017, 2/20/2017, 2/18/2017, 10/14/2016, 5/9/2016, 2/24/2015, 10/28/2014, 7/14/2014, 6/12/2014, 5/17/2013, 5/16/2013, 7/2/2012, 7/16/2007, 7/31/2006, 9/12/2005            Results       Current Immunizations     13-VALENT PCV PREVNAR 3/13/2017  4:29 AM      Below and/or attached are the medications your provider expects you to take. Review all of your home medications and newly ordered medications with your provider and/or pharmacist. Follow medication  instructions as directed by your provider and/or pharmacist. Please keep your medication list with you and share with your provider. Update the information when medications are discontinued, doses are changed, or new medications (including over-the-counter products) are added; and carry medication information at all times in the event of emergency situations     Allergies:  TAPE - Contact Dermatitis               Medications  Valid as of: June 21, 2017 -  2:18 PM    Generic Name Brand Name Tablet Size Instructions for use    AmLODIPine Besylate (Tab) NORVASC 5 MG Take 1 Tab by mouth every day.        Ascorbic Acid (Tab) VITAMIN C 500 MG Take 1 Tab by mouth 3 times a day.        Aspirin (Tablet Delayed Response) ECOTRIN 81 MG Take 81 mg by mouth every day.        Atorvastatin Calcium (Tab) LIPITOR 40 MG Take 1 Tab by mouth every bedtime.        Carvedilol (Tab) COREG 6.25 MG Take 1 Tab by mouth 2 times a day, with meals.        Clopidogrel Bisulfate (Tab) PLAVIX 75 MG Take 1 Tab by mouth every day.        DiazePAM (Tab) VALIUM 5 MG Take 1 Tab by mouth 4 times a day as needed for Anxiety.        Escitalopram Oxalate (Tab) LEXAPRO 20 MG Take 1 Tab by mouth every day.        Ferrous Gluconate (Tab) FERGON 324 (38 FE) MG Take 1 Tab by mouth 2 times a day, with meals.        Gemfibrozil (Tab) LOPID 600 MG TAKE ONE TABLET BY MOUTH TWICE DAILY        Levothyroxine Sodium (Tab) SYNTHROID 75 MCG Take 75 mcg by mouth Every morning on an empty stomach.        Losartan Potassium (Tab) COZAAR 100 MG Take 1 Tab by mouth every day.        MetFORMIN HCl (TABLET SR 24 HR) GLUCOPHAGE  MG Take 500 mg by mouth 2 times a day, with meals.        Multiple Vitamin (Tab) THERAGRAN  Take 1 Tab by mouth every day.        NS SOLN 100 mL with cefTRIAXone 2 GM SOLR 2 g   2 g by Intravenous route every 24 hours.        .                 Medicines prescribed today were sent to:     [facility] [address] [place] [address], [place] [address] [facility]       [address] [facility] [place] [address]    Phone: [phone] Fax: [phone]    Open 24 Hours?: No      Medication refill instructions:       If your prescription bottle indicates you have medication refills left, it is not necessary to call your provider’s office. Please contact your pharmacy and they will refill your medication.    If your prescription bottle indicates you do not have any refills left, you may request refills at any time through one of the following ways: The online GroupSwim system (except Urgent Care), by calling your provider’s office, or by asking your pharmacy to contact your provider’s office with a refill request. Medication refills are processed only during regular business hours and may not be available until the next business day. Your provider may request additional information or to have a follow-up visit with you prior to refilling your medication.   *Please Note: Medication refills are assigned a new Rx number when refilled electronically. Your pharmacy may indicate that no refills were authorized even though a new prescription for the same medication is available at the pharmacy. Please request the medicine by name with the pharmacy before contacting your provider for a refill.           GroupSwim Access Code: [email or web address]  Expires: 7/21/2017  1:04 PM    GroupSwim  A secure, online tool to manage your health information     [employer]’s GroupSwim® is a secure, online tool that connects you to your personalized health information from the privacy of your home -- day or night - making it very easy for you to manage your healthcare. Once the activation process is completed, you can even access your medical information using the GroupSwim meredith, which is available for free in the Apple Meredith store or Google Play store.     GroupSwim provides the following levels of access (as shown below):   My Chart Features   [facility] Primary Care Doctor [facility]  Specialists [facility]  Urgent  Care  [facility]  Primary Care  Doctor   Email your healthcare team securely and privately 24/7 X X X    Manage appointments: schedule your next appointment; view details of past/upcoming appointments X      Request prescription refills. X      View recent personal medical records, including lab and immunizations X X X X   View health record, including health history, allergies, medications X X X X   Read reports about your outpatient visits, procedures, consult and ER notes X X X X   See your discharge summary, which is a recap of your hospital and/or ER visit that includes your diagnosis, lab results, and care plan. X X       How to register for Funidelia:  1. Go to  [email or web address].  2. Click on the Sign Up Now box, which takes you to the New Member Sign Up page. You will need to provide the following information:  a. Enter your Funidelia Access Code exactly as it appears at the top of this page. (You will not need to use this code after you’ve completed the sign-up process. If you do not sign up before the expiration date, you must request a new code.)   b. Enter your date of birth.   c. Enter your home email address.   d. Click Submit, and follow the next screen’s instructions.  3. Create a Funidelia ID. This will be your Funidelia login ID and cannot be changed, so think of one that is secure and easy to remember.  4. Create a Funidelia password. You can change your password at any time.  5. Enter your Password Reset Question and Answer. This can be used at a later time if you forget your password.   6. Enter your e-mail address. This allows you to receive e-mail notifications when new information is available in Funidelia.  7. Click Sign Up. You can now view your health information.    For assistance activating your Funidelia account, call [phone]

## 2017-06-21 NOTE — PROGRESS NOTES
Subjective:   Carole Kingsley is a pleasant 71-year-old female with known history of hypertension, dyslipidemia, diabetes, bilateral lower extremity PVD, CAD s/p CABG 2/20/17 LIMA to LAD, reverse SVG to distal diagonal, there was SVG to distal LCx, reverse SVG to distal PDA presenting today for follow-up evaluation of CAD.     Since CABG patient denied any further episodes of exertional chest pain pressure or tightness. Patient still continues to have claudication with activity. Patient is planning to move to [facility] in September 2017 and wants to hold off any further workup for now. No complaints of dizziness lightheadedness or LOC. Denied any myalgias while on statins. No complaints of palpitations orthopnea or PND.    Past Medical History   Diagnosis Date   • Granular cell tumor 2007     Pituitary / status post transsphenoidal resection   • Hypertension    • Hypothyroidism      Primary   • Vitamin D deficiency    • Gout    • DIABETES MELLITUS      Oral meds   • Heart burn    • Unspecified cataract      Early stage   • Psychiatric problem      Anxiety   • Renal disorder 2013     Hospitalized for kidney infection   • Hiatus hernia syndrome    • Pituitary adenoma (CMS-[facility]) 2008   • Peripheral vascular disease with claudication (CMS-[facility]) 2014     Status post corrective surgery   • Coronary artery disease 2014   • Hyperlipidemia      Especially hypertriglyceridemia   • S/P CABG x 4 February 2017     CABG x 4 (LIMA to distal LAD, rSVG to distal diagonal, rSVG to distal circumflex, rSVG to distal PDA) by Dr. Dowell.     Past Surgical History   Procedure Laterality Date   • Recovery  6/12/2014     Performed by Ir-Recovery Surgery at SURGERY [facility] ORS   • Other  2013     Eye (laser)   • Recovery  7/14/2014     Performed by Ir-Recovery Surgery at SURGERY SAME DAY [facility] ORS   • Hysterectomy, total abdominal     • Angioplasty  7/2014     Legs   • Recovery  10/3/2014     Performed by Cath-Recovery Surgery at  "SURGERY SAME DAY [facility] ORS   • Multiple coronary artery bypass endo vein harvest  2/20/2017     Procedure: MULTIPLE CORONARY ARTERY BYPASS ENDO VEIN HARVEST X4, PREVENA DRESSING;  Surgeon: Hernán Dowell M.D.;  Location: SURGERY [facility];  Service:    • Adan  2/20/2017     Procedure: ADAN ;  Surgeon: Hernán Dowell M.D.;  Location: SURGERY [facility];  Service:    • Multiple coronary artery bypass  February 2017     CABG x 4 (LIMA to distal LAD, rSVG to distal diagonal, rSVG to distal circumflex, rSVG to distal PDA) by Dr. Dowell.     Family History   Problem Relation Age of Onset   • Other Mother      Colon cancer   • Heart Attack Paternal Uncle      MI in 60     History   Smoking status   • Former Smoker -- 25 years   • Types: Cigarettes   • Quit date: 01/08/2014   Smokeless tobacco   • Never Used     Allergies   Allergen Reactions   • Tape Contact Dermatitis     \"paper is OK\"     Outpatient Encounter Prescriptions as of 6/21/2017   Medication Sig Dispense Refill   • diazepam (VALIUM) 5 MG Tab Take 1 Tab by mouth 4 times a day as needed for Anxiety. 30 Tab 0   • losartan (COZAAR) 100 MG Tab Take 1 Tab by mouth every day. 30 Tab 0   • carvedilol (COREG) 6.25 MG Tab Take 1 Tab by mouth 2 times a day, with meals. 60 Tab 0   • amlodipine (NORVASC) 5 MG Tab Take 1 Tab by mouth every day. 30 Tab 0   • ferrous gluconate (FERGON) 324 (38 FE) MG Tab Take 1 Tab by mouth 2 times a day, with meals. 60 Tab 0   • multivitamin (THERAGRAN) Tab Take 1 Tab by mouth every day. 30 Tab 0   • ascorbic acid (VITAMIN C) 500 MG tablet Take 1 Tab by mouth 3 times a day. 90 Tab 0   • aspirin EC (ECOTRIN) 81 MG Tablet Delayed Response Take 81 mg by mouth every day.     • levothyroxine (SYNTHROID) 75 MCG Tab Take 75 mcg by mouth Every morning on an empty stomach.     • clopidogrel (PLAVIX) 75 MG Tab Take 1 Tab by mouth every day. 30 Tab 2   • atorvastatin (LIPITOR) 20 MG Tab Take 1 Tab by mouth every bedtime. 30 Tab 6   • " "escitalopram (LEXAPRO) 20 MG tablet Take 1 Tab by mouth every day. 30 Tab    • metformin ER (GLUCOPHAGE XR) 500 MG TABLET SR 24 HR Take 500 mg by mouth 2 times a day, with meals.     • gemfibrozil (LOPID) 600 MG Tab TAKE ONE TABLET BY MOUTH TWICE DAILY 60 Tab 0   • NS SOLN 100 mL with cefTRIAXone 2 GM SOLR 2 g 2 g by Intravenous route every 24 hours.     • colchicine (COLCRYS) 0.6 MG Tab Take 1 Tab by mouth 2 Times a Day. 60 Tab 0   • oxycodone immediate-release (ROXICODONE) 5 MG Tab Take 5 mg by mouth every four hours as needed for Severe Pain.     • docusate sodium 100 MG Cap Take 100 mg by mouth every morning. 60 Cap      No facility-administered encounter medications on file as of 6/21/2017.     Review of Systems   Constitutional: Negative for fever.   HENT: Negative for congestion.    Eyes: Negative for blurred vision.   Respiratory: Negative for shortness of breath.    Cardiovascular: Positive for claudication (only when patient climbs up hill). Negative for chest pain, palpitations, orthopnea, leg swelling and PND.   Gastrointestinal: Negative for abdominal pain and blood in stool.   Genitourinary: Negative for hematuria.   Musculoskeletal: Negative for myalgias and joint pain.   Skin: Negative for rash.   Neurological: Negative for speech change and loss of consciousness.   Endo/Heme/Allergies: Does not bruise/bleed easily.   Psychiatric/Behavioral: Negative for depression. The patient is not nervous/anxious.    All other systems reviewed and are negative.       Objective:   /76 mmHg  Pulse 69  Ht 1.575 m (5' 2\")  Wt 51.71 kg (114 lb)  BMI 20.85 kg/m2  SpO2 94%    Physical Exam   Constitutional: She is oriented to person, place, and time. She appears well-developed. No distress.   HENT:   Mouth/Throat: Mucous membranes are normal.   Eyes: Conjunctivae and EOM are normal.   Neck: No JVD present. No tracheal deviation present. No thyroid mass present.   Cardiovascular: Normal rate and regular " rhythm.    No murmur heard.  Pulses:       Radial pulses are 2+ on the right side, and 2+ on the left side.        Dorsalis pedis pulses are 1+ on the right side, and 1+ on the left side.        Posterior tibial pulses are 1+ on the right side, and 1+ on the left side.   Pulmonary/Chest: Effort normal and breath sounds normal. No respiratory distress. She exhibits no tenderness.   Abdominal: Soft. There is no tenderness.   Musculoskeletal: Normal range of motion. She exhibits no edema.   Neurological: She is alert and oriented to person, place, and time. She has normal strength. She displays no tremor.   Skin: Skin is warm and dry. She is not diaphoretic.   Psychiatric: She has a normal mood and affect. Her behavior is normal.   Vitals reviewed.     EK17  EKG personally reviewed by me.  Sinus rhythm 70 bpm right axis deviationo nonspecific T-wave changes inferiorly.    Labs: 17  Sodium 145, potassium 4.2, chloride 10 10, bicarbonate 22, creatinine 0.97 BUN 19, glucose 96  Alkaline phosphatase 95, AST 10, ALT 10  Total cholesterol 185, triglyceride 114, LDL 99, HDL 60    PARUL: 17  1. Left ventricular ejection fraction is visually estimated to be 65%.  2. Mild tricuspid regurgitation.  3. Grade 3 aortic atherosclerosis.  4. Post bypass imaging reveals preserved biventricular function. No new segmental wall motion abnormalities are appreciated. No changes are noted from the pre bypass exam.    Transthoracic echo: 17  Normal left ventricular size, wall thickness, and systolic function.  Left ventricular ejection fraction is visually estimated to be 70%.  Grade I diastolic dysfunction.  Aortic sclerosis without stenosis.  Right ventricular systolic pressure is estimated to be 30 mmHg.    Coronary angiogram: 17  RCA dominant gives off PLV and PDA. Mid RCA has mild to moderate nonobstructive disease. Crux of PDA has high-grade 80% stenosis at the ostia.  LM free from disease. Gives off LAD and  LCx.  LCX moderate-sized vessel with subtotal occlusion of large marginal branch as well as high-grade stenosis in the circumflex proper.  LAD large vessel. Long segment moderate disease in the proximal portion. D1 has 50% ostial stenosis. D2 has 70% ostial stenosis. LAD has diffuse area of high-grade stenosis in midportion.    Nuclear stress test: 11/2/15  Small sized, nonreversible, decreased uptake in the basal anterolateral segment during post stress images consistent with infarction or attenuation artifact.  Normal left ventricular size, ejection fraction, and wall motion.    Labs: 2/24/15  AST 18, ALT 34, alkaline phosphatase 103  Total cholesterol 180, triglycerides 236, HDL 51, LDL 97.    Carotid Doppler: 10/3/14  Nl right carotid tree  <50% left ICA stenosis  Nl vertebrals and subclavians bilaterally    TTE: 9/29/14   Normal left ventricular chamber size. Mild asymmetric septal hypertrophy. Normal left ventricular systolic function. Left ventricular ejection fraction is 60% to 65%. Grade I diastolic dysfunction - mitral inflow E/A is <1.0.   Trace to mild mitral regurgitation.  Trace tricuspid regurgitation. Right ventricular systolic pressure is estimated to be 25-30 mmHg. No pericardial effusion seen.   Ascending aorta 3.6 cm, normal aortic root diameter 3.5 cm.   Compared to prior study done on 7/3/12 no significant change seen. (AV trileaflet, Bubble study was negative)    Nuclear stress test: 9/29/14   Exercise duration (m:s): 5:58, METS: 7.0, achieved 87% MPHR  Abnormal exercise myocardial perfusion study. Moderate basal lateral wall ischemia. No infarct.  LVEF is 81%. No wall motion abnormalities.  Positive EKG potion of stress test.  Chest pain was not experienced during this study     Assessment:   1. 3V CAD S/p CABG  2. Dyslipidemia   3. Hypertension   4. PVD    Medical Decision Making:  Today's Assessment / Status / Plan:     1. Stable for now.  Continue aspirin 81 mg along with Plavix 75 mg  daily.  Continue Coreg 6.25 mg BID.  2. LDL less than 100.  Triglycerides less than 150.  Will try to target LDL to less than 70.  Increase Lipitor to 40 mg qHs.  Continue Lopid 600 mg BID.  Advised patient to monitor for signs of rhabdomyolysis as we are planning to increase Lipitor dose   3. Blood pressure well controlled at the present visit.  Continue losartan 100 mg daily.  Continue Norvasc 5 mg daily.  Continue Coreg 6.25 mg BID.  4. Patient wants to hold off any further workup for PVD as she is planning to move this was aligned now.    Follow-up PRN    Thank you for allowing me to participate in taking care of patient.    Rody Araiza MD.

## 2017-06-21 NOTE — Clinical Note
[facility]   [address]   [address]  [place] [address]  Phone: [phone]  Fax: [phone]              Carole Kingsley  1945    Encounter Date: 6/21/2017    Rody Eugene M.D.          PROGRESS NOTE:  Subjective:   Carole Kingsley is a pleasant 71-year-old female with known history of hypertension, dyslipidemia, diabetes, bilateral lower extremity PVD, CAD s/p CABG 2/20/17 LIMA to LAD, reverse SVG to distal diagonal, there was SVG to distal LCx, reverse SVG to distal PDA presenting today for follow-up evaluation of CAD.     Since CABG patient denied any further episodes of exertional chest pain pressure or tightness. Patient still continues to have claudication with activity. Patient is planning to move to [facility] in September 2017 and wants to hold off any further workup for now. No complaints of dizziness lightheadedness or LOC. Denied any myalgias while on statins. No complaints of palpitations orthopnea or PND.    Past Medical History   Diagnosis Date   • Granular cell tumor 2007     Pituitary / status post transsphenoidal resection   • Hypertension    • Hypothyroidism      Primary   • Vitamin D deficiency    • Gout    • DIABETES MELLITUS      Oral meds   • Heart burn    • Unspecified cataract      Early stage   • Psychiatric problem      Anxiety   • Renal disorder 2013     Hospitalized for kidney infection   • Hiatus hernia syndrome    • Pituitary adenoma (CMS-HCC) 2008   • Peripheral vascular disease with claudication (CMS-HCC) 2014     Status post corrective surgery   • Coronary artery disease 2014   • Hyperlipidemia      Especially hypertriglyceridemia   • S/P CABG x 4 February 2017     CABG x 4 (LIMA to distal LAD, rSVG to distal diagonal, rSVG to distal circumflex, rSVG to distal PDA) by Dr. Dowell.     Past Surgical History   Procedure Laterality Date   • Recovery  6/12/2014     Performed by [facility]  "Surgery at SURGERY [facility]   • Other  2013     Eye (laser)   • Recovery  7/14/2014     Performed by Ir-Recovery Surgery at SURGERY SAME DAY [facility] ORS   • Hysterectomy, total abdominal     • Angioplasty  7/2014     Legs   • Recovery  10/3/2014     Performed by Cath-Recovery Surgery at SURGERY SAME DAY [facility] ORS   • Multiple coronary artery bypass endo vein harvest  2/20/2017     Procedure: MULTIPLE CORONARY ARTERY BYPASS ENDO VEIN HARVEST X4, PREVENA DRESSING;  Surgeon: Hernán Dowell M.D.;  Location: SURGERY [facility];  Service:    • Adan  2/20/2017     Procedure: ADAN ;  Surgeon: Hernán Dowell M.D.;  Location: SURGERY [facility];  Service:    • Multiple coronary artery bypass  February 2017     CABG x 4 (LIMA to distal LAD, rSVG to distal diagonal, rSVG to distal circumflex, rSVG to distal PDA) by Dr. Dowell.     Family History   Problem Relation Age of Onset   • Other Mother      Colon cancer   • Heart Attack Paternal Uncle      MI in 60     History   Smoking status   • Former Smoker -- 25 years   • Types: Cigarettes   • Quit date: 01/08/2014   Smokeless tobacco   • Never Used     Allergies   Allergen Reactions   • Tape Contact Dermatitis     \"paper is OK\"     Outpatient Encounter Prescriptions as of 6/21/2017   Medication Sig Dispense Refill   • diazepam (VALIUM) 5 MG Tab Take 1 Tab by mouth 4 times a day as needed for Anxiety. 30 Tab 0   • losartan (COZAAR) 100 MG Tab Take 1 Tab by mouth every day. 30 Tab 0   • carvedilol (COREG) 6.25 MG Tab Take 1 Tab by mouth 2 times a day, with meals. 60 Tab 0   • amlodipine (NORVASC) 5 MG Tab Take 1 Tab by mouth every day. 30 Tab 0   • ferrous gluconate (FERGON) 324 (38 FE) MG Tab Take 1 Tab by mouth 2 times a day, with meals. 60 Tab 0   • multivitamin (THERAGRAN) Tab Take 1 Tab by mouth every day. 30 Tab 0   • ascorbic acid (VITAMIN C) 500 MG tablet Take 1 Tab by mouth 3 times a day. 90 Tab 0   • aspirin EC (ECOTRIN) 81 MG Tablet Delayed Response " "Take 81 mg by mouth every day.     • levothyroxine (SYNTHROID) 75 MCG Tab Take 75 mcg by mouth Every morning on an empty stomach.     • clopidogrel (PLAVIX) 75 MG Tab Take 1 Tab by mouth every day. 30 Tab 2   • atorvastatin (LIPITOR) 20 MG Tab Take 1 Tab by mouth every bedtime. 30 Tab 6   • escitalopram (LEXAPRO) 20 MG tablet Take 1 Tab by mouth every day. 30 Tab    • metformin ER (GLUCOPHAGE XR) 500 MG TABLET SR 24 HR Take 500 mg by mouth 2 times a day, with meals.     • gemfibrozil (LOPID) 600 MG Tab TAKE ONE TABLET BY MOUTH TWICE DAILY 60 Tab 0   • NS SOLN 100 mL with cefTRIAXone 2 GM SOLR 2 g 2 g by Intravenous route every 24 hours.     • colchicine (COLCRYS) 0.6 MG Tab Take 1 Tab by mouth 2 Times a Day. 60 Tab 0   • oxycodone immediate-release (ROXICODONE) 5 MG Tab Take 5 mg by mouth every four hours as needed for Severe Pain.     • docusate sodium 100 MG Cap Take 100 mg by mouth every morning. 60 Cap      No facility-administered encounter medications on file as of 6/21/2017.     Review of Systems   Constitutional: Negative for fever.   HENT: Negative for congestion.    Eyes: Negative for blurred vision.   Respiratory: Negative for shortness of breath.    Cardiovascular: Positive for claudication (only when patient climbs up hill). Negative for chest pain, palpitations, orthopnea, leg swelling and PND.   Gastrointestinal: Negative for abdominal pain and blood in stool.   Genitourinary: Negative for hematuria.   Musculoskeletal: Negative for myalgias and joint pain.   Skin: Negative for rash.   Neurological: Negative for speech change and loss of consciousness.   Endo/Heme/Allergies: Does not bruise/bleed easily.   Psychiatric/Behavioral: Negative for depression. The patient is not nervous/anxious.    All other systems reviewed and are negative.       Objective:   /76 mmHg  Pulse 69  Ht 1.575 m (5' 2\")  Wt 51.71 kg (114 lb)  BMI 20.85 kg/m2  SpO2 94%    Physical Exam   Constitutional: She is oriented " to person, place, and time. She appears well-developed. No distress.   HENT:   Mouth/Throat: Mucous membranes are normal.   Eyes: Conjunctivae and EOM are normal.   Neck: No JVD present. No tracheal deviation present. No thyroid mass present.   Cardiovascular: Normal rate and regular rhythm.    No murmur heard.  Pulses:       Radial pulses are 2+ on the right side, and 2+ on the left side.        Dorsalis pedis pulses are 1+ on the right side, and 1+ on the left side.        Posterior tibial pulses are 1+ on the right side, and 1+ on the left side.   Pulmonary/Chest: Effort normal and breath sounds normal. No respiratory distress. She exhibits no tenderness.   Abdominal: Soft. There is no tenderness.   Musculoskeletal: Normal range of motion. She exhibits no edema.   Neurological: She is alert and oriented to person, place, and time. She has normal strength. She displays no tremor.   Skin: Skin is warm and dry. She is not diaphoretic.   Psychiatric: She has a normal mood and affect. Her behavior is normal.   Vitals reviewed.     EK17  EKG personally reviewed by me.  Sinus rhythm 70 bpm right axis deviationo nonspecific T-wave changes inferiorly.    Labs: 17  Sodium 145, potassium 4.2, chloride 10 10, bicarbonate 22, creatinine 0.97 BUN 19, glucose 96  Alkaline phosphatase 95, AST 10, ALT 10  Total cholesterol 185, triglyceride 114, LDL 99, HDL 60    PARUL: 17  1. Left ventricular ejection fraction is visually estimated to be 65%.  2. Mild tricuspid regurgitation.  3. Grade 3 aortic atherosclerosis.  4. Post bypass imaging reveals preserved biventricular function. No new segmental wall motion abnormalities are appreciated. No changes are noted from the pre bypass exam.    Transthoracic echo: 17  Normal left ventricular size, wall thickness, and systolic function.  Left ventricular ejection fraction is visually estimated to be 70%.  Grade I diastolic dysfunction.  Aortic sclerosis without  stenosis.  Right ventricular systolic pressure is estimated to be 30 mmHg.    Coronary angiogram: 2/17/17  RCA dominant gives off PLV and PDA. Mid RCA has mild to moderate nonobstructive disease. Crux of PDA has high-grade 80% stenosis at the ostia.  LM free from disease. Gives off LAD and LCx.  LCX moderate-sized vessel with subtotal occlusion of large marginal branch as well as high-grade stenosis in the circumflex proper.  LAD large vessel. Long segment moderate disease in the proximal portion. D1 has 50% ostial stenosis. D2 has 70% ostial stenosis. LAD has diffuse area of high-grade stenosis in midportion.    Nuclear stress test: 11/2/15  Small sized, nonreversible, decreased uptake in the basal anterolateral segment during post stress images consistent with infarction or attenuation artifact.  Normal left ventricular size, ejection fraction, and wall motion.    Labs: 2/24/15  AST 18, ALT 34, alkaline phosphatase 103  Total cholesterol 180, triglycerides 236, HDL 51, LDL 97.    Carotid Doppler: 10/3/14  Nl right carotid tree  <50% left ICA stenosis  Nl vertebrals and subclavians bilaterally    TTE: 9/29/14   Normal left ventricular chamber size. Mild asymmetric septal hypertrophy. Normal left ventricular systolic function. Left ventricular ejection fraction is 60% to 65%. Grade I diastolic dysfunction - mitral inflow E/A is <1.0.   Trace to mild mitral regurgitation.  Trace tricuspid regurgitation. Right ventricular systolic pressure is estimated to be 25-30 mmHg. No pericardial effusion seen.   Ascending aorta 3.6 cm, normal aortic root diameter 3.5 cm.   Compared to prior study done on 7/3/12 no significant change seen. (AV trileaflet, Bubble study was negative)    Nuclear stress test: 9/29/14   Exercise duration (m:s): 5:58, METS: 7.0, achieved 87% MPHR  Abnormal exercise myocardial perfusion study. Moderate basal lateral wall ischemia. No infarct.  LVEF is 81%. No wall motion abnormalities.  Positive EKG  potion of stress test.  Chest pain was not experienced during this study     Assessment:   1. 3V CAD S/p CABG  2. Dyslipidemia   3. Hypertension   4. PVD    Medical Decision Making:  Today's Assessment / Status / Plan:     1. Stable for now.  Continue aspirin 81 mg along with Plavix 75 mg daily.  Continue Coreg 6.25 mg BID.  2. LDL less than 100.  Triglycerides less than 150.  Will try to target LDL to less than 70.  Increase Lipitor to 40 mg qHs.  Continue Lopid 600 mg BID.  Advised patient to monitor for signs of rhabdomyolysis as we are planning to increase Lipitor dose   3. Blood pressure well controlled at the present visit.  Continue losartan 100 mg daily.  Continue Norvasc 5 mg daily.  Continue Coreg 6.25 mg BID.  4. Patient wants to hold off any further workup for PVD as she is planning to move this was aligned now.    Follow-up PRN    Thank you for allowing me to participate in taking care of patient.    Rody Araiza MD.      Cristin Morgan, F.N.P.  [address] [facility] [address]  VIA Facsimile: [phone]

## 2017-07-17 ENCOUNTER — HOSPITAL ENCOUNTER (OUTPATIENT)
Facility: MEDICAL CENTER | Age: 72
End: 2017-07-17
Attending: OPHTHALMOLOGY | Admitting: OPHTHALMOLOGY
Payer: MEDICARE

## 2017-07-17 VITALS
TEMPERATURE: 97.7 F | OXYGEN SATURATION: 94 % | WEIGHT: 116.84 LBS | BODY MASS INDEX: 21.5 KG/M2 | RESPIRATION RATE: 16 BRPM | HEIGHT: 62 IN | SYSTOLIC BLOOD PRESSURE: 131 MMHG | HEART RATE: 54 BPM | DIASTOLIC BLOOD PRESSURE: 68 MMHG

## 2017-07-17 PROBLEM — H26.9 CATARACT: Status: ACTIVE | Noted: 2017-07-17

## 2017-07-17 LAB — GLUCOSE BLD-MCNC: 108 MG/DL (ref 65–99)

## 2017-07-17 PROCEDURE — 160048 HCHG OR STATISTICAL LEVEL 1-5: Performed by: OPHTHALMOLOGY

## 2017-07-17 PROCEDURE — 160035 HCHG PACU - 1ST 60 MINS PHASE I: Performed by: OPHTHALMOLOGY

## 2017-07-17 PROCEDURE — 700101 HCHG RX REV CODE 250

## 2017-07-17 PROCEDURE — 160029 HCHG SURGERY MINUTES - 1ST 30 MINS LEVEL 4: Performed by: OPHTHALMOLOGY

## 2017-07-17 PROCEDURE — 501749 HCHG SHELL REV 276: Performed by: OPHTHALMOLOGY

## 2017-07-17 PROCEDURE — 160002 HCHG RECOVERY MINUTES (STAT): Performed by: OPHTHALMOLOGY

## 2017-07-17 PROCEDURE — 700111 HCHG RX REV CODE 636 W/ 250 OVERRIDE (IP)

## 2017-07-17 PROCEDURE — 82962 GLUCOSE BLOOD TEST: CPT

## 2017-07-17 PROCEDURE — 500855 HCHG NEEDLE, IRRIG CYSTOTOME 27G: Performed by: OPHTHALMOLOGY

## 2017-07-17 PROCEDURE — 99152 MOD SED SAME PHYS/QHP 5/>YRS: CPT | Performed by: OPHTHALMOLOGY

## 2017-07-17 PROCEDURE — 160041 HCHG SURGERY MINUTES - EA ADDL 1 MIN LEVEL 4: Performed by: OPHTHALMOLOGY

## 2017-07-17 PROCEDURE — 500558 HCHG EYE SHIELD W/GARTER (FOX): Performed by: OPHTHALMOLOGY

## 2017-07-17 PROCEDURE — 501539 HCHG TIP, PHACO: Performed by: OPHTHALMOLOGY

## 2017-07-17 PROCEDURE — 502240 HCHG MISC OR SUPPLY RC 0272: Performed by: OPHTHALMOLOGY

## 2017-07-17 PROCEDURE — 99153 MOD SED SAME PHYS/QHP EA: CPT | Performed by: OPHTHALMOLOGY

## 2017-07-17 DEVICE — IMPLANTABLE DEVICE: Type: IMPLANTABLE DEVICE | Status: FUNCTIONAL

## 2017-07-17 RX ORDER — TETRACAINE HYDROCHLORIDE 5 MG/ML
SOLUTION OPHTHALMIC
Status: COMPLETED
Start: 2017-07-17 | End: 2017-07-17

## 2017-07-17 RX ORDER — PHENYLEPHRINE HYDROCHLORIDE 25 MG/ML
SOLUTION/ DROPS OPHTHALMIC
Status: COMPLETED
Start: 2017-07-17 | End: 2017-07-17

## 2017-07-17 RX ORDER — LIDOCAINE HYDROCHLORIDE 10 MG/ML
INJECTION, SOLUTION EPIDURAL; INFILTRATION; INTRACAUDAL; PERINEURAL
Status: DISCONTINUED | OUTPATIENT
Start: 2017-07-17 | End: 2017-07-17 | Stop reason: HOSPADM

## 2017-07-17 RX ORDER — CYCLOPENTOLATE HYDROCHLORIDE 10 MG/ML
SOLUTION/ DROPS OPHTHALMIC
Status: COMPLETED
Start: 2017-07-17 | End: 2017-07-17

## 2017-07-17 RX ORDER — MOXIFLOXACIN 5 MG/ML
SOLUTION/ DROPS OPHTHALMIC
Status: DISCONTINUED | OUTPATIENT
Start: 2017-07-17 | End: 2017-07-17 | Stop reason: HOSPADM

## 2017-07-17 RX ORDER — TETRACAINE HYDROCHLORIDE 5 MG/ML
SOLUTION OPHTHALMIC
Status: DISCONTINUED | OUTPATIENT
Start: 2017-07-17 | End: 2017-07-17 | Stop reason: HOSPADM

## 2017-07-17 RX ORDER — MOXIFLOXACIN 5 MG/ML
SOLUTION/ DROPS OPHTHALMIC
Status: COMPLETED
Start: 2017-07-17 | End: 2017-07-17

## 2017-07-17 RX ORDER — SODIUM CHLORIDE, SODIUM LACTATE, POTASSIUM CHLORIDE, CALCIUM CHLORIDE 600; 310; 30; 20 MG/100ML; MG/100ML; MG/100ML; MG/100ML
INJECTION, SOLUTION INTRAVENOUS CONTINUOUS
Status: DISCONTINUED | OUTPATIENT
Start: 2017-07-17 | End: 2017-07-17 | Stop reason: HOSPADM

## 2017-07-17 RX ORDER — FLURBIPROFEN SODIUM 0.3 MG/ML
SOLUTION/ DROPS OPHTHALMIC
Status: COMPLETED
Start: 2017-07-17 | End: 2017-07-17

## 2017-07-17 RX ADMIN — PHENYLEPHRINE HYDROCHLORIDE 3 DROP: 2.5 SOLUTION/ DROPS OPHTHALMIC at 08:47

## 2017-07-17 RX ADMIN — MOXIFLOXACIN HYDROCHLORIDE 3 DROP: 5 SOLUTION/ DROPS OPHTHALMIC at 08:47

## 2017-07-17 RX ADMIN — CYCLOPENTOLATE HYDROCHLORIDE 3 DROP: 10 SOLUTION/ DROPS OPHTHALMIC at 08:47

## 2017-07-17 RX ADMIN — SODIUM CHLORIDE, SODIUM LACTATE, POTASSIUM CHLORIDE, CALCIUM CHLORIDE: 600; 310; 30; 20 INJECTION, SOLUTION INTRAVENOUS at 09:00

## 2017-07-17 RX ADMIN — FLURBIPROFEN SODIUM 3 DROP: 0.3 SOLUTION/ DROPS OPHTHALMIC at 08:47

## 2017-07-17 RX ADMIN — TETRACAINE HYDROCHLORIDE 2 DROP: 5 SOLUTION OPHTHALMIC at 08:47

## 2017-07-17 ASSESSMENT — PAIN SCALES - GENERAL
PAINLEVEL_OUTOF10: 0

## 2017-07-17 NOTE — IP AVS SNAPSHOT
" Home Care Instructions                                                                                                                Name:Carole Kingsley  Medical Record Number:[id number]  CSN: [id number]    YOB: 1945   Age: 71 y.o.  Sex: female  HT:1.575 m (5' 2\") WT: 53 kg (116 lb 13.5 oz)          Admit Date: 7/17/2017     Discharge Date:   Today's Date: 7/17/2017  Attending Doctor:  Maricarmen Rogers M.D.                  Allergies:  Tape                Discharge Instructions           HOME CARE INSTRUCTIONS FOR CATARACT SURGERY    ACTIVITY: Rest and take it easy for the first 24 hours. We strongly suggest that a responsible adult remain with you during that time. It is normal to feel sleepy. We encourage you to not do anything that requires balance, judgment or coordination. Be extra careful when walking (with a dilated eye, it is easier to trip and fall).     FOR 24 HOURS, DO NOT:       Drive, operate machinery or run household appliances.        Drink beer or alcoholic beverages.        Make important decisions or sign legal documents.     DIET: To avoid nausea, slowly advance diet as tolerated, avoiding spicy or greasy foods for the first meal.     MEDICATIONS: Resume taking daily medication. You may take Tylenol for mild discomfort, if needed.     SURGICAL DRESSING: Eye shield as instructed by your doctor. Dark glasses should be worn while in the sunlight.     Follow your Physician's instruction Sheet. Eye Kit Given    A follow-up appointment is scheduled with your doctor tomorrow at ___8:15____ am.     You should call 911 if you develop problems with breathing or chest pain.  You should CALL YOUR PHYSICIAN if you develop: Sharp stabbing pain or sudden change in vision in your operative eye. If you are unable to contact your doctor or the surgical center, you should go to the nearest emergency room or urgent care center. Physician's telephone # __________________________    If any questions " arise, call your doctor. If your doctor is not available, please feel free to call Same Day Surgery at [phone]. You can also call the Health Hotline open 24 hours/day, 7 days/week and speak to a nurse at [phone] or [phone].     I acknowledge receipt and understanding of these Home Care Instructions.    ______________________________     _______________________________            Signature of Patient / Responsible Adult                                                       RN Signature    A registered nurse may call you a few days after your surgery to see how you are doing.   You may also receive a survey in the mail within the next two weeks and we ask that you take a few moments to complete and return the survey. Our goal is to provide you with very good care and we value your comments. Thank you for choosing [facility].        Medication List      ASK your doctor about these medications        Instructions    Morning Afternoon Evening Bedtime    amlodipine 5 MG Tabs   Commonly known as:  NORVASC        Take 1 Tab by mouth every day.   Dose:  5 mg                        ascorbic acid 500 MG tablet   Commonly known as:  VITAMIN C        Take 1 Tab by mouth 3 times a day.   Dose:  500 mg                        aspirin EC 81 MG Tbec   Commonly known as:  ECOTRIN        Take 81 mg by mouth every day.   Dose:  81 mg                        atorvastatin 40 MG Tabs   Commonly known as:  LIPITOR        Take 1 Tab by mouth every bedtime.   Dose:  40 mg                        carvedilol 6.25 MG Tabs   Commonly known as:  COREG        Take 1 Tab by mouth 2 times a day, with meals.   Dose:  6.25 mg                        clopidogrel 75 MG Tabs   Commonly known as:  PLAVIX        Take 1 Tab by mouth every day.   Dose:  75 mg                        diazepam 5 MG Tabs   Commonly known as:  VALIUM        Take 1 Tab by mouth 4 times a day as needed for Anxiety.   Dose:  5 mg                         escitalopram 20 MG tablet   Commonly known as:  LEXAPRO        Take 1 Tab by mouth every day.   Dose:  20 mg                        ferrous gluconate 324 (38 FE) MG Tabs   Commonly known as:  FERGON        Take 1 Tab by mouth 2 times a day, with meals.   Dose:  324 mg                        gemfibrozil 600 MG Tabs   Commonly known as:  LOPID        TAKE ONE TABLET BY MOUTH TWICE DAILY                        levothyroxine 75 MCG Tabs   Commonly known as:  SYNTHROID        Take 75 mcg by mouth Every morning on an empty stomach.   Dose:  75 mcg                        losartan 100 MG Tabs   Commonly known as:  COZAAR        Take 1 Tab by mouth every day.   Dose:  100 mg                        metformin  MG Tb24   Commonly known as:  GLUCOPHAGE XR        Take 500 mg by mouth 2 times a day, with meals.   Dose:  500 mg                        multivitamin Tabs        Take 1 Tab by mouth every day.   Dose:  1 Tab                                Medication Information     Above and/or attached are the medications your physician expects you to take upon discharge. Review all of your home medications and newly ordered medications with your doctor and/or pharmacist. Follow medication instructions as directed by your doctor and/or pharmacist. Please keep your medication list with you and share with your physician. Update the information when medications are discontinued, doses are changed, or new medications (including over-the-counter products) are added; and carry medication information at all times in the event of emergency situations.        Resources     Quit Smoking / Tobacco Use:    I understand the use of any tobacco products increases my chance of suffering from future heart disease or stroke and could cause other illnesses which may shorten my life. Quitting the use of tobacco products is the single most important thing I can do to improve my health. For further information on smoking / tobacco cessation call a  Toll Free Quit Line at 1-391.364.7454 (*[employer] [place]) or 1-533.660.2622 (American Lung Association) or you can access the web based program at www.lungusa.org.    [place] Tobacco Users Help Line:  (471) 333-4000       Toll Free: 1-669.185.4301  Quit Tobacco Program [facility] (131)704-3576    Crisis Hotline:    [place] Crisis Hotline:  1-948-CADWBYW or 1-675.392.6142    [place] Crisis Hotline:    1-946.849.5265 or 878-036-5713    Discharge Survey:   Thank you for choosing [facility]. We hope we did everything we could to make your hospital stay a pleasant one. You may be receiving a survey and we would appreciate your time and participation in answering the questions. Your input is very valuable to us in our efforts to improve our service to our patients and their families.            Signatures     My signature on this form indicates that:    1. I acknowledge receipt and understanding of these Home Care Instruction.  2. My questions regarding this information have been answered to my satisfaction.  3. I have formulated a plan with my discharge nurse to obtain my prescribed medications for home.    __________________________________      __________________________________                   Patient Signature                                 Guardian/Responsible Adult Signature      __________________________________                 __________       ________                       Nurse Signature                                               Date                 Time

## 2017-07-17 NOTE — IP AVS SNAPSHOT
7/17/2017    Carole Kingsley  [address]  [place] [address]    Dear Carole:    [facility] wants to ensure your discharge home is safe and you or your loved ones have had all of your questions answered regarding your care after you leave the hospital.    Below is a list of resources and contact information should you have any questions regarding your hospital stay, follow-up instructions, or active medical symptoms.    Questions or Concerns Regarding… Contact   Medical Questions Related to Your Discharge  (7 days a week, 8am-5pm) Contact a Nurse Care Coordinator   [phone]   Medical Questions Not Related to Your Discharge  (24 hours a day / 7 days a week)  Contact the Nurse Health Line   [phone]    Medications or Discharge Instructions Refer to your discharge packet   or contact your [facility] Primary Care Provider   [phone]   Follow-up Appointment(s) Schedule your appointment via [employer]   or contact Scheduling [phone]   Billing Review your statement via [employer]  or contact Billing [phone]   Medical Records Review your records via [employer]   or contact Medical Records [phone]     You may receive a telephone call within two days of discharge. This call is to make certain you understand your discharge instructions and have the opportunity to have any questions answered. You can also easily access your medical information, test results and upcoming appointments via the [employer] free online health management tool. You can learn more and sign up at [employer]. For assistance setting up your [employer] account, please call [phone].    Once again, we want to ensure your discharge home is safe and that you have a clear understanding of any next steps in your care. If you have any questions or concerns, please do not hesitate to contact us, we are here for you. Thank you for choosing [facility] for your healthcare needs.    Sincerely,    Your [facility] Healthcare Team

## 2017-07-17 NOTE — DISCHARGE INSTRUCTIONS
HOME CARE INSTRUCTIONS FOR CATARACT SURGERY    ACTIVITY: Rest and take it easy for the first 24 hours. We strongly suggest that a responsible adult remain with you during that time. It is normal to feel sleepy. We encourage you to not do anything that requires balance, judgment or coordination. Be extra careful when walking (with a dilated eye, it is easier to trip and fall).     FOR 24 HOURS, DO NOT:       Drive, operate machinery or run household appliances.        Drink beer or alcoholic beverages.        Make important decisions or sign legal documents.     DIET: To avoid nausea, slowly advance diet as tolerated, avoiding spicy or greasy foods for the first meal.     MEDICATIONS: Resume taking daily medication. You may take Tylenol for mild discomfort, if needed.     SURGICAL DRESSING: Eye shield as instructed by your doctor. Dark glasses should be worn while in the sunlight.     Follow your Physician's instruction Sheet. Eye Kit Given    A follow-up appointment is scheduled with your doctor tomorrow at ___8:15____ am.     You should call 911 if you develop problems with breathing or chest pain.  You should CALL YOUR PHYSICIAN if you develop: Sharp stabbing pain or sudden change in vision in your operative eye. If you are unable to contact your doctor or the surgical center, you should go to the nearest emergency room or urgent care center. Physician's telephone # __________________________    If any questions arise, call your doctor. If your doctor is not available, please feel free to call Same Day Surgery at [phone]. You can also call the [employer] Hotline open 24 hours/day, 7 days/week and speak to a nurse at [phone] or [phone].     I acknowledge receipt and understanding of these Home Care Instructions.    ______________________________     _______________________________            Signature of Patient / Responsible Adult                                                       RN  Signature    A registered nurse may call you a few days after your surgery to see how you are doing.   You may also receive a survey in the mail within the next two weeks and we ask that you take a few moments to complete and return the survey. Our goal is to provide you with very good care and we value your comments. Thank you for choosing [facility].

## 2017-07-17 NOTE — OR NURSING
1002 Received pt from OR, received report from Dr. Quiles. Pt awake and alert, VS WNL. Pt has patch to right eye, CDI.     1015 Pt tolerating juice.     1030 Pt meets criteria for discharge, pt and Sister given instructions for discharge, evidence of understanding demonstrated.     1035 Pt out to car in .

## 2017-07-18 NOTE — OP REPORT
OP Report       Date: 7/18/2017    Carole Kingsley [id number]    Surgeon: Maricarmen Rogers M.D.    History: This patient is a 71-year-old woman who complains that she has blurry vision in both eyes. She complains that her vision is blurry after dark, and that she has starburst of light from headlights in both eyes. She also complains of trouble focusing her vision. Best corrected vision in her right eye is 20/30 at distance, and 20/40 at near. Her lenses graded at 3+ nuclear sclerosis she also has moderate dry macular degeneration. The patient is status post resection of pituitary micro-or macroadenoma in 2010 and also has a history a stable paracentral scotoma which the patient understands will not resolve with cataract surgery. She also has thinning of the macula in the right eye she understands that she will not have potential for 20/20 vision in the side. By questionnaire she states that she is having a great deal of difficulty reading and seeing traffic signs and doing handwork playing games and watching television. She has a DVD regarding the risks and complications of cataract surgery she has signed informed consent and wishes to proceed.    Anesthesiologist: Dr. Redman    Pre-operative Diagnosis: Cataract right eye    Post-operative Diagnosis: Cataract right eye    Procedure: Prior to surgery the patient had taken nonsteroidal anti-inflammatory eyedrops in the operative eye for 3 days. In the holding area she received topical dilating drops, flurbiprofen, and Vigamox. On-call to the operating room she was taken in the operating suite, given topical tetracaine, positioned on the table, and prepped and draped in usual sterile manner for eye surgery in the right eye. A timeout was observed. Sterile drapes were opened and a wire lid speculum was inserted. 1% preservative-free lidocaine was applied to the cornea. An anterior chamber paracentesis was placed at the 12:00 limbus. One percent preservative-free  lidocaine and Viscoat were instilled in the anterior chamber. A 3 mm keratome was used to create a temporal clear corneal wound. Capsulorrhexis, and hydrodissection were performed the nucleus was phacoemulsified with a CDE of 8.29. Cortical cleanup was completed, using the IA tip to polish the posterior capsule. Provisc was instilled in the anterior chamber, and a Brown capsule polisher was used to polish the anterior capsule. An Darek AcrySof model SN 60 WF power 23.0 dpt 1 piece acrylic intraocular lens serial #[id number] was called for, inspected, placed into the injector, injected into the capsular bag and positioned. Anterior stromal hydration of the wounds was performed. Viscoelastic was aspirated. It was instilled into the anterior chamber. The checked and found to be watertight. Sterile drapes were removed. A drop of Vigamox was placed on the eye, and a shield was placed over the eye. The patient was taken to the recovery room in good condition there were no operative complications.

## 2017-08-02 ENCOUNTER — TELEPHONE (OUTPATIENT)
Dept: ENDOCRINOLOGY | Facility: MEDICAL CENTER | Age: 72
End: 2017-08-02

## 2017-08-02 NOTE — TELEPHONE ENCOUNTER
Telephone conversation with patient    Patient requested refill for Lexapro and previously Valium. I called Cristin to tell her that I think her PCP Cristin Morgan should be doing this and she agrees. She will ask Cristin to do those prescriptions.    As an aside she informed me that she had just had a heart attack and a 4 way bypass surgery. She is recovering well. This precipitated her halfway from work and move to [place] to be with her daughter. Big changes but she is looking forward to it.    I wish her well    Angel Ambrose M.D.

## 2017-08-07 ENCOUNTER — HOSPITAL ENCOUNTER (OUTPATIENT)
Facility: MEDICAL CENTER | Age: 72
End: 2017-08-07
Attending: OPHTHALMOLOGY | Admitting: OPHTHALMOLOGY
Payer: MEDICARE

## 2017-08-07 VITALS
RESPIRATION RATE: 16 BRPM | BODY MASS INDEX: 21.62 KG/M2 | WEIGHT: 117.5 LBS | TEMPERATURE: 97.8 F | HEIGHT: 62 IN | HEART RATE: 56 BPM | OXYGEN SATURATION: 99 %

## 2017-08-07 PROBLEM — H26.9 CATARACT, LEFT: Status: ACTIVE | Noted: 2017-08-07

## 2017-08-07 LAB — GLUCOSE BLD-MCNC: 96 MG/DL (ref 65–99)

## 2017-08-07 PROCEDURE — 501749 HCHG SHELL REV 276: Performed by: OPHTHALMOLOGY

## 2017-08-07 PROCEDURE — 99152 MOD SED SAME PHYS/QHP 5/>YRS: CPT | Performed by: OPHTHALMOLOGY

## 2017-08-07 PROCEDURE — 700111 HCHG RX REV CODE 636 W/ 250 OVERRIDE (IP)

## 2017-08-07 PROCEDURE — 501539 HCHG TIP, PHACO: Performed by: OPHTHALMOLOGY

## 2017-08-07 PROCEDURE — 160029 HCHG SURGERY MINUTES - 1ST 30 MINS LEVEL 4: Performed by: OPHTHALMOLOGY

## 2017-08-07 PROCEDURE — 160035 HCHG PACU - 1ST 60 MINS PHASE I: Performed by: OPHTHALMOLOGY

## 2017-08-07 PROCEDURE — 700101 HCHG RX REV CODE 250

## 2017-08-07 PROCEDURE — 500882 HCHG PACK, EYE CUSTOM CATARACT: Performed by: OPHTHALMOLOGY

## 2017-08-07 PROCEDURE — 500558 HCHG EYE SHIELD W/GARTER (FOX): Performed by: OPHTHALMOLOGY

## 2017-08-07 PROCEDURE — 500855 HCHG NEEDLE, IRRIG CYSTOTOME 27G: Performed by: OPHTHALMOLOGY

## 2017-08-07 PROCEDURE — 99153 MOD SED SAME PHYS/QHP EA: CPT | Performed by: OPHTHALMOLOGY

## 2017-08-07 PROCEDURE — 82962 GLUCOSE BLOOD TEST: CPT

## 2017-08-07 PROCEDURE — 160048 HCHG OR STATISTICAL LEVEL 1-5: Performed by: OPHTHALMOLOGY

## 2017-08-07 PROCEDURE — 502240 HCHG MISC OR SUPPLY RC 0272: Performed by: OPHTHALMOLOGY

## 2017-08-07 PROCEDURE — 160002 HCHG RECOVERY MINUTES (STAT): Performed by: OPHTHALMOLOGY

## 2017-08-07 PROCEDURE — 160036 HCHG PACU - EA ADDL 30 MINS PHASE I: Performed by: OPHTHALMOLOGY

## 2017-08-07 DEVICE — IMPLANTABLE DEVICE: Type: IMPLANTABLE DEVICE | Status: FUNCTIONAL

## 2017-08-07 RX ORDER — LIDOCAINE HYDROCHLORIDE 10 MG/ML
0.5 INJECTION, SOLUTION INFILTRATION; PERINEURAL
Status: DISCONTINUED | OUTPATIENT
Start: 2017-08-07 | End: 2017-08-07 | Stop reason: HOSPADM

## 2017-08-07 RX ORDER — CYCLOPENTOLATE HYDROCHLORIDE 10 MG/ML
SOLUTION/ DROPS OPHTHALMIC
Status: COMPLETED
Start: 2017-08-07 | End: 2017-08-07

## 2017-08-07 RX ORDER — FLURBIPROFEN SODIUM 0.3 MG/ML
SOLUTION/ DROPS OPHTHALMIC
Status: COMPLETED
Start: 2017-08-07 | End: 2017-08-07

## 2017-08-07 RX ORDER — LIDOCAINE HYDROCHLORIDE 10 MG/ML
INJECTION, SOLUTION EPIDURAL; INFILTRATION; INTRACAUDAL; PERINEURAL
Status: DISCONTINUED | OUTPATIENT
Start: 2017-08-07 | End: 2017-08-07 | Stop reason: HOSPADM

## 2017-08-07 RX ORDER — LIDOCAINE AND PRILOCAINE 25; 25 MG/G; MG/G
1 CREAM TOPICAL
Status: DISCONTINUED | OUTPATIENT
Start: 2017-08-07 | End: 2017-08-07 | Stop reason: HOSPADM

## 2017-08-07 RX ORDER — TETRACAINE HYDROCHLORIDE 5 MG/ML
SOLUTION OPHTHALMIC
Status: COMPLETED
Start: 2017-08-07 | End: 2017-08-07

## 2017-08-07 RX ORDER — SODIUM CHLORIDE, SODIUM LACTATE, POTASSIUM CHLORIDE, CALCIUM CHLORIDE 600; 310; 30; 20 MG/100ML; MG/100ML; MG/100ML; MG/100ML
1000 INJECTION, SOLUTION INTRAVENOUS
Status: COMPLETED | OUTPATIENT
Start: 2017-08-07 | End: 2017-08-07

## 2017-08-07 RX ORDER — SODIUM CHLORIDE, SODIUM LACTATE, POTASSIUM CHLORIDE, CALCIUM CHLORIDE 600; 310; 30; 20 MG/100ML; MG/100ML; MG/100ML; MG/100ML
INJECTION, SOLUTION INTRAVENOUS CONTINUOUS
Status: DISCONTINUED | OUTPATIENT
Start: 2017-08-07 | End: 2017-08-07 | Stop reason: HOSPADM

## 2017-08-07 RX ORDER — TETRACAINE HYDROCHLORIDE 5 MG/ML
SOLUTION OPHTHALMIC
Status: DISCONTINUED | OUTPATIENT
Start: 2017-08-07 | End: 2017-08-07 | Stop reason: HOSPADM

## 2017-08-07 RX ORDER — MOXIFLOXACIN 5 MG/ML
SOLUTION/ DROPS OPHTHALMIC
Status: COMPLETED
Start: 2017-08-07 | End: 2017-08-07

## 2017-08-07 RX ORDER — PHENYLEPHRINE HYDROCHLORIDE 25 MG/ML
SOLUTION/ DROPS OPHTHALMIC
Status: COMPLETED
Start: 2017-08-07 | End: 2017-08-07

## 2017-08-07 RX ORDER — MOXIFLOXACIN 5 MG/ML
SOLUTION/ DROPS OPHTHALMIC
Status: DISCONTINUED | OUTPATIENT
Start: 2017-08-07 | End: 2017-08-07 | Stop reason: HOSPADM

## 2017-08-07 RX ADMIN — FLURBIPROFEN SODIUM 1 DROP: 0.3 SOLUTION/ DROPS OPHTHALMIC at 09:40

## 2017-08-07 RX ADMIN — TETRACAINE HYDROCHLORIDE 1 DROP: 5 SOLUTION OPHTHALMIC at 09:35

## 2017-08-07 RX ADMIN — CYCLOPENTOLATE HYDROCHLORIDE 1 DROP: 10 SOLUTION/ DROPS OPHTHALMIC at 09:40

## 2017-08-07 RX ADMIN — SODIUM CHLORIDE, SODIUM LACTATE, POTASSIUM CHLORIDE, CALCIUM CHLORIDE 1000 ML: 600; 310; 30; 20 INJECTION, SOLUTION INTRAVENOUS at 09:00

## 2017-08-07 ASSESSMENT — PAIN SCALES - GENERAL
PAINLEVEL_OUTOF10: 0

## 2017-08-07 NOTE — IP AVS SNAPSHOT
" Home Care Instructions                                                                                                                Name:Carole Kingsley  Medical Record Number:[id number]  CSN: [id number]    YOB: 1945   Age: 71 y.o.  Sex: female  HT:1.575 m (5' 2\") WT: 53.3 kg (117 lb 8.1 oz)          Admit Date: 8/7/2017     Discharge Date:   Today's Date: 8/7/2017  Attending Doctor:  Maricarmen Rogers M.D.                  Allergies:  Tape                Discharge Instructions       HOME CARE INSTRUCTIONS FOR CATARACT SURGERY    ACTIVITY: Rest and take it easy for the first 24 hours. We strongly suggest that a responsible adult remain with you during that time. It is normal to feel sleepy. We encourage you to not do anything that requires balance, judgment or coordination. Be extra careful when walking (with a dilated eye, it is easier to trip and fall).     FOR 24 HOURS, DO NOT:       Drive, operate machinery or run household appliances.        Drink beer or alcoholic beverages.        Make important decisions or sign legal documents.     DIET: To avoid nausea, slowly advance diet as tolerated, avoiding spicy or greasy foods for the first meal.     MEDICATIONS: Resume taking daily medication. You may take Tylenol for mild discomfort, if needed.     SURGICAL DRESSING: Eye shield as instructed by your doctor. Dark glasses should be worn while in the sunlight.     Follow your Physician's instruction Sheet. Eye Kit Given    A follow-up appointment is scheduled with your doctor tomorrow at __7:45___ am    You should call 911 if you develop problems with breathing or chest pain.  You should CALL YOUR PHYSICIAN if you develop: Sharp stabbing pain or sudden change in vision in your operative eye. If you are unable to contact your doctor or the surgical center, you should go to the nearest emergency room or urgent care center. Physician's telephone # _____[phone]_____________________    If any questions " arise, call your doctor. If your doctor is not available, please feel free to call Same Day Surgery at [phone]. You can also call the Health Hotline open 24 hours/day, 7 days/week and speak to a nurse at [phone] or [phone].     I acknowledge receipt and understanding of these Home Care Instructions.    ______________________________     _______________________________            Signature of Patient / Responsible Adult                                                       RN Signature    A registered nurse may call you a few days after your surgery to see how you are doing.   You may also receive a survey in the mail within the next two weeks and we ask that you take a few moments to complete and return the survey. Our goal is to provide you with very good care and we value your comments. Thank you for choosing [facility].        Medication List      ASK your doctor about these medications        Instructions    Morning Afternoon Evening Bedtime    amlodipine 5 MG Tabs   Commonly known as:  NORVASC        Take 1 Tab by mouth every day.   Dose:  5 mg                        ascorbic acid 500 MG tablet   Commonly known as:  VITAMIN C        Take 1 Tab by mouth 3 times a day.   Dose:  500 mg                        aspirin EC 81 MG Tbec   Commonly known as:  ECOTRIN        Take 81 mg by mouth every day.   Dose:  81 mg                        atorvastatin 40 MG Tabs   Commonly known as:  LIPITOR        Take 1 Tab by mouth every bedtime.   Dose:  40 mg                        carvedilol 6.25 MG Tabs   Commonly known as:  COREG        Take 1 Tab by mouth 2 times a day, with meals.   Dose:  6.25 mg                        clopidogrel 75 MG Tabs   Commonly known as:  PLAVIX        Take 1 Tab by mouth every day.   Dose:  75 mg                        diazepam 5 MG Tabs   Commonly known as:  VALIUM        Take 1 Tab by mouth 4 times a day as needed for Anxiety.   Dose:  5 mg                         escitalopram 20 MG tablet   Commonly known as:  LEXAPRO        Take 1 Tab by mouth every day.   Dose:  20 mg                        ferrous gluconate 324 (38 FE) MG Tabs   Commonly known as:  FERGON        Take 1 Tab by mouth 2 times a day, with meals.   Dose:  324 mg                        gemfibrozil 600 MG Tabs   Commonly known as:  LOPID        TAKE ONE TABLET BY MOUTH TWICE DAILY                        levothyroxine 75 MCG Tabs   Commonly known as:  SYNTHROID        Take 75 mcg by mouth Every morning on an empty stomach.   Dose:  75 mcg                        losartan 100 MG Tabs   Commonly known as:  COZAAR        Take 1 Tab by mouth every day.   Dose:  100 mg                        metformin  MG Tb24   Commonly known as:  GLUCOPHAGE XR        Take 500 mg by mouth 2 times a day, with meals.   Dose:  500 mg                        multivitamin Tabs        Take 1 Tab by mouth every day.   Dose:  1 Tab                                Medication Information     Above and/or attached are the medications your physician expects you to take upon discharge. Review all of your home medications and newly ordered medications with your doctor and/or pharmacist. Follow medication instructions as directed by your doctor and/or pharmacist. Please keep your medication list with you and share with your physician. Update the information when medications are discontinued, doses are changed, or new medications (including over-the-counter products) are added; and carry medication information at all times in the event of emergency situations.        Resources     Quit Smoking / Tobacco Use:    I understand the use of any tobacco products increases my chance of suffering from future heart disease or stroke and could cause other illnesses which may shorten my life. Quitting the use of tobacco products is the single most important thing I can do to improve my health. For further information on smoking / tobacco cessation call a  Toll Free Quit Line at 1-470.822.7471 (*[employer] [place]) or 1-252.101.5889 (American Lung Association) or you can access the web based program at www.lungusa.org.    [place] Tobacco Users Help Line:  (929) 483-6152       Toll Free: 1-202.691.8968  Quit Tobacco Program [facility] [phone])075-6655    Crisis Hotline:    [place] Crisis Hotline:  8-916-IAOMQDL or 1-198.198.2255    [place] Crisis Hotline:    1-866.942.3588 or 383-060-4350    Discharge Survey:   Thank you for choosing [facility]. We hope we did everything we could to make your hospital stay a pleasant one. You may be receiving a survey and we would appreciate your time and participation in answering the questions. Your input is very valuable to us in our efforts to improve our service to our patients and their families.            Signatures     My signature on this form indicates that:    1. I acknowledge receipt and understanding of these Home Care Instruction.  2. My questions regarding this information have been answered to my satisfaction.  3. I have formulated a plan with my discharge nurse to obtain my prescribed medications for home.    __________________________________      __________________________________                   Patient Signature                                 Guardian/Responsible Adult Signature      __________________________________                 __________       ________                       Nurse Signature                                               Date                 Time

## 2017-08-07 NOTE — IP AVS SNAPSHOT
8/7/2017    Carole Kingsley  [address]  [place] [address]    Dear Carole:    [facility] wants to ensure your discharge home is safe and you or your loved ones have had all of your questions answered regarding your care after you leave the hospital.    Below is a list of resources and contact information should you have any questions regarding your hospital stay, follow-up instructions, or active medical symptoms.    Questions or Concerns Regarding… Contact   Medical Questions Related to Your Discharge  (7 days a week, 8am-5pm) Contact a Nurse Care Coordinator   [phone]   Medical Questions Not Related to Your Discharge  (24 hours a day / 7 days a week)  Contact the Nurse Health Line   [phone]    Medications or Discharge Instructions Refer to your discharge packet   or contact your [facility] Primary Care Provider   [phone]   Follow-up Appointment(s) Schedule your appointment via [employer]   or contact Scheduling [phone]   Billing Review your statement via [employer]  or contact Billing [phone]   Medical Records Review your records via [employer]   or contact Medical Records [phone]     You may receive a telephone call within two days of discharge. This call is to make certain you understand your discharge instructions and have the opportunity to have any questions answered. You can also easily access your medical information, test results and upcoming appointments via the [employer] free online health management tool. You can learn more and sign up at [employer]Ygle. For assistance setting up your Ygle account, please call [phone].    Once again, we want to ensure your discharge home is safe and that you have a clear understanding of any next steps in your care. If you have any questions or concerns, please do not hesitate to contact us, we are here for you. Thank you for choosing [facility] for your healthcare needs.    Sincerely,    Your [facility] Healthcare Team

## 2017-08-07 NOTE — DISCHARGE INSTRUCTIONS
HOME CARE INSTRUCTIONS FOR CATARACT SURGERY    ACTIVITY: Rest and take it easy for the first 24 hours. We strongly suggest that a responsible adult remain with you during that time. It is normal to feel sleepy. We encourage you to not do anything that requires balance, judgment or coordination. Be extra careful when walking (with a dilated eye, it is easier to trip and fall).     FOR 24 HOURS, DO NOT:       Drive, operate machinery or run household appliances.        Drink beer or alcoholic beverages.        Make important decisions or sign legal documents.     DIET: To avoid nausea, slowly advance diet as tolerated, avoiding spicy or greasy foods for the first meal.     MEDICATIONS: Resume taking daily medication. You may take Tylenol for mild discomfort, if needed.     SURGICAL DRESSING: Eye shield as instructed by your doctor. Dark glasses should be worn while in the sunlight.     Follow your Physician's instruction Sheet. Eye Kit Given    A follow-up appointment is scheduled with your doctor tomorrow at __7:45___ am    You should call 911 if you develop problems with breathing or chest pain.  You should CALL YOUR PHYSICIAN if you develop: Sharp stabbing pain or sudden change in vision in your operative eye. If you are unable to contact your doctor or the surgical center, you should go to the nearest emergency room or urgent care center. Physician's telephone # _____[phone]_____________________    If any questions arise, call your doctor. If your doctor is not available, please feel free to call Same Day Surgery at [phone]. You can also call the [employer] Hotline open 24 hours/day, 7 days/week and speak to a nurse at [phone] or [phone].     I acknowledge receipt and understanding of these Home Care Instructions.    ______________________________     _______________________________            Signature of Patient / Responsible Adult                                                       RN  Signature    A registered nurse may call you a few days after your surgery to see how you are doing.   You may also receive a survey in the mail within the next two weeks and we ask that you take a few moments to complete and return the survey. Our goal is to provide you with very good care and we value your comments. Thank you for choosing [facility].

## 2017-08-07 NOTE — PROGRESS NOTES
1146-received pt from OR with report from Dr San. VSS. Pt awake, alert, denies pain, eye shield to left eye. Sister called for ride.   1245-sister at bedside. Instructions reviewed, pt and sister express understanding. Pt discharged home at 1250, ambulatory out, steady on feet

## 2017-08-08 NOTE — OP REPORT
"DATE OF SERVICE:  08/07/2017    PREOPERATIVE DIAGNOSIS:  Cataract, left eye.    POSTOPERATIVE DIAGNOSIS:  Cataract, left eye.    PROCEDURE PERFORMED:  Phacoemulsification with posterior chamber intraocular   lens implant, left eye.    ANESTHESIA:  Topical with monitored anesthesia care by Dr. San.    HISTORY:  This patient is a 71-year-old woman who presents with cataract in   the right eye graded at 3+ nuclear sclerosis.  Her best corrected vision in   the eye is 20/40 in the left eye at distance.  The patient has some macular   scarring and realizes she may not have the potential to see 20/20.  She   complains that her vision is blurry after dark and she sees \"starbursts of   light and headlight,\" brightness acuity testing on medium setting with   correction is 20/40 in the left eye.  The patient has recently had cataract   surgery in her right eye and feels that compared to the right eye, the vision   in the left eye is quite blurry and much more difficult to see after dark.    She notes that after surgery on the right eye, she is doing much better for   driving than she was before the surgery and she states oh, my left eye is not   as good as I thought it was after surgery on the right eye has been done.  She   feels that she is having difficulty reading with the left eye and seeing   traffic signs, doing fine handwork, writing cheques and filling out forms and   a great deal of trouble watching television.  She has seen a DVD regarding the   risks and complications of cataract surgery.  She has signed informed consent   and wishes to proceed.    DESCRIPTION OF PROCEDURE:  The patient was brought to the pre-anesthesia area   after having taken nonsteroidal anti-inflammatory drops in the left eye for 3   days prior to surgery.  In the holding area, she received topical dilating   drops, flurbiprofen and Vigamox.  On call to the operating room, she was taken   into the operating suite, given tetracaine, " positioned on the table and   prepped and draped in the usual sterile manner for eye surgery in the left   eye.  Sterile drapes were opened, a wireless speculum was inserted, 1%   preservative-free lidocaine was applied to the cornea.  A timeout was   observed.  A clear corneal wound was placed at the 6 o'clock limbus.  1%   preservative-free lidocaine and Viscoat were instilled into the anterior   chamber.  A temporal clear corneal wound was created using a 3 mm keratome.    Capsulorrhexis, hydrodissection, hydrodelineation were performed.  The nucleus   was phacoemulsified with a CDE of 10.43.  Cortical cleanup was completed.    Provisc was used to open the capsular bag and polish the posterior capsule.    An Darek AcrySof model SN60WS power 22.5 diopter, 1 piece acrylic intraocular   lens serial #[id number] was called for, inspected, placed into the injector,   injected into the capsular bag and positioned.  Viscoelastic was aspirated,   anterior stromal hydration of the wounds was performed.  The wounds were   checked and found to be watertight.  Miostat was instilled into the anterior   chamber.  Sterile drapes were removed.  A drop of Vigamox was placed on the   eye and shield was placed over the eye.  The patient was taken to the recovery   room in good condition.  There were no operative complications.       ____________________________________     MD CARROLL MARTINEZ / BUNNY    DD:  08/08/2017 09:45:43  DT:  08/08/2017 10:24:31    D#:  [id number]  Job#:  [id number]

## 2018-05-03 ENCOUNTER — TELEPHONE (OUTPATIENT)
Dept: CARDIOLOGY | Facility: MEDICAL CENTER | Age: 73
End: 2018-05-03

## 2018-05-03 NOTE — TELEPHONE ENCOUNTER
I left a message for Carole to make an appointment with a cardiologist to get assessed and discuss option of moving to [place].

## 2018-05-03 NOTE — TELEPHONE ENCOUNTER
----- Message from WARREN Anders sent at 5/3/2018 10:57 AM PDT -----    According to last AM note, her cardiac status is stable, so I can't really write a letter stating otherwise.   Thanks, AB    ----- Message -----  From: Qi Clayton R.N.  Sent: 4/13/2018  11:54 AM  To: WARREN Anders    She would like to live with her daughter because her family does not want her to live alone due to her heart issues.  [place] does not allow more than 3 months in the country without a strong rationale.   Can you do this for her?    ----- Message -----  From: Kellie Lester  Sent: 4/13/2018  10:42 AM  To: Qi Clayton R.N.    BRIANA/Rashmi Go needs a letter so she can move back to [place] to be with her daughter. She can be reached at [phone].

## 2019-04-01 NOTE — OP REPORT
DATE OF SERVICE:  02/20/2017    PREOPERATIVE DIAGNOSES:  Smoker, stopped in 2014; angina, coronary artery   disease, severe hypertension, diabetes mellitus, claudication, atherosclerotic   peripheral vascular disease, occlusion of femoral arteries, chest pain,   undefined.    POSTOPERATIVE DIAGNOSES:  Smoker, stopped in 2014; angina, coronary artery   disease, severe hypertension, diabetes mellitus, claudication, atherosclerotic   peripheral vascular disease, occlusion of femoral arteries, chest pain,   undefined.    OPERATION PERFORMED:  Coronary bypass grafting x4, left FRANCISCO to the distal LAD,   reverse saphenous vein graft to the distal diagonal, reverse saphenous vein   graft to the distal circumflex, reverse saphenous vein graft to the distal   PDA, endoscopic vein harvest, transesophageal echocardiography, temporary   pacemaker.    ESTIMATED BLOOD LOSS:  50 mL Cell Saver protocol.    SURGEON:  Hernán Dowell MD    ASSISTANT:  BIN Aguilar    ANESTHESIOLOGIST:  Abdelrahman Madden MD    COMPLICATIONS:  None.    ANESTHESIA:  General anesthetic.    DESCRIPTION OF PROCEDURE:  The patient was brought to the operating room,   placed in a supine position upon the table.  After adequate general   anesthesia, patient prepped and draped in usual sterile manner.  Elbows were   protected to avoid ulnar neuropathy, chest wall expansion to avoid ulnar   neuropathy, phrenic nerve protector used to protect phrenic nerve, removed at   end of case.  Phrenic nerve was visualized and was not damaged.  It was in   continuity and its anatomy was normal.  It was normal at the end of case.    Vein was resected from the bilateral upper thighs using endoscopic vein   technique.  Side branch secured using clips, leg closed with multilayer Vicryl   and Dexon technique.  Lower legs were not harvested because of her peripheral   vascular disease.  Aleksandra Murphy harvested the vein.  Aleksandra Murphy then   first assisted the case from start to  the finish.    Midline sternal skin incision carried down to sternum, divided with saw, left   FRANCISCO was harvested, prepared for anastomosis, heparin given, ACT checked.    Patient cannulated with a 6.5 arterial cannula, A, 2-stage venous return   catheter, antegrade cardioplegia.  Began a cardiopulmonary bypass, cooled 34   degrees, cross clamp applied, cardioplegia delivered.  Heart topically cooled   with cold saline, ice flush.  Phrenic nerves were protected.  Good vessels   noted, gave cardioplegia after each distal anastomosis.    Reverse saphenous vein grafts were separately placed to the distal PDA, to the   distal major OM, and the distal diagonal 2 using 7-0 running Prolene   technique.  Cardioplegia run was delivered after each distal anastomosis.  Of   note, the posterolateral was too small for bypass.  The OM2 is too small for   bypass, although it had excellent retrograde flow once we gave cardioplegia   flow down the OM1.  Therefore, this is a well-protected area.  The diagonal 1   had excellent flow.  We did not feel this was necessary for bypass.  The OM2   received to bypass.    Cross clamp removed, side clamp placed.  Three proximal anastomoses were   performed using 6-0 running Prolene technique.  Deairing procedure carried   out.  Side clamp bulldogs removed.  Patient regained normal sinus rhythm,   brought to normothermia, good ventilation, weaned from cardiopulmonary bypass.    Lines were removed and doubly secured.  Protamine delivered.  Meticulous   hemostasis present.  AC locators and chest tubes placed.    Blood was given because of patient's extremely small size.  After going on   bypass, her hematocrit went to 20 and this is because of the pump prime and   her small body size cause this hemodilution; not bleeding.  In addition,   platelets were given because the patient was coagulopathic after the surgery   and that this is typical of her extremely fragile and thin-walled tissue, at   one  point, her blood pressure went to 230, controlled by anesthesia.  We   carefully examined all the anastomoses, connections, cannulation sites, and   these were all dry.  We spent extra time making sure good hemostasis was   present.  We did 5 hemostatic inspections of all graft sites and anastomoses.    This was meticulously dry.  In addition, we did meticulous examination for   foreign objects, none were found.  Counts were correct.    Chest tubes were placed.  Ventricular temporary pacemaker was placed.  Closure   begun.    The sternum was closed with wire followed by linea alba and pectus fascia   closure with running 0 Vicryl sutures in double-layer technique.  Skin closed   with subcuticular 4-0 Dexon suture technique.  Patient tolerated the procedure   well and will be prepared for transfer to ICU.  Prevena dressing will be   used.    Cryoprecipitate glue was also used for anastomoses because of extreme,   fragile, thin-walled nature of her hypertensive arteries.    A family conference will be held.       ____________________________________     MD ARSLAN GARCIA / BUNNY    DD:  02/20/2017 13:02:41  DT:  02/20/2017 14:56:14    D#:  [id number]  Job#:  [id number]   F25.9

## 2020-07-03 ENCOUNTER — HOSPITAL ENCOUNTER (INPATIENT)
Dept: HOSPITAL 8 - ED | Age: 75
LOS: 3 days | Discharge: HOME | DRG: 379 | End: 2020-07-06
Attending: FAMILY MEDICINE | Admitting: HOSPITALIST
Payer: MEDICARE

## 2020-07-03 VITALS — SYSTOLIC BLOOD PRESSURE: 137 MMHG | DIASTOLIC BLOOD PRESSURE: 73 MMHG

## 2020-07-03 VITALS — DIASTOLIC BLOOD PRESSURE: 82 MMHG | SYSTOLIC BLOOD PRESSURE: 168 MMHG

## 2020-07-03 VITALS — SYSTOLIC BLOOD PRESSURE: 144 MMHG | DIASTOLIC BLOOD PRESSURE: 68 MMHG

## 2020-07-03 VITALS — DIASTOLIC BLOOD PRESSURE: 72 MMHG | SYSTOLIC BLOOD PRESSURE: 152 MMHG

## 2020-07-03 VITALS — DIASTOLIC BLOOD PRESSURE: 76 MMHG | SYSTOLIC BLOOD PRESSURE: 160 MMHG

## 2020-07-03 VITALS — DIASTOLIC BLOOD PRESSURE: 72 MMHG | SYSTOLIC BLOOD PRESSURE: 141 MMHG

## 2020-07-03 VITALS — DIASTOLIC BLOOD PRESSURE: 75 MMHG | SYSTOLIC BLOOD PRESSURE: 150 MMHG

## 2020-07-03 VITALS — HEIGHT: 62 IN | WEIGHT: 129.85 LBS | BODY MASS INDEX: 23.9 KG/M2

## 2020-07-03 VITALS — SYSTOLIC BLOOD PRESSURE: 158 MMHG | DIASTOLIC BLOOD PRESSURE: 77 MMHG

## 2020-07-03 DIAGNOSIS — K92.2: Primary | ICD-10-CM

## 2020-07-03 DIAGNOSIS — I10: ICD-10-CM

## 2020-07-03 DIAGNOSIS — K57.90: ICD-10-CM

## 2020-07-03 DIAGNOSIS — E11.51: ICD-10-CM

## 2020-07-03 DIAGNOSIS — I25.2: ICD-10-CM

## 2020-07-03 DIAGNOSIS — K64.4: ICD-10-CM

## 2020-07-03 DIAGNOSIS — K44.9: ICD-10-CM

## 2020-07-03 DIAGNOSIS — Z79.82: ICD-10-CM

## 2020-07-03 DIAGNOSIS — Z20.828: ICD-10-CM

## 2020-07-03 DIAGNOSIS — D50.0: ICD-10-CM

## 2020-07-03 DIAGNOSIS — Z90.710: ICD-10-CM

## 2020-07-03 DIAGNOSIS — Z95.1: ICD-10-CM

## 2020-07-03 DIAGNOSIS — I25.10: ICD-10-CM

## 2020-07-03 LAB
% IRON SATURATION: 2 % (ref 20–55)
ALBUMIN SERPL-MCNC: 3.8 G/DL (ref 3.4–5)
ALP SERPL-CCNC: 88 U/L (ref 45–117)
ALT SERPL-CCNC: 20 U/L (ref 12–78)
ANION GAP SERPL CALC-SCNC: 6 MMOL/L (ref 5–15)
BASOPHILS # BLD AUTO: 0.02 X10^3/UL (ref 0–0.1)
BASOPHILS NFR BLD AUTO: 0 % (ref 0–1)
BILIRUB SERPL-MCNC: 0.3 MG/DL (ref 0.2–1)
CALCIUM SERPL-MCNC: 8.2 MG/DL (ref 8.5–10.1)
CHLORIDE SERPL-SCNC: 114 MMOL/L (ref 98–107)
CREAT SERPL-MCNC: 0.87 MG/DL (ref 0.55–1.02)
EOSINOPHIL # BLD AUTO: 0.28 X10^3/UL (ref 0–0.4)
EOSINOPHIL NFR BLD AUTO: 4 % (ref 1–7)
ERYTHROCYTE [DISTWIDTH] IN BLOOD BY AUTOMATED COUNT: 18.2 % (ref 9.6–15.2)
INR PPP: 0.92 (ref 0.93–1.1)
IRON LEVEL: 10 MCG/DL (ref 50–170)
LYMPHOCYTES # BLD AUTO: 1.77 X10^3/UL (ref 1–3.4)
LYMPHOCYTES NFR BLD AUTO: 23 % (ref 22–44)
MCH RBC QN AUTO: 24 PG (ref 27–34.8)
MCHC RBC AUTO-ENTMCNC: 30.5 G/DL (ref 32.4–35.8)
MCV RBC AUTO: 78.8 FL (ref 80–100)
MD: (no result)
MONOCYTES # BLD AUTO: 0.64 X10^3/UL (ref 0.2–0.8)
MONOCYTES NFR BLD AUTO: 8 % (ref 2–9)
NEUTROPHILS # BLD AUTO: 4.91 X10^3/UL (ref 1.8–6.8)
NEUTROPHILS NFR BLD AUTO: 65 % (ref 42–75)
PLATELET # BLD AUTO: 342 X10^3/UL (ref 130–400)
PMV BLD AUTO: 8.5 FL (ref 7.4–10.4)
PROT SERPL-MCNC: 7 G/DL (ref 6.4–8.2)
PROTHROMBIN TIME: 9.7 SECONDS (ref 9.6–11.5)
RBC # BLD AUTO: 2.56 X10^6/UL (ref 3.82–5.3)
TIBC SERPL-MCNC: 404 MCG/DL (ref 250–450)

## 2020-07-03 PROCEDURE — 85610 PROTHROMBIN TIME: CPT

## 2020-07-03 PROCEDURE — 36430 TRANSFUSION BLD/BLD COMPNT: CPT

## 2020-07-03 PROCEDURE — 99285 EMERGENCY DEPT VISIT HI MDM: CPT

## 2020-07-03 PROCEDURE — 80069 RENAL FUNCTION PANEL: CPT

## 2020-07-03 PROCEDURE — 36415 COLL VENOUS BLD VENIPUNCTURE: CPT

## 2020-07-03 PROCEDURE — 83550 IRON BINDING TEST: CPT

## 2020-07-03 PROCEDURE — 86900 BLOOD TYPING SEROLOGIC ABO: CPT

## 2020-07-03 PROCEDURE — 88305 TISSUE EXAM BY PATHOLOGIST: CPT

## 2020-07-03 PROCEDURE — C9113 INJ PANTOPRAZOLE SODIUM, VIA: HCPCS

## 2020-07-03 PROCEDURE — 30233N1 TRANSFUSION OF NONAUTOLOGOUS RED BLOOD CELLS INTO PERIPHERAL VEIN, PERCUTANEOUS APPROACH: ICD-10-PCS | Performed by: INTERNAL MEDICINE

## 2020-07-03 PROCEDURE — 85025 COMPLETE CBC W/AUTO DIFF WBC: CPT

## 2020-07-03 PROCEDURE — 84443 ASSAY THYROID STIM HORMONE: CPT

## 2020-07-03 PROCEDURE — 83540 ASSAY OF IRON: CPT

## 2020-07-03 PROCEDURE — 86923 COMPATIBILITY TEST ELECTRIC: CPT

## 2020-07-03 PROCEDURE — 80053 COMPREHEN METABOLIC PANEL: CPT

## 2020-07-03 PROCEDURE — 86850 RBC ANTIBODY SCREEN: CPT

## 2020-07-03 PROCEDURE — 87635 SARS-COV-2 COVID-19 AMP PRB: CPT

## 2020-07-03 PROCEDURE — 83735 ASSAY OF MAGNESIUM: CPT

## 2020-07-03 PROCEDURE — P9016 RBC LEUKOCYTES REDUCED: HCPCS

## 2020-07-03 RX ADMIN — CARVEDILOL SCH MG: 6.25 TABLET, FILM COATED ORAL at 20:51

## 2020-07-03 RX ADMIN — INSULIN LISPRO SCH NOTE: 100 INJECTION, SOLUTION INTRAVENOUS; SUBCUTANEOUS at 21:22

## 2020-07-03 RX ADMIN — ATORVASTATIN CALCIUM SCH MG: 40 TABLET, FILM COATED ORAL at 20:51

## 2020-07-03 NOTE — NUR
REPORT TO LEONIDES ON THE FLOOR. LEONIDES RN AWARE OF NEED TO START BLOOD AS IT WAS 
NOT READY PRIOR TO ROOM ASSIGNMENT AND REPORT BEING CALLED.  JET ZIMMER AT BEDSIDE 
THEN PT WILL BE TRANSPORTED.

## 2020-07-04 VITALS — DIASTOLIC BLOOD PRESSURE: 82 MMHG | SYSTOLIC BLOOD PRESSURE: 169 MMHG

## 2020-07-04 VITALS — SYSTOLIC BLOOD PRESSURE: 168 MMHG | DIASTOLIC BLOOD PRESSURE: 82 MMHG

## 2020-07-04 VITALS — SYSTOLIC BLOOD PRESSURE: 148 MMHG | DIASTOLIC BLOOD PRESSURE: 70 MMHG

## 2020-07-04 VITALS — SYSTOLIC BLOOD PRESSURE: 149 MMHG | DIASTOLIC BLOOD PRESSURE: 78 MMHG

## 2020-07-04 VITALS — SYSTOLIC BLOOD PRESSURE: 176 MMHG | DIASTOLIC BLOOD PRESSURE: 79 MMHG

## 2020-07-04 LAB
ALBUMIN SERPL-MCNC: 3.4 G/DL (ref 3.4–5)
ALP SERPL-CCNC: 82 U/L (ref 45–117)
ALT SERPL-CCNC: 16 U/L (ref 12–78)
ANION GAP SERPL CALC-SCNC: 8 MMOL/L (ref 5–15)
BASOPHILS # BLD AUTO: 0.04 X10^3/UL (ref 0–0.1)
BASOPHILS NFR BLD AUTO: 1 % (ref 0–1)
BILIRUB SERPL-MCNC: 1.3 MG/DL (ref 0.2–1)
CALCIUM SERPL-MCNC: 8.3 MG/DL (ref 8.5–10.1)
CHLORIDE SERPL-SCNC: 116 MMOL/L (ref 98–107)
CREAT SERPL-MCNC: 0.85 MG/DL (ref 0.55–1.02)
EOSINOPHIL # BLD AUTO: 0.41 X10^3/UL (ref 0–0.4)
EOSINOPHIL NFR BLD AUTO: 5 % (ref 1–7)
ERYTHROCYTE [DISTWIDTH] IN BLOOD BY AUTOMATED COUNT: 16.9 % (ref 9.6–15.2)
LYMPHOCYTES # BLD AUTO: 2.23 X10^3/UL (ref 1–3.4)
LYMPHOCYTES NFR BLD AUTO: 28 % (ref 22–44)
MCH RBC QN AUTO: 25.4 PG (ref 27–34.8)
MCHC RBC AUTO-ENTMCNC: 31.1 G/DL (ref 32.4–35.8)
MCV RBC AUTO: 81.9 FL (ref 80–100)
MD: NO
MONOCYTES # BLD AUTO: 0.81 X10^3/UL (ref 0.2–0.8)
MONOCYTES NFR BLD AUTO: 10 % (ref 2–9)
NEUTROPHILS # BLD AUTO: 4.45 X10^3/UL (ref 1.8–6.8)
NEUTROPHILS NFR BLD AUTO: 56 % (ref 42–75)
PLATELET # BLD AUTO: 297 X10^3/UL (ref 130–400)
PMV BLD AUTO: 8.9 FL (ref 7.4–10.4)
PROT SERPL-MCNC: 6.8 G/DL (ref 6.4–8.2)
RBC # BLD AUTO: 3.45 X10^6/UL (ref 3.82–5.3)

## 2020-07-04 RX ADMIN — CARVEDILOL SCH MG: 6.25 TABLET, FILM COATED ORAL at 10:06

## 2020-07-04 RX ADMIN — Medication SCH TAB: at 10:06

## 2020-07-04 RX ADMIN — AMLODIPINE BESYLATE SCH MG: 10 TABLET ORAL at 10:06

## 2020-07-04 RX ADMIN — PANTOPRAZOLE SODIUM SCH MG: 40 INJECTION, POWDER, FOR SOLUTION INTRAVENOUS at 07:59

## 2020-07-04 RX ADMIN — CARVEDILOL SCH MG: 6.25 TABLET, FILM COATED ORAL at 21:41

## 2020-07-04 RX ADMIN — LOSARTAN POTASSIUM SCH MG: 100 TABLET, FILM COATED ORAL at 10:03

## 2020-07-04 RX ADMIN — CLOPIDOGREL SCH MG: 75 TABLET, FILM COATED ORAL at 10:06

## 2020-07-04 RX ADMIN — INSULIN LISPRO SCH NOTE: 100 INJECTION, SOLUTION INTRAVENOUS; SUBCUTANEOUS at 08:00

## 2020-07-04 RX ADMIN — OXYCODONE HYDROCHLORIDE AND ACETAMINOPHEN SCH MG: 500 TABLET ORAL at 10:05

## 2020-07-04 RX ADMIN — BUDESONIDE AND FORMOTEROL FUMARATE DIHYDRATE SCH TAB: 160; 4.5 AEROSOL RESPIRATORY (INHALATION) at 09:00

## 2020-07-04 RX ADMIN — DOCUSATE SODIUM 50MG AND SENNOSIDES 8.6MG SCH TAB: 8.6; 5 TABLET, FILM COATED ORAL at 09:00

## 2020-07-04 RX ADMIN — VITAMIN D, TAB 1000IU (100/BT) SCH UNITS: 25 TAB at 09:00

## 2020-07-04 RX ADMIN — ATORVASTATIN CALCIUM SCH MG: 40 TABLET, FILM COATED ORAL at 21:41

## 2020-07-04 RX ADMIN — INSULIN LISPRO SCH NOTE: 100 INJECTION, SOLUTION INTRAVENOUS; SUBCUTANEOUS at 16:00

## 2020-07-05 VITALS — SYSTOLIC BLOOD PRESSURE: 154 MMHG | DIASTOLIC BLOOD PRESSURE: 80 MMHG

## 2020-07-05 VITALS — DIASTOLIC BLOOD PRESSURE: 87 MMHG | SYSTOLIC BLOOD PRESSURE: 173 MMHG

## 2020-07-05 VITALS — SYSTOLIC BLOOD PRESSURE: 157 MMHG | DIASTOLIC BLOOD PRESSURE: 70 MMHG

## 2020-07-05 VITALS — SYSTOLIC BLOOD PRESSURE: 113 MMHG | DIASTOLIC BLOOD PRESSURE: 66 MMHG

## 2020-07-05 LAB
ALBUMIN SERPL-MCNC: 3.6 G/DL (ref 3.4–5)
ANION GAP SERPL CALC-SCNC: 7 MMOL/L (ref 5–15)
BASOPHILS # BLD AUTO: 0.04 X10^3/UL (ref 0–0.1)
BASOPHILS NFR BLD AUTO: 1 % (ref 0–1)
CALCIUM SERPL-MCNC: 8.9 MG/DL (ref 8.5–10.1)
CHLORIDE SERPL-SCNC: 113 MMOL/L (ref 98–107)
CREAT SERPL-MCNC: 0.8 MG/DL (ref 0.55–1.02)
EOSINOPHIL # BLD AUTO: 0.33 X10^3/UL (ref 0–0.4)
EOSINOPHIL NFR BLD AUTO: 5 % (ref 1–7)
ERYTHROCYTE [DISTWIDTH] IN BLOOD BY AUTOMATED COUNT: 18.1 % (ref 9.6–15.2)
LYMPHOCYTES # BLD AUTO: 2.48 X10^3/UL (ref 1–3.4)
LYMPHOCYTES NFR BLD AUTO: 39 % (ref 22–44)
MCH RBC QN AUTO: 25.4 PG (ref 27–34.8)
MCHC RBC AUTO-ENTMCNC: 31.5 G/DL (ref 32.4–35.8)
MCV RBC AUTO: 80.5 FL (ref 80–100)
MD: NO
MONOCYTES # BLD AUTO: 0.65 X10^3/UL (ref 0.2–0.8)
MONOCYTES NFR BLD AUTO: 10 % (ref 2–9)
NEUTROPHILS # BLD AUTO: 2.85 X10^3/UL (ref 1.8–6.8)
NEUTROPHILS NFR BLD AUTO: 45 % (ref 42–75)
PLATELET # BLD AUTO: 293 X10^3/UL (ref 130–400)
PMV BLD AUTO: 9.1 FL (ref 7.4–10.4)
RBC # BLD AUTO: 3.78 X10^6/UL (ref 3.82–5.3)

## 2020-07-05 PROCEDURE — 0DB98ZX EXCISION OF DUODENUM, VIA NATURAL OR ARTIFICIAL OPENING ENDOSCOPIC, DIAGNOSTIC: ICD-10-PCS | Performed by: INTERNAL MEDICINE

## 2020-07-05 PROCEDURE — 0DJD8ZZ INSPECTION OF LOWER INTESTINAL TRACT, VIA NATURAL OR ARTIFICIAL OPENING ENDOSCOPIC: ICD-10-PCS | Performed by: INTERNAL MEDICINE

## 2020-07-05 RX ADMIN — AMLODIPINE BESYLATE SCH MG: 10 TABLET ORAL at 10:22

## 2020-07-05 RX ADMIN — OXYCODONE HYDROCHLORIDE AND ACETAMINOPHEN SCH MG: 500 TABLET ORAL at 09:00

## 2020-07-05 RX ADMIN — CARVEDILOL SCH MG: 6.25 TABLET, FILM COATED ORAL at 20:41

## 2020-07-05 RX ADMIN — Medication SCH TAB: at 09:00

## 2020-07-05 RX ADMIN — LOSARTAN POTASSIUM SCH MG: 100 TABLET, FILM COATED ORAL at 10:22

## 2020-07-05 RX ADMIN — BUPROPION HYDROCHLORIDE SCH TAB: 300 TABLET, FILM COATED, EXTENDED RELEASE ORAL at 09:00

## 2020-07-05 RX ADMIN — BUDESONIDE AND FORMOTEROL FUMARATE DIHYDRATE SCH TAB: 160; 4.5 AEROSOL RESPIRATORY (INHALATION) at 09:00

## 2020-07-05 RX ADMIN — VITAMIN D, TAB 1000IU (100/BT) SCH UNITS: 25 TAB at 10:23

## 2020-07-05 RX ADMIN — PANTOPRAZOLE SODIUM SCH MG: 40 INJECTION, POWDER, FOR SOLUTION INTRAVENOUS at 07:44

## 2020-07-05 RX ADMIN — ATORVASTATIN CALCIUM SCH MG: 40 TABLET, FILM COATED ORAL at 20:41

## 2020-07-05 RX ADMIN — DOCUSATE SODIUM 50MG AND SENNOSIDES 8.6MG SCH TAB: 8.6; 5 TABLET, FILM COATED ORAL at 09:00

## 2020-07-05 RX ADMIN — INSULIN LISPRO SCH NOTE: 100 INJECTION, SOLUTION INTRAVENOUS; SUBCUTANEOUS at 00:00

## 2020-07-05 RX ADMIN — CARVEDILOL SCH MG: 6.25 TABLET, FILM COATED ORAL at 10:22

## 2020-07-05 RX ADMIN — CLOPIDOGREL SCH MG: 75 TABLET, FILM COATED ORAL at 10:22

## 2020-07-06 VITALS — DIASTOLIC BLOOD PRESSURE: 84 MMHG | SYSTOLIC BLOOD PRESSURE: 172 MMHG

## 2020-07-06 VITALS — DIASTOLIC BLOOD PRESSURE: 91 MMHG | SYSTOLIC BLOOD PRESSURE: 157 MMHG

## 2020-07-06 RX ADMIN — DOCUSATE SODIUM 50MG AND SENNOSIDES 8.6MG SCH TAB: 8.6; 5 TABLET, FILM COATED ORAL at 08:34

## 2020-07-06 RX ADMIN — CLOPIDOGREL SCH MG: 75 TABLET, FILM COATED ORAL at 08:32

## 2020-07-06 RX ADMIN — BUDESONIDE AND FORMOTEROL FUMARATE DIHYDRATE SCH TAB: 160; 4.5 AEROSOL RESPIRATORY (INHALATION) at 08:35

## 2020-07-06 RX ADMIN — Medication SCH TAB: at 08:31

## 2020-07-06 RX ADMIN — AMLODIPINE BESYLATE SCH MG: 10 TABLET ORAL at 08:31

## 2020-07-06 RX ADMIN — OXYCODONE HYDROCHLORIDE AND ACETAMINOPHEN SCH MG: 500 TABLET ORAL at 08:30

## 2020-07-06 RX ADMIN — PANTOPRAZOLE SODIUM SCH MG: 40 INJECTION, POWDER, FOR SOLUTION INTRAVENOUS at 08:31

## 2020-07-06 RX ADMIN — VITAMIN D, TAB 1000IU (100/BT) SCH UNITS: 25 TAB at 08:33

## 2020-07-06 RX ADMIN — LOSARTAN POTASSIUM SCH MG: 100 TABLET, FILM COATED ORAL at 08:46

## 2020-07-06 RX ADMIN — CARVEDILOL SCH MG: 6.25 TABLET, FILM COATED ORAL at 08:31

## 2020-07-06 RX ADMIN — BUPROPION HYDROCHLORIDE SCH TAB: 300 TABLET, FILM COATED, EXTENDED RELEASE ORAL at 06:16

## 2021-03-08 NOTE — PSYCHIATRY
"PSYCHIATRIC CONSULTATION:  Reason for admission: CP resulting in CABG    Reason for consult: depression and alcohol  Requesting Physician:Antonio Lee M.D.     Legal status: na    Chief Complaint:depression    HPI: 71 female that has been on lexapro 10 mg for many years for depression which she states started after having \"brain surgery\" for a pituitary tumor in 2008 prescribed by her PCP. She feels it has been helpful. She also drink 4 days a week, never on nights before work, in the amount of 4 vodka and gonzalez. This last year she has felt more down identifying some stressors as being arguments with a dtr over her drinking. She has trouble sleeping and says the alcohol helps her feel \"better\", \"less pressured\" so she can sleep.. Appetite is unchanged, energy down, motivation decreased but not hopeless. No SI/HI, or wishes to die. . No psychosis. Anxiety this last month, uncertain why, no panic.     Admits she agreed to see psych because family insisted.    Psychiatric Review of Systems:current symptoms as reported by pt.  Depression:  As noted.    Lashae: denies  Anxiety/Panic Attacks as noted  Psychosis:denies     Psychiatric Examination: observed phenomenon:  Vitals:Blood pressure 106/73, pulse 62, temperature 37.1 °C (98.8 °F), resp. rate 14, height 1.57 m (5' 1.81\"), weight 69.2 kg (152 lb 8.9 oz), SpO2 97 %.  Musculoskeletal(abnormal movements, gait, etc):none noted.   Appearance: clean, well groomed, glasses, holding a pillow to her chest and sometimes stops talking secondary to pain.good eye contact. Nice nails.  Thoughts: linear, no psychosis  Speech:wnl  Mood: depressed, mild  Affect: appropriate but frequently also looks euthymic  SI/HI:   denies  Attention/Alertness:intact  Memory: intact  Orientation: x 4  Fund of Knowledge:  good  Insight/Judgement: fair to good for depression, less good for alcohol use.    Medical Review of Systems: as reported by pt. All systems reviewed. Only those found to be + " are noted below. All others are negative.   Neurological:    TBIs:denies   SZs:denies   Strokes:denies   Other:pituitary tumor 2008  Other medical conditions:DM, HTN, trouble swallowing at times with regurgitation. She isn't sure if she has a hernia or not.    Past Psychiatric Hx: as noted. No SAs, hospitalizations, other meds, branden, etc. Hs of having therapy with her dtr when the latter was 15.     Family Psychiatric Hx:mom depressed, with SAs and alcohol.    Social Hx: lives alone, 2 children who live out of town but came in for this surgery. Lives in [place] around [facility]. 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 HS grad and beauty school grad.     Drug/Alcohol/Tobacco Hx:   Drugs:denies   Alcohol:as noted.    Medical Hx: labs, MARS, medications, etc were reviewed. Only those findings of potential interest to psychiatry are noted below:  Medical Conditions:s/p CABG, hypothyroidism, HTN, DM, UTI  Allergies:Tape    Medications (currently prescribed at [facility]):lexapro 10 mg  Labs:Results for QUEENIE ROSE (MRN [id number]) as of 2/21/2017 14:27   Ref. Range 2/21/2017 05:00   WBC Latest Ref Range: 4.8-10.8 K/uL 14.6 (H)   Results for QUEENIE ROSE (MRN [id number]) as of 2/21/2017 14:27   Ref. Range 2/21/2017 05:00   Hemoglobin Latest Ref Range: 12.0-16.0 g/dL 10.3 (L)   Hematocrit Latest Ref Range: 37.0-47.0 % 30.4 (L)   Results for QUEENIE ROSE (MRN [id number]) as of 2/21/2017 14:27   Ref. Range 2/21/2017 05:00   Platelet Count Latest Ref Range: 164-446 K/uL 155 (L)   Results for QUEENIE ROSE (MRN [id number]) as of 2/21/2017 14:27   Ref. Range 2/20/2017 03:00   Leukocyte Esterase Latest Ref Range: Negative  Moderate (A)     ECG: QTc 464      ASSESSMENT: (new dx, acuity level)  Depressive Disorder Unspec.   Adjustment Disorder with anxiety, R/o anxiety Disorder Unspec: this last month. Possibly related to CP, etc.  Alcohol Use Disorder    PLAN:increase  lexapro to 20 mg. She agrees. Discussed liver functions in terms of making different proteins, some for coagulation, how alcohol can interfere with meds and if it damages her liver, what can happen, bleeding etc. Recommended AA. Though she didn't identify clear reasons for therapy, it might help.   Legal status: na     Will follow   Thank you for the consult.   Hydroxychloroquine Counseling:  I discussed with the patient that a baseline ophthalmologic exam is needed at the start of therapy and every year thereafter while on therapy. A CBC may also be warranted for monitoring.  The side effects of this medication were discussed with the patient, including but not limited to agranulocytosis, aplastic anemia, seizures, rashes, retinopathy, and liver toxicity. Patient instructed to call the office should any adverse effect occur.  The patient verbalized understanding of the proper use and possible adverse effects of Plaquenil.  All the patient's questions and concerns were addressed.

## 2021-10-06 ENCOUNTER — TELEPHONE (OUTPATIENT)
Dept: CARDIOLOGY | Facility: MEDICAL CENTER | Age: 76
End: 2021-10-06

## 2021-10-06 NOTE — TELEPHONE ENCOUNTER
Chart Prep        Spoke to pt to see if they have seen a cardiologist recently and she stated that she went to [facility] and did some testings and has records over there. I will be requesting for those records. Pt was reminded on their appt date and time.

## 2021-10-13 ENCOUNTER — OFFICE VISIT (OUTPATIENT)
Dept: CARDIOLOGY | Facility: MEDICAL CENTER | Age: 76
End: 2021-10-13
Payer: MEDICARE

## 2021-10-13 VITALS
HEIGHT: 62 IN | OXYGEN SATURATION: 96 % | DIASTOLIC BLOOD PRESSURE: 80 MMHG | BODY MASS INDEX: 25.03 KG/M2 | HEART RATE: 66 BPM | WEIGHT: 136 LBS | SYSTOLIC BLOOD PRESSURE: 134 MMHG | RESPIRATION RATE: 16 BRPM

## 2021-10-13 DIAGNOSIS — E78.2 MIXED HYPERLIPIDEMIA: ICD-10-CM

## 2021-10-13 DIAGNOSIS — I70.213 ATHEROSCLEROSIS OF NATIVE ARTERY OF BOTH LOWER EXTREMITIES WITH INTERMITTENT CLAUDICATION (HCC): ICD-10-CM

## 2021-10-13 DIAGNOSIS — I10 HTN (HYPERTENSION), MALIGNANT: ICD-10-CM

## 2021-10-13 DIAGNOSIS — I25.10 CORONARY ARTERY DISEASE INVOLVING NATIVE CORONARY ARTERY OF NATIVE HEART WITHOUT ANGINA PECTORIS: ICD-10-CM

## 2021-10-13 DIAGNOSIS — E11.65 TYPE 2 DIABETES MELLITUS WITH HYPERGLYCEMIA, WITHOUT LONG-TERM CURRENT USE OF INSULIN (HCC): ICD-10-CM

## 2021-10-13 DIAGNOSIS — Z95.1 HX OF CABG: ICD-10-CM

## 2021-10-13 LAB — EKG IMPRESSION: NORMAL

## 2021-10-13 PROCEDURE — 99204 OFFICE O/P NEW MOD 45 MIN: CPT | Performed by: INTERNAL MEDICINE

## 2021-10-13 PROCEDURE — 93000 ELECTROCARDIOGRAM COMPLETE: CPT | Performed by: INTERNAL MEDICINE

## 2021-10-13 RX ORDER — PANTOPRAZOLE SODIUM 40 MG/1
40 TABLET, DELAYED RELEASE ORAL
COMMUNITY
Start: 2021-07-29

## 2021-10-13 RX ORDER — ROSUVASTATIN CALCIUM 10 MG/1
10 TABLET, COATED ORAL
COMMUNITY
Start: 2021-07-29

## 2021-10-13 ASSESSMENT — ENCOUNTER SYMPTOMS
NAUSEA: 0
LOSS OF CONSCIOUSNESS: 0
VOMITING: 0
HALLUCINATIONS: 0
SENSORY CHANGE: 0
EYE DISCHARGE: 0
BRUISES/BLEEDS EASILY: 0
SPEECH CHANGE: 0
FALLS: 0
DOUBLE VISION: 0
PALPITATIONS: 0
HEADACHES: 0
CHILLS: 0
COUGH: 0
PND: 0
DEPRESSION: 0
EYE PAIN: 0
ORTHOPNEA: 0
CLAUDICATION: 1
FEVER: 0
BLURRED VISION: 0
ABDOMINAL PAIN: 0
DIZZINESS: 0
BLOOD IN STOOL: 0
WEIGHT LOSS: 0
SHORTNESS OF BREATH: 0
MYALGIAS: 0

## 2021-10-13 NOTE — PROGRESS NOTES
Chief Complaint   Patient presents with   • Hyperlipidemia   • Coronary Artery Disease   • Hypertension       Subjective     Carole Kingsley is a 75 y.o. female who presents today for cardiac care and evaluation along with treatment due to claudication.  This has been ongoing for 2 years but has gotten worse.  Her legs do get tired with exertion and some pain associated with fatigue.  Patient does have prior history of peripheral arterial disease for which she had angioplasties in her bilateral lower extremities in 2015.  In February 2017, patient also underwent CABG with LIMA to LAD, SVG to distal diagonal, left circumflex and PDA.    She [facility] not [facility].  Although she does have follow-up with regular cardiologist in her hometown in [place].  She did have recent echo stress test done in 2020 which showed normal findings.  I do not have the images of her study but the report is available.    I have personally interpreted EKG today with patient, there is no evidence of acute coronary syndrome, no evidence of prior infarct, normal NV and QT interval, no significant conduction disease. Sinus rhythm.      Past Medical History:   Diagnosis Date   • Arthritis    • Coronary artery disease 2014   • DIABETES MELLITUS     Oral meds   • Gout    • Granular cell tumor 2007    Pituitary / status post transsphenoidal resection   • Heart burn    • Hemorrhagic disorder (HCC)     plavix   • Hiatus hernia syndrome    • High cholesterol    • Hyperlipidemia     Especially hypertriglyceridemia   • Hypertension    • Hypothyroidism     Primary   • Infectious disease     inf r knee 2/2017   • Myocardial infarct (HCC) 2017     CABG x4 vessels   • Peripheral vascular disease with claudication (HCC) 2014    Status post corrective surgery   • Pituitary adenoma (HCC) 2008   • Psychiatric problem     Anxiety   • Renal disorder 2013    Hospitalized for kidney infection   • S/P CABG x 4 February 2017    CABG x 4  (LIMA to distal LAD, rSVG to distal diagonal, rSVG to distal circumflex, rSVG to distal PDA) by Dr. Dowell.   • Unspecified cataract     Early stage   • Vitamin D deficiency      Past Surgical History:   Procedure Laterality Date   • CATARACT PHACO WITH IOL Left 8/7/2017    Procedure: CATARACT PHACO WITH IOL;  Surgeon: Maricarmen Rogers M.D.;  Location: SURGERY SAME DAY [facility];  Service:    • CATARACT PHACO WITH IOL Right 7/17/2017    Procedure: CATARACT PHACO WITH IOL;  Surgeon: Maricarmen Rogers M.D.;  Location: SURGERY SAME DAY [facility] ER ORS;  Service:    • MULTIPLE CORONARY ARTERY BYPASS ENDO VEIN HARVEST  2/20/2017    Procedure: MULTIPLE CORONARY ARTERY BYPASS ENDO VEIN HARVEST X4, PREVENA DRESSING;  Surgeon: Hernán Dowell M.D.;  Location: SURGERY [facility];  Service:    • PARUL  2/20/2017    Procedure: PARUL ;  Surgeon: Hernán Dowell M.D.;  Location: SURGERY [facility];  Service:    • MULTIPLE CORONARY ARTERY BYPASS  February 2017    CABG x 4 (LIMA to distal LAD, rSVG to distal diagonal, rSVG to distal circumflex, rSVG to distal PDA) by Dr. Dowell.   • RECOVERY  10/3/2014    Performed by Cath-Recovery Surgery at SURGERY SAME DAY [facility] ORS   • RECOVERY  7/14/2014    Performed by Ir-Recovery Surgery at SURGERY SAME DAY [facility] ORS   • ANGIOPLASTY  7/2014    Legs   • RECOVERY  6/12/2014    Performed by Ir-Recovery Surgery at SURGERY [facility] ORS   • OTHER  2013    Eye (laser)   • HYSTERECTOMY, TOTAL ABDOMINAL  1960     Family History   Problem Relation Age of Onset   • Other Mother         Colon cancer   • Heart Attack Paternal Uncle         MI in 60     Social History     Socioeconomic History   • Marital status:      Spouse name: Not on file   • Number of children: Not on file   • Years of education: Not on file   • Highest education level: Not on file   Occupational History   • Not on file   Tobacco Use   • Smoking status: Former Smoker     Years: 25.00     Types: Cigarettes     Quit date:  "2014     Years since quittin.7   • Smokeless tobacco: Never Used   Substance and Sexual Activity   • Alcohol use: No     Alcohol/week: 1.0 oz     Types: 2 Standard drinks or equivalent per week     Comment: 2 a night but not for the past month/ Vodka   • Drug use: No   • Sexual activity: Not on file   Other Topics Concern   • Not on file   Social History Narrative   • Not on file     Social Determinants of Health     Financial Resource Strain:    • Difficulty of Paying Living Expenses:    Food Insecurity:    • Worried About Running Out of Food in the Last Year:    • Ran Out of Food in the Last Year:    Transportation Needs:    • Lack of Transportation (Medical):    • Lack of Transportation (Non-Medical):    Physical Activity:    • Days of Exercise per Week:    • Minutes of Exercise per Session:    Stress:    • Feeling of Stress :    Social Connections:    • Frequency of Communication with Friends and Family:    • Frequency of Social Gatherings with Friends and Family:    • Attends [employer] Services:    • Active Member of Clubs or Organizations:    • Attends Club or Organization Meetings:    • Marital Status:    Intimate Partner Violence:    • Fear of Current or Ex-Partner:    • Emotionally Abused:    • Physically Abused:    • Sexually Abused:      Allergies   Allergen Reactions   • Tape Contact Dermatitis     \"paper is OK\"     Outpatient Encounter Medications as of 10/13/2021   Medication Sig Dispense Refill   • rosuvastatin (CRESTOR) 10 MG Tab Take 10 mg by mouth.     • pantoprazole (PROTONIX) 40 MG Tablet Delayed Response Take 40 mg by mouth every day.     • atorvastatin (LIPITOR) 40 MG Tab Take 1 Tab by mouth every bedtime. 90 Tab 3   • diazepam (VALIUM) 5 MG Tab Take 1 Tab by mouth 4 times a day as needed for Anxiety. 30 Tab 0   • losartan (COZAAR) 100 MG Tab Take 1 Tab by mouth every day. 30 Tab 0   • carvedilol (COREG) 6.25 MG Tab Take 1 Tab by mouth 2 times a day, with meals. 60 Tab 0   • amlodipine " (NORVASC) 5 MG Tab Take 1 Tab by mouth every day. 30 Tab 0   • ferrous gluconate (FERGON) 324 (38 FE) MG Tab Take 1 Tab by mouth 2 times a day, with meals. 60 Tab 0   • multivitamin (THERAGRAN) Tab Take 1 Tab by mouth every day. 30 Tab 0   • ascorbic acid (VITAMIN C) 500 MG tablet Take 1 Tab by mouth 3 times a day. 90 Tab 0   • aspirin EC (ECOTRIN) 81 MG Tablet Delayed Response Take 81 mg by mouth every day.     • levothyroxine (SYNTHROID) 75 MCG Tab Take 75 mcg by mouth Every morning on an empty stomach.     • clopidogrel (PLAVIX) 75 MG Tab Take 1 Tab by mouth every day. 30 Tab 2   • escitalopram (LEXAPRO) 20 MG tablet Take 1 Tab by mouth every day. 30 Tab    • [DISCONTINUED] metformin ER (GLUCOPHAGE XR) 500 MG TABLET SR 24 HR Take 500 mg by mouth 2 times a day, with meals. (Patient not taking: Reported on 10/13/2021)     • [DISCONTINUED] gemfibrozil (LOPID) 600 MG Tab TAKE ONE TABLET BY MOUTH TWICE DAILY (Patient not taking: Reported on 10/13/2021) 60 Tab 0     No facility-administered encounter medications on file as of 10/13/2021.     Review of Systems   Constitutional: Negative for chills, fever, malaise/fatigue and weight loss.   HENT: Negative for ear discharge, ear pain, hearing loss and nosebleeds.    Eyes: Negative for blurred vision, double vision, pain and discharge.   Respiratory: Negative for cough and shortness of breath.    Cardiovascular: Positive for claudication. Negative for chest pain, palpitations, orthopnea, leg swelling and PND.   Gastrointestinal: Negative for abdominal pain, blood in stool, melena, nausea and vomiting.   Genitourinary: Negative for dysuria and hematuria.   Musculoskeletal: Negative for falls, joint pain and myalgias.   Skin: Negative for itching and rash.   Neurological: Negative for dizziness, sensory change, speech change, loss of consciousness and headaches.   Endo/Heme/Allergies: Negative for environmental allergies. Does not bruise/bleed easily.  "  Psychiatric/Behavioral: Negative for depression, hallucinations and suicidal ideas.              Objective     /80 (BP Location: Left arm, Patient Position: Sitting, BP Cuff Size: Adult)   Pulse 66   Resp 16   Ht 1.575 m (5' 2\")   Wt 61.7 kg (136 lb)   SpO2 96%   BMI 24.87 kg/m²     Physical Exam  Vitals and nursing note reviewed.   Constitutional:       General: She is not in acute distress.     Appearance: She is not diaphoretic.   HENT:      Head: Normocephalic and atraumatic.      Right Ear: External ear normal.      Left Ear: External ear normal.   Eyes:      General:         Right eye: No discharge.         Left eye: No discharge.   Neck:      Thyroid: No thyromegaly.      Vascular: No JVD.   Cardiovascular:      Rate and Rhythm: Normal rate and regular rhythm.      Pulses: Normal pulses.   Pulmonary:      Effort: No respiratory distress.   Abdominal:      General: Bowel sounds are normal.   Musculoskeletal:         General: No tenderness.   Skin:     General: Skin is warm and dry.   Neurological:      Mental Status: She is alert and oriented to person, place, and time.      Cranial Nerves: No cranial nerve deficit.   Psychiatric:         Behavior: Behavior normal.       Assessment & Plan     1. Atherosclerosis of native artery of both lower extremities with intermittent claudication (HCC)  US-EXTREMITY ARTERY LOWER BILAT W/NIYA (COMBO)   2. Hx of CABG  US-EXTREMITY ARTERY LOWER BILAT W/NIYA (COMBO)   3. HTN (hypertension), malignant  US-EXTREMITY ARTERY LOWER BILAT W/NIYA (COMBO)   4. Mixed hyperlipidemia     5. Type 2 diabetes mellitus with hyperglycemia, without long-term current use of insulin (HCC)     6. Coronary artery disease involving native coronary artery of native heart without angina pectoris         Medical Decision Making: Today's Assessment/Status/Plan:     At this time, I do think that patient is indicated to undergo bilateral lower extremities NIYA to further assess the nature of " her lower extremities circulation.    In the meantime, we will continue current medical therapy without change.  Her blood pressure is borderline at this time.  We will consider changing her regimen at the next visit if continues to be higher.

## 2021-10-20 ENCOUNTER — HOSPITAL ENCOUNTER (OUTPATIENT)
Dept: RADIOLOGY | Facility: MEDICAL CENTER | Age: 76
End: 2021-10-20
Attending: INTERNAL MEDICINE
Payer: MEDICARE

## 2021-10-20 DIAGNOSIS — Z95.1 HX OF CABG: ICD-10-CM

## 2021-10-20 DIAGNOSIS — I70.213 ATHEROSCLEROSIS OF NATIVE ARTERY OF BOTH LOWER EXTREMITIES WITH INTERMITTENT CLAUDICATION (HCC): ICD-10-CM

## 2021-10-20 DIAGNOSIS — I10 HTN (HYPERTENSION), MALIGNANT: ICD-10-CM

## 2021-10-20 PROCEDURE — 93925 LOWER EXTREMITY STUDY: CPT | Mod: 26 | Performed by: INTERNAL MEDICINE

## 2021-10-20 PROCEDURE — 93922 UPR/L XTREMITY ART 2 LEVELS: CPT | Mod: 26 | Performed by: INTERNAL MEDICINE

## 2021-10-20 PROCEDURE — 93922 UPR/L XTREMITY ART 2 LEVELS: CPT

## 2021-10-20 PROCEDURE — 93925 LOWER EXTREMITY STUDY: CPT

## 2021-10-20 NOTE — RESULT ENCOUNTER NOTE
Dear Michelle,    Can you please let Carole Kingsley know that result is not entirely normal and we will need to refer patient to  [facility] for further care?    His office is     Thank you,  Genaro.

## 2021-10-21 ENCOUNTER — TELEPHONE (OUTPATIENT)
Dept: CARDIOLOGY | Facility: MEDICAL CENTER | Age: 76
End: 2021-10-21

## 2021-10-21 DIAGNOSIS — I73.9 PERIPHERAL VASCULAR DISEASE (HCC): ICD-10-CM

## 2021-10-21 NOTE — TELEPHONE ENCOUNTER
US-EXTREMITY ARTERY LOWER BILAT    Message    ----- Message -----   From: Eugenia Gonzalez M.D.   Sent: 10/20/2021   4:05 PM PDT   To: Michelle Oquendo R.N.     Dear Michelle,     Can you please let Carole Ortega Kingsley know that result is not entirely normal and we will need to refer patient to  [facility] vascular surgery for further care?     His office is      Thank you,   Genaro.   _________________________________________________________________________________    Left detailed message for patient with results and instructions to return call if she has further questions.

## 2021-11-16 ENCOUNTER — HOSPITAL ENCOUNTER (OUTPATIENT)
Dept: RADIOLOGY | Facility: MEDICAL CENTER | Age: 76
End: 2021-11-16
Attending: SURGERY
Payer: MEDICARE

## 2021-11-16 DIAGNOSIS — I65.29 STENOSIS OF CAROTID ARTERY, UNSPECIFIED LATERALITY: ICD-10-CM

## 2021-11-16 PROCEDURE — 93880 EXTRACRANIAL BILAT STUDY: CPT

## 2021-11-16 PROCEDURE — 93880 EXTRACRANIAL BILAT STUDY: CPT | Mod: 26 | Performed by: SURGERY

## 2022-02-08 ENCOUNTER — OFFICE VISIT (OUTPATIENT)
Dept: CARDIOLOGY | Facility: MEDICAL CENTER | Age: 77
End: 2022-02-08
Payer: MEDICARE

## 2022-02-08 VITALS
RESPIRATION RATE: 16 BRPM | SYSTOLIC BLOOD PRESSURE: 140 MMHG | OXYGEN SATURATION: 97 % | WEIGHT: 138 LBS | HEART RATE: 64 BPM | BODY MASS INDEX: 25.4 KG/M2 | DIASTOLIC BLOOD PRESSURE: 90 MMHG | HEIGHT: 62 IN

## 2022-02-08 DIAGNOSIS — I73.9 PERIPHERAL VASCULAR DISEASE (HCC): ICD-10-CM

## 2022-02-08 DIAGNOSIS — E78.2 MIXED HYPERLIPIDEMIA: ICD-10-CM

## 2022-02-08 DIAGNOSIS — I10 HTN (HYPERTENSION), MALIGNANT: ICD-10-CM

## 2022-02-08 DIAGNOSIS — E11.65 TYPE 2 DIABETES MELLITUS WITH HYPERGLYCEMIA, WITHOUT LONG-TERM CURRENT USE OF INSULIN (HCC): ICD-10-CM

## 2022-02-08 DIAGNOSIS — Z95.1 HX OF CABG: ICD-10-CM

## 2022-02-08 DIAGNOSIS — I70.213 ATHEROSCLEROSIS OF NATIVE ARTERY OF BOTH LOWER EXTREMITIES WITH INTERMITTENT CLAUDICATION (HCC): ICD-10-CM

## 2022-02-08 PROCEDURE — 99214 OFFICE O/P EST MOD 30 MIN: CPT | Performed by: INTERNAL MEDICINE

## 2022-02-08 RX ORDER — CARVEDILOL 12.5 MG/1
12.5 TABLET ORAL 2 TIMES DAILY WITH MEALS
Qty: 180 TABLET | Refills: 3 | Status: SHIPPED | OUTPATIENT
Start: 2022-02-08

## 2022-02-08 ASSESSMENT — ENCOUNTER SYMPTOMS
ABDOMINAL PAIN: 0
DEPRESSION: 0
BRUISES/BLEEDS EASILY: 0
LOSS OF CONSCIOUSNESS: 0
WEIGHT LOSS: 0
DIZZINESS: 0
ORTHOPNEA: 0
FEVER: 0
HALLUCINATIONS: 0
SENSORY CHANGE: 0
PALPITATIONS: 0
MYALGIAS: 0
CHILLS: 0
EYE PAIN: 0
NAUSEA: 0
HEADACHES: 0
BLOOD IN STOOL: 0
SPEECH CHANGE: 0
FALLS: 0
VOMITING: 0
SHORTNESS OF BREATH: 0
BLURRED VISION: 0
DOUBLE VISION: 0
PND: 0
CLAUDICATION: 1
COUGH: 0
EYE DISCHARGE: 0

## 2022-02-08 NOTE — PROGRESS NOTES
Chief Complaint   Patient presents with   • Hyperlipidemia   • Coronary Artery Disease   • HTN (Controlled)       Subjective     Carole Kingsley is a 75 y.o. female who presents today for cardiac care and evaluation along with treatment due to claudication.  This has been ongoing for 2 years but has gotten worse.  Her legs do get tired with exertion and some pain associated with fatigue.  Patient does have prior history of peripheral arterial disease for which she had angioplasties in her bilateral lower extremities in 2015.  In February 2017, patient also underwent CABG with LIMA to LAD, SVG to distal diagonal, left circumflex and PDA.    In the interim, patient has been doing well without having any symptoms. Patient denies having chest pain, dyspnea, palpitation, presyncope, syncope episodes.       Past Medical History:   Diagnosis Date   • Arthritis    • Coronary artery disease 2014   • DIABETES MELLITUS     Oral meds   • Gout    • Granular cell tumor 2007    Pituitary / status post transsphenoidal resection   • Heart burn    • Hemorrhagic disorder (HCC)     plavix   • Hiatus hernia syndrome    • High cholesterol    • Hyperlipidemia     Especially hypertriglyceridemia   • Hypertension    • Hypothyroidism     Primary   • Infectious disease     inf r knee 2/2017   • Myocardial infarct (HCC) 2017     CABG x4 vessels   • Peripheral vascular disease with claudication (HCC) 2014    Status post corrective surgery   • Pituitary adenoma (HCC) 2008   • Psychiatric problem     Anxiety   • Renal disorder 2013    Hospitalized for kidney infection   • S/P CABG x 4 February 2017    CABG x 4 (LIMA to distal LAD, rSVG to distal diagonal, rSVG to distal circumflex, rSVG to distal PDA) by Dr. Dowell.   • Unspecified cataract     Early stage   • Vitamin D deficiency      Past Surgical History:   Procedure Laterality Date   • CATARACT PHACO WITH IOL Left 8/7/2017    Procedure: CATARACT PHACO WITH IOL;  Surgeon: Maricarmen Rogers,  M.D.;  Location: SURGERY SAME DAY [facility];  Service:    • CATARACT PHACO WITH IOL Right 2017    Procedure: CATARACT PHACO WITH IOL;  Surgeon: Maricarmen Rogers M.D.;  Location: SURGERY SAME DAY [facility];  Service:    • MULTIPLE CORONARY ARTERY BYPASS ENDO VEIN HARVEST  2017    Procedure: MULTIPLE CORONARY ARTERY BYPASS ENDO VEIN HARVEST X4, PREVENA DRESSING;  Surgeon: Hernán Dowell M.D.;  Location: SURGERY [facility];  Service:    • PARUL  2017    Procedure: PARUL ;  Surgeon: Hernán Dowell M.D.;  Location: SURGERY [facility];  Service:    • MULTIPLE CORONARY ARTERY BYPASS  2017    CABG x 4 (LIMA to distal LAD, rSVG to distal diagonal, rSVG to distal circumflex, rSVG to distal PDA) by Dr. Dowell.   • RECOVERY  10/3/2014    Performed by Cath-Recovery Surgery at SURGERY SAME DAY [facility]   • RECOVERY  2014    Performed by Ir-Recovery Surgery at SURGERY SAME DAY [facility]   • ANGIOPLASTY  2014    Legs   • RECOVERY  2014    Performed by Ir-Recovery Surgery at SURGERY [facility]   • OTHER      Eye (laser)   • HYSTERECTOMY, TOTAL ABDOMINAL  1960     Family History   Problem Relation Age of Onset   • Other Mother         Colon cancer   • Heart Attack Paternal Uncle         MI in 60     Social History     Socioeconomic History   • Marital status:      Spouse name: Not on file   • Number of children: Not on file   • Years of education: Not on file   • Highest education level: Not on file   Occupational History   • Not on file   Tobacco Use   • Smoking status: Former Smoker     Years: 25.00     Types: Cigarettes     Quit date: 2014     Years since quittin.0   • Smokeless tobacco: Never Used   Substance and Sexual Activity   • Alcohol use: No     Alcohol/week: 1.0 oz     Types: 2 Standard drinks or equivalent per week     Comment: 2 a night but not for the past month/ Vodka   • Drug use: No   • Sexual activity: Not on file   Other Topics Concern  "  • Not on file   Social History Narrative   • Not on file     Social Determinants of Health     Financial Resource Strain:    • Difficulty of Paying Living Expenses: Not on file   Food Insecurity:    • Worried About Running Out of Food in the Last Year: Not on file   • Ran Out of Food in the Last Year: Not on file   Transportation Needs:    • Lack of Transportation (Medical): Not on file   • Lack of Transportation (Non-Medical): Not on file   Physical Activity:    • Days of Exercise per Week: Not on file   • Minutes of Exercise per Session: Not on file   Stress:    • Feeling of Stress : Not on file   Social Connections:    • Frequency of Communication with Friends and Family: Not on file   • Frequency of Social Gatherings with Friends and Family: Not on file   • Attends [employer] Services: Not on file   • Active Member of Clubs or Organizations: Not on file   • Attends Club or Organization Meetings: Not on file   • Marital Status: Not on file   Intimate Partner Violence:    • Fear of Current or Ex-Partner: Not on file   • Emotionally Abused: Not on file   • Physically Abused: Not on file   • Sexually Abused: Not on file   Housing Stability:    • Unable to Pay for Housing in the Last Year: Not on file   • Number of Places Lived in the Last Year: Not on file   • Unstable Housing in the Last Year: Not on file     Allergies   Allergen Reactions   • Tape Contact Dermatitis     \"paper is OK\"     Outpatient Encounter Medications as of 2/8/2022   Medication Sig Dispense Refill   • carvedilol (COREG) 12.5 MG Tab Take 1 Tablet by mouth 2 times a day with meals. 180 Tablet 3   • rosuvastatin (CRESTOR) 10 MG Tab Take 10 mg by mouth.     • pantoprazole (PROTONIX) 40 MG Tablet Delayed Response Take 40 mg by mouth every day.     • atorvastatin (LIPITOR) 40 MG Tab Take 1 Tab by mouth every bedtime. 90 Tab 3   • losartan (COZAAR) 100 MG Tab Take 1 Tab by mouth every day. 30 Tab 0   • amlodipine (NORVASC) 5 MG Tab Take 1 Tab by " mouth every day. 30 Tab 0   • ferrous gluconate (FERGON) 324 (38 FE) MG Tab Take 1 Tab by mouth 2 times a day, with meals. 60 Tab 0   • multivitamin (THERAGRAN) Tab Take 1 Tab by mouth every day. 30 Tab 0   • ascorbic acid (VITAMIN C) 500 MG tablet Take 1 Tab by mouth 3 times a day. 90 Tab 0   • aspirin EC (ECOTRIN) 81 MG Tablet Delayed Response Take 81 mg by mouth every day.     • clopidogrel (PLAVIX) 75 MG Tab Take 1 Tab by mouth every day. 30 Tab 2   • escitalopram (LEXAPRO) 20 MG tablet Take 1 Tab by mouth every day. 30 Tab    • [DISCONTINUED] diazepam (VALIUM) 5 MG Tab Take 1 Tab by mouth 4 times a day as needed for Anxiety. (Patient not taking: Reported on 2/8/2022) 30 Tab 0   • [DISCONTINUED] carvedilol (COREG) 6.25 MG Tab Take 1 Tab by mouth 2 times a day, with meals. 60 Tab 0   • [DISCONTINUED] levothyroxine (SYNTHROID) 75 MCG Tab Take 75 mcg by mouth Every morning on an empty stomach. (Patient not taking: Reported on 2/8/2022)       No facility-administered encounter medications on file as of 2/8/2022.     Review of Systems   Constitutional: Negative for chills, fever, malaise/fatigue and weight loss.   HENT: Negative for ear discharge, ear pain, hearing loss and nosebleeds.    Eyes: Negative for blurred vision, double vision, pain and discharge.   Respiratory: Negative for cough and shortness of breath.    Cardiovascular: Positive for claudication. Negative for chest pain, palpitations, orthopnea, leg swelling and PND.   Gastrointestinal: Negative for abdominal pain, blood in stool, melena, nausea and vomiting.   Genitourinary: Negative for dysuria and hematuria.   Musculoskeletal: Negative for falls, joint pain and myalgias.   Skin: Negative for itching and rash.   Neurological: Negative for dizziness, sensory change, speech change, loss of consciousness and headaches.   Endo/Heme/Allergies: Negative for environmental allergies. Does not bruise/bleed easily.   Psychiatric/Behavioral: Negative for  "depression, hallucinations and suicidal ideas.              Objective     /90 (BP Location: Left arm, Patient Position: Sitting, BP Cuff Size: Adult)   Pulse 64   Resp 16   Ht 1.575 m (5' 2\")   Wt 62.6 kg (138 lb)   SpO2 97%   BMI 25.24 kg/m²     Physical Exam  Vitals and nursing note reviewed.   Constitutional:       General: She is not in acute distress.     Appearance: She is not diaphoretic.   HENT:      Head: Normocephalic and atraumatic.      Right Ear: External ear normal.      Left Ear: External ear normal.   Eyes:      General:         Right eye: No discharge.         Left eye: No discharge.   Neck:      Thyroid: No thyromegaly.      Vascular: No JVD.   Cardiovascular:      Rate and Rhythm: Normal rate and regular rhythm.      Pulses: Normal pulses.   Pulmonary:      Effort: No respiratory distress.   Abdominal:      General: Bowel sounds are normal.   Musculoskeletal:         General: No tenderness.   Skin:     General: Skin is warm and dry.   Neurological:      Mental Status: She is alert and oriented to person, place, and time.      Cranial Nerves: No cranial nerve deficit.   Psychiatric:         Behavior: Behavior normal.       Assessment & Plan     1. Hx of CABG     2. Atherosclerosis of native artery of both lower extremities with intermittent claudication ([facility])     3. HTN (hypertension), malignant     4. Mixed hyperlipidemia     5. Type 2 diabetes mellitus with hyperglycemia, without long-term current use of insulin ([facility])     6. Peripheral vascular disease ([facility])         Medical Decision Making: Today's Assessment/Status/Plan:     Blood pressure is high.  Will increase Carvedilol to 12.5 mg po twice daily. Continue Amlodipine 5 mg daily.  Continue plavix, asa, BB, ARB rosuvastatin at current dose.  I will order transthoracic echocardiogram to assess for structural abnormalities.              "

## 2022-08-05 ENCOUNTER — TELEPHONE (OUTPATIENT)
Dept: CARDIOLOGY | Facility: MEDICAL CENTER | Age: 77
End: 2022-08-05
Payer: MEDICARE

## 2022-08-05 NOTE — TELEPHONE ENCOUNTER
Chart Prep      Spoke to pt regarding standing lab work and standing echo and pt stated that she has not got it done yet and also stated to cancel her appt because she wont be here. She will be calling us back to reshedule.

## 2022-11-22 NOTE — PROGRESS NOTES
FSBS 58, pt given cranberry juice and 25 mL of dextrose. Notified Dr. Edwards. Continue to monitor.    No

## 2023-08-02 ENCOUNTER — TELEPHONE (OUTPATIENT)
Dept: CARDIOLOGY | Facility: MEDICAL CENTER | Age: 78
End: 2023-08-02
Payer: MEDICARE

## 2024-01-01 NOTE — CARE PLAN
Problem: Communication  Goal: The ability to communicate needs accurately and effectively will improve  Outcome: PROGRESSING AS EXPECTED  Pt. Updated on plan of care. Pt. Educated to call for assistance. Pt. Verbalizes understanding.     Problem: Pain Management  Goal: Pain level will decrease to patient’s comfort goal  Outcome: PROGRESSING AS EXPECTED  Pt. Assessed and reassessed for pain using 0-10 pain scale. Pt. Medicated per MAR         Yes

## 2024-02-29 NOTE — PROGRESS NOTES
H&P Update:  I have seen and examined patient and there are no changes to my previous H&P.  I have again reviewed the planned procedure and risks associated, family present. Proceed with surgery today.   melatonin 3mg p.o. at bedtime  Continue venlafaxine XR 37.5mg p.o. daily

## 2024-11-20 NOTE — TELEPHONE ENCOUNTER
Well Visit, Ages 18 to 65: Care Instructions  Well visits can help you stay healthy. Your doctor has checked your overall health and may have suggested ways to take good care of yourself. Your doctor also may have recommended tests. You can help prevent illness with healthy eating, good sleep, vaccinations, regular exercise, and other steps.    Get the tests that you and your doctor decide on. Depending on your age and risks, examples might include screening for diabetes; hepatitis C; HIV; and cervical, breast, lung, and colon cancer. Screening helps find diseases before any symptoms appear.   Eat healthy foods. Choose fruits, vegetables, whole grains, lean protein, and low-fat dairy foods. Limit saturated fat and reduce salt.     Limit alcohol. Men should have no more than 2 drinks a day. Women should have no more than 1. For some people, no alcohol is the best choice.   Exercise. Get at least 30 minutes of exercise on most days of the week. Walking can be a good choice.     Reach and stay at your healthy weight. This will lower your risk for many health problems.   Take care of your mental health. Try to stay connected with friends, family, and community, and find ways to manage stress.     If you're feeling depressed or hopeless, talk to someone. A counselor can help. If you don't have a counselor, talk to your doctor.   Talk to your doctor if you think you may have a problem with alcohol or drug use. This includes prescription medicines, marijuana, and other drugs.     Avoid tobacco and nicotine: Don't smoke, vape, or chew. If you need help quitting, talk to your doctor.   Practice safer sex. Getting tested, using condoms or dental dams, and limiting sex partners can help prevent STIs.     Use birth control if it's important to you to prevent pregnancy. Talk with your doctor about your choices and what might be best for you.   Prevent problems where you can. Protect your skin from too much sun, wash your  ----- Message from Kellie Lester sent at 2/17/2017  8:27 AM PST -----  Regarding: Dr. Abad wants patient to be seen by Dr. Eugene today  AM/Rashmi Go's granddaughter Tamica was told by Dr. Abad to call Dr. Eugene this morning. He wants patient to be seen today. Tamica can be reached at [phone] until 9:30 am, after that she will be on her cell at [phone].

## (undated) DEVICE — NEEDLE CYSTOTOME OPTH VSTC  0.4MM X 16MM - (10/CA)

## (undated) DEVICE — SUCTION INSTRUMENT YANKAUER BULBOUS TIP W/O VENT (50EA/CA)

## (undated) DEVICE — SET CATHETER CENTRAL VENOUS 5X15 ORDER BY THE EACH

## (undated) DEVICE — LEAD SET 6 DISP. EKG NIHON KOHDEN

## (undated) DEVICE — TIP POLYMER I&A

## (undated) DEVICE — KIT, EYE POST-OP FOR PHACOS

## (undated) DEVICE — CARTRIDGE MONARCH C - (10EA/BX)

## (undated) DEVICE — INSERT STEALTH #1 - 10/BX

## (undated) DEVICE — DRAPE MAYO STAND - (30/CA)

## (undated) DEVICE — TUBING CLEARLINK DUO-VENT - C-FLO (48EA/CA)

## (undated) DEVICE — GLOVE BIOGEL SZ 5.5 SURGICAL PF LTX- (50PR/BX 4BX/CA)

## (undated) DEVICE — TUBE CHEST 36FR. STRAIGHT - (10EA/CA)

## (undated) DEVICE — KIT ROOM DECONTAMINATION

## (undated) DEVICE — GLOVE BIOGEL PI INDICATOR SZ 7.5 SURGICAL PF LF -(50/BX 4BX/CA)

## (undated) DEVICE — LACTATED RINGERS INJ 1000 ML - (14EA/CA 60CA/PF)

## (undated) DEVICE — GLOVE COTTON STERILE (50/CA)

## (undated) DEVICE — GLOVE BIOGEL PI INDICATOR SZ 7.0 SURGICAL PF LF - (50/BX 4BX/CA)

## (undated) DEVICE — SPRING BULLDOG 1/2 FORCE BLUE - (10/BX)

## (undated) DEVICE — KIT ENDOHARVEST SYSTEM 8 - MUST ORDER 5 AT A TIME

## (undated) DEVICE — SODIUM CHL IRRIGATION 0.9% 1000ML (12EA/CA)

## (undated) DEVICE — SET EXTENSION WITH 2 PORTS (48EA/CA) ***PART #2C8610 IS A SUBSTITUTE*****

## (undated) DEVICE — PUNCH DISP VASCULAR 4.4 - 6/BX

## (undated) DEVICE — LEAD PACING TEMP MYO - (12/BX)

## (undated) DEVICE — KIT ANESTHESIA W/CIRCUIT & 3/LT BAG W/FILTER (20EA/CA)

## (undated) DEVICE — NEEDLE SAFETY 18 GA X 1 1/2 IN (100EA/BX)

## (undated) DEVICE — CON SEDATION EA ADDL 15 MIN

## (undated) DEVICE — BLADE STERNUM SAW SURGICAL 32.0 X 6.4 MM STERILE (1/EA)

## (undated) DEVICE — SPONGE XRAY 8X4 STERL. 12PL - (10EA/TY 80TY/CA)

## (undated) DEVICE — MASK ANESTHESIA ADULT  - (100/CA)

## (undated) DEVICE — HEAD HOLDER JUNIOR/ADULT

## (undated) DEVICE — KIT  I.V. START (100EA/CA)

## (undated) DEVICE — SET BIFURCATED BLOOD - (48EA/CS)

## (undated) DEVICE — BLADE 3.0MM ANGLED DBL BEVEL WITH SAFETY (10/CA)

## (undated) DEVICE — ELECTRODE DUAL RETURN W/ CORD - (50/PK)

## (undated) DEVICE — ELECTRODE 850 FOAM ADHESIVE - HYDROGEL RADIOTRNSPRNT (50/PK)

## (undated) DEVICE — DRESSING TRANSPARENT FILM TEGADERM 2.375 X 2.75"  (100EA/BX)"

## (undated) DEVICE — SUTURE 0 ETHIBOND MO6 C/R - (12/BX) 8-18 INCH ETHICON

## (undated) DEVICE — GOWN WARMING STANDARD FLEX - (30/CA)

## (undated) DEVICE — RETRACTOR OFF PUMP OCTO ONLY - 10/BX

## (undated) DEVICE — GLOVE BIOGEL INDICATOR SZ 7.5 SURGICAL PF LTX - (50PR/BX 4BX/CA)

## (undated) DEVICE — INSERT STEALTH #5 - (10/BX)

## (undated) DEVICE — CANNULA DIVIDED ADULT CO2 - SAMPLE W/FEMALE CONNCT (25/CA)

## (undated) DEVICE — TUBING PRSS MNTR 72IN M/ M LL - (25/BX) MONIT. LINE W/MALE L/L

## (undated) DEVICE — CANISTER SUCTION 3000ML MECHANICAL FILTER AUTO SHUTOFF MEDI-VAC NONSTERILE LF DISP  (40EA/CA)

## (undated) DEVICE — SUTURE OHS

## (undated) DEVICE — SLEEVE, VASO, THIGH, MED

## (undated) DEVICE — SENSOR SPO2 NEO LNCS ADHESIVE (20/BX) SEE USER NOTES

## (undated) DEVICE — BAG RESUSCITATION DISPOSABLE - WITH MASK (10 EA/CA)

## (undated) DEVICE — MICRODRIP PRIMARY VENTED 60 (48EA/CA) THIS WAS PART #2C8428 WHICH WAS DISCONTINUED

## (undated) DEVICE — KNIFE MICROSURGICAL 15 DEGREE GREEN

## (undated) DEVICE — CON SEDATION/>5 YR 1ST 15 MIN

## (undated) DEVICE — SUTURE 2-0 VICRYL PLUS CT-1 - 8 X 18 INCH(12/BX)

## (undated) DEVICE — WIRE STEEL 5-0 B&S 20 OHS - 5/PK 12PK/BX ITEM. D5329 OR D6625 CAN BE USED AS A SUB

## (undated) DEVICE — STOPCOCK MALE 4-WAY - (50/CA)

## (undated) DEVICE — PACK LOWER EXTREMITY - (2/CA)

## (undated) DEVICE — TRAY SURESTEP FOLEY TEMP SENSING 16FR (10EA/CA) ORDER  #18764 FOR TEMP FOLEY ONLY

## (undated) DEVICE — DRAIN CHEST ADULT (6EA/CA) DELETED ITEM  ORDER #15909

## (undated) DEVICE — WATER IRRIGATION STERILE 1000ML (12EA/CA)

## (undated) DEVICE — SET LEADWIRE 5 LEAD BEDSIDE DISPOSABLE ECG (1SET OF 5/EA)

## (undated) DEVICE — TIP 0.9MM ABS 45 DEG OZIL 12 - (6/BX)

## (undated) DEVICE — BONE WAX (12PK/BX)

## (undated) DEVICE — PACK BASIC CATARACT - (4/BX)

## (undated) DEVICE — SUTURE 4-0 MONOCRYL PLUS PS-2 - 27 INCH (36/BX)

## (undated) DEVICE — CHLORAPREP 26 ML APPLICATOR - ORANGE TINT(25/CA)

## (undated) DEVICE — GLOVE BIOGEL INDICATOR SZ 7SURGICAL PF LTX - (50/BX 4BX/CA)

## (undated) DEVICE — SET IRRIGATION CYSTOSCOPY TUBE L80 IN (20EA/CA)

## (undated) DEVICE — HANDPIECE 10FT INTPLS SCT PLS IRRIGATION HAND CONTROL SET (6/PK)

## (undated) DEVICE — SUTURE 8-0 PROLENE BV175-8 EVERPONT

## (undated) DEVICE — CLIP SM INTNL HRZN TI ESCP LGT - (24EA/PK 25PK/BX)

## (undated) DEVICE — TOWELS CLOTH SURGICAL - (4/PK 20PK/CA)

## (undated) DEVICE — TRAY MULTI-LUMEN 7FR PRESSURE W/MAX BARRIER AND BIOPATCH - (5/CA)

## (undated) DEVICE — KIT TOURNIQUET DLP (40EA/PK)

## (undated) DEVICE — PACK CV DRAPING/BASIN 2PART - (1/CA)

## (undated) DEVICE — ARMBOARD  SMALL IV 9 INLONG - (25EA/CA)

## (undated) DEVICE — NEPTUNE 4 PORT MANIFOLD - (20/PK)

## (undated) DEVICE — SPONGE PEANUT - (5/PK 50PK/CA)

## (undated) DEVICE — PACK CATARACT GENERAL (4EA/CA)

## (undated) DEVICE — SENSOR CEREBRAL AND SOMATIC MONITORING (20/CA)

## (undated) DEVICE — FIBRILLAR SURGICEL 4X4 - 10/CA

## (undated) DEVICE — SYRINGE SAFETY 3 ML 18 GA X 1 1/2 BLUNT LL (100/BX 8BX/CA)

## (undated) DEVICE — GLOVE BIOGEL SZ 6.5 SURGICAL PF LTX (50PR/BX 4BX/CA)

## (undated) DEVICE — PERFUSION

## (undated) DEVICE — STAPLER SKIN DISP - (6/BX 10BX/CA) VISISTAT

## (undated) DEVICE — CATHETER IV 20 GA X 1-1/4 ---SURG.& SDS ONLY--- (50EA/BX)

## (undated) DEVICE — GLOVE BIOGEL SZ 7.5 SURGICAL PF LTX - (50PR/BX 4BX/CA)

## (undated) DEVICE — PERFUSION STAT

## (undated) DEVICE — BAG DECANTER (50EA/CS)

## (undated) DEVICE — TUBING INSUFFLATION - (10/BX)

## (undated) DEVICE — CANNULA W/ SUPPLY TUBING O2 - (50/CA)

## (undated) DEVICE — SUTURE 4-0 30CM STRATAFIX SPIRAL PS-2 (12EA/BX)

## (undated) DEVICE — TUBE E-T HI-LO CUFF 8.0MM (10EA/PK)

## (undated) DEVICE — SET SUCT.W/SLEEVE VIA-GUARD - (10/BX10BX/CA)

## (undated) DEVICE — PACK E SUTURE USED FOR - OPEN HEART  (5/BX)

## (undated) DEVICE — BANDAGE ELASTIC 4 IN X 5 YDS - LATEX FREE(10/BX 5BX/CA)

## (undated) DEVICE — KIT RADIAL ARTERY 20GA W/MAX BARRIER AND BIOPATCH  (5EA/CA) #10740 IS FOR THE SET RADIAL ARTERIAL

## (undated) DEVICE — PACK VEIN - (19/CA)

## (undated) DEVICE — SPLINT PLASTER 5 IN X 30 IN - (50EA/BX 6BX/CA)

## (undated) DEVICE — PROTECTOR ULNA NERVE - (36PR/CA)

## (undated) DEVICE — SUTURE GENERAL

## (undated) DEVICE — TRAY SKIN SCRUB PVP WET (20EA/CA) PART #DYND70356 DISCONTINUED

## (undated) DEVICE — NEEDLE FILTER ASPIRATION 18 GA X 1 1/2 IN (100EA/BX)

## (undated) DEVICE — BLOWER/MISTER (5EA/PK)

## (undated) DEVICE — SUTURE 6-0 PROLENE RB-2 D/A 30 (36PK/BX)"

## (undated) DEVICE — BAG, SPONGE COUNT 50600

## (undated) DEVICE — SYS DLV COST CLS RM TEMP - INJECTATE (CO-SET II) (10EA/CA)

## (undated) DEVICE — SYRINGE 30 ML LL (56/BX)

## (undated) DEVICE — SUTURE 5-0 PROLENE C-1 D/A 24 (36PK/BX)"

## (undated) DEVICE — SODIUM CHL. INJ. 0.9% 500ML (24EA/CA 50CA/PF)

## (undated) DEVICE — SHIELD OPTH AL GRTR CVR FOX (50EA/BX)

## (undated) DEVICE — SHUNT CORONARY 1.5 - 5/PK

## (undated) DEVICE — SOLUTION NORMOSOL-4 INJ 1000ML

## (undated) DEVICE — WATER IRRIG. STER. 1500 ML - (9/CA)

## (undated) DEVICE — BLANKET WARMING CARDIAC STRL - (5/CA)

## (undated) DEVICE — BLADE BEAVER 6900 MINI SHARP ALL AROUND (20/CA)

## (undated) DEVICE — DRESSING TRANSPARENT FILM TEGADERM 4 X 4.75" (50EA/BX)"

## (undated) DEVICE — SOD. CHL. INJ. 0.9% 1000 ML - (14EA/CA 60CA/PF)

## (undated) DEVICE — Device

## (undated) DEVICE — SET FLUID WARMING STANDARD FLOW - (10/CA)

## (undated) DEVICE — DILATORS PARSONNET 1.0MM - (5EA/CA)

## (undated) DEVICE — GOWN SURGEONS X-LARGE - DISP. (30/CA)

## (undated) DEVICE — TRANSDUCER BIFURCATED MONITORING KIT (10EA/CA)

## (undated) DEVICE — GUARD SPLASH FOR PULSEVAC (12EA/PK)